# Patient Record
Sex: FEMALE | Race: BLACK OR AFRICAN AMERICAN | NOT HISPANIC OR LATINO | URBAN - METROPOLITAN AREA
[De-identification: names, ages, dates, MRNs, and addresses within clinical notes are randomized per-mention and may not be internally consistent; named-entity substitution may affect disease eponyms.]

---

## 2020-02-21 ENCOUNTER — INPATIENT (INPATIENT)
Facility: HOSPITAL | Age: 50
LOS: 10 days | Discharge: HOME | DRG: 682 | End: 2020-03-03
Attending: INTERNAL MEDICINE | Admitting: INTERNAL MEDICINE
Payer: MEDICAID

## 2020-02-21 VITALS
WEIGHT: 190.04 LBS | RESPIRATION RATE: 22 BRPM | DIASTOLIC BLOOD PRESSURE: 98 MMHG | TEMPERATURE: 99 F | SYSTOLIC BLOOD PRESSURE: 159 MMHG | HEIGHT: 64 IN | OXYGEN SATURATION: 100 % | HEART RATE: 119 BPM

## 2020-02-21 DIAGNOSIS — D64.9 ANEMIA, UNSPECIFIED: ICD-10-CM

## 2020-02-21 LAB
ALBUMIN SERPL ELPH-MCNC: 3.1 G/DL — LOW (ref 3.3–5)
ALP SERPL-CCNC: 104 U/L — SIGNIFICANT CHANGE UP (ref 40–120)
ALT FLD-CCNC: 16 U/L — SIGNIFICANT CHANGE UP (ref 10–45)
ANION GAP SERPL CALC-SCNC: 13 MMOL/L — SIGNIFICANT CHANGE UP (ref 5–17)
ANISOCYTOSIS BLD QL: SIGNIFICANT CHANGE UP
APPEARANCE UR: CLEAR — SIGNIFICANT CHANGE UP
APTT BLD: 32.1 SEC — SIGNIFICANT CHANGE UP (ref 27.5–36.3)
AST SERPL-CCNC: 25 U/L — SIGNIFICANT CHANGE UP (ref 10–40)
BACTERIA # UR AUTO: NEGATIVE — SIGNIFICANT CHANGE UP
BASOPHILS # BLD AUTO: 0 K/UL — SIGNIFICANT CHANGE UP (ref 0–0.2)
BASOPHILS NFR BLD AUTO: 0 % — SIGNIFICANT CHANGE UP (ref 0–2)
BILIRUB SERPL-MCNC: 0.3 MG/DL — SIGNIFICANT CHANGE UP (ref 0.2–1.2)
BILIRUB UR-MCNC: NEGATIVE — SIGNIFICANT CHANGE UP
BLD GP AB SCN SERPL QL: NEGATIVE — SIGNIFICANT CHANGE UP
BUN SERPL-MCNC: 24 MG/DL — HIGH (ref 7–23)
BURR CELLS BLD QL SMEAR: PRESENT — SIGNIFICANT CHANGE UP
CALCIUM SERPL-MCNC: 8.3 MG/DL — LOW (ref 8.4–10.5)
CHLORIDE SERPL-SCNC: 106 MMOL/L — SIGNIFICANT CHANGE UP (ref 96–108)
CO2 SERPL-SCNC: 18 MMOL/L — LOW (ref 22–31)
COLOR SPEC: SIGNIFICANT CHANGE UP
CREAT SERPL-MCNC: 2.04 MG/DL — HIGH (ref 0.5–1.3)
DACRYOCYTES BLD QL SMEAR: SLIGHT — SIGNIFICANT CHANGE UP
DIFF PNL FLD: NEGATIVE — SIGNIFICANT CHANGE UP
ELLIPTOCYTES BLD QL SMEAR: SLIGHT — SIGNIFICANT CHANGE UP
EOSINOPHIL # BLD AUTO: 0.39 K/UL — SIGNIFICANT CHANGE UP (ref 0–0.5)
EOSINOPHIL NFR BLD AUTO: 5 % — SIGNIFICANT CHANGE UP (ref 0–6)
EPI CELLS # UR: 2 /HPF — SIGNIFICANT CHANGE UP
GIANT PLATELETS BLD QL SMEAR: PRESENT — SIGNIFICANT CHANGE UP
GLUCOSE BLDC GLUCOMTR-MCNC: 149 MG/DL — HIGH (ref 70–99)
GLUCOSE SERPL-MCNC: 144 MG/DL — HIGH (ref 70–99)
GLUCOSE UR QL: NEGATIVE — SIGNIFICANT CHANGE UP
HCG UR QL: NEGATIVE — SIGNIFICANT CHANGE UP
HCT VFR BLD CALC: 17.7 % — CRITICAL LOW (ref 34.5–45)
HGB BLD-MCNC: 4 G/DL — CRITICAL LOW (ref 11.5–15.5)
HYALINE CASTS # UR AUTO: 6 /LPF — HIGH (ref 0–2)
HYPOCHROMIA BLD QL: SIGNIFICANT CHANGE UP
INR BLD: 1.29 RATIO — HIGH (ref 0.88–1.16)
KETONES UR-MCNC: NEGATIVE — SIGNIFICANT CHANGE UP
LEUKOCYTE ESTERASE UR-ACNC: NEGATIVE — SIGNIFICANT CHANGE UP
LG PLATELETS BLD QL AUTO: SLIGHT — SIGNIFICANT CHANGE UP
LYMPHOCYTES # BLD AUTO: 1.24 K/UL — SIGNIFICANT CHANGE UP (ref 1–3.3)
LYMPHOCYTES # BLD AUTO: 16 % — SIGNIFICANT CHANGE UP (ref 13–44)
MACROCYTES BLD QL: SLIGHT — SIGNIFICANT CHANGE UP
MANUAL SMEAR VERIFICATION: SIGNIFICANT CHANGE UP
MCHC RBC-ENTMCNC: 13.1 PG — LOW (ref 27–34)
MCHC RBC-ENTMCNC: 22.6 GM/DL — LOW (ref 32–36)
MCV RBC AUTO: 58 FL — LOW (ref 80–100)
MICROCYTES BLD QL: SIGNIFICANT CHANGE UP
MONOCYTES # BLD AUTO: 1.09 K/UL — HIGH (ref 0–0.9)
MONOCYTES NFR BLD AUTO: 14 % — SIGNIFICANT CHANGE UP (ref 2–14)
NEUTROPHILS # BLD AUTO: 5.04 K/UL — SIGNIFICANT CHANGE UP (ref 1.8–7.4)
NEUTROPHILS NFR BLD AUTO: 63 % — SIGNIFICANT CHANGE UP (ref 43–77)
NEUTS BAND # BLD: 2 % — SIGNIFICANT CHANGE UP (ref 0–8)
NITRITE UR-MCNC: NEGATIVE — SIGNIFICANT CHANGE UP
NRBC # BLD: 3 /100 — HIGH (ref 0–0)
NT-PROBNP SERPL-SCNC: HIGH PG/ML (ref 0–300)
PH UR: 6 — SIGNIFICANT CHANGE UP (ref 5–8)
PLAT MORPH BLD: ABNORMAL
PLATELET # BLD AUTO: 363 K/UL — SIGNIFICANT CHANGE UP (ref 150–400)
POLYCHROMASIA BLD QL SMEAR: SLIGHT — SIGNIFICANT CHANGE UP
POTASSIUM SERPL-MCNC: 4.1 MMOL/L — SIGNIFICANT CHANGE UP (ref 3.5–5.3)
POTASSIUM SERPL-SCNC: 4.1 MMOL/L — SIGNIFICANT CHANGE UP (ref 3.5–5.3)
PROT SERPL-MCNC: 7.8 G/DL — SIGNIFICANT CHANGE UP (ref 6–8.3)
PROT UR-MCNC: ABNORMAL
PROTHROM AB SERPL-ACNC: 14.8 SEC — HIGH (ref 10–12.9)
RBC # BLD: 3.05 M/UL — LOW (ref 3.8–5.2)
RBC # FLD: 27.2 % — HIGH (ref 10.3–14.5)
RBC BLD AUTO: ABNORMAL
RBC CASTS # UR COMP ASSIST: 4 /HPF — SIGNIFICANT CHANGE UP (ref 0–4)
RH IG SCN BLD-IMP: POSITIVE — SIGNIFICANT CHANGE UP
RH IG SCN BLD-IMP: POSITIVE — SIGNIFICANT CHANGE UP
SCHISTOCYTES BLD QL AUTO: SLIGHT — SIGNIFICANT CHANGE UP
SODIUM SERPL-SCNC: 137 MMOL/L — SIGNIFICANT CHANGE UP (ref 135–145)
SP GR SPEC: 1.01 — SIGNIFICANT CHANGE UP (ref 1.01–1.02)
TARGETS BLD QL SMEAR: SLIGHT — SIGNIFICANT CHANGE UP
TROPONIN T, HIGH SENSITIVITY RESULT: 22 NG/L — SIGNIFICANT CHANGE UP (ref 0–51)
UROBILINOGEN FLD QL: NEGATIVE — SIGNIFICANT CHANGE UP
WBC # BLD: 7.76 K/UL — SIGNIFICANT CHANGE UP (ref 3.8–10.5)
WBC # FLD AUTO: 7.76 K/UL — SIGNIFICANT CHANGE UP (ref 3.8–10.5)
WBC UR QL: 5 /HPF — SIGNIFICANT CHANGE UP (ref 0–5)

## 2020-02-21 PROCEDURE — 74176 CT ABD & PELVIS W/O CONTRAST: CPT | Mod: 26

## 2020-02-21 PROCEDURE — 99284 EMERGENCY DEPT VISIT MOD MDM: CPT

## 2020-02-21 PROCEDURE — 93010 ELECTROCARDIOGRAM REPORT: CPT

## 2020-02-21 PROCEDURE — 99222 1ST HOSP IP/OBS MODERATE 55: CPT

## 2020-02-21 PROCEDURE — 71046 X-RAY EXAM CHEST 2 VIEWS: CPT | Mod: 26

## 2020-02-21 RX ORDER — INFLUENZA VIRUS VACCINE 15; 15; 15; 15 UG/.5ML; UG/.5ML; UG/.5ML; UG/.5ML
0.5 SUSPENSION INTRAMUSCULAR ONCE
Refills: 0 | Status: COMPLETED | OUTPATIENT
Start: 2020-02-21 | End: 2020-02-21

## 2020-02-21 NOTE — ED PROVIDER NOTE - CLINICAL SUMMARY MEDICAL DECISION MAKING FREE TEXT BOX
49 y old f with history of endometriosis ,DM hypertension with one week of  dyspnea and pedal edema ,couth  ECG no acute changes ,,ro pneumonia vs CHF  ,will obtain chest xray blood work and on reevaluation: ZR

## 2020-02-21 NOTE — CONSULT NOTE ADULT - ASSESSMENT
Ms Robert has likely EVELYN, edema and UA showing proteinuria ( could be all chronic) but given neg CT scan for endometrial finding and mostly showing anasarca, and pe showing edema and UA with protein- this could be first presentation of nephrotic syndrome and sudden onset could suggest minimal change dx vs FSGS. In addition, this could be paraprotein process in setting of anemia as well. Her anemia and renal finding could not be related.  Alternatively. a GI loss of albumin, protein losing enteropathy and anemia could also be causing this and pre renal EVELYN.     For now, consider PRBC and fluids for tonight, no lasix  Await final read on CT Scan but appears neg for endometrial bleeding  Check urine lytes  Please send tests for ALVERTO, dsDNA, anti smith, RF, ANCAs, Anti GBM, Anti PLA2R level, anti GBM, Hep panel, HIV , c3,c4,RPR, immunoglobulin free light chains, Serum immunofixation, urine for protein/crt ratio  Needs renal dopplers for rule out Renal veins for rule out thrombosis      Arvin Sarmiento MD  Cell   Pager   Office  Ms Robert has likely EVELYN, edema and UA showing proteinuria ( could be all chronic) but given neg CT scan for endometrial finding and mostly showing anasarca, and pe showing edema and UA with protein- this could be first presentation of nephrotic syndrome and sudden onset could suggest minimal change dx vs FSGS. In addition, this could be paraprotein process in setting of anemia as well. Her anemia and renal finding could not be related.  Alternatively. a GI loss of albumin, protein losing enteropathy and anemia could also be causing this and pre renal EVELYN.     For now, consider PRBC and fluids for tonight, no lasix  Await final read on CT Scan but appears neg for endometrial bleeding  Check urine lytes  Please send tests for ALVERTO, dsDNA, anti smith, RF, ANCAs, Anti GBM, Anti PLA2R level, anti GBM, Hep panel, HIV , c3,c4,RPR, immunoglobulin free light chains, Serum immunofixation, urine for protein/crt ratio  Needs renal dopplers for rule out Renal veins for rule out thrombosis  [place] still get [place] in [place] as might need alternate imaging as CT neg for obvious finding- pt said this was missed last time as well on CT      Arvin Sarmiento MD  Cell   Pager   Office

## 2020-02-21 NOTE — ED ADULT NURSE REASSESSMENT NOTE - NS ED NURSE REASSESS COMMENT FT1
Hgb level is 4. Blood transfusion Type 'O' pos started after checked by 2RN's. Blood transfusion protocol followed. Will continue to reassess.

## 2020-02-21 NOTE — ED ADULT NURSE NOTE - OBJECTIVE STATEMENT
Female 49 years old with past medical history of DM and HTN came in for shortness of breathe for 2 days with swelling of both legs. Pt reports she vomited once last night. With occasional productive cough of whitish secretions. Denies fever, chills, chest pain or urinary symptoms. No sick contacts, no recent travel. Will continue to reassess. Female 49 years old with past medical history of DM and HTN came in for shortness of breathe for 2 days with swelling of both legs. Pt reports she vomited once last night. With occasional productive cough of whitish secretions. Denies fever, chills, chest pain or urinary symptoms. Abdomen distended but soft and non tender.  No sick contacts, no recent travel. Will continue to reassess.

## 2020-02-21 NOTE — ED PROVIDER NOTE - RAPID ASSESSMENT
49y f PMHx DM, HTN, endometriosis p/w shortness of breath. Pt reports SOB for approximately 1 week, associated with cough/vomiting. she reports swollen ankles/legs for 2 days with increased discomfort with walking. +cough with clear sputum. Denies chest pain or pain with deep inspiration. She also admits to nausea/vomiting this week after coughing fits, approx 1 episode per day this week, nonbloody and nonbilious. +nausea currently. Currently anemic on iron supplements, diagnosed 5 yrs ago. Did not get flu shot this year. Pt denies recent travel, OCPs, hemoptysis, fever, chills, sick contacts, heavy vaginal bleeding, abd pain, diarrhea, dysuria, hematuria, or joint pain. LMP september, pt perimenopausal.     **Pt seen in waiting room by Kt Davis (FRANNY) Documentation completed by myself. Pt to be sent to main ED for further evaluation - all orders placed to be followed by MD in the main ED** 49y f PMHx DM, HTN, endometriosis p/w shortness of breath. Pt reports SOB for approximately 1 week, associated with cough/vomiting. she reports swollen ankles/legs for 2 days with increased discomfort with walking. +cough with clear sputum. Denies chest pain or pain with deep inspiration. She also admits to nausea/vomiting this week after coughing fits, approx 1 episode per day this week, nonbloody and nonbilious. +nausea currently. Currently anemic on iron supplements, diagnosed 5 yrs ago. Did not get flu shot this year. Pt denies recent travel, OCPs, hemoptysis, fever, chills, sick contacts, heavy vaginal bleeding, abd pain, diarrhea, dysuria, hematuria, or joint pain. LMP september, pt perimenopausal.     **Pt seen in waiting room by Kt Davis (FRANNY) Documentation completed by myself. Pt to be sent to main ED for further evaluation - all orders placed to be followed by MD in the main ED**    Dr. Khoury: I attest to above documentation. I did not personally see or examine this pt, but was present in the department and available to the PA/ACP at the time.

## 2020-02-21 NOTE — ED PROVIDER NOTE - OBJECTIVE STATEMENT
49y female with PMH of DM, HTN, endometriosis presenting with SOB. Started with productive cough with white sputum x4 months, for the last week she has had SOB, and multiple episodes of post-tussive vomiting, no nausea or diarrhea. For the past 2 days she has had bilateral leg swelling, never had leg swelling before. No chest pain, but feels abdominal distention and fullness that feels like it is pressing up into her chest. No fevers, chills, abdominal pain.

## 2020-02-22 DIAGNOSIS — D64.9 ANEMIA, UNSPECIFIED: ICD-10-CM

## 2020-02-22 DIAGNOSIS — N17.9 ACUTE KIDNEY FAILURE, UNSPECIFIED: ICD-10-CM

## 2020-02-22 DIAGNOSIS — E11.9 TYPE 2 DIABETES MELLITUS WITHOUT COMPLICATIONS: ICD-10-CM

## 2020-02-22 DIAGNOSIS — I10 ESSENTIAL (PRIMARY) HYPERTENSION: ICD-10-CM

## 2020-02-22 LAB
ANION GAP SERPL CALC-SCNC: 11 MMOL/L — SIGNIFICANT CHANGE UP (ref 5–17)
BUN SERPL-MCNC: 23 MG/DL — SIGNIFICANT CHANGE UP (ref 7–23)
CALCIUM SERPL-MCNC: 8 MG/DL — LOW (ref 8.4–10.5)
CHLORIDE SERPL-SCNC: 108 MMOL/L — SIGNIFICANT CHANGE UP (ref 96–108)
CO2 SERPL-SCNC: 18 MMOL/L — LOW (ref 22–31)
CREAT ?TM UR-MCNC: 106 MG/DL — SIGNIFICANT CHANGE UP
CREAT SERPL-MCNC: 2.07 MG/DL — HIGH (ref 0.5–1.3)
ERYTHROCYTE [SEDIMENTATION RATE] IN BLOOD: 37 MM/HR — HIGH (ref 0–15)
GLUCOSE BLDC GLUCOMTR-MCNC: 108 MG/DL — HIGH (ref 70–99)
GLUCOSE BLDC GLUCOMTR-MCNC: 117 MG/DL — HIGH (ref 70–99)
GLUCOSE BLDC GLUCOMTR-MCNC: 142 MG/DL — HIGH (ref 70–99)
GLUCOSE BLDC GLUCOMTR-MCNC: 145 MG/DL — HIGH (ref 70–99)
GLUCOSE SERPL-MCNC: 114 MG/DL — HIGH (ref 70–99)
HBA1C BLD-MCNC: 5.4 % — SIGNIFICANT CHANGE UP (ref 4–5.6)
HCT VFR BLD CALC: 22.3 % — LOW (ref 34.5–45)
HCT VFR BLD CALC: 22.7 % — LOW (ref 34.5–45)
HCT VFR BLD CALC: 27.7 % — LOW (ref 34.5–45)
HGB BLD-MCNC: 5.7 G/DL — CRITICAL LOW (ref 11.5–15.5)
HGB BLD-MCNC: 5.8 G/DL — CRITICAL LOW (ref 11.5–15.5)
HGB BLD-MCNC: 7.6 G/DL — LOW (ref 11.5–15.5)
MCHC RBC-ENTMCNC: 16.2 PG — LOW (ref 27–34)
MCHC RBC-ENTMCNC: 16.3 PG — LOW (ref 27–34)
MCHC RBC-ENTMCNC: 18.7 PG — LOW (ref 27–34)
MCHC RBC-ENTMCNC: 25.6 GM/DL — LOW (ref 32–36)
MCHC RBC-ENTMCNC: 25.6 GM/DL — LOW (ref 32–36)
MCHC RBC-ENTMCNC: 27.4 GM/DL — LOW (ref 32–36)
MCV RBC AUTO: 63.6 FL — LOW (ref 80–100)
MCV RBC AUTO: 63.7 FL — LOW (ref 80–100)
MCV RBC AUTO: 68.1 FL — LOW (ref 80–100)
NRBC # BLD: 1 /100 WBCS — HIGH (ref 0–0)
PLATELET # BLD AUTO: 313 K/UL — SIGNIFICANT CHANGE UP (ref 150–400)
PLATELET # BLD AUTO: 328 K/UL — SIGNIFICANT CHANGE UP (ref 150–400)
PLATELET # BLD AUTO: 347 K/UL — SIGNIFICANT CHANGE UP (ref 150–400)
POTASSIUM SERPL-MCNC: 4.1 MMOL/L — SIGNIFICANT CHANGE UP (ref 3.5–5.3)
POTASSIUM SERPL-SCNC: 4.1 MMOL/L — SIGNIFICANT CHANGE UP (ref 3.5–5.3)
PROT ?TM UR-MCNC: 366 MG/DL — HIGH (ref 0–12)
PROT SERPL-MCNC: 6.6 G/DL — SIGNIFICANT CHANGE UP (ref 6–8.3)
PROT SERPL-MCNC: 6.6 G/DL — SIGNIFICANT CHANGE UP (ref 6–8.3)
PROT/CREAT UR-RTO: 3.5 RATIO — HIGH (ref 0–0.2)
RBC # BLD: 3.5 M/UL — LOW (ref 3.8–5.2)
RBC # BLD: 3.57 M/UL — LOW (ref 3.8–5.2)
RBC # BLD: 4.07 M/UL — SIGNIFICANT CHANGE UP (ref 3.8–5.2)
RBC # FLD: 32.9 % — HIGH (ref 10.3–14.5)
RBC # FLD: 33.2 % — HIGH (ref 10.3–14.5)
RBC # FLD: 33.9 % — HIGH (ref 10.3–14.5)
SODIUM SERPL-SCNC: 137 MMOL/L — SIGNIFICANT CHANGE UP (ref 135–145)
SODIUM UR-SCNC: 46 MMOL/L — SIGNIFICANT CHANGE UP
WBC # BLD: 9.16 K/UL — SIGNIFICANT CHANGE UP (ref 3.8–10.5)
WBC # BLD: 9.47 K/UL — SIGNIFICANT CHANGE UP (ref 3.8–10.5)
WBC # BLD: 9.94 K/UL — SIGNIFICANT CHANGE UP (ref 3.8–10.5)
WBC # FLD AUTO: 9.16 K/UL — SIGNIFICANT CHANGE UP (ref 3.8–10.5)
WBC # FLD AUTO: 9.47 K/UL — SIGNIFICANT CHANGE UP (ref 3.8–10.5)
WBC # FLD AUTO: 9.94 K/UL — SIGNIFICANT CHANGE UP (ref 3.8–10.5)

## 2020-02-22 PROCEDURE — 99232 SBSQ HOSP IP/OBS MODERATE 35: CPT

## 2020-02-22 RX ORDER — HEPARIN SODIUM 5000 [USP'U]/ML
5000 INJECTION INTRAVENOUS; SUBCUTANEOUS
Refills: 0 | Status: DISCONTINUED | OUTPATIENT
Start: 2020-02-22 | End: 2020-02-26

## 2020-02-22 RX ORDER — LOSARTAN POTASSIUM 100 MG/1
50 TABLET, FILM COATED ORAL DAILY
Refills: 0 | Status: DISCONTINUED | OUTPATIENT
Start: 2020-02-22 | End: 2020-03-03

## 2020-02-22 RX ORDER — INSULIN LISPRO 100/ML
VIAL (ML) SUBCUTANEOUS
Refills: 0 | Status: DISCONTINUED | OUTPATIENT
Start: 2020-02-22 | End: 2020-03-03

## 2020-02-22 RX ORDER — DEXTROSE 50 % IN WATER 50 %
25 SYRINGE (ML) INTRAVENOUS ONCE
Refills: 0 | Status: DISCONTINUED | OUTPATIENT
Start: 2020-02-22 | End: 2020-03-03

## 2020-02-22 RX ORDER — DEXTROSE 50 % IN WATER 50 %
15 SYRINGE (ML) INTRAVENOUS ONCE
Refills: 0 | Status: DISCONTINUED | OUTPATIENT
Start: 2020-02-22 | End: 2020-03-03

## 2020-02-22 RX ORDER — BUMETANIDE 0.25 MG/ML
2 INJECTION INTRAMUSCULAR; INTRAVENOUS
Refills: 0 | Status: DISCONTINUED | OUTPATIENT
Start: 2020-02-22 | End: 2020-03-03

## 2020-02-22 RX ORDER — SODIUM CHLORIDE 9 MG/ML
1000 INJECTION, SOLUTION INTRAVENOUS
Refills: 0 | Status: DISCONTINUED | OUTPATIENT
Start: 2020-02-22 | End: 2020-03-03

## 2020-02-22 RX ORDER — GLUCAGON INJECTION, SOLUTION 0.5 MG/.1ML
1 INJECTION, SOLUTION SUBCUTANEOUS ONCE
Refills: 0 | Status: DISCONTINUED | OUTPATIENT
Start: 2020-02-22 | End: 2020-03-03

## 2020-02-22 RX ORDER — DEXTROSE 50 % IN WATER 50 %
12.5 SYRINGE (ML) INTRAVENOUS ONCE
Refills: 0 | Status: DISCONTINUED | OUTPATIENT
Start: 2020-02-22 | End: 2020-03-03

## 2020-02-22 RX ADMIN — HEPARIN SODIUM 5000 UNIT(S): 5000 INJECTION INTRAVENOUS; SUBCUTANEOUS at 17:29

## 2020-02-22 RX ADMIN — Medication 100 MILLIGRAM(S): at 17:20

## 2020-02-22 RX ADMIN — Medication 100 MILLIGRAM(S): at 10:05

## 2020-02-22 RX ADMIN — HEPARIN SODIUM 5000 UNIT(S): 5000 INJECTION INTRAVENOUS; SUBCUTANEOUS at 05:16

## 2020-02-22 RX ADMIN — LOSARTAN POTASSIUM 50 MILLIGRAM(S): 100 TABLET, FILM COATED ORAL at 09:58

## 2020-02-22 RX ADMIN — BUMETANIDE 2 MILLIGRAM(S): 0.25 INJECTION INTRAMUSCULAR; INTRAVENOUS at 17:40

## 2020-02-22 NOTE — H&P ADULT - HISTORY OF PRESENT ILLNESS
49 y old AA female with hx of DMII, HTN, endometriosis here with SOB, ARIAS and abdominal girth last 1 week( sudden onset) and new LE edema as well. All started 1 week ago. Also cough for 1 month. Non sputum  She has had some n/v as well and some sputum production. She Feels that it is her endometriosis that is bleeding and enlarged. She has had something similar in 2012. In er, she had hgb of 4, crt of 2. Her prior crt was normal in 2015. No NSAID use, no recent new meds. No prior hx of renal disease. She has no other med hx.  PRBC transfusion ordered in the ED  She does have labs showing 300+ protein and albumin <3.5.  No jt pains, no rashes, no other specific complaints

## 2020-02-22 NOTE — PROGRESS NOTE ADULT - SUBJECTIVE AND OBJECTIVE BOX
[facility] DIVISION OF KIDNEY DISEASES AND HYPERTENSION -- FOLLOW UP NOTE  --------------------------------------------------------------------------------  Chief Complaint:    Feels no change    24 hour events/subjective:  urine studies noted      PAST HISTORY  --------------------------------------------------------------------------------  No significant changes to PMH, PSH, FHx, SHx, unless otherwise noted    ALLERGIES & MEDICATIONS  --------------------------------------------------------------------------------  Allergies    No Known Allergies    Intolerances      Standing Inpatient Medications  dextrose 5%. 1000 milliLiter(s) IV Continuous <Continuous>  dextrose 50% Injectable 12.5 Gram(s) IV Push once  dextrose 50% Injectable 25 Gram(s) IV Push once  dextrose 50% Injectable 25 Gram(s) IV Push once  heparin  Injectable 5000 Unit(s) SubCutaneous two times a day  influenza   Vaccine 0.5 milliLiter(s) IntraMuscular once  insulin lispro (HumaLOG) corrective regimen sliding scale   SubCutaneous three times a day before meals  losartan 50 milliGRAM(s) Oral daily    PRN Inpatient Medications  dextrose 40% Gel 15 Gram(s) Oral once PRN  glucagon  Injectable 1 milliGRAM(s) IntraMuscular once PRN      REVIEW OF SYSTEMS  --------------------------------------------------------------------------------  Gen: No weight changes, fatigue, fevers/chills, weakness  Skin: No rashes  Head/Eyes/Ears/Mouth: No headache; Normal hearing; Normal vision w/o blurriness; No sinus pain/discomfort, sore throat  Respiratory: No dyspnea, cough, wheezing, hemoptysis  CV: No chest pain, PND, orthopnea  GI: No abdominal pain, diarrhea, constipation, nausea, vomiting, melena, hematochezia  : No increased frequency, dysuria, hematuria, nocturia  MSK: No joint pain/swelling; no back pain; no edema  Neuro: No dizziness/lightheadedness, weakness, seizures, numbness, tingling  Heme: No easy bruising or bleeding  Endo: No heat/cold intolerance  Psych: No significant nervousness, anxiety, stress, depression    All other systems were reviewed and are negative, except as noted.    VITALS/PHYSICAL EXAM  --------------------------------------------------------------------------------  T(C): 36.6 (02-22-20 @ 11:48), Max: 37.4 (02-21-20 @ 18:07)  HR: 106 (02-22-20 @ 11:48) (91 - 119)  BP: 155/101 (02-22-20 @ 11:48) (135/90 - 159/98)  RR: 19 (02-22-20 @ 11:48) (18 - 22)  SpO2: 97% (02-22-20 @ 11:48) (96% - 100%)  Wt(kg): --  Height (cm): 162.56 (02-21-20 @ 13:29)  Weight (kg): 86.2 (02-21-20 @ 13:29)  BMI (kg/m2): 32.6 (02-21-20 @ 13:29)  BSA (m2): 1.91 (02-21-20 @ 13:29)      PHYSICAL EXAM: vital signs as above  in no apparent distress  Neck: Supple, no JVD,    Lungs: no rhonchi, no wheeze, no crackles  CVS: S1 S2 no M/R/G  Abdomen: no tenderness, no organomegaly, anasarca  Neuro: Grossly intact  Ext: no cyanosis or clubbing, 2+ edema    LABS/STUDIES  --------------------------------------------------------------------------------              5.7    9.47  >-----------<  328      [02-22-20 @ 05:36]              22.3     137  |  108  |  23  ----------------------------<  114      [02-22-20 @ 03:32]  4.1   |  18  |  2.07        Ca     8.0     [02-22-20 @ 03:32]    TPro  6.6  /  Alb  x   /  TBili  x   /  DBili  x   /  AST  x   /  ALT  x   /  AlkPhos  x   [02-22-20 @ 12:11]    PT/INR: PT 14.8 , INR 1.29       [02-21-20 @ 16:15]  PTT: 32.1       [02-21-20 @ 16:15]      Creatinine Trend:  SCr 2.07 [02-22 @ 03:32]  SCr 2.04 [02-21 @ 14:52]    Urinalysis - [02-21-20 @ 15:40]      Color Light Yellow / Appearance Clear / SG 1.015 / pH 6.0      Gluc Negative / Ketone Negative  / Bili Negative / Urobili Negative       Blood Negative / Protein 300 mg/dL / Leuk Est Negative / Nitrite Negative      RBC 4 / WBC 5 / Hyaline 6 / Gran  / Sq Epi  / Non Sq Epi 2 / Bacteria Negative    Urine Creatinine 106      [02-22-20 @ 07:06]  Urine Protein 366      [02-22-20 @ 07:06]  Urine Sodium 46      [02-22-20 @ 07:06]

## 2020-02-22 NOTE — H&P ADULT - PROBLEM SELECTOR PLAN 4
She denies any pmh of renal failure. Renal eval called. STEFANI as advised. Nephrotic syndrome noted on UA with substantial proteinuria

## 2020-02-22 NOTE — PROGRESS NOTE ADULT - PROBLEM SELECTOR PLAN 4
She denies any pmh of renal failure. Renal eval noted  KO as advised. Nephrotic syndrome noted on UA with substantial proteinuria  Anasarca related to proteinuria, started on IV Bumex

## 2020-02-22 NOTE — PROGRESS NOTE ADULT - ASSESSMENT
Ms Robert has likely EVELYN, edema and UA showing proteinuria ( could be all chronic) but given neg CT scan for endometrial finding and mostly showing anasarca, and pe showing edema and UA with protein- this could be first presentation of nephrotic syndrome and sudden onset could suggest minimal change dx vs FSGS. In addition, this could be paraprotein process in setting of anemia as well. Her anemia and renal finding could not be related.  Alternatively. a GI loss of albumin, protein losing enteropathy and anemia could also be causing this and pre renal EVELYN.     Spot ratio showing 3.5 gm of proteinuria- could be diabetes related but with her presentation could be nephrotic syndrome other causes- all workup sent as listed below.   ALVERTO, dsDNA, anti smith, RF, ANCAs, Anti GBM, Anti PLA2R level, anti GBM, Hep panel, HIV , c3,c4,RPR, immunoglobulin free light chains, Serum immunofixation  Needs renal dopplers for rule out Renal veins for rule out thrombosis  Would still get [place] in [place] as might need alternate imaging as CT neg for obvious finding- pt said this was missed last time as well on CT  on [facility] for now - which is great  start bumex 2mg IV BID for diuresis as most of this is anasarca      Arvin Sarmiento MD  Cell   Pager   Office

## 2020-02-22 NOTE — CONSULT NOTE ADULT - ASSESSMENT
49 y old AA female with hx of DMII, HTN, endometriosis here with SOB, ARIAS and abdominal girth and new LE edema, found to have renal dysfunction, proteinuria, and severe anemia. CT a/p found hepatomegaly and 1.1cm soft tissue density in the L breast    Anemia -- responded well to PRBC, with microcytosis, concerning for bleeding, though can also be due to thalassemia. Also may be ACD due to renal dysfunction  -- hgb adequate at this time  -- transfuse to keep hgb >7  -- check Fe panel, B12, folate, SPEP, ZOLTAN, hapto, FOBT, hemoglobin electrophoresis  -- if with iron def, will need GI eval  -- monitor CBC    Renal -- renal following    edema -- per renal    endometriosis -- pls consult gyn    hepatomegaly -- LFTs nml, cause unclear  -- monitor for now, ? GI eval    L breast soft tissue density -- partially noted on CT. Not able to obtain mammo in house, ? utility of CT chest to eval  -- will need mammo and breast u/s as outpt    Will follow, d/w primary team, [phone]

## 2020-02-22 NOTE — PROGRESS NOTE ADULT - SUBJECTIVE AND OBJECTIVE BOX
Patient is a 49y old  Female who presents with a chief complaint of Edema (2020 13:12)      SUBJECTIVE / OVERNIGHT EVENTS:  No dizziness. Anasarca +  Review of Systems:   CONSTITUTIONAL: No fever, weight loss, or fatigue  EYES: No eye pain, visual disturbances, or discharge  ENMT:  No difficulty hearing, tinnitus, vertigo; No sinus or throat pain  NECK: No pain or stiffness  BREASTS: No pain, masses, or nipple discharge  RESPIRATORY: No cough, wheezing, chills or hemoptysis; No shortness of breath  CARDIOVASCULAR: No chest pain, palpitations, dizziness, + leg swelling  GASTROINTESTINAL: No abdominal or epigastric pain. No nausea, vomiting, or hematemesis; No diarrhea or constipation. No melena or hematochezia.  GENITOURINARY: No dysuria, frequency, hematuria, or incontinence  NEUROLOGICAL: No headaches, memory loss, loss of strength, numbness, or tremors  SKIN: No itching, burning, rashes, or lesions   LYMPH NODES: No enlarged glands  ENDOCRINE: No heat or cold intolerance; No hair loss  MUSCULOSKELETAL: No joint pain or swelling; No muscle, back, or extremity pain  PSYCHIATRIC: No depression, anxiety, mood swings, or difficulty sleeping  HEME/LYMPH: No easy bruising, or bleeding gums  ALLERY AND IMMUNOLOGIC: No hives or eczema    MEDICATIONS  (STANDING):  buMETAnide Injectable 2 milliGRAM(s) IV Push two times a day  dextrose 5%. 1000 milliLiter(s) (50 mL/Hr) IV Continuous <Continuous>  dextrose 50% Injectable 12.5 Gram(s) IV Push once  dextrose 50% Injectable 25 Gram(s) IV Push once  dextrose 50% Injectable 25 Gram(s) IV Push once  heparin  Injectable 5000 Unit(s) SubCutaneous two times a day  influenza   Vaccine 0.5 milliLiter(s) IntraMuscular once  insulin lispro (HumaLOG) corrective regimen sliding scale   SubCutaneous three times a day before meals  losartan 50 milliGRAM(s) Oral daily    MEDICATIONS  (PRN):  dextrose 40% Gel 15 Gram(s) Oral once PRN Blood Glucose LESS THAN 70 milliGRAM(s)/deciliter  glucagon  Injectable 1 milliGRAM(s) IntraMuscular once PRN Glucose LESS THAN 70 milligrams/deciliter      PHYSICAL EXAM:  Vital Signs Last 24 Hrs  T(C): 36.6 (2020 15:31), Max: 36.9 (2020 09:53)  T(F): 97.9 (2020 15:31), Max: 98.4 (2020 09:53)  HR: 110 (2020 17:19) (91 - 112)  BP: 142/99 (2020 17:19) (135/90 - 155/101)  BP(mean): --  RR: 19 (2020 17:19) (18 - 19)  SpO2: 100% (2020 17:19) (96% - 100%)  I&O's Summary    2020 07:01  -  2020 21:24  --------------------------------------------------------  IN: 120 mL / OUT: 0 mL / NET: 120 mL      GENERAL: NAD, well-developed  HEAD:  Atraumatic, Normocephalic  EYES: EOMI, PERRLA, conjunctiva and sclera clear  NECK: Supple, No JVD  CHEST/LUNG: Clear to auscultation bilaterally; No wheeze  HEART: Regular rate and rhythm; No murmurs, rubs, or gallops  ABDOMEN: Soft, Nontender, Nondistended; Bowel sounds present  EXTREMITIES:  2+ Peripheral Pulses, No clubbing, cyanosis, 3+edema  PSYCH: AAOx3  NEUROLOGY: non-focal  SKIN: No rashes or lesions    LABS:  CAPILLARY BLOOD GLUCOSE      POCT Blood Glucose.: 145 mg/dL (2020 17:39)  POCT Blood Glucose.: 142 mg/dL (2020 12:39)  POCT Blood Glucose.: 108 mg/dL (2020 08:24)  POCT Blood Glucose.: 149 mg/dL (2020 21:54)                          7.6    9.94  )-----------( 313      ( 2020 16:36 )             27.7     02-    137  |  108  |  23  ----------------------------<  114<H>  4.1   |  18<L>  |  2.07<H>    Ca    8.0<L>      2020 03:32    TPro  6.6  /  Alb  x   /  TBili  x   /  DBili  x   /  AST  x   /  ALT  x   /  AlkPhos  x       PT/INR - ( 2020 16:15 )   PT: 14.8 sec;   INR: 1.29 ratio         PTT - ( 2020 16:15 )  PTT:32.1 sec      Urinalysis Basic - ( 2020 15:40 )    Color: Light Yellow / Appearance: Clear / S.015 / pH: x  Gluc: x / Ketone: Negative  / Bili: Negative / Urobili: Negative   Blood: x / Protein: 300 mg/dL / Nitrite: Negative   Leuk Esterase: Negative / RBC: 4 /hpf / WBC 5 /HPF   Sq Epi: x / Non Sq Epi: 2 /hpf / Bacteria: Negative        RADIOLOGY & ADDITIONAL TESTS:    Imaging Personally Reviewed:    Consultant(s) Notes Reviewed:      Care Discussed with Consultants/Other Providers:

## 2020-02-23 LAB
ANION GAP SERPL CALC-SCNC: 13 MMOL/L — SIGNIFICANT CHANGE UP (ref 5–17)
BUN SERPL-MCNC: 29 MG/DL — HIGH (ref 7–23)
CALCIUM SERPL-MCNC: 8.3 MG/DL — LOW (ref 8.4–10.5)
CHLORIDE SERPL-SCNC: 106 MMOL/L — SIGNIFICANT CHANGE UP (ref 96–108)
CO2 SERPL-SCNC: 20 MMOL/L — LOW (ref 22–31)
CREAT SERPL-MCNC: 2.14 MG/DL — HIGH (ref 0.5–1.3)
FERRITIN SERPL-MCNC: 14 NG/ML — LOW (ref 15–150)
FOLATE SERPL-MCNC: >20 NG/ML — SIGNIFICANT CHANGE UP
GLUCOSE BLDC GLUCOMTR-MCNC: 130 MG/DL — HIGH (ref 70–99)
GLUCOSE BLDC GLUCOMTR-MCNC: 135 MG/DL — HIGH (ref 70–99)
GLUCOSE BLDC GLUCOMTR-MCNC: 139 MG/DL — HIGH (ref 70–99)
GLUCOSE BLDC GLUCOMTR-MCNC: 143 MG/DL — HIGH (ref 70–99)
GLUCOSE SERPL-MCNC: 78 MG/DL — SIGNIFICANT CHANGE UP (ref 70–99)
HAPTOGLOB SERPL-MCNC: 176 MG/DL — SIGNIFICANT CHANGE UP (ref 34–200)
HCT VFR BLD CALC: SIGNIFICANT CHANGE UP % (ref 34.5–45)
HGB BLD-MCNC: 8.1 G/DL — LOW (ref 11.5–15.5)
IRON SATN MFR SERPL: 29 UG/DL — LOW (ref 30–160)
IRON SATN MFR SERPL: 8 % — LOW (ref 14–50)
MCHC RBC-ENTMCNC: SIGNIFICANT CHANGE UP GM/DL (ref 32–36)
MCHC RBC-ENTMCNC: SIGNIFICANT CHANGE UP PG (ref 27–34)
MCV RBC AUTO: SIGNIFICANT CHANGE UP FL (ref 80–100)
NRBC # BLD: 1 /100 WBCS — HIGH (ref 0–0)
PLATELET # BLD AUTO: 330 K/UL — SIGNIFICANT CHANGE UP (ref 150–400)
POTASSIUM SERPL-MCNC: 4 MMOL/L — SIGNIFICANT CHANGE UP (ref 3.5–5.3)
POTASSIUM SERPL-SCNC: 4 MMOL/L — SIGNIFICANT CHANGE UP (ref 3.5–5.3)
PROT SERPL-MCNC: 6.9 G/DL — SIGNIFICANT CHANGE UP (ref 6–8.3)
PROT SERPL-MCNC: 6.9 G/DL — SIGNIFICANT CHANGE UP (ref 6–8.3)
RBC # BLD: 4.44 M/UL — SIGNIFICANT CHANGE UP (ref 3.8–5.2)
RBC # BLD: 4.44 M/UL — SIGNIFICANT CHANGE UP (ref 3.8–5.2)
RBC # FLD: SIGNIFICANT CHANGE UP % (ref 10.3–14.5)
RETICS #: 11.5 K/UL — LOW (ref 25–125)
RETICS/RBC NFR: 0.3 % — LOW (ref 0.5–2.5)
SODIUM SERPL-SCNC: 139 MMOL/L — SIGNIFICANT CHANGE UP (ref 135–145)
T4 FREE SERPL-MCNC: 1.3 NG/DL — SIGNIFICANT CHANGE UP (ref 0.9–1.8)
TIBC SERPL-MCNC: 350 UG/DL — SIGNIFICANT CHANGE UP (ref 220–430)
TSH SERPL-MCNC: 1.06 UIU/ML — SIGNIFICANT CHANGE UP (ref 0.27–4.2)
UIBC SERPL-MCNC: 321 UG/DL — SIGNIFICANT CHANGE UP (ref 110–370)
VIT B12 SERPL-MCNC: 1119 PG/ML — SIGNIFICANT CHANGE UP (ref 232–1245)
WBC # BLD: 10.83 K/UL — HIGH (ref 3.8–10.5)
WBC # FLD AUTO: 10.83 K/UL — HIGH (ref 3.8–10.5)

## 2020-02-23 PROCEDURE — 83020 HEMOGLOBIN ELECTROPHORESIS: CPT | Mod: 26

## 2020-02-23 PROCEDURE — 99232 SBSQ HOSP IP/OBS MODERATE 35: CPT | Mod: GC

## 2020-02-23 RX ORDER — IRON SUCROSE 20 MG/ML
200 INJECTION, SOLUTION INTRAVENOUS EVERY 24 HOURS
Refills: 0 | Status: COMPLETED | OUTPATIENT
Start: 2020-02-23 | End: 2020-02-26

## 2020-02-23 RX ADMIN — BUMETANIDE 2 MILLIGRAM(S): 0.25 INJECTION INTRAMUSCULAR; INTRAVENOUS at 18:17

## 2020-02-23 RX ADMIN — LOSARTAN POTASSIUM 50 MILLIGRAM(S): 100 TABLET, FILM COATED ORAL at 05:15

## 2020-02-23 RX ADMIN — Medication 200 MILLIGRAM(S): at 13:48

## 2020-02-23 RX ADMIN — HEPARIN SODIUM 5000 UNIT(S): 5000 INJECTION INTRAVENOUS; SUBCUTANEOUS at 18:18

## 2020-02-23 RX ADMIN — HEPARIN SODIUM 5000 UNIT(S): 5000 INJECTION INTRAVENOUS; SUBCUTANEOUS at 05:17

## 2020-02-23 RX ADMIN — IRON SUCROSE 110 MILLIGRAM(S): 20 INJECTION, SOLUTION INTRAVENOUS at 18:17

## 2020-02-23 RX ADMIN — BUMETANIDE 2 MILLIGRAM(S): 0.25 INJECTION INTRAMUSCULAR; INTRAVENOUS at 05:17

## 2020-02-23 NOTE — PROGRESS NOTE ADULT - PROBLEM SELECTOR PLAN 4
She denies any pmh of renal failure. Renal eval noted  KO as advised. Nephrotic syndrome noted on UA with substantial proteinuria  Anasarca related to proteinuria, started on IV Bumex. Renal Biopsy recommended

## 2020-02-23 NOTE — PROGRESS NOTE ADULT - SUBJECTIVE AND OBJECTIVE BOX
[facility] DIVISION OF KIDNEY DISEASES AND HYPERTENSION -- FOLLOW UP NOTE  --------------------------------------------------------------------------------  Chief Complaint: Body swelling    24 hour events/subjective: Pt. seen and examined at bedside. Pt. states that her facial swelling is improved. States her LE edema is also improving. Denies CP, SOB, N/V/F/C.    PAST HISTORY  --------------------------------------------------------------------------------  No significant changes to PMH, PSH, FHx, SHx, unless otherwise noted    ALLERGIES & MEDICATIONS  --------------------------------------------------------------------------------  Allergies    No Known Allergies    Intolerances    Standing Inpatient Medications  buMETAnide Injectable 2 milliGRAM(s) IV Push two times a day  dextrose 5%. 1000 milliLiter(s) IV Continuous <Continuous>  dextrose 50% Injectable 12.5 Gram(s) IV Push once  dextrose 50% Injectable 25 Gram(s) IV Push once  dextrose 50% Injectable 25 Gram(s) IV Push once  heparin  Injectable 5000 Unit(s) SubCutaneous two times a day  influenza   Vaccine 0.5 milliLiter(s) IntraMuscular once  insulin lispro (HumaLOG) corrective regimen sliding scale   SubCutaneous three times a day before meals  losartan 50 milliGRAM(s) Oral daily    REVIEW OF SYSTEMS  --------------------------------------------------------------------------------  Gen: No lethargy  Respiratory: No dyspnea  CV: No chest pain  GI: No abdominal pain  MSK: + LE edema  Neuro: No dizziness  Heme: No bleeding    All other systems were reviewed and are negative, except as noted.    VITALS/PHYSICAL EXAM  --------------------------------------------------------------------------------  T(C): 36.7 (02-23-20 @ 05:04), Max: 37 (02-22-20 @ 21:33)  HR: 104 (02-23-20 @ 05:04) (104 - 112)  BP: 149/89 (02-23-20 @ 05:04) (133/92 - 155/101)  RR: 18 (02-23-20 @ 05:04) (18 - 19)  SpO2: 96% (02-23-20 @ 05:04) (96% - 100%)  Wt(kg): --  Height (cm): 162.56 (02-21-20 @ 13:29)  Weight (kg): 86.2 (02-21-20 @ 13:29)  BMI (kg/m2): 32.6 (02-21-20 @ 13:29)  BSA (m2): 1.91 (02-21-20 @ 13:29)    02-22-20 @ 07:01  -  02-23-20 @ 07:00  --------------------------------------------------------  IN: 600 mL / OUT: 0 mL / NET: 600 mL    Physical Exam:  	Gen: NAD  	HEENT: supple neck  	Pulm: CTA B/L  	CV: RRR, S1S2; no rub  	Abd: +BS, soft, nontender/nondistended  	: No suprapubic tenderness  	LE: 2+ edema b/l    LABS/STUDIES  --------------------------------------------------------------------------------              7.6    9.94  >-----------<  313      [02-22-20 @ 16:36]              27.7     139  |  106  |  29  ----------------------------<  78      [02-23-20 @ 07:30]  4.0   |  20  |  2.14        Ca     8.3     [02-23-20 @ 07:30]    Creatinine Trend:  SCr 2.14 [02-23 @ 07:30]  SCr 2.07 [02-22 @ 03:32]  SCr 2.04 [02-21 @ 14:52]    Urine Creatinine 106      [02-22-20 @ 07:06]  Urine Protein 366      [02-22-20 @ 07:06]  Urine Sodium 46      [02-22-20 @ 07:06]

## 2020-02-23 NOTE — PROGRESS NOTE ADULT - SUBJECTIVE AND OBJECTIVE BOX
Pt seen, feeling fine, unchanged SOB and edema    MEDICATIONS  (STANDING):  buMETAnide Injectable 2 milliGRAM(s) IV Push two times a day  dextrose 5%. 1000 milliLiter(s) (50 mL/Hr) IV Continuous <Continuous>  dextrose 50% Injectable 12.5 Gram(s) IV Push once  dextrose 50% Injectable 25 Gram(s) IV Push once  dextrose 50% Injectable 25 Gram(s) IV Push once  heparin  Injectable 5000 Unit(s) SubCutaneous two times a day  influenza   Vaccine 0.5 milliLiter(s) IntraMuscular once  insulin lispro (HumaLOG) corrective regimen sliding scale   SubCutaneous three times a day before meals  iron sucrose IVPB 200 milliGRAM(s) IV Intermittent every 24 hours  losartan 50 milliGRAM(s) Oral daily    MEDICATIONS  (PRN):  dextrose 40% Gel 15 Gram(s) Oral once PRN Blood Glucose LESS THAN 70 milliGRAM(s)/deciliter  glucagon  Injectable 1 milliGRAM(s) IntraMuscular once PRN Glucose LESS THAN 70 milligrams/deciliter  guaiFENesin   Syrup  (Sugar-Free) 200 milliGRAM(s) Oral every 6 hours PRN Cough      ROS  No fever, sweats, chills  No epistaxis, HA, sore throat  No CP, sputum  No n/v/d, abd pain, melena, hematochezia  + edema  No rash  No anxiety  No back pain, joint pain  No bleeding, bruising  No dysuria, hematuria    Vital Signs Last 24 Hrs  T(C): 36.7 (23 Feb 2020 05:04), Max: 37 (22 Feb 2020 21:33)  T(F): 98.1 (23 Feb 2020 05:04), Max: 98.6 (22 Feb 2020 21:33)  HR: 106 (23 Feb 2020 13:54) (104 - 110)  BP: 142/98 (23 Feb 2020 13:54) (133/92 - 149/89)  BP(mean): --  RR: 18 (23 Feb 2020 13:54) (18 - 19)  SpO2: 97% (23 Feb 2020 13:54) (96% - 100%)    PE  NAD  Awake, alert  Anicteric, MMM  RRR  CTAB diminished throughout  Abd soft, NT  2+ pitting edema b/l LE  No rash grossly  FROM                          8.1    10.83 )-----------( 330      ( 23 Feb 2020 07:30 )             SEE NOTE      02-23    139  |  106  |  29<H>  ----------------------------<  78  4.0   |  20<L>  |  2.14<H>    Ca    8.3<L>      23 Feb 2020 07:30    TPro  6.9  /  Alb  x   /  TBili  x   /  DBili  x   /  AST  x   /  ALT  x   /  AlkPhos  x   02-23

## 2020-02-23 NOTE — PROGRESS NOTE ADULT - ASSESSMENT
Ms. Robert has likely EVELYN, edema and UA showing proteinuria (could be all chronic) but given neg CT scan for endometrial finding and mostly showing anasarca, and pe showing edema and UA with protein- this could be first presentation of nephrotic syndrome and sudden onset could suggest minimal change dx vs FSGS. In addition, this could be paraprotein process in setting of anemia as well. Her anemia and renal finding could not be related.  Alternatively. a GI loss of albumin, protein losing enteropathy and anemia could also be causing this and pre renal EVELYN.     Spot ratio showing 3.5 gm of proteinuria- could be diabetes related but with her presentation could be nephrotic syndrome other causes- all workup sent as listed below.   ALVERTO, dsDNA, anti smith, RF, ANCAs, Anti GBM, Anti PLA2R level, anti GBM, Hep panel, HIV , C3, C4, RPR, immunoglobulin free light chains, serum immunofixation.  Needs renal dopplers for rule out renal veins for rule out thrombosis.  Continue on ARB.  Bumex 2mg IV BID for diuresis as most of this is anasarca.    Umair May  Nephrology Fellow  Cell: [phone] (from 8 am to 5 pm)  (After 5 pm or on weekends please page on-call fellow)

## 2020-02-23 NOTE — PROGRESS NOTE ADULT - ATTENDING COMMENTS
Ms. Robert has likely EVELYN, edema and UA showing proteinuria (could be all chronic) but given neg CT scan for endometrial finding and mostly showing anasarca, and pe showing edema and UA with protein- this could be first presentation of nephrotic syndrome and sudden onset could suggest minimal change dx vs FSGS. In addition, this could be paraprotein process in setting of anemia as well. Her anemia and renal finding could not be related.    Spot ratio showing 3.5 gm of proteinuria- could be diabetes related but with her presentation could be nephrotic syndrome other causes- all workup sent as listed below.   ALVERTO, dsDNA, anti smith, RF, ANCAs, Anti GBM, Anti PLA2R level, anti GBM, Hep panel, HIV , C3, C4, RPR, immunoglobulin free light chains, serum immunofixation was sent. Appreciate heme eval  Needs renal dopplers for rule out renal veins for rule out thrombosis.  Continue on ARB.  Bumex 2mg IV BID for diuresis as most of this is anasarca.  Please arrange for a kidney biopsy for this week with US guided with either Dr Liriano or Zac (pt has agreed)    Arvin Sarmiento MD  Cell   Pager   Office

## 2020-02-23 NOTE — PROGRESS NOTE ADULT - ASSESSMENT
49 y old AA female with hx of DMII, HTN, endometriosis here with SOB, ARIAS and abdominal girth and new LE edema, found to have renal dysfunction, proteinuria, and severe anemia. CT a/p found hepatomegaly and 1.1cm soft tissue density in the L breast    Anemia -- responded well to PRBC. Now with Ferritin of 14, with YUAN  -- start venofer 200mg daily x4  -- hgb adequate at this time  -- transfuse to keep hgb >7  -- neg hapto, no hemolysis  -- B12/folate adeuquate, TFT nml  -- f/u SPEP, ZOLTAN, FOBT, hemoglobin electrophoresis  -- with YUAN, need GI eval  -- monitor CBC    Renal -- renal following  -- for renal bx    edema -- per renal    endometriosis -- pls consult gyn    hepatomegaly -- LFTs nml, cause unclear  -- monitor for now, ? GI eval    L breast soft tissue density -- partially noted on CT. Not able to obtain mammo in house. Pt states that she had a lesion in the L breast that was bx years ago, last mammo was 2018, which was neg  -- outpt mammo and breast u/s    Will follow, d/w primary team and pt, [phone]

## 2020-02-23 NOTE — PROGRESS NOTE ADULT - PROBLEM SELECTOR PLAN 1
Severe anemia. cause TBD. Will get hem eval, FU CBC. She does have evidence of Fe deficiency with low ferritin. Hem FU noted. Will check stool OB as well

## 2020-02-24 ENCOUNTER — TRANSCRIPTION ENCOUNTER (OUTPATIENT)
Age: 50
End: 2020-02-24

## 2020-02-24 LAB
% ALBUMIN: 42.7 % — SIGNIFICANT CHANGE UP
% ALPHA 1: 6.8 % — SIGNIFICANT CHANGE UP
% ALPHA 2: 10.7 % — SIGNIFICANT CHANGE UP
% BETA: 12.8 % — SIGNIFICANT CHANGE UP
% GAMMA: 27 % — SIGNIFICANT CHANGE UP
ALBUMIN SERPL ELPH-MCNC: 2.9 G/DL — LOW (ref 3.6–5.5)
ALBUMIN/GLOB SERPL ELPH: 0.7 RATIO — SIGNIFICANT CHANGE UP
ALPHA1 GLOB SERPL ELPH-MCNC: 0.5 G/DL — HIGH (ref 0.1–0.4)
ALPHA2 GLOB SERPL ELPH-MCNC: 0.7 G/DL — SIGNIFICANT CHANGE UP (ref 0.5–1)
ANION GAP SERPL CALC-SCNC: 12 MMOL/L — SIGNIFICANT CHANGE UP (ref 5–17)
ANTI-RIBONUCLEAR PROTEIN: <0.2 AI — SIGNIFICANT CHANGE UP
B-GLOBULIN SERPL ELPH-MCNC: 0.9 G/DL — SIGNIFICANT CHANGE UP (ref 0.5–1)
BUN SERPL-MCNC: 32 MG/DL — HIGH (ref 7–23)
C3 SERPL-MCNC: 119 MG/DL — SIGNIFICANT CHANGE UP (ref 81–157)
C4 SERPL-MCNC: 20 MG/DL — SIGNIFICANT CHANGE UP (ref 13–39)
CALCIUM SERPL-MCNC: 8.4 MG/DL — SIGNIFICANT CHANGE UP (ref 8.4–10.5)
CHLORIDE SERPL-SCNC: 106 MMOL/L — SIGNIFICANT CHANGE UP (ref 96–108)
CO2 SERPL-SCNC: 20 MMOL/L — LOW (ref 22–31)
CREAT SERPL-MCNC: 2.44 MG/DL — HIGH (ref 0.5–1.3)
ENA SM AB FLD QL: <0.2 AI — SIGNIFICANT CHANGE UP
GAMMA GLOBULIN: 1.9 G/DL — HIGH (ref 0.6–1.6)
GLUCOSE BLDC GLUCOMTR-MCNC: 110 MG/DL — HIGH (ref 70–99)
GLUCOSE BLDC GLUCOMTR-MCNC: 124 MG/DL — HIGH (ref 70–99)
GLUCOSE BLDC GLUCOMTR-MCNC: 132 MG/DL — HIGH (ref 70–99)
GLUCOSE BLDC GLUCOMTR-MCNC: 82 MG/DL — SIGNIFICANT CHANGE UP (ref 70–99)
GLUCOSE SERPL-MCNC: 85 MG/DL — SIGNIFICANT CHANGE UP (ref 70–99)
HAV IGM SER-ACNC: SIGNIFICANT CHANGE UP
HBV CORE IGM SER-ACNC: SIGNIFICANT CHANGE UP
HBV SURFACE AG SER-ACNC: SIGNIFICANT CHANGE UP
HCT VFR BLD CALC: 29.1 % — LOW (ref 34.5–45)
HCV AB S/CO SERPL IA: 0.39 S/CO — SIGNIFICANT CHANGE UP (ref 0–0.99)
HCV AB SERPL-IMP: SIGNIFICANT CHANGE UP
HGB BLD-MCNC: 7.9 G/DL — LOW (ref 11.5–15.5)
HIV 1+2 AB+HIV1 P24 AG SERPL QL IA: SIGNIFICANT CHANGE UP
IGA FLD-MCNC: 314 MG/DL — SIGNIFICANT CHANGE UP (ref 84–499)
IGA FLD-MCNC: 321 MG/DL — SIGNIFICANT CHANGE UP (ref 84–499)
IGG FLD-MCNC: 2061 MG/DL — HIGH (ref 610–1660)
IGG FLD-MCNC: 2432 MG/DL — HIGH (ref 610–1660)
IGM SERPL-MCNC: 59 MG/DL — SIGNIFICANT CHANGE UP (ref 35–242)
IGM SERPL-MCNC: 60 MG/DL — SIGNIFICANT CHANGE UP (ref 35–242)
INTERPRETATION SERPL IFE-IMP: SIGNIFICANT CHANGE UP
KAPPA LC SER QL IFE: 10.82 MG/DL — HIGH (ref 0.33–1.94)
KAPPA LC SER QL IFE: 10.82 MG/DL — HIGH (ref 0.33–1.94)
KAPPA LC SER QL IFE: 11.07 MG/DL — HIGH (ref 0.33–1.94)
KAPPA LC SER QL IFE: 11.36 MG/DL — HIGH (ref 0.33–1.94)
KAPPA/LAMBDA FREE LIGHT CHAIN RATIO, SERUM: 1.15 RATIO — SIGNIFICANT CHANGE UP (ref 0.26–1.65)
KAPPA/LAMBDA FREE LIGHT CHAIN RATIO, SERUM: 1.15 RATIO — SIGNIFICANT CHANGE UP (ref 0.26–1.65)
KAPPA/LAMBDA FREE LIGHT CHAIN RATIO, SERUM: 1.21 RATIO — SIGNIFICANT CHANGE UP (ref 0.26–1.65)
KAPPA/LAMBDA FREE LIGHT CHAIN RATIO, SERUM: 1.31 RATIO — SIGNIFICANT CHANGE UP (ref 0.26–1.65)
LAMBDA LC SER QL IFE: 8.69 MG/DL — HIGH (ref 0.57–2.63)
LAMBDA LC SER QL IFE: 9.18 MG/DL — HIGH (ref 0.57–2.63)
LAMBDA LC SER QL IFE: 9.39 MG/DL — HIGH (ref 0.57–2.63)
LAMBDA LC SER QL IFE: 9.39 MG/DL — HIGH (ref 0.57–2.63)
MCHC RBC-ENTMCNC: 18.9 PG — LOW (ref 27–34)
MCHC RBC-ENTMCNC: 27.1 GM/DL — LOW (ref 32–36)
MCV RBC AUTO: 69.6 FL — LOW (ref 80–100)
NRBC # BLD: 1 /100 WBCS — HIGH (ref 0–0)
PLATELET # BLD AUTO: 299 K/UL — SIGNIFICANT CHANGE UP (ref 150–400)
POTASSIUM SERPL-MCNC: 3.7 MMOL/L — SIGNIFICANT CHANGE UP (ref 3.5–5.3)
POTASSIUM SERPL-SCNC: 3.7 MMOL/L — SIGNIFICANT CHANGE UP (ref 3.5–5.3)
PROT PATTERN SERPL ELPH-IMP: SIGNIFICANT CHANGE UP
RBC # BLD: 4.18 M/UL — SIGNIFICANT CHANGE UP (ref 3.8–5.2)
RBC # FLD: 35.7 % — HIGH (ref 10.3–14.5)
SODIUM SERPL-SCNC: 138 MMOL/L — SIGNIFICANT CHANGE UP (ref 135–145)
T PALLIDUM AB TITR SER: NEGATIVE — SIGNIFICANT CHANGE UP
WBC # BLD: 11.21 K/UL — HIGH (ref 3.8–10.5)
WBC # FLD AUTO: 11.21 K/UL — HIGH (ref 3.8–10.5)

## 2020-02-24 PROCEDURE — 93975 VASCULAR STUDY: CPT | Mod: 26

## 2020-02-24 PROCEDURE — 76830 TRANSVAGINAL US NON-OB: CPT | Mod: 26

## 2020-02-24 PROCEDURE — 99233 SBSQ HOSP IP/OBS HIGH 50: CPT | Mod: GC

## 2020-02-24 RX ADMIN — HEPARIN SODIUM 5000 UNIT(S): 5000 INJECTION INTRAVENOUS; SUBCUTANEOUS at 05:45

## 2020-02-24 RX ADMIN — IRON SUCROSE 110 MILLIGRAM(S): 20 INJECTION, SOLUTION INTRAVENOUS at 17:02

## 2020-02-24 RX ADMIN — BUMETANIDE 2 MILLIGRAM(S): 0.25 INJECTION INTRAMUSCULAR; INTRAVENOUS at 17:02

## 2020-02-24 RX ADMIN — BUMETANIDE 2 MILLIGRAM(S): 0.25 INJECTION INTRAMUSCULAR; INTRAVENOUS at 05:45

## 2020-02-24 RX ADMIN — HEPARIN SODIUM 5000 UNIT(S): 5000 INJECTION INTRAVENOUS; SUBCUTANEOUS at 17:02

## 2020-02-24 RX ADMIN — LOSARTAN POTASSIUM 50 MILLIGRAM(S): 100 TABLET, FILM COATED ORAL at 05:45

## 2020-02-24 NOTE — CONSULT NOTE ADULT - ASSESSMENT
anemia  iron deficiency   hepatomegaly    no overt gi bleed, but given degree of anemia would recommend inpatient egd/colonoscopy  cont regular diet today  clears Tuesday prep Tuesday pm   plan for egd/colon on Wednesday   check abdominal u/s

## 2020-02-24 NOTE — PROGRESS NOTE ADULT - ASSESSMENT
49y F with nephrotic syndrome      #Nephrotic syndrome  -Has likely EVELYN, edema and UA showing proteinuria (could be all chronic) but given neg CT scan for endometrial finding and mostly showing anasarca, and pe showing edema and UA with protein- this could be first presentation of nephrotic syndrome and sudden onset could suggest minimal change, could also be a be paraprotein process in setting of anemia as well.   -Spot ratio showing 3.5 gm of proteinuria- could be diabetes related but with her presentation could be nephrotic syndrome other causes- all workup sent as listed below.   - Please check ALVERTO, dsDNA, anti smith, RF, ANCAs, Anti GBM, Anti PLA2R level, anti GBM, Hep panel, HIV , C3, C4, RPR, immunoglobulin free light chains, serum immunofixation.  - Awaiting renal dopplers for rule out renal veins for rule out thrombosis.  - Bumex 2mg IV BID for diuresis as most of this is anasarca.  - Would hold ARB at this time  - Will decide on kidney biopsy pending US results, serological workup, and if patient is agreeble     Gustavo Snatana   Nephrology Fellow  Pager NS: [phone]/ [facility]: [phone]  (After 5 pm or on weekends please page the on-call fellow)

## 2020-02-24 NOTE — DISCHARGE NOTE NURSING/CASE MANAGEMENT/SOCIAL WORK - NSDCPEWEB_GEN_ALL_CORE
[facility] for Tobacco Control website --- [email or web address] website --- [email or web address]

## 2020-02-24 NOTE — DISCHARGE NOTE NURSING/CASE MANAGEMENT/SOCIAL WORK - PATIENT PORTAL LINK FT
You can access the FollowMyHealth Patient Portal offered by [facility] by registering at the following website: [email or web address]. By joining [facility]’s FollowMyHealth portal, you will also be able to view your health information using other applications (apps) compatible with our system.

## 2020-02-24 NOTE — PROGRESS NOTE ADULT - SUBJECTIVE AND OBJECTIVE BOX
[facility] DIVISION OF KIDNEY DISEASES AND HYPERTENSION -- FOLLOW UP NOTE  --------------------------------------------------------------------------------    24 hour events/subjective: Patient seen and examined at the bedside. States she still has LE swelling though improved, no abdominal pain or CP. SOB improved as well. Had a prolonged discussion regarding kidney biopsy, patient would like to see the kidney US result and serological workup prior to receiving kidney biopsy.        PAST HISTORY  --------------------------------------------------------------------------------  No significant changes to PMH, PSH, FHx, SHx, unless otherwise noted    ALLERGIES & MEDICATIONS  --------------------------------------------------------------------------------  Allergies    No Known Allergies    Intolerances      Standing Inpatient Medications  buMETAnide Injectable 2 milliGRAM(s) IV Push two times a day  dextrose 5%. 1000 milliLiter(s) IV Continuous <Continuous>  dextrose 50% Injectable 12.5 Gram(s) IV Push once  dextrose 50% Injectable 25 Gram(s) IV Push once  dextrose 50% Injectable 25 Gram(s) IV Push once  heparin  Injectable 5000 Unit(s) SubCutaneous two times a day  influenza   Vaccine 0.5 milliLiter(s) IntraMuscular once  insulin lispro (HumaLOG) corrective regimen sliding scale   SubCutaneous three times a day before meals  iron sucrose IVPB 200 milliGRAM(s) IV Intermittent every 24 hours  losartan 50 milliGRAM(s) Oral daily    PRN Inpatient Medications  dextrose 40% Gel 15 Gram(s) Oral once PRN  glucagon  Injectable 1 milliGRAM(s) IntraMuscular once PRN  guaiFENesin   Syrup  (Sugar-Free) 200 milliGRAM(s) Oral every 6 hours PRN      REVIEW OF SYSTEMS  --------------------------------------------------------------------------------  Gen: No lethargy  Respiratory: + dyspnea  CV: No chest pain  GI: No abdominal pain/ diarrhea   MSK: + LE edema  Neuro: No dizziness  Heme: No bleeding    All other systems were reviewed and are negative, except as noted.      >>> <<<    VITALS/PHYSICAL EXAM  --------------------------------------------------------------------------------  T(C): 36.8 (02-24-20 @ 11:27), Max: 36.9 (02-23-20 @ 20:37)  HR: 88 (02-24-20 @ 11:27) (88 - 100)  BP: 143/94 (02-24-20 @ 11:27) (138/89 - 147/90)  RR: 18 (02-24-20 @ 11:27) (18 - 18)  SpO2: 98% (02-24-20 @ 11:27) (97% - 99%)  Wt(kg): --        02-23-20 @ 07:01  -  02-24-20 @ 07:00  --------------------------------------------------------  IN: 580 mL / OUT: 0 mL / NET: 580 mL    02-24-20 @ 07:01  -  02-24-20 @ 15:30  --------------------------------------------------------  IN: 240 mL / OUT: 0 mL / NET: 240 mL      Physical Exam:    	Gen: NAD  	HEENT: supple neck  	Pulm: CTA B/L  	CV: RRR, S1S2; no rub  	Abd: +BS, soft, nontender/nondistended  	: No suprapubic tenderness  	LE: 2+ edema b/l    LABS/STUDIES  --------------------------------------------------------------------------------              7.9    11.21 >-----------<  299      [02-24-20 @ 07:16]              29.1     138  |  106  |  32  ----------------------------<  85      [02-24-20 @ 07:08]  3.7   |  20  |  2.44        Ca     8.4     [02-24-20 @ 07:08]    TPro  6.9  /  Alb  2.9  /  TBili  x   /  DBili  x   /  AST  x   /  ALT  x   /  AlkPhos  x   [02-23-20 @ 10:12]          Creatinine Trend:  SCr 2.44 [02-24 @ 07:08]  SCr 2.14 [02-23 @ 07:30]  SCr 2.07 [02-22 @ 03:32]  SCr 2.04 [02-21 @ 14:52]    Urinalysis - [02-21-20 @ 15:40]      Color Light Yellow / Appearance Clear / SG 1.015 / pH 6.0      Gluc Negative / Ketone Negative  / Bili Negative / Urobili Negative       Blood Negative / Protein 300 mg/dL / Leuk Est Negative / Nitrite Negative      RBC 4 / WBC 5 / Hyaline 6 / Gran  / Sq Epi  / Non Sq Epi 2 / Bacteria Negative    Urine Creatinine 106      [02-22-20 @ 07:06]  Urine Protein 366      [02-22-20 @ 07:06]  Urine Sodium 46      [02-22-20 @ 07:06]    Iron 29, TIBC 350, %sat 8      [02-23-20 @ 10:15]  Ferritin 14      [02-23-20 @ 10:12]  HbA1c 5.4      [02-22-20 @ 11:41]  TSH 1.06      [02-23-20 @ 10:12]      Free Light Chains: kappa 11.36, lambda 8.69, ratio = 1.31      [02-23 @ 10:12]  Immunofixation Serum:   No Monoclonal Band Identified    Reference Range: None Detected      [02-23-20 @ 10:12]  SPEP Interpretation: Normal Electrophoresis Pattern      [02-23-20 @ 10:12]

## 2020-02-24 NOTE — PROGRESS NOTE ADULT - ATTENDING COMMENTS
Less edema.  W/U in progress  1.  Nephrotic range proteinuria--w/u as outlined.  Reviewed indications of renal biopsy with patient who is considering.  Agree with NOT providing ACEI or ARB at present  2.  Volume overload--diuretic management  3.  Renal failure--non oliguric, no HD at present, avoid nephrotoxins, anti-ANG2 rx

## 2020-02-25 DIAGNOSIS — I10 ESSENTIAL (PRIMARY) HYPERTENSION: ICD-10-CM

## 2020-02-25 DIAGNOSIS — D64.9 ANEMIA, UNSPECIFIED: ICD-10-CM

## 2020-02-25 LAB
% ALBUMIN: 41.4 % — SIGNIFICANT CHANGE UP
% ALPHA 1: 6.9 % — SIGNIFICANT CHANGE UP
% ALPHA 2: 11 % — SIGNIFICANT CHANGE UP
% BETA: 13.8 % — SIGNIFICANT CHANGE UP
% GAMMA: 26.9 % — SIGNIFICANT CHANGE UP
ALBUMIN SERPL ELPH-MCNC: 2.7 G/DL — LOW (ref 3.6–5.5)
ALBUMIN/GLOB SERPL ELPH: 0.7 RATIO — SIGNIFICANT CHANGE UP
ALPHA1 GLOB SERPL ELPH-MCNC: 0.5 G/DL — HIGH (ref 0.1–0.4)
ALPHA2 GLOB SERPL ELPH-MCNC: 0.7 G/DL — SIGNIFICANT CHANGE UP (ref 0.5–1)
ANA TITR SER: NEGATIVE — SIGNIFICANT CHANGE UP
ANION GAP SERPL CALC-SCNC: 13 MMOL/L — SIGNIFICANT CHANGE UP (ref 5–17)
B-GLOBULIN SERPL ELPH-MCNC: 0.9 G/DL — SIGNIFICANT CHANGE UP (ref 0.5–1)
BUN SERPL-MCNC: 30 MG/DL — HIGH (ref 7–23)
CALCIUM SERPL-MCNC: 8.4 MG/DL — SIGNIFICANT CHANGE UP (ref 8.4–10.5)
CHLORIDE SERPL-SCNC: 104 MMOL/L — SIGNIFICANT CHANGE UP (ref 96–108)
CO2 SERPL-SCNC: 22 MMOL/L — SIGNIFICANT CHANGE UP (ref 22–31)
CREAT SERPL-MCNC: 2.38 MG/DL — HIGH (ref 0.5–1.3)
GAMMA GLOBULIN: 1.8 G/DL — HIGH (ref 0.6–1.6)
GLUCOSE BLDC GLUCOMTR-MCNC: 104 MG/DL — HIGH (ref 70–99)
GLUCOSE BLDC GLUCOMTR-MCNC: 109 MG/DL — HIGH (ref 70–99)
GLUCOSE BLDC GLUCOMTR-MCNC: 81 MG/DL — SIGNIFICANT CHANGE UP (ref 70–99)
GLUCOSE BLDC GLUCOMTR-MCNC: 86 MG/DL — SIGNIFICANT CHANGE UP (ref 70–99)
GLUCOSE SERPL-MCNC: 79 MG/DL — SIGNIFICANT CHANGE UP (ref 70–99)
HCT VFR BLD CALC: SIGNIFICANT CHANGE UP % (ref 34.5–45)
HEMOGLOBIN INTERPRETATION: SIGNIFICANT CHANGE UP
HGB A MFR BLD: 98.1 % — HIGH (ref 95.8–98)
HGB A2 MFR BLD: 1.9 % — LOW (ref 2–3.2)
HGB BLD-MCNC: 8.1 G/DL — LOW (ref 11.5–15.5)
INTERPRETATION SERPL IFE-IMP: SIGNIFICANT CHANGE UP
MCHC RBC-ENTMCNC: 26.8 GM/DL — LOW (ref 32–36)
MCHC RBC-ENTMCNC: SIGNIFICANT CHANGE UP PG (ref 27–34)
MCV RBC AUTO: SIGNIFICANT CHANGE UP FL (ref 80–100)
NRBC # BLD: 4 /100 WBCS — HIGH (ref 0–0)
PHOSPHOLIPASE A2 RECEPTOR ELISA: 2 RU/ML — SIGNIFICANT CHANGE UP
PHOSPHOLIPASE A2 RECEPTOR IFA: NEGATIVE — SIGNIFICANT CHANGE UP
PLATELET # BLD AUTO: 321 K/UL — SIGNIFICANT CHANGE UP (ref 150–400)
POTASSIUM SERPL-MCNC: 3.6 MMOL/L — SIGNIFICANT CHANGE UP (ref 3.5–5.3)
POTASSIUM SERPL-SCNC: 3.6 MMOL/L — SIGNIFICANT CHANGE UP (ref 3.5–5.3)
PROT PATTERN SERPL ELPH-IMP: SIGNIFICANT CHANGE UP
RBC # BLD: 4.29 M/UL — SIGNIFICANT CHANGE UP (ref 3.8–5.2)
RBC # FLD: 36.6 % — HIGH (ref 10.3–14.5)
RHEUMATOID FACT SERPL-ACNC: <10 IU/ML — SIGNIFICANT CHANGE UP (ref 0–13)
SODIUM SERPL-SCNC: 139 MMOL/L — SIGNIFICANT CHANGE UP (ref 135–145)
WBC # BLD: 9.75 K/UL — SIGNIFICANT CHANGE UP (ref 3.8–10.5)
WBC # FLD AUTO: 9.75 K/UL — SIGNIFICANT CHANGE UP (ref 3.8–10.5)

## 2020-02-25 PROCEDURE — 99232 SBSQ HOSP IP/OBS MODERATE 35: CPT | Mod: GC

## 2020-02-25 RX ORDER — SOD SULF/SODIUM/NAHCO3/KCL/PEG
1000 SOLUTION, RECONSTITUTED, ORAL ORAL EVERY 4 HOURS
Refills: 0 | Status: COMPLETED | OUTPATIENT
Start: 2020-02-25 | End: 2020-02-25

## 2020-02-25 RX ADMIN — BUMETANIDE 2 MILLIGRAM(S): 0.25 INJECTION INTRAMUSCULAR; INTRAVENOUS at 17:09

## 2020-02-25 RX ADMIN — LOSARTAN POTASSIUM 50 MILLIGRAM(S): 100 TABLET, FILM COATED ORAL at 05:58

## 2020-02-25 RX ADMIN — Medication 1000 MILLILITER(S): at 15:37

## 2020-02-25 RX ADMIN — HEPARIN SODIUM 5000 UNIT(S): 5000 INJECTION INTRAVENOUS; SUBCUTANEOUS at 05:58

## 2020-02-25 RX ADMIN — IRON SUCROSE 110 MILLIGRAM(S): 20 INJECTION, SOLUTION INTRAVENOUS at 17:09

## 2020-02-25 RX ADMIN — Medication 1000 MILLILITER(S): at 20:23

## 2020-02-25 RX ADMIN — HEPARIN SODIUM 5000 UNIT(S): 5000 INJECTION INTRAVENOUS; SUBCUTANEOUS at 17:32

## 2020-02-25 RX ADMIN — BUMETANIDE 2 MILLIGRAM(S): 0.25 INJECTION INTRAMUSCULAR; INTRAVENOUS at 05:58

## 2020-02-25 RX ADMIN — Medication 20 MILLIGRAM(S): at 22:34

## 2020-02-25 NOTE — PROGRESS NOTE ADULT - PROBLEM SELECTOR PLAN 1
Severe anemia. cause TBD.  FU CBC. She does have evidence of Fe deficiency with low ferritin. Hem FU noted. Will check GI work up

## 2020-02-25 NOTE — PROGRESS NOTE ADULT - ASSESSMENT
49 y old AA female with hx of DMII, HTN, endometriosis here with SOB, ARIAS and abdominal girth and new LE edema, found to have renal dysfunction, proteinuria, and severe anemia. CT a/p found hepatomegaly and 1.1cm soft tissue density in the L breast    Anemia -- responded well to PRBC. Now with Ferritin of 14, with YUAN  -- c/w venofer 200mg daily x4 total  -- hgb adequate at this time  -- transfuse to keep hgb >7  -- neg hapto, no hemolysis  -- B12/folate adeuquate, TFT nml  -- SPEP, ZOLTAN,  hemoglobin electrophoresis wnl  --f/u  FOBT   -- with YUAN, need GI eval;  plan for egd and colonoscopy tomorrow  -- monitor CBC- stable for now    Renal -- renal following  -- for renal bx    edema -- per renal    endometriosis -- pls consult gyn    hepatomegaly -- LFTs nml, cause unclear  -- monitor for now,     L breast soft tissue density -- partially noted on CT. Not able to obtain mammo in house. Pt states that she had a lesion in the L breast that was bx years ago, last mammo was 2018, which was neg  -- outpt mammo and breast u/s    Benito Rosa MD  Hematology/Oncology  Cell:  [phone]  Office Phone: [phone]  Office Fax:  [phone] [address] [place] [address]

## 2020-02-25 NOTE — PROGRESS NOTE ADULT - ASSESSMENT
49y F with nephrotic syndrome      #Nephrotic syndrome  -Has likely EVELYN, edema and UA showing proteinuria (could be all chronic) but given neg CT scan for endometrial finding and mostly showing anasarca, and pe showing edema and UA with protein- this could be first presentation of nephrotic syndrome and sudden onset could suggest minimal change, could also be a be paraprotein process in setting of anemia as well.   - Spot ratio showing 3.5 gm of proteinuria- could be diabetes related but with her presentation could be nephrotic syndrome other causes- all workup sent as listed below.   - Waiting ALVERTO, dsDNA, anti smith, RF, ANCAs, Anti GBM, Anti PLA2R level, anti GBM, Hep panel, HIV , C3, C4, RPR, immunoglobulin free light chains, serum immunofixation.  - Renal Sono: Patent renal veins bilaterally. No evidence of thrombosis  Markedly increased renal cortical echogenicity bilaterally, which may be secondary to medical renal disease versus other infiltrative processes.  - Bumex 2mg IV BID for diuresis as most of this is anasarca.  - Plan for renal biopsy on 2/27/20 - Hold IV heparin prior to renal biospy. May give DDAVP 20mcg IV prior to Biopsy to prevent bleeding. Keep SBP<160mmHg and Hgb>8gm/dl    #Hypertension  - Blood pressure currently stable and at an acceptable range  - If BP still uncontrolled can add ACE/ARBs  - Keep SBP<160mmHg prior to renal biopsy    #Anemia  - Patient with iron studies consistent with iron deficiency  - Agree with IV Iron 200mg very 48 hours x5 doses  - Transfuse as necessary      Manan Olmos  Nephrology Fellow  Pager: [phone]

## 2020-02-25 NOTE — PROGRESS NOTE ADULT - ASSESSMENT
anemia  iron deficiency   hepatomegaly    no overt gi bleed, but given degree of anemia would recommend inpatient egd/colonoscopy, plan for tomorrow  CLD today, prep ordered. NPO after midnight   check abdominal u/s   further recommendations pending above

## 2020-02-25 NOTE — PROGRESS NOTE ADULT - SUBJECTIVE AND OBJECTIVE BOX
Patient is a 49y old  Female who presents with a chief complaint of Edema (2020 13:12)      SUBJECTIVE / OVERNIGHT EVENTS:  No dizziness. Anasarca better  Review of Systems:   CONSTITUTIONAL: No fever, weight loss, or fatigue  EYES: No eye pain, visual disturbances, or discharge  ENMT:  No difficulty hearing, tinnitus, vertigo; No sinus or throat pain  NECK: No pain or stiffness  BREASTS: No pain, masses, or nipple discharge  RESPIRATORY: No cough, wheezing, chills or hemoptysis; No shortness of breath  CARDIOVASCULAR: No chest pain, palpitations, dizziness, + leg swelling  GASTROINTESTINAL: No abdominal or epigastric pain. No nausea, vomiting, or hematemesis; No diarrhea or constipation. No melena or hematochezia.  GENITOURINARY: No dysuria, frequency, hematuria, or incontinence  NEUROLOGICAL: No headaches, memory loss, loss of strength, numbness, or tremors  SKIN: No itching, burning, rashes, or lesions   LYMPH NODES: No enlarged glands  ENDOCRINE: No heat or cold intolerance; No hair loss  MUSCULOSKELETAL: No joint pain or swelling; No muscle, back, or extremity pain  PSYCHIATRIC: No depression, anxiety, mood swings, or difficulty sleeping  HEME/LYMPH: No easy bruising, or bleeding gums  ALLERY AND IMMUNOLOGIC: No hives or eczema    MEDICATIONS  (STANDING):  buMETAnide Injectable 2 milliGRAM(s) IV Push two times a day  dextrose 5%. 1000 milliLiter(s) (50 mL/Hr) IV Continuous <Continuous>  dextrose 50% Injectable 12.5 Gram(s) IV Push once  dextrose 50% Injectable 25 Gram(s) IV Push once  dextrose 50% Injectable 25 Gram(s) IV Push once  heparin  Injectable 5000 Unit(s) SubCutaneous two times a day  influenza   Vaccine 0.5 milliLiter(s) IntraMuscular once  insulin lispro (HumaLOG) corrective regimen sliding scale   SubCutaneous three times a day before meals  losartan 50 milliGRAM(s) Oral daily    MEDICATIONS  (PRN):  dextrose 40% Gel 15 Gram(s) Oral once PRN Blood Glucose LESS THAN 70 milliGRAM(s)/deciliter  glucagon  Injectable 1 milliGRAM(s) IntraMuscular once PRN Glucose LESS THAN 70 milligrams/deciliter      PHYSICAL EXAM:  Vital Signs Last 24 Hrs  T(C): 36.6 (2020 15:31), Max: 36.9 (2020 09:53)  T(F): 97.9 (2020 15:31), Max: 98.4 (2020 09:53)  HR: 110 (2020 17:19) (91 - 112)  BP: 142/99 (2020 17:19) (135/90 - 155/101)  BP(mean): --  RR: 19 (2020 17:19) (18 - 19)  SpO2: 100% (2020 17:19) (96% - 100%)  I&O's Summary    2020 07:01  -  2020 21:24  --------------------------------------------------------  IN: 120 mL / OUT: 0 mL / NET: 120 mL      GENERAL: NAD, well-developed  HEAD:  Atraumatic, Normocephalic  EYES: EOMI, PERRLA, conjunctiva and sclera clear  NECK: Supple, No JVD  CHEST/LUNG: Clear to auscultation bilaterally; No wheeze  HEART: Regular rate and rhythm; No murmurs, rubs, or gallops  ABDOMEN: Soft, Nontender, Nondistended; Bowel sounds present  EXTREMITIES:  2+ Peripheral Pulses, No clubbing, cyanosis, 3+edema  PSYCH: AAOx3  NEUROLOGY: non-focal  SKIN: No rashes or lesions    LABS:  CAPILLARY BLOOD GLUCOSE      POCT Blood Glucose.: 145 mg/dL (2020 17:39)  POCT Blood Glucose.: 142 mg/dL (2020 12:39)  POCT Blood Glucose.: 108 mg/dL (2020 08:24)  POCT Blood Glucose.: 149 mg/dL (2020 21:54)                          7.6    9.94  )-----------( 313      ( 2020 16:36 )             27.7     02-    137  |  108  |  23  ----------------------------<  114<H>  4.1   |  18<L>  |  2.07<H>    Ca    8.0<L>      2020 03:32    TPro  6.6  /  Alb  x   /  TBili  x   /  DBili  x   /  AST  x   /  ALT  x   /  AlkPhos  x       PT/INR - ( 2020 16:15 )   PT: 14.8 sec;   INR: 1.29 ratio         PTT - ( 2020 16:15 )  PTT:32.1 sec      Urinalysis Basic - ( 2020 15:40 )    Color: Light Yellow / Appearance: Clear / S.015 / pH: x  Gluc: x / Ketone: Negative  / Bili: Negative / Urobili: Negative   Blood: x / Protein: 300 mg/dL / Nitrite: Negative   Leuk Esterase: Negative / RBC: 4 /hpf / WBC 5 /HPF   Sq Epi: x / Non Sq Epi: 2 /hpf / Bacteria: Negative        RADIOLOGY & ADDITIONAL TESTS:    Imaging Personally Reviewed:    Consultant(s) Notes Reviewed:      Care Discussed with Consultants/Other Providers:

## 2020-02-25 NOTE — PROGRESS NOTE ADULT - SUBJECTIVE AND OBJECTIVE BOX
no new symptoms    bisacodyl 20 milliGRAM(s) Oral at bedtime  buMETAnide Injectable 2 milliGRAM(s) IV Push two times a day  dextrose 40% Gel 15 Gram(s) Oral once PRN  dextrose 5%. 1000 milliLiter(s) IV Continuous <Continuous>  dextrose 50% Injectable 12.5 Gram(s) IV Push once  dextrose 50% Injectable 25 Gram(s) IV Push once  dextrose 50% Injectable 25 Gram(s) IV Push once  glucagon  Injectable 1 milliGRAM(s) IntraMuscular once PRN  guaiFENesin   Syrup  (Sugar-Free) 200 milliGRAM(s) Oral every 6 hours PRN  heparin  Injectable 5000 Unit(s) SubCutaneous two times a day  influenza   Vaccine 0.5 milliLiter(s) IntraMuscular once  insulin lispro (HumaLOG) corrective regimen sliding scale   SubCutaneous three times a day before meals  iron sucrose IVPB 200 milliGRAM(s) IV Intermittent every 24 hours  losartan 50 milliGRAM(s) Oral daily  polyethylene glycol/electrolyte Solution 1000 milliLiter(s) Oral every 4 hours      No Known Allergies      ROS otherwise negative     T(C): 36.7 (02-25-20 @ 12:33), Max: 36.9 (02-24-20 @ 20:56)  HR: 98 (02-25-20 @ 12:33) (87 - 98)  BP: 125/88 (02-25-20 @ 12:33) (125/88 - 131/89)  RR: 18 (02-25-20 @ 12:33) (17 - 18)  SpO2: 99% (02-25-20 @ 12:33) (96% - 99%)  PHYSICAL EXAM  Gen:  laying in bed, nad  H:  anicteric, eomi  CV:  RRR, S1, S2  Lungs:  CTA b/l  Ab soft/nt/nd  Ext:  no edema                          8.1    9.75  )-----------( 321      ( 25 Feb 2020 07:24 )             #NM                          7.9    11.21 )-----------( 299      ( 24 Feb 2020 07:16 )             29.1                         8.1    10.83 )-----------( 330      ( 23 Feb 2020 07:30 )             SEE NOTE  02-25    139  |  104  |  30<H>  ----------------------------<  79  3.6   |  22  |  2.38<H>    Ca    8.4      25 Feb 2020 07:18    Protein Electrophoresis, Serum (02.23.20 @ 10:12)    Protein Total, Serum: 6.9 g/dL    Total Protein, Serum: 6.9 g/dL    Albumin, Serum: 2.9 g/dL    Alpha 1: 0.5 g/dL    Alpha 2: 0.7 g/dL    Beta Globulin: 0.9 g/dL    Gamma Globulin: 1.9 g/dL    % Albumin: 42.7 %    % Alpha 1: 6.8 %    % Alpha 2: 10.7 %    % Beta: 12.8 %    % Gamma: 27.0 %    Albumin/Globulin Ratio: 0.7 Ratio    Serum Protein Electrophoresis Interp: Normal Electrophoresis Pattern    Immunofixation, Serum:   No Monoclonal Band Identified    Reference Range: None Detected (02.23.20 @ 10:12)    Hemoglobin Electrophoresis (02.23.20 @ 09:54)    Hemoglobin A%: 98.1 %    Hemoglobin A2%: 1.9 %    Hemoglobin Interpretation: Normal capillary hemoglobin electrophoresis pattern with microcytosis and  decreased hemoglobin A2 which may be seen in iron deficiency and/or alpha  thalassemia trait, or anemia of chronic disease.  Suggest clinical and  laboratory correlation.  IAN Boateng M.D.

## 2020-02-25 NOTE — PROGRESS NOTE ADULT - ATTENDING COMMENTS
Feeling improved with diuresis  1.  Nephrotic range proteinuria--w/u in progress.  Renal biopsy for Thurs  2.  Volume overload--diuresis, loop  3.  Renal failure--unclear A vs C.  Bx should help resolve

## 2020-02-25 NOTE — PROGRESS NOTE ADULT - SUBJECTIVE AND OBJECTIVE BOX
INTERVAL HPI/OVERNIGHT EVENTS:    pt seen and examined  she denies abdominal pain, n/v  no bm today  tolerating clears     MEDICATIONS  (STANDING):  buMETAnide Injectable 2 milliGRAM(s) IV Push two times a day  dextrose 5%. 1000 milliLiter(s) (50 mL/Hr) IV Continuous <Continuous>  dextrose 50% Injectable 12.5 Gram(s) IV Push once  dextrose 50% Injectable 25 Gram(s) IV Push once  dextrose 50% Injectable 25 Gram(s) IV Push once  heparin  Injectable 5000 Unit(s) SubCutaneous two times a day  influenza   Vaccine 0.5 milliLiter(s) IntraMuscular once  insulin lispro (HumaLOG) corrective regimen sliding scale   SubCutaneous three times a day before meals  iron sucrose IVPB 200 milliGRAM(s) IV Intermittent every 24 hours  losartan 50 milliGRAM(s) Oral daily    MEDICATIONS  (PRN):  dextrose 40% Gel 15 Gram(s) Oral once PRN Blood Glucose LESS THAN 70 milliGRAM(s)/deciliter  glucagon  Injectable 1 milliGRAM(s) IntraMuscular once PRN Glucose LESS THAN 70 milligrams/deciliter  guaiFENesin   Syrup  (Sugar-Free) 200 milliGRAM(s) Oral every 6 hours PRN Cough      Allergies    No Known Allergies    Intolerances        Review of Systems:    General:  No wt loss, fevers, chills, night sweats,fatigue,   Eyes:  Good vision, no reported pain  ENT:  No sore throat, pain, runny nose, dysphagia  CV:  No pain, palpitations, hypo/hypertension  Resp:  No dyspnea, cough, tachypnea, wheezing  GI:  No pain, No nausea, No vomiting, No diarrhea, No constipation, No weight loss, No fever, No pruritis, No rectal bleeding, No melena, No dysphagia  :  No pain, bleeding, incontinence, nocturia  Muscle:  No pain, weakness  Neuro:  No weakness, tingling, memory problems  Psych:  No fatigue, insomnia, mood problems, depression  Endocrine:  No polyuria, polydypsia, cold/heat intolerance  Heme:  No petechiae, ecchymosis, easy bruisability  Skin:  No rash, tattoos, scars, edema      Vital Signs Last 24 Hrs  T(C): 36.9 (25 Feb 2020 05:20), Max: 36.9 (24 Feb 2020 20:56)  T(F): 98.5 (25 Feb 2020 05:20), Max: 98.5 (25 Feb 2020 05:20)  HR: 87 (25 Feb 2020 05:20) (87 - 94)  BP: 130/74 (25 Feb 2020 05:20) (130/74 - 143/94)  BP(mean): --  RR: 17 (25 Feb 2020 05:20) (17 - 18)  SpO2: 96% (25 Feb 2020 05:20) (96% - 99%)    PHYSICAL EXAM:    Constitutional: NAD, well-developed  HEENT: EOMI, throat clear  Neck: No LAD, supple  Respiratory: CTA and P  Cardiovascular: S1 and S2, RRR, no M  Gastrointestinal: BS+, soft, NT/ND, neg HSM,  Extremities: No peripheral edema, neg clubing, cyanosis  Vascular: 2+ peripheral pulses  Neurological: A/O x 3, no focal deficits  Psychiatric: Normal mood, normal affect  Skin: No rashes      LABS:                        8.1    9.75  )-----------( 321      ( 25 Feb 2020 07:24 )             #NM      02-25    139  |  104  |  30<H>  ----------------------------<  79  3.6   |  22  |  2.38<H>    Ca    8.4      25 Feb 2020 07:18    TPro  6.9  /  Alb  2.9<L>  /  TBili  x   /  DBili  x   /  AST  x   /  ALT  x   /  AlkPhos  x   02-23          RADIOLOGY & ADDITIONAL TESTS:

## 2020-02-25 NOTE — PROGRESS NOTE ADULT - SUBJECTIVE AND OBJECTIVE BOX
[facility] Division of Kidney Diseases & Hypertension  FOLLOW UP NOTE  [phone]--------------------------------------------------------------------------------  Chief Complaint:Anemia      24 hour events/subjective:  Patient seen today. Currently without subjective complaints  No acute events overnight  SCr: 2.38 today stable  Patient on Bumex for diuresis      PAST HISTORY  --------------------------------------------------------------------------------  No significant changes to PMH, PSH, FHx, SHx, unless otherwise noted    ALLERGIES & MEDICATIONS  --------------------------------------------------------------------------------  Allergies    No Known Allergies    Intolerances      Standing Inpatient Medications  bisacodyl 20 milliGRAM(s) Oral at bedtime  buMETAnide Injectable 2 milliGRAM(s) IV Push two times a day  dextrose 5%. 1000 milliLiter(s) IV Continuous <Continuous>  dextrose 50% Injectable 12.5 Gram(s) IV Push once  dextrose 50% Injectable 25 Gram(s) IV Push once  dextrose 50% Injectable 25 Gram(s) IV Push once  heparin  Injectable 5000 Unit(s) SubCutaneous two times a day  influenza   Vaccine 0.5 milliLiter(s) IntraMuscular once  insulin lispro (HumaLOG) corrective regimen sliding scale   SubCutaneous three times a day before meals  iron sucrose IVPB 200 milliGRAM(s) IV Intermittent every 24 hours  losartan 50 milliGRAM(s) Oral daily  polyethylene glycol/electrolyte Solution 1000 milliLiter(s) Oral every 4 hours    PRN Inpatient Medications  dextrose 40% Gel 15 Gram(s) Oral once PRN  glucagon  Injectable 1 milliGRAM(s) IntraMuscular once PRN  guaiFENesin   Syrup  (Sugar-Free) 200 milliGRAM(s) Oral every 6 hours PRN      REVIEW OF SYSTEMS  --------------------------------------------------------------------------------  Gen: No  fevers/chills  Skin: No rashes  Head/Eyes/Ears/Mouth: No headache; Normal hearing; Normal vision w/o blurriness  Respiratory: No dyspnea, cough, wheezing, hemoptysis  CV: No chest pain, PND, orthopnea  GI: No abdominal pain, diarrhea, constipation, nausea, vomiting  : No increased frequency, dysuria, hematuria, nocturia  MSK: No joint pain/swelling; no back pain; no edema  Neuro: No dizziness/lightheadedness, weakness, seizures, numbness, tingling      All other systems were reviewed and are negative, except as noted.    VITALS/PHYSICAL EXAM  --------------------------------------------------------------------------------  T(C): 36.7 (02-25-20 @ 12:33), Max: 36.9 (02-24-20 @ 20:56)  HR: 98 (02-25-20 @ 12:33) (87 - 98)  BP: 125/88 (02-25-20 @ 12:33) (125/88 - 131/89)  RR: 18 (02-25-20 @ 12:33) (17 - 18)  SpO2: 99% (02-25-20 @ 12:33) (96% - 99%)  Wt(kg): --        02-24-20 @ 07:01  -  02-25-20 @ 07:00  --------------------------------------------------------  IN: 240 mL / OUT: 0 mL / NET: 240 mL    02-25-20 @ 07:01  -  02-25-20 @ 15:03  --------------------------------------------------------  IN: 720 mL / OUT: 0 mL / NET: 720 mL      Physical Exam:    	Gen: NAD  	HEENT: supple neck  	Pulm: CTA B/L  	CV: RRR, S1S2; no rub  	Abd: +BS, soft, nontender/nondistended  	: No suprapubic tenderness  	LE: 2+ edema b/l      LABS/STUDIES  --------------------------------------------------------------------------------              8.1    9.75  >-----------<  321      [02-25-20 @ 07:24]              #NM      139  |  104  |  30  ----------------------------<  79      [02-25-20 @ 07:18]  3.6   |  22  |  2.38        Ca     8.4     [02-25-20 @ 07:18]            Creatinine Trend:  SCr 2.38 [02-25 @ 07:18]  SCr 2.44 [02-24 @ 07:08]  SCr 2.14 [02-23 @ 07:30]  SCr 2.07 [02-22 @ 03:32]  SCr 2.04 [02-21 @ 14:52]    Urinalysis - [02-21-20 @ 15:40]      Color Light Yellow / Appearance Clear / SG 1.015 / pH 6.0      Gluc Negative / Ketone Negative  / Bili Negative / Urobili Negative       Blood Negative / Protein 300 mg/dL / Leuk Est Negative / Nitrite Negative      RBC 4 / WBC 5 / Hyaline 6 / Gran  / Sq Epi  / Non Sq Epi 2 / Bacteria Negative    Urine Creatinine 106      [02-22-20 @ 07:06]  Urine Protein 366      [02-22-20 @ 07:06]  Urine Sodium 46      [02-22-20 @ 07:06]    Iron 29, TIBC 350, %sat 8      [02-23-20 @ 10:15]  Ferritin 14      [02-23-20 @ 10:12]  HbA1c 5.4      [02-22-20 @ 11:41]  TSH 1.06      [02-23-20 @ 10:12]    HBsAg Nonreact      [02-24-20 @ 14:31]  HCV 0.39, Nonreact      [02-24-20 @ 14:31]  HIV Nonreact      [02-24-20 @ 14:31]    C3 Complement 119      [02-24-20 @ 14:31]  C4 Complement 20      [02-24-20 @ 14:31]  Rheumatoid Factor <10      [02-25-20 @ 09:57]  Syphilis Screen (Treponema Pallidum Ab) Negative      [02-24-20 @ 14:31]  PLA2R: ABHIJIT 2, IFA Negative      [02-22-20 @ 14:17]  Free Light Chains: kappa 11.07, lambda 9.18, ratio = 1.21      [02-24 @ 14:31]  Immunofixation Serum:   No Monoclonal Band Identified    Reference Range: None Detected      [02-23-20 @ 10:12]  SPEP Interpretation: Normal Electrophoresis Pattern      [02-23-20 @ 10:12]

## 2020-02-26 ENCOUNTER — RESULT REVIEW (OUTPATIENT)
Age: 50
End: 2020-02-26

## 2020-02-26 LAB
ANA TITR SER: NEGATIVE — SIGNIFICANT CHANGE UP
ANION GAP SERPL CALC-SCNC: 16 MMOL/L — SIGNIFICANT CHANGE UP (ref 5–17)
AUTO DIFF PNL BLD: NEGATIVE — SIGNIFICANT CHANGE UP
AUTO DIFF PNL BLD: NEGATIVE — SIGNIFICANT CHANGE UP
BUN SERPL-MCNC: 26 MG/DL — HIGH (ref 7–23)
C-ANCA SER-ACNC: NEGATIVE — SIGNIFICANT CHANGE UP
C-ANCA SER-ACNC: NEGATIVE — SIGNIFICANT CHANGE UP
CALCIUM SERPL-MCNC: 9 MG/DL — SIGNIFICANT CHANGE UP (ref 8.4–10.5)
CHLORIDE SERPL-SCNC: 106 MMOL/L — SIGNIFICANT CHANGE UP (ref 96–108)
CO2 SERPL-SCNC: 20 MMOL/L — LOW (ref 22–31)
CREAT SERPL-MCNC: 2.23 MG/DL — HIGH (ref 0.5–1.3)
DSDNA AB SER-ACNC: <12 IU/ML — SIGNIFICANT CHANGE UP
GLUCOSE BLDC GLUCOMTR-MCNC: 137 MG/DL — HIGH (ref 70–99)
GLUCOSE BLDC GLUCOMTR-MCNC: 89 MG/DL — SIGNIFICANT CHANGE UP (ref 70–99)
GLUCOSE SERPL-MCNC: 67 MG/DL — LOW (ref 70–99)
HCT VFR BLD CALC: 32.4 % — LOW (ref 34.5–45)
HGB BLD-MCNC: 8.3 G/DL — LOW (ref 11.5–15.5)
INR BLD: 1.21 RATIO — HIGH (ref 0.88–1.16)
MCHC RBC-ENTMCNC: 18.5 PG — LOW (ref 27–34)
MCHC RBC-ENTMCNC: 25.6 GM/DL — LOW (ref 32–36)
MCV RBC AUTO: 72.2 FL — LOW (ref 80–100)
NRBC # BLD: 2 /100 WBCS — HIGH (ref 0–0)
P-ANCA SER-ACNC: NEGATIVE — SIGNIFICANT CHANGE UP
P-ANCA SER-ACNC: NEGATIVE — SIGNIFICANT CHANGE UP
PLATELET # BLD AUTO: 309 K/UL — SIGNIFICANT CHANGE UP (ref 150–400)
POTASSIUM SERPL-MCNC: 3.6 MMOL/L — SIGNIFICANT CHANGE UP (ref 3.5–5.3)
POTASSIUM SERPL-SCNC: 3.6 MMOL/L — SIGNIFICANT CHANGE UP (ref 3.5–5.3)
PROTHROM AB SERPL-ACNC: 14 SEC — HIGH (ref 10–13.1)
RBC # BLD: 4.49 M/UL — SIGNIFICANT CHANGE UP (ref 3.8–5.2)
RBC # FLD: SIGNIFICANT CHANGE UP (ref 10.3–14.5)
SODIUM SERPL-SCNC: 142 MMOL/L — SIGNIFICANT CHANGE UP (ref 135–145)
WBC # BLD: 10.52 K/UL — HIGH (ref 3.8–10.5)
WBC # FLD AUTO: 10.52 K/UL — HIGH (ref 3.8–10.5)

## 2020-02-26 PROCEDURE — 88313 SPECIAL STAINS GROUP 2: CPT | Mod: 26

## 2020-02-26 PROCEDURE — 76942 ECHO GUIDE FOR BIOPSY: CPT | Mod: 26

## 2020-02-26 PROCEDURE — 88346 IMFLUOR 1ST 1ANTB STAIN PX: CPT | Mod: 26

## 2020-02-26 PROCEDURE — 88305 TISSUE EXAM BY PATHOLOGIST: CPT | Mod: 26

## 2020-02-26 PROCEDURE — 88312 SPECIAL STAINS GROUP 1: CPT | Mod: 26

## 2020-02-26 PROCEDURE — 50200 RENAL BIOPSY PERQ: CPT | Mod: LT

## 2020-02-26 PROCEDURE — 88350 IMFLUOR EA ADDL 1ANTB STN PX: CPT | Mod: 26

## 2020-02-26 PROCEDURE — 88348 ELECTRON MICROSCOPY DX: CPT | Mod: 26

## 2020-02-26 RX ORDER — ACETAMINOPHEN 500 MG
650 TABLET ORAL ONCE
Refills: 0 | Status: COMPLETED | OUTPATIENT
Start: 2020-02-26 | End: 2020-02-26

## 2020-02-26 RX ADMIN — Medication 200 MILLIGRAM(S): at 17:21

## 2020-02-26 RX ADMIN — BUMETANIDE 2 MILLIGRAM(S): 0.25 INJECTION INTRAMUSCULAR; INTRAVENOUS at 17:14

## 2020-02-26 RX ADMIN — Medication 650 MILLIGRAM(S): at 17:40

## 2020-02-26 RX ADMIN — Medication 650 MILLIGRAM(S): at 17:11

## 2020-02-26 RX ADMIN — IRON SUCROSE 110 MILLIGRAM(S): 20 INJECTION, SOLUTION INTRAVENOUS at 17:21

## 2020-02-26 RX ADMIN — BUMETANIDE 2 MILLIGRAM(S): 0.25 INJECTION INTRAMUSCULAR; INTRAVENOUS at 05:29

## 2020-02-26 NOTE — PROGRESS NOTE ADULT - PROBLEM SELECTOR PLAN 4
She denies any pmh of renal failure. Renal eval noted  KO as advised. Nephrotic syndrome noted on UA with substantial proteinuria  Anasarca related to proteinuria, started on IV Bumex. Renal Biopsy done

## 2020-02-26 NOTE — PROGRESS NOTE ADULT - SUBJECTIVE AND OBJECTIVE BOX
Patient is a 49y old  Female who presents with a chief complaint of Edema (2020 13:12)      SUBJECTIVE / OVERNIGHT EVENTS:  No dizziness. Anasarca better  Renal biopsy done today    Review of Systems:   CONSTITUTIONAL: No fever, weight loss, or fatigue  EYES: No eye pain, visual disturbances, or discharge  ENMT:  No difficulty hearing, tinnitus, vertigo; No sinus or throat pain  NECK: No pain or stiffness  BREASTS: No pain, masses, or nipple discharge  RESPIRATORY: No cough, wheezing, chills or hemoptysis; No shortness of breath  CARDIOVASCULAR: No chest pain, palpitations, dizziness, + leg swelling  GASTROINTESTINAL: No abdominal or epigastric pain. No nausea, vomiting, or hematemesis; No diarrhea or constipation. No melena or hematochezia.  GENITOURINARY: No dysuria, frequency, hematuria, or incontinence  NEUROLOGICAL: No headaches, memory loss, loss of strength, numbness, or tremors  SKIN: No itching, burning, rashes, or lesions   LYMPH NODES: No enlarged glands  ENDOCRINE: No heat or cold intolerance; No hair loss  MUSCULOSKELETAL: No joint pain or swelling; No muscle, back, or extremity pain  PSYCHIATRIC: No depression, anxiety, mood swings, or difficulty sleeping  HEME/LYMPH: No easy bruising, or bleeding gums  ALLERY AND IMMUNOLOGIC: No hives or eczema    MEDICATIONS  (STANDING):  buMETAnide Injectable 2 milliGRAM(s) IV Push two times a day  dextrose 5%. 1000 milliLiter(s) (50 mL/Hr) IV Continuous <Continuous>  dextrose 50% Injectable 12.5 Gram(s) IV Push once  dextrose 50% Injectable 25 Gram(s) IV Push once  dextrose 50% Injectable 25 Gram(s) IV Push once  heparin  Injectable 5000 Unit(s) SubCutaneous two times a day  influenza   Vaccine 0.5 milliLiter(s) IntraMuscular once  insulin lispro (HumaLOG) corrective regimen sliding scale   SubCutaneous three times a day before meals  losartan 50 milliGRAM(s) Oral daily    MEDICATIONS  (PRN):  dextrose 40% Gel 15 Gram(s) Oral once PRN Blood Glucose LESS THAN 70 milliGRAM(s)/deciliter  glucagon  Injectable 1 milliGRAM(s) IntraMuscular once PRN Glucose LESS THAN 70 milligrams/deciliter      PHYSICAL EXAM:  Vital Signs Last 24 Hrs  T(C): 36.6 (2020 15:31), Max: 36.9 (2020 09:53)  T(F): 97.9 (2020 15:31), Max: 98.4 (2020 09:53)  HR: 110 (2020 17:19) (91 - 112)  BP: 142/99 (2020 17:19) (135/90 - 155/101)  BP(mean): --  RR: 19 (2020 17:19) (18 - 19)  SpO2: 100% (2020 17:19) (96% - 100%)  I&O's Summary    2020 07:01  -  2020 21:24  --------------------------------------------------------  IN: 120 mL / OUT: 0 mL / NET: 120 mL      GENERAL: NAD, well-developed  HEAD:  Atraumatic, Normocephalic  EYES: EOMI, PERRLA, conjunctiva and sclera clear  NECK: Supple, No JVD  CHEST/LUNG: Clear to auscultation bilaterally; No wheeze  HEART: Regular rate and rhythm; No murmurs, rubs, or gallops  ABDOMEN: Soft, Nontender, Nondistended; Bowel sounds present  EXTREMITIES:  2+ Peripheral Pulses, No clubbing, cyanosis, 3+edema  PSYCH: AAOx3  NEUROLOGY: non-focal  SKIN: No rashes or lesions    LABS:  CAPILLARY BLOOD GLUCOSE      POCT Blood Glucose.: 145 mg/dL (2020 17:39)  POCT Blood Glucose.: 142 mg/dL (2020 12:39)  POCT Blood Glucose.: 108 mg/dL (2020 08:24)  POCT Blood Glucose.: 149 mg/dL (2020 21:54)                          7.6    9.94  )-----------( 313      ( 2020 16:36 )             27.7     02-    137  |  108  |  23  ----------------------------<  114<H>  4.1   |  18<L>  |  2.07<H>    Ca    8.0<L>      2020 03:32    TPro  6.6  /  Alb  x   /  TBili  x   /  DBili  x   /  AST  x   /  ALT  x   /  AlkPhos  x       PT/INR - ( 2020 16:15 )   PT: 14.8 sec;   INR: 1.29 ratio         PTT - ( 2020 16:15 )  PTT:32.1 sec      Urinalysis Basic - ( 2020 15:40 )    Color: Light Yellow / Appearance: Clear / S.015 / pH: x  Gluc: x / Ketone: Negative  / Bili: Negative / Urobili: Negative   Blood: x / Protein: 300 mg/dL / Nitrite: Negative   Leuk Esterase: Negative / RBC: 4 /hpf / WBC 5 /HPF   Sq Epi: x / Non Sq Epi: 2 /hpf / Bacteria: Negative        RADIOLOGY & ADDITIONAL TESTS:    Imaging Personally Reviewed:    Consultant(s) Notes Reviewed:      Care Discussed with Consultants/Other Providers:

## 2020-02-26 NOTE — PROGRESS NOTE ADULT - SUBJECTIVE AND OBJECTIVE BOX
Pt seen, feeling fine    MEDICATIONS  (STANDING):  buMETAnide Injectable 2 milliGRAM(s) IV Push two times a day  dextrose 5%. 1000 milliLiter(s) (50 mL/Hr) IV Continuous <Continuous>  dextrose 50% Injectable 12.5 Gram(s) IV Push once  dextrose 50% Injectable 25 Gram(s) IV Push once  dextrose 50% Injectable 25 Gram(s) IV Push once  influenza   Vaccine 0.5 milliLiter(s) IntraMuscular once  insulin lispro (HumaLOG) corrective regimen sliding scale   SubCutaneous three times a day before meals  losartan 50 milliGRAM(s) Oral daily    MEDICATIONS  (PRN):  dextrose 40% Gel 15 Gram(s) Oral once PRN Blood Glucose LESS THAN 70 milliGRAM(s)/deciliter  glucagon  Injectable 1 milliGRAM(s) IntraMuscular once PRN Glucose LESS THAN 70 milligrams/deciliter  guaiFENesin   Syrup  (Sugar-Free) 200 milliGRAM(s) Oral every 6 hours PRN Cough      ROS  No fever, sweats, chills  No epistaxis, HA, sore throat  No CP, SOB, cough, sputum  No n/v/d, abd pain, melena, hematochezia  + edema  No rash  No anxiety  No back pain, joint pain  No bleeding, bruising  No dysuria, hematuria    Vital Signs Last 24 Hrs  T(C): 36.7 (26 Feb 2020 15:12), Max: 37.1 (25 Feb 2020 21:29)  T(F): 98 (26 Feb 2020 15:12), Max: 98.7 (25 Feb 2020 21:29)  HR: 97 (26 Feb 2020 17:10) (96 - 101)  BP: 120/80 (26 Feb 2020 17:10) (120/80 - 144/96)  BP(mean): --  RR: 18 (26 Feb 2020 15:12) (18 - 18)  SpO2: 95% (26 Feb 2020 15:12) (95% - 97%)    PE  NAD  Awake, alert  Anicteric, MMM  RRR  CTAB  Abd soft, NT, ND  1+ pitting edema b/l LE  No rash grossly  FROM                          8.3    10.52 )-----------( 309      ( 26 Feb 2020 07:03 )             32.4       02-26    142  |  106  |  26<H>  ----------------------------<  67<L>  3.6   |  20<L>  |  2.23<H>    Ca    9.0      26 Feb 2020 07:03

## 2020-02-27 LAB
ANION GAP SERPL CALC-SCNC: 13 MMOL/L — SIGNIFICANT CHANGE UP (ref 5–17)
BUN SERPL-MCNC: 23 MG/DL — SIGNIFICANT CHANGE UP (ref 7–23)
CALCIUM SERPL-MCNC: 8.4 MG/DL — SIGNIFICANT CHANGE UP (ref 8.4–10.5)
CHLORIDE SERPL-SCNC: 106 MMOL/L — SIGNIFICANT CHANGE UP (ref 96–108)
CO2 SERPL-SCNC: 22 MMOL/L — SIGNIFICANT CHANGE UP (ref 22–31)
CREAT SERPL-MCNC: 2.28 MG/DL — HIGH (ref 0.5–1.3)
GLUCOSE BLDC GLUCOMTR-MCNC: 118 MG/DL — HIGH (ref 70–99)
GLUCOSE BLDC GLUCOMTR-MCNC: 130 MG/DL — HIGH (ref 70–99)
GLUCOSE BLDC GLUCOMTR-MCNC: 145 MG/DL — HIGH (ref 70–99)
GLUCOSE BLDC GLUCOMTR-MCNC: 86 MG/DL — SIGNIFICANT CHANGE UP (ref 70–99)
GLUCOSE SERPL-MCNC: 77 MG/DL — SIGNIFICANT CHANGE UP (ref 70–99)
HCT VFR BLD CALC: 26.7 % — LOW (ref 34.5–45)
HCT VFR BLD CALC: SIGNIFICANT CHANGE UP (ref 34.5–45)
HGB BLD-MCNC: 7.1 G/DL — LOW (ref 11.5–15.5)
HGB BLD-MCNC: 7.2 G/DL — LOW (ref 11.5–15.5)
MCHC RBC-ENTMCNC: 19.2 PG — LOW (ref 27–34)
MCHC RBC-ENTMCNC: 26.6 GM/DL — LOW (ref 32–36)
MCHC RBC-ENTMCNC: SIGNIFICANT CHANGE UP (ref 27–34)
MCHC RBC-ENTMCNC: SIGNIFICANT CHANGE UP (ref 32–36)
MCV RBC AUTO: 72.4 FL — LOW (ref 80–100)
MCV RBC AUTO: SIGNIFICANT CHANGE UP (ref 80–100)
NRBC # BLD: 2 /100 WBCS — HIGH (ref 0–0)
NRBC # BLD: 2 /100 WBCS — HIGH (ref 0–0)
PLATELET # BLD AUTO: 284 K/UL — SIGNIFICANT CHANGE UP (ref 150–400)
PLATELET # BLD AUTO: 318 K/UL — SIGNIFICANT CHANGE UP (ref 150–400)
POTASSIUM SERPL-MCNC: 4.3 MMOL/L — SIGNIFICANT CHANGE UP (ref 3.5–5.3)
POTASSIUM SERPL-SCNC: 4.3 MMOL/L — SIGNIFICANT CHANGE UP (ref 3.5–5.3)
RBC # BLD: 3.69 M/UL — LOW (ref 3.8–5.2)
RBC # BLD: 3.83 M/UL — SIGNIFICANT CHANGE UP (ref 3.8–5.2)
RBC # FLD: SIGNIFICANT CHANGE UP (ref 10.3–14.5)
RBC # FLD: SIGNIFICANT CHANGE UP (ref 10.3–14.5)
SODIUM SERPL-SCNC: 141 MMOL/L — SIGNIFICANT CHANGE UP (ref 135–145)
WBC # BLD: 10.37 K/UL — SIGNIFICANT CHANGE UP (ref 3.8–10.5)
WBC # BLD: 12.38 K/UL — HIGH (ref 3.8–10.5)
WBC # FLD AUTO: 10.37 K/UL — SIGNIFICANT CHANGE UP (ref 3.8–10.5)
WBC # FLD AUTO: 12.38 K/UL — HIGH (ref 3.8–10.5)

## 2020-02-27 PROCEDURE — 99233 SBSQ HOSP IP/OBS HIGH 50: CPT | Mod: GC

## 2020-02-27 PROCEDURE — 76857 US EXAM PELVIC LIMITED: CPT | Mod: 26

## 2020-02-27 PROCEDURE — 76700 US EXAM ABDOM COMPLETE: CPT | Mod: 26

## 2020-02-27 RX ORDER — ACETAMINOPHEN 500 MG
650 TABLET ORAL ONCE
Refills: 0 | Status: COMPLETED | OUTPATIENT
Start: 2020-02-27 | End: 2020-02-27

## 2020-02-27 RX ORDER — DESMOPRESSIN ACETATE 0.1 MG/1
20 TABLET ORAL ONCE
Refills: 0 | Status: COMPLETED | OUTPATIENT
Start: 2020-02-27 | End: 2020-02-27

## 2020-02-27 RX ADMIN — BUMETANIDE 2 MILLIGRAM(S): 0.25 INJECTION INTRAMUSCULAR; INTRAVENOUS at 18:35

## 2020-02-27 RX ADMIN — Medication 650 MILLIGRAM(S): at 20:59

## 2020-02-27 RX ADMIN — Medication 650 MILLIGRAM(S): at 13:00

## 2020-02-27 RX ADMIN — DESMOPRESSIN ACETATE 220 MICROGRAM(S): 0.1 TABLET ORAL at 20:58

## 2020-02-27 RX ADMIN — BUMETANIDE 2 MILLIGRAM(S): 0.25 INJECTION INTRAMUSCULAR; INTRAVENOUS at 07:00

## 2020-02-27 RX ADMIN — Medication 650 MILLIGRAM(S): at 12:25

## 2020-02-27 RX ADMIN — LOSARTAN POTASSIUM 50 MILLIGRAM(S): 100 TABLET, FILM COATED ORAL at 07:00

## 2020-02-27 RX ADMIN — Medication 650 MILLIGRAM(S): at 21:45

## 2020-02-27 NOTE — PROGRESS NOTE ADULT - ASSESSMENT
· Assessment		  49 y old AA female with hx of DMII, HTN, endometriosis here with SOB, ARIAS and abdominal girth and new LE edema, found to have renal dysfunction, proteinuria, and severe anemia. CT a/p found hepatomegaly and 1.1cm soft tissue density in the L breast    Anemia -- responded well to PRBC. Now with Ferritin of 14, with YUAN  -- s/p venofer 200mg daily x4 total, although worsening anemia today would hold off on further iron currently  -- hgb adequate at this time  -- transfuse to keep hgb >7  -- neg hapto, no hemolysis  -- B12/folate adeuquate, TFT nml  -- SPEP, ZOLTAN,  hemoglobin electrophoresis wnl  -- f/u  FOBT   -- with YUAN, had EGD and colonoscopy. Found gastritis and diverticuli, hemorrhoids  -- monitor CBC- stable for now  -- f/u GI    Renal -- renal following  -- for renal bx, ? IA nephropathy    edema -- per renal    hepatomegaly -- LFTs nml, cause unclear  -- monitor for now,     L breast soft tissue density -- partially noted on CT. Not able to obtain mammo in house. Pt states that she had a lesion in the L breast that was bx years ago, last mammo was 2018, which was neg  -- outpt mammo and breast u/s      Wayne Hernandez MD  [facility]  Cell: [phone]

## 2020-02-27 NOTE — DIETITIAN INITIAL EVALUATION ADULT. - ADD RECOMMEND
1. Will continue to monitor PO intake, weight, labs, skin, GI status, diet. 2. Encourage PO intake and obtained food preferences (obtained at this time) - reviewed menu order procedures. 3. Provided education on T2DM and heart healthy nutrition therapy - made aware RD remains available.

## 2020-02-27 NOTE — DIETITIAN INITIAL EVALUATION ADULT. - ENERGY NEEDS
Pertinent information as per chart: Pt 50 y/o F with PMH: T2DM, HTN, endometriosis, admitted with SOB, ARIAS, abdominal girth, and new LE edema, found to have renal dysfunction, proteinuria, and severe anemia; CT a/p found hepatomegaly and 1.1cm soft tissue density in the L breast, S/P renal biopsy (02/26).

## 2020-02-27 NOTE — PROGRESS NOTE ADULT - ASSESSMENT
anemia  iron deficiency   hepatomegaly      s/p colonoscopy 2/26 with diverticulosis in the sigmoid colon  - Internal hemorrhoids  - The examination was otherwise normal  - No specimens collected    s/p upper gastrointestinal endoscopy 2/26  with normal esophagus.  - Gastritis. Biopsied.  - Normal examined duodenum. Biopsied.  - await pathology results     check abdominal u/s   further recommendations pending above

## 2020-02-27 NOTE — PROGRESS NOTE ADULT - PROBLEM SELECTOR PLAN 4
IgA Nephropathy seen on Biopsy. Renal will FU. Small perinephric hematoma at site of biopsy, will monitor clinically

## 2020-02-27 NOTE — PROGRESS NOTE ADULT - ASSESSMENT
49y F with nephrotic syndrome      #Nephrotic syndrome  -Has likely EVELYN, edema and UA showing proteinuria (could be all chronic) but given neg CT scan for endometrial finding and mostly showing anasarca, and pe showing edema and UA with protein- this could be first presentation of nephrotic syndrome and sudden onset could suggest minimal change, could also be a be paraprotein process in setting of anemia as well.   - Spot urine TP/CR  showing 3.5 gm of proteinuria  - Serologic work-up so far: Normal Electrophoresis, Complements WNL, Neg PLA2R, Negative ALVERTO, anti-DsDNA and ANCA serologies  - Renal Sono: Patent renal veins bilaterally. No evidence of thrombosis  Markedly increased renal cortical echogenicity bilaterally, which may be secondary to medical renal disease versus other infiltrative processes.  - Bumex 2mg IV BID for diuresis as most of this is anasarca.  - Renal biopsy reviewed with pathologist preliminary with IgA Nephropathy, 70% Global Glomerulosclerosis    #Hypertension  - Blood pressure currently stable and at an acceptable range  - Keep SBP<160mmHg prior to renal biopsy    #Anemia  - Patient with iron studies consistent with iron deficiency  -  IV Iron 200mg very 48 hours x5 doses  - Transfuse as necessary      Manan Olmos  Nephrology Fellow  Pager: [phone]

## 2020-02-27 NOTE — PROGRESS NOTE ADULT - SUBJECTIVE AND OBJECTIVE BOX
Patient is a 49y old  Female who presents with a chief complaint of Edema (2020 13:12)      SUBJECTIVE / OVERNIGHT EVENTS:  Renal biopsy done yesterday.  US shows perinephric hematoma    Review of Systems:   CONSTITUTIONAL: No fever, weight loss, or fatigue  EYES: No eye pain, visual disturbances, or discharge  ENMT:  No difficulty hearing, tinnitus, vertigo; No sinus or throat pain  NECK: No pain or stiffness  BREASTS: No pain, masses, or nipple discharge  RESPIRATORY: No cough, wheezing, chills or hemoptysis; No shortness of breath  CARDIOVASCULAR: No chest pain, palpitations, dizziness, + leg swelling  GASTROINTESTINAL: No abdominal or epigastric pain. No nausea, vomiting, or hematemesis; No diarrhea or constipation. No melena or hematochezia.  GENITOURINARY: No dysuria, frequency, hematuria, or incontinence  NEUROLOGICAL: No headaches, memory loss, loss of strength, numbness, or tremors  SKIN: No itching, burning, rashes, or lesions   LYMPH NODES: No enlarged glands  ENDOCRINE: No heat or cold intolerance; No hair loss  MUSCULOSKELETAL: No joint pain or swelling; No muscle, back, or extremity pain  PSYCHIATRIC: No depression, anxiety, mood swings, or difficulty sleeping  HEME/LYMPH: No easy bruising, or bleeding gums  ALLERY AND IMMUNOLOGIC: No hives or eczema    MEDICATIONS  (STANDING):  buMETAnide Injectable 2 milliGRAM(s) IV Push two times a day  dextrose 5%. 1000 milliLiter(s) (50 mL/Hr) IV Continuous <Continuous>  dextrose 50% Injectable 12.5 Gram(s) IV Push once  dextrose 50% Injectable 25 Gram(s) IV Push once  dextrose 50% Injectable 25 Gram(s) IV Push once  heparin  Injectable 5000 Unit(s) SubCutaneous two times a day  influenza   Vaccine 0.5 milliLiter(s) IntraMuscular once  insulin lispro (HumaLOG) corrective regimen sliding scale   SubCutaneous three times a day before meals  losartan 50 milliGRAM(s) Oral daily    MEDICATIONS  (PRN):  dextrose 40% Gel 15 Gram(s) Oral once PRN Blood Glucose LESS THAN 70 milliGRAM(s)/deciliter  glucagon  Injectable 1 milliGRAM(s) IntraMuscular once PRN Glucose LESS THAN 70 milligrams/deciliter      PHYSICAL EXAM:  Vital Signs Last 24 Hrs  T(C): 36.6 (2020 15:31), Max: 36.9 (2020 09:53)  T(F): 97.9 (2020 15:31), Max: 98.4 (2020 09:53)  HR: 110 (2020 17:19) (91 - 112)  BP: 142/99 (2020 17:19) (135/90 - 155/101)  BP(mean): --  RR: 19 (2020 17:19) (18 - 19)  SpO2: 100% (2020 17:19) (96% - 100%)  I&O's Summary    2020 07:01  -  2020 21:24  --------------------------------------------------------  IN: 120 mL / OUT: 0 mL / NET: 120 mL      GENERAL: NAD, well-developed  HEAD:  Atraumatic, Normocephalic  EYES: EOMI, PERRLA, conjunctiva and sclera clear  NECK: Supple, No JVD  CHEST/LUNG: Clear to auscultation bilaterally; No wheeze  HEART: Regular rate and rhythm; No murmurs, rubs, or gallops  ABDOMEN: Soft, Nontender, Nondistended; Bowel sounds present  EXTREMITIES:  2+ Peripheral Pulses, No clubbing, cyanosis, 3+edema  PSYCH: AAOx3  NEUROLOGY: non-focal  SKIN: No rashes or lesions    LABS:  CAPILLARY BLOOD GLUCOSE      POCT Blood Glucose.: 145 mg/dL (2020 17:39)  POCT Blood Glucose.: 142 mg/dL (2020 12:39)  POCT Blood Glucose.: 108 mg/dL (2020 08:24)  POCT Blood Glucose.: 149 mg/dL (2020 21:54)                          7.6    9.94  )-----------( 313      ( 2020 16:36 )             27.7     02-22    137  |  108  |  23  ----------------------------<  114<H>  4.1   |  18<L>  |  2.07<H>    Ca    8.0<L>      2020 03:32    TPro  6.6  /  Alb  x   /  TBili  x   /  DBili  x   /  AST  x   /  ALT  x   /  AlkPhos  x   -    PT/INR - ( 2020 16:15 )   PT: 14.8 sec;   INR: 1.29 ratio         PTT - ( 2020 16:15 )  PTT:32.1 sec      Urinalysis Basic - ( 2020 15:40 )    Color: Light Yellow / Appearance: Clear / S.015 / pH: x  Gluc: x / Ketone: Negative  / Bili: Negative / Urobili: Negative   Blood: x / Protein: 300 mg/dL / Nitrite: Negative   Leuk Esterase: Negative / RBC: 4 /hpf / WBC 5 /HPF   Sq Epi: x / Non Sq Epi: 2 /hpf / Bacteria: Negative        RADIOLOGY & ADDITIONAL TESTS:    Imaging Personally Reviewed:    Consultant(s) Notes Reviewed:      Care Discussed with Consultants/Other Providers:

## 2020-02-27 NOTE — PROGRESS NOTE ADULT - SUBJECTIVE AND OBJECTIVE BOX
[facility] Division of Kidney Diseases & Hypertension  FOLLOW UP NOTE  [phone]--------------------------------------------------------------------------------  Chief Complaint:Anemia      24 hour events/subjective:    Patient seen today without any significant signs of distress  s/p renal biopsy on 2/26/20  No acute events overnight    PAST HISTORY  --------------------------------------------------------------------------------  No significant changes to PMH, PSH, FHx, SHx, unless otherwise noted    ALLERGIES & MEDICATIONS  --------------------------------------------------------------------------------  Allergies    No Known Allergies    Intolerances      Standing Inpatient Medications  buMETAnide Injectable 2 milliGRAM(s) IV Push two times a day  dextrose 5%. 1000 milliLiter(s) IV Continuous <Continuous>  dextrose 50% Injectable 12.5 Gram(s) IV Push once  dextrose 50% Injectable 25 Gram(s) IV Push once  dextrose 50% Injectable 25 Gram(s) IV Push once  influenza   Vaccine 0.5 milliLiter(s) IntraMuscular once  insulin lispro (HumaLOG) corrective regimen sliding scale   SubCutaneous three times a day before meals  losartan 50 milliGRAM(s) Oral daily    PRN Inpatient Medications  dextrose 40% Gel 15 Gram(s) Oral once PRN  glucagon  Injectable 1 milliGRAM(s) IntraMuscular once PRN  guaiFENesin   Syrup  (Sugar-Free) 200 milliGRAM(s) Oral every 6 hours PRN      REVIEW OF SYSTEMS  --------------------------------------------------------------------------------  Gen: No  fevers/chills  Skin: No rashes  Head/Eyes/Ears/Mouth: No headache; Normal hearing; Normal vision w/o blurriness  Respiratory: No dyspnea, cough, wheezing, hemoptysis  CV: No chest pain, PND, orthopnea  GI: No abdominal pain, diarrhea, constipation, nausea, vomiting  : No increased frequency, dysuria, hematuria, nocturia  MSK: No joint pain/swelling; no back pain; no edema  Neuro: No dizziness/lightheadedness, weakness, seizures, numbness, tingling      All other systems were reviewed and are negative, except as noted.    VITALS/PHYSICAL EXAM  --------------------------------------------------------------------------------  T(C): 36.7 (02-27-20 @ 12:38), Max: 36.7 (02-27-20 @ 12:38)  HR: 89 (02-27-20 @ 12:38) (89 - 97)  BP: 133/86 (02-27-20 @ 12:38) (120/80 - 133/86)  RR: 18 (02-27-20 @ 12:38) (18 - 18)  SpO2: 99% (02-27-20 @ 12:38) (96% - 99%)  Wt(kg): --        02-27-20 @ 07:01  -  02-27-20 @ 15:51  --------------------------------------------------------  IN: 600 mL / OUT: 0 mL / NET: 600 mL      Physical Exam:    	Gen: NAD  	HEENT: supple neck  	Pulm: CTA B/L  	CV: RRR, S1S2; no rub  	Abd: +BS, soft, nontender/nondistended  	: No suprapubic tenderness  	LE: 2+ edema b/l improving    LABS/STUDIES  --------------------------------------------------------------------------------              7.1    10.37 >-----------<  318      [02-27-20 @ 11:12]              26.7     141  |  106  |  23  ----------------------------<  77      [02-27-20 @ 08:43]  4.3   |  22  |  2.28        Ca     8.4     [02-27-20 @ 08:43]      PT/INR: PT 14.0 , INR 1.21       [02-26-20 @ 09:17]      Creatinine Trend:  SCr 2.28 [02-27 @ 08:43]  SCr 2.23 [02-26 @ 07:03]  SCr 2.38 [02-25 @ 07:18]  SCr 2.44 [02-24 @ 07:08]  SCr 2.14 [02-23 @ 07:30]    Urinalysis - [02-21-20 @ 15:40]      Color Light Yellow / Appearance Clear / SG 1.015 / pH 6.0      Gluc Negative / Ketone Negative  / Bili Negative / Urobili Negative       Blood Negative / Protein 300 mg/dL / Leuk Est Negative / Nitrite Negative      RBC 4 / WBC 5 / Hyaline 6 / Gran  / Sq Epi  / Non Sq Epi 2 / Bacteria Negative    Urine Creatinine 106      [02-22-20 @ 07:06]  Urine Protein 366      [02-22-20 @ 07:06]  Urine Sodium 46      [02-22-20 @ 07:06]    Iron 29, TIBC 350, %sat 8      [02-23-20 @ 10:15]  Ferritin 14      [02-23-20 @ 10:12]  HbA1c 5.4      [02-22-20 @ 11:41]  TSH 1.06      [02-23-20 @ 10:12]    HBsAg Nonreact      [02-24-20 @ 14:31]  HCV 0.39, Nonreact      [02-24-20 @ 14:31]  HIV Nonreact      [02-24-20 @ 14:31]    ALVERTO: titer Negative, pattern --      [02-25-20 @ 09:34]  dsDNA <12      [02-24-20 @ 14:31]  C3 Complement 119      [02-24-20 @ 14:31]  C4 Complement 20      [02-24-20 @ 14:31]  Rheumatoid Factor <10      [02-25-20 @ 09:57]  ANCA: cANCA Negative, pANCA Negative, atypical ANCA Negative      [02-24-20 @ 14:31]  Syphilis Screen (Treponema Pallidum Ab) Negative      [02-24-20 @ 14:31]  PLA2R: ABHIJIT 2, IFA Negative      [02-22-20 @ 14:17]  Free Light Chains: kappa 11.07, lambda 9.18, ratio = 1.21      [02-24 @ 14:31]  Immunofixation Serum:   No Monoclonal Band Identified    Reference Range: None Detected      [02-23-20 @ 10:12]  SPEP Interpretation: Normal Electrophoresis Pattern      [02-23-20 @ 10:12]

## 2020-02-27 NOTE — DIETITIAN INITIAL EVALUATION ADULT. - NS FNS WEIGHT CHANGE REASON
Pt reports history of 15 pounds weight loss due to fair PO intake, unable to recall times frames, from 190 to 175 pounds, followed by weight gain to 190 pounds again. Weight as per flow sheets (02/21) 190 pounds - will continue to monitor (pt sitting at this time).

## 2020-02-27 NOTE — DIETITIAN INITIAL EVALUATION ADULT. - PHYSICAL APPEARANCE
Performed nutrition focused physical exam with pt's consent and noted no signs of muscle/fat loss in any area./overweight/other (specify) Ht: 64 inches Wt: 190 pounds BMI: 32.6 kg/m2 IBW: 120 (+/-10%) 158.3 %IBW  Noted +1 sierra. foot edema as per flow sheets.  Skin: no noted pressure injuries as per documentation.

## 2020-02-27 NOTE — PROGRESS NOTE ADULT - SUBJECTIVE AND OBJECTIVE BOX
Pt seen, still with some fatigue      MEDICATIONS  (STANDING):  buMETAnide Injectable 2 milliGRAM(s) IV Push two times a day  dextrose 5%. 1000 milliLiter(s) (50 mL/Hr) IV Continuous <Continuous>  dextrose 50% Injectable 12.5 Gram(s) IV Push once  dextrose 50% Injectable 25 Gram(s) IV Push once  dextrose 50% Injectable 25 Gram(s) IV Push once  influenza   Vaccine 0.5 milliLiter(s) IntraMuscular once  insulin lispro (HumaLOG) corrective regimen sliding scale   SubCutaneous three times a day before meals  losartan 50 milliGRAM(s) Oral daily    MEDICATIONS  (PRN):  dextrose 40% Gel 15 Gram(s) Oral once PRN Blood Glucose LESS THAN 70 milliGRAM(s)/deciliter  glucagon  Injectable 1 milliGRAM(s) IntraMuscular once PRN Glucose LESS THAN 70 milligrams/deciliter  guaiFENesin   Syrup  (Sugar-Free) 200 milliGRAM(s) Oral every 6 hours PRN Cough      ROS  No fever, sweats, chills  No epistaxis, HA, sore throat  No CP, SOB, cough, sputum  No n/v/d, abd pain, melena, hematochezia  No edema  No rash  No anxiety  No back pain, joint pain  No bleeding, bruising  No dysuria, hematuria    Vital Signs Last 24 Hrs  T(C): 36.7 (27 Feb 2020 12:38), Max: 36.7 (27 Feb 2020 12:38)  T(F): 98 (27 Feb 2020 12:38), Max: 98 (27 Feb 2020 12:38)  HR: 89 (27 Feb 2020 12:38) (89 - 97)  BP: 133/86 (27 Feb 2020 12:38) (120/80 - 133/86)  BP(mean): --  RR: 18 (27 Feb 2020 12:38) (18 - 18)  SpO2: 99% (27 Feb 2020 12:38) (96% - 99%)    PE  NAD  Awake, alert  Anicteric, MMM  RRR  CTAB  Abd soft, NT, ND  No c/c/e  No rash grossly  FROM                          7.1    10.37 )-----------( 318      ( 27 Feb 2020 11:12 )             26.7       02-27    141  |  106  |  23  ----------------------------<  77  4.3   |  22  |  2.28<H>    Ca    8.4      27 Feb 2020 08:43

## 2020-02-27 NOTE — PROGRESS NOTE ADULT - SUBJECTIVE AND OBJECTIVE BOX
INTERVAL HPI/OVERNIGHT EVENTS:    s/p colonoscopy with diverticulosis in the sigmoid colon  - Internal hemorrhoids  - The examination was otherwise normal  - No specimens collected    s/p  upper gastrointestinal endoscopy  with normal esophagus.  - Gastritis. Biopsied.  - Normal examined duodenum. Biopsied.    pt seen and examined  no new GI events/complaints   no bm since procedure    MEDICATIONS  (STANDING):  buMETAnide Injectable 2 milliGRAM(s) IV Push two times a day  dextrose 5%. 1000 milliLiter(s) (50 mL/Hr) IV Continuous <Continuous>  dextrose 50% Injectable 12.5 Gram(s) IV Push once  dextrose 50% Injectable 25 Gram(s) IV Push once  dextrose 50% Injectable 25 Gram(s) IV Push once  influenza   Vaccine 0.5 milliLiter(s) IntraMuscular once  insulin lispro (HumaLOG) corrective regimen sliding scale   SubCutaneous three times a day before meals  losartan 50 milliGRAM(s) Oral daily    MEDICATIONS  (PRN):  dextrose 40% Gel 15 Gram(s) Oral once PRN Blood Glucose LESS THAN 70 milliGRAM(s)/deciliter  glucagon  Injectable 1 milliGRAM(s) IntraMuscular once PRN Glucose LESS THAN 70 milligrams/deciliter  guaiFENesin   Syrup  (Sugar-Free) 200 milliGRAM(s) Oral every 6 hours PRN Cough      Allergies    No Known Allergies    Intolerances        Review of Systems:    General:  No wt loss, fevers, chills, night sweats,fatigue,   Eyes:  Good vision, no reported pain  ENT:  No sore throat, pain, runny nose, dysphagia  CV:  No pain, palpitations, hypo/hypertension  Resp:  No dyspnea, cough, tachypnea, wheezing  GI:  No pain, No nausea, No vomiting, No diarrhea, No constipation, No weight loss, No fever, No pruritis, No rectal bleeding, No melena, No dysphagia  :  No pain, bleeding, incontinence, nocturia  Muscle:  No pain, weakness  Neuro:  No weakness, tingling, memory problems  Psych:  No fatigue, insomnia, mood problems, depression  Endocrine:  No polyuria, polydypsia, cold/heat intolerance  Heme:  No petechiae, ecchymosis, easy bruisability  Skin:  No rash, tattoos, scars, edema      Vital Signs Last 24 Hrs  T(C): 36.7 (27 Feb 2020 12:38), Max: 36.7 (26 Feb 2020 15:12)  T(F): 98 (27 Feb 2020 12:38), Max: 98 (26 Feb 2020 15:12)  HR: 89 (27 Feb 2020 12:38) (89 - 100)  BP: 133/86 (27 Feb 2020 12:38) (120/80 - 133/86)  BP(mean): --  RR: 18 (27 Feb 2020 12:38) (18 - 18)  SpO2: 99% (27 Feb 2020 12:38) (95% - 99%)    PHYSICAL EXAM:    Constitutional: NAD, well-developed  HEENT: EOMI, throat clear  Neck: No LAD, supple  Respiratory: CTA and P  Cardiovascular: S1 and S2, RRR, no M  Gastrointestinal: BS+, soft, NT/ND, neg HSM,  Extremities: No peripheral edema, neg clubing, cyanosis  Vascular: 2+ peripheral pulses  Neurological: A/O x 3, no focal deficits  Psychiatric: Normal mood, normal affect  Skin: No rashes      LABS:                        7.1    10.37 )-----------( 318      ( 27 Feb 2020 11:12 )             26.7     02-27    141  |  106  |  23  ----------------------------<  77  4.3   |  22  |  2.28<H>    Ca    8.4      27 Feb 2020 08:43      PT/INR - ( 26 Feb 2020 09:17 )   PT: 14.0 sec;   INR: 1.21 ratio               RADIOLOGY & ADDITIONAL TESTS:

## 2020-02-27 NOTE — DIETITIAN INITIAL EVALUATION ADULT. - OTHER INFO
Pt reports fair appetite and PO intake "for a long time", states always being a small eater. Confirms NKFA. Reports taking Multivitamin, Vitamin C, and Ferrous Sulfate PTA; denies drinking any nutritional supplement. Pt reports not following any type of diet or restriction at home but states not consuming chicken and milk; admits to add sugar to beverages and drink juice (noted cranberry juice 2L bottle at bedside) - diet recall indicates high carbohydrate/sugar intake. Denies monitoring BG and taking any insulin/medication for BG at home; HbA1c (02/22) 5.4% - indicates good BG control.     Pt reports good appetite and PO intake in house. Noted 75% PO intake on (02/24) as per flow sheets and 100% as per breakfast tray at bedside. Denies difficulty chewing/swallowing. Pt denies nausea, vomiting, diarrhea, or constipation, reports last BM yesterday (02/26). Reviewed menu order procedures in hospital and obtained food preferences.     Provided education on T2DM and heart healthy nutrition therapy. Provided education on foods containing carbohydrates, foods containing proteins, and portion sizes. Stressed the importance of a balanced meal to maintain blood glucose. Encouraged vegetables consumption. Described HbA1c and stressed importance of its normal levels. Recommended water consumption with avoidance of soda and juice. Discussed nutrition label reading. Recommended limited salt intake. Reviewed foods high in salt and amount of salt recommended per day. Discussed nutrition label reading. Stressed the importance of limited fried foods and saturated fat consumption. Pt amenable for education - made aware RD remains available. Pt reports fair appetite and PO intake "for a long time" at home, states always being a small eater. Confirms NKFA. Reports taking Multivitamin, Vitamin C, and Ferrous Sulfate PTA; denies drinking any nutritional supplement. Pt reports not following any type of diet or restriction at home but states not consuming chicken and milk; admits to add sugar to beverages and drink juice (noted cranberry juice 2L bottle at bedside) - diet recall indicates high carbohydrate/sugar intake. Denies monitoring BG and taking any insulin/medication for BG at home.     Pt reports good appetite and PO intake in house. Noted 75% PO intake on (02/24) as per flow sheets and 100% as per breakfast tray at bedside. Denies difficulty chewing/swallowing. Pt denies nausea, vomiting, diarrhea, or constipation, reports last BM yesterday (02/26). Reviewed menu order procedures in hospital and obtained food preferences.     Provided education on T2DM and heart healthy nutrition therapy. Provided education on foods containing carbohydrates, foods containing proteins, and portion sizes. Stressed the importance of a balanced meal to maintain blood glucose. Encouraged vegetables consumption. Described HbA1c and stressed importance of its normal levels. Recommended water consumption with avoidance of soda and juice. Discussed nutrition label reading. Recommended limited salt intake. Reviewed foods high in salt and amount of salt recommended per day. Discussed nutrition label reading. Stressed the importance of limited fried foods and saturated fat consumption. Pt amenable for education - made aware RD remains available.

## 2020-02-27 NOTE — PROGRESS NOTE ADULT - ATTENDING COMMENTS
Preliminary results of renal biopsy reviewed  1.  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  BP control important.  IMMUNOLOGIC rx unlikely to be beneficial given extent of irreversible disease  2.  Volume overload--diuretic rx  3.  Hypertension--maintain under current acceptable limits

## 2020-02-28 LAB
ALBUMIN SERPL ELPH-MCNC: 3.2 G/DL — LOW (ref 3.3–5)
ALP SERPL-CCNC: 86 U/L — SIGNIFICANT CHANGE UP (ref 40–120)
ALT FLD-CCNC: 10 U/L — SIGNIFICANT CHANGE UP (ref 10–45)
ANION GAP SERPL CALC-SCNC: 7 MMOL/L — SIGNIFICANT CHANGE UP (ref 5–17)
AST SERPL-CCNC: 17 U/L — SIGNIFICANT CHANGE UP (ref 10–40)
BASOPHILS # BLD AUTO: 0.01 K/UL — SIGNIFICANT CHANGE UP (ref 0–0.2)
BASOPHILS NFR BLD AUTO: 0.1 % — SIGNIFICANT CHANGE UP (ref 0–2)
BILIRUB SERPL-MCNC: 0.4 MG/DL — SIGNIFICANT CHANGE UP (ref 0.2–1.2)
BLD GP AB SCN SERPL QL: NEGATIVE — SIGNIFICANT CHANGE UP
BUN SERPL-MCNC: 24 MG/DL — HIGH (ref 7–23)
CALCIUM SERPL-MCNC: 8.6 MG/DL — SIGNIFICANT CHANGE UP (ref 8.4–10.5)
CHLORIDE SERPL-SCNC: 107 MMOL/L — SIGNIFICANT CHANGE UP (ref 96–108)
CO2 SERPL-SCNC: 21 MMOL/L — LOW (ref 22–31)
CREAT SERPL-MCNC: 2.51 MG/DL — HIGH (ref 0.5–1.3)
EOSINOPHIL # BLD AUTO: 0.3 K/UL — SIGNIFICANT CHANGE UP (ref 0–0.5)
EOSINOPHIL NFR BLD AUTO: 2.8 % — SIGNIFICANT CHANGE UP (ref 0–6)
GLUCOSE BLDC GLUCOMTR-MCNC: 124 MG/DL — HIGH (ref 70–99)
GLUCOSE BLDC GLUCOMTR-MCNC: 137 MG/DL — HIGH (ref 70–99)
GLUCOSE BLDC GLUCOMTR-MCNC: 146 MG/DL — HIGH (ref 70–99)
GLUCOSE BLDC GLUCOMTR-MCNC: 94 MG/DL — SIGNIFICANT CHANGE UP (ref 70–99)
GLUCOSE SERPL-MCNC: 99 MG/DL — SIGNIFICANT CHANGE UP (ref 70–99)
HCT VFR BLD CALC: SIGNIFICANT CHANGE UP (ref 34.5–45)
HGB BLD-MCNC: 6.7 G/DL — CRITICAL LOW (ref 11.5–15.5)
HGB BLD-MCNC: 7.1 G/DL — LOW (ref 11.5–15.5)
HGB BLD-MCNC: 8.1 G/DL — LOW (ref 11.5–15.5)
IMM GRANULOCYTES NFR BLD AUTO: 0.6 % — SIGNIFICANT CHANGE UP (ref 0–1.5)
LYMPHOCYTES # BLD AUTO: 0.75 K/UL — LOW (ref 1–3.3)
LYMPHOCYTES # BLD AUTO: 7 % — LOW (ref 13–44)
MCHC RBC-ENTMCNC: SIGNIFICANT CHANGE UP (ref 27–34)
MCHC RBC-ENTMCNC: SIGNIFICANT CHANGE UP (ref 32–36)
MCV RBC AUTO: SIGNIFICANT CHANGE UP (ref 80–100)
MONOCYTES # BLD AUTO: 1.07 K/UL — HIGH (ref 0–0.9)
MONOCYTES NFR BLD AUTO: 10 % — SIGNIFICANT CHANGE UP (ref 2–14)
NEUTROPHILS # BLD AUTO: 8.53 K/UL — HIGH (ref 1.8–7.4)
NEUTROPHILS NFR BLD AUTO: 79.5 % — HIGH (ref 43–77)
NRBC # BLD: 0 /100 WBCS — SIGNIFICANT CHANGE UP (ref 0–0)
NRBC # BLD: 1 /100 WBCS — HIGH (ref 0–0)
NRBC # BLD: 2 /100 WBCS — HIGH (ref 0–0)
PLATELET # BLD AUTO: 274 K/UL — SIGNIFICANT CHANGE UP (ref 150–400)
PLATELET # BLD AUTO: 280 K/UL — SIGNIFICANT CHANGE UP (ref 150–400)
PLATELET # BLD AUTO: 286 K/UL — SIGNIFICANT CHANGE UP (ref 150–400)
POTASSIUM SERPL-MCNC: 3.6 MMOL/L — SIGNIFICANT CHANGE UP (ref 3.5–5.3)
POTASSIUM SERPL-SCNC: 3.6 MMOL/L — SIGNIFICANT CHANGE UP (ref 3.5–5.3)
PROT SERPL-MCNC: 6.8 G/DL — SIGNIFICANT CHANGE UP (ref 6–8.3)
RBC # BLD: 3.51 M/UL — LOW (ref 3.8–5.2)
RBC # BLD: 3.71 M/UL — LOW (ref 3.8–5.2)
RBC # BLD: 4.04 M/UL — SIGNIFICANT CHANGE UP (ref 3.8–5.2)
RBC # FLD: SIGNIFICANT CHANGE UP (ref 10.3–14.5)
RH IG SCN BLD-IMP: POSITIVE — SIGNIFICANT CHANGE UP
SODIUM SERPL-SCNC: 135 MMOL/L — SIGNIFICANT CHANGE UP (ref 135–145)
SURGICAL PATHOLOGY STUDY: SIGNIFICANT CHANGE UP
WBC # BLD: 10.72 K/UL — HIGH (ref 3.8–10.5)
WBC # BLD: 11.3 K/UL — HIGH (ref 3.8–10.5)
WBC # BLD: 11.96 K/UL — HIGH (ref 3.8–10.5)
WBC # FLD AUTO: 10.72 K/UL — HIGH (ref 3.8–10.5)
WBC # FLD AUTO: 11.3 K/UL — HIGH (ref 3.8–10.5)
WBC # FLD AUTO: 11.96 K/UL — HIGH (ref 3.8–10.5)

## 2020-02-28 PROCEDURE — 99233 SBSQ HOSP IP/OBS HIGH 50: CPT | Mod: GC

## 2020-02-28 RX ORDER — LIDOCAINE 4 G/100G
1 CREAM TOPICAL DAILY
Refills: 0 | Status: DISCONTINUED | OUTPATIENT
Start: 2020-02-28 | End: 2020-03-03

## 2020-02-28 RX ORDER — ACETAMINOPHEN 500 MG
650 TABLET ORAL ONCE
Refills: 0 | Status: COMPLETED | OUTPATIENT
Start: 2020-02-28 | End: 2020-02-28

## 2020-02-28 RX ORDER — DESMOPRESSIN ACETATE 0.1 MG/1
20 TABLET ORAL ONCE
Refills: 0 | Status: COMPLETED | OUTPATIENT
Start: 2020-02-28 | End: 2020-02-28

## 2020-02-28 RX ADMIN — BUMETANIDE 2 MILLIGRAM(S): 0.25 INJECTION INTRAMUSCULAR; INTRAVENOUS at 06:37

## 2020-02-28 RX ADMIN — Medication 650 MILLIGRAM(S): at 04:35

## 2020-02-28 RX ADMIN — LIDOCAINE 1 PATCH: 4 CREAM TOPICAL at 13:04

## 2020-02-28 RX ADMIN — LIDOCAINE 1 PATCH: 4 CREAM TOPICAL at 21:49

## 2020-02-28 RX ADMIN — Medication 650 MILLIGRAM(S): at 19:30

## 2020-02-28 RX ADMIN — Medication 650 MILLIGRAM(S): at 05:38

## 2020-02-28 RX ADMIN — Medication 650 MILLIGRAM(S): at 18:51

## 2020-02-28 RX ADMIN — BUMETANIDE 2 MILLIGRAM(S): 0.25 INJECTION INTRAMUSCULAR; INTRAVENOUS at 17:54

## 2020-02-28 RX ADMIN — DESMOPRESSIN ACETATE 220 MICROGRAM(S): 0.1 TABLET ORAL at 16:38

## 2020-02-28 RX ADMIN — LOSARTAN POTASSIUM 50 MILLIGRAM(S): 100 TABLET, FILM COATED ORAL at 06:36

## 2020-02-28 NOTE — PROGRESS NOTE ADULT - ATTENDING COMMENTS
L flank pain  1.  Anemia--transfuse and rescan L kidney for expansion of L renal hematoma.  DDAVP and close monitoring.  Keep hgb>8  2.  IgA nephropathy--BP control, low protein diet.  Keep HCo3>21.  Sufficiently advanced that immunotherapy risk>benefit    Tremaine Snow MD  [phone]

## 2020-02-28 NOTE — PROGRESS NOTE ADULT - SUBJECTIVE AND OBJECTIVE BOX
[facility] Division of Kidney Diseases & Hypertension  FOLLOW UP NOTE  [phone]--------------------------------------------------------------------------------  Chief Complaint:Anemia      24 hour events/subjective:        PAST HISTORY  --------------------------------------------------------------------------------  No significant changes to PMH, PSH, FHx, SHx, unless otherwise noted    ALLERGIES & MEDICATIONS  --------------------------------------------------------------------------------  Allergies    No Known Allergies    Intolerances      Standing Inpatient Medications  buMETAnide Injectable 2 milliGRAM(s) IV Push two times a day  dextrose 5%. 1000 milliLiter(s) IV Continuous <Continuous>  dextrose 50% Injectable 12.5 Gram(s) IV Push once  dextrose 50% Injectable 25 Gram(s) IV Push once  dextrose 50% Injectable 25 Gram(s) IV Push once  influenza   Vaccine 0.5 milliLiter(s) IntraMuscular once  insulin lispro (HumaLOG) corrective regimen sliding scale   SubCutaneous three times a day before meals  losartan 50 milliGRAM(s) Oral daily    PRN Inpatient Medications  dextrose 40% Gel 15 Gram(s) Oral once PRN  glucagon  Injectable 1 milliGRAM(s) IntraMuscular once PRN  guaiFENesin   Syrup  (Sugar-Free) 200 milliGRAM(s) Oral every 6 hours PRN      REVIEW OF SYSTEMS  --------------------------------------------------------------------------------  Gen: No  fevers/chills  Skin: No rashes  Head/Eyes/Ears/Mouth: No headache  Respiratory: No dyspnea  CV: No chest pain  GI: No abdominal pain  : No hmeaturia  MSK:  + leg swelling  Neuro: No dizziness/lightheadedness      All other systems were reviewed and are negative, except as noted.    VITALS/PHYSICAL EXAM  --------------------------------------------------------------------------------  T(C): 36.8 (02-28-20 @ 05:20), Max: 37.2 (02-27-20 @ 21:11)  HR: 93 (02-28-20 @ 05:20) (89 - 107)  BP: 136/93 (02-28-20 @ 05:20) (133/86 - 154/98)  RR: 18 (02-28-20 @ 05:20) (17 - 18)  SpO2: 96% (02-28-20 @ 05:20) (96% - 99%)  Wt(kg): --        02-27-20 @ 07:01  -  02-28-20 @ 07:00  --------------------------------------------------------  IN: 1050 mL / OUT: 0 mL / NET: 1050 mL      Physical Exam:  	Gen: NAD, well-appearing  	HEENT: PERRL, supple neck, clear oropharynx  	Pulm: CTA B/L  	CV: RRR, S1S2;  	Abd: +BS, soft, nontender/nondistended  	: No suprapubic tenderness               Extremities: + bilateral LE edema  	Skin: Warm, without rashes  	Vascular access: none    LABS/STUDIES  --------------------------------------------------------------------------------              6.7    10.72 >-----------<  274      [02-28-20 @ 06:47]              See note    135  |  107  |  24  ----------------------------<  99      [02-28-20 @ 06:47]  3.6   |  21  |  2.51        Ca     8.6     [02-28-20 @ 06:47]    TPro  6.8  /  Alb  3.2  /  TBili  0.4  /  DBili  x   /  AST  17  /  ALT  10  /  AlkPhos  86  [02-28-20 @ 06:47]          Creatinine Trend:  SCr 2.51 [02-28 @ 06:47]  SCr 2.28 [02-27 @ 08:43]  SCr 2.23 [02-26 @ 07:03]  SCr 2.38 [02-25 @ 07:18]  SCr 2.44 [02-24 @ 07:08]    Urinalysis - [02-21-20 @ 15:40]      Color Light Yellow / Appearance Clear / SG 1.015 / pH 6.0      Gluc Negative / Ketone Negative  / Bili Negative / Urobili Negative       Blood Negative / Protein 300 mg/dL / Leuk Est Negative / Nitrite Negative      RBC 4 / WBC 5 / Hyaline 6 / Gran  / Sq Epi  / Non Sq Epi 2 / Bacteria Negative    Urine Creatinine 106      [02-22-20 @ 07:06]  Urine Protein 366      [02-22-20 @ 07:06]  Urine Sodium 46      [02-22-20 @ 07:06]    Iron 29, TIBC 350, %sat 8      [02-23-20 @ 10:15]  Ferritin 14      [02-23-20 @ 10:12]  HbA1c 5.4      [02-22-20 @ 11:41]  TSH 1.06      [02-23-20 @ 10:12]    HBsAg Nonreact      [02-24-20 @ 14:31]  HCV 0.39, Nonreact      [02-24-20 @ 14:31]  HIV Nonreact      [02-24-20 @ 14:31]    ALVERTO: titer Negative, pattern --      [02-25-20 @ 09:34]  dsDNA <12      [02-24-20 @ 14:31]  C3 Complement 119      [02-24-20 @ 14:31]  C4 Complement 20      [02-24-20 @ 14:31]  Rheumatoid Factor <10      [02-25-20 @ 09:57]  ANCA: cANCA Negative, pANCA Negative, atypical ANCA Negative      [02-24-20 @ 14:31]  Syphilis Screen (Treponema Pallidum Ab) Negative      [02-24-20 @ 14:31]  PLA2R: ABHIJIT 2, IFA Negative      [02-22-20 @ 14:17]  Free Light Chains: kappa 11.07, lambda 9.18, ratio = 1.21      [02-24 @ 14:31]  Immunofixation Serum:   No Monoclonal Band Identified    Reference Range: None Detected      [02-23-20 @ 10:12]  SPEP Interpretation: Normal Electrophoresis Pattern      [02-23-20 @ 10:12] [facility] Division of Kidney Diseases & Hypertension  FOLLOW UP NOTE  [phone]--------------------------------------------------------------------------------  Chief Complaint:Anemia      24 hour events/subjective:  Patient seen today. Still complaints of having spasms on the left side/flank  Patient with dropping hemoglobin levels  Renal US with new left perinephric hematoma  Hgb: 6.7 today      PAST HISTORY  --------------------------------------------------------------------------------  No significant changes to PMH, PSH, FHx, SHx, unless otherwise noted    ALLERGIES & MEDICATIONS  --------------------------------------------------------------------------------  Allergies    No Known Allergies    Intolerances      Standing Inpatient Medications  buMETAnide Injectable 2 milliGRAM(s) IV Push two times a day  dextrose 5%. 1000 milliLiter(s) IV Continuous <Continuous>  dextrose 50% Injectable 12.5 Gram(s) IV Push once  dextrose 50% Injectable 25 Gram(s) IV Push once  dextrose 50% Injectable 25 Gram(s) IV Push once  influenza   Vaccine 0.5 milliLiter(s) IntraMuscular once  insulin lispro (HumaLOG) corrective regimen sliding scale   SubCutaneous three times a day before meals  losartan 50 milliGRAM(s) Oral daily    PRN Inpatient Medications  dextrose 40% Gel 15 Gram(s) Oral once PRN  glucagon  Injectable 1 milliGRAM(s) IntraMuscular once PRN  guaiFENesin   Syrup  (Sugar-Free) 200 milliGRAM(s) Oral every 6 hours PRN      REVIEW OF SYSTEMS  --------------------------------------------------------------------------------  Gen: No  fevers/chills  Skin: No rashes  Head/Eyes/Ears/Mouth: No headache  Respiratory: No dyspnea  CV: No chest pain  GI: No abdominal pain  : No hematuria  MSK:  + leg swelling  Neuro: No dizziness/lightheadedness      All other systems were reviewed and are negative, except as noted.    VITALS/PHYSICAL EXAM  --------------------------------------------------------------------------------  T(C): 36.8 (02-28-20 @ 05:20), Max: 37.2 (02-27-20 @ 21:11)  HR: 93 (02-28-20 @ 05:20) (89 - 107)  BP: 136/93 (02-28-20 @ 05:20) (133/86 - 154/98)  RR: 18 (02-28-20 @ 05:20) (17 - 18)  SpO2: 96% (02-28-20 @ 05:20) (96% - 99%)  Wt(kg): --        02-27-20 @ 07:01  -  02-28-20 @ 07:00  --------------------------------------------------------  IN: 1050 mL / OUT: 0 mL / NET: 1050 mL      Physical Exam:  	Gen: NAD, well-appearing  	HEENT: PERRL, supple neck, clear oropharynx  	Pulm: CTA B/L  	CV: RRR, S1S2;  	Abd: +BS, soft, nontender/nondistended  	: No suprapubic tenderness               Extremities: + bilateral LE edema  	Skin: Warm, without rashes  	Vascular access: none    LABS/STUDIES  --------------------------------------------------------------------------------              6.7    10.72 >-----------<  274      [02-28-20 @ 06:47]              See note    135  |  107  |  24  ----------------------------<  99      [02-28-20 @ 06:47]  3.6   |  21  |  2.51        Ca     8.6     [02-28-20 @ 06:47]    TPro  6.8  /  Alb  3.2  /  TBili  0.4  /  DBili  x   /  AST  17  /  ALT  10  /  AlkPhos  86  [02-28-20 @ 06:47]          Creatinine Trend:  SCr 2.51 [02-28 @ 06:47]  SCr 2.28 [02-27 @ 08:43]  SCr 2.23 [02-26 @ 07:03]  SCr 2.38 [02-25 @ 07:18]  SCr 2.44 [02-24 @ 07:08]    Urinalysis - [02-21-20 @ 15:40]      Color Light Yellow / Appearance Clear / SG 1.015 / pH 6.0      Gluc Negative / Ketone Negative  / Bili Negative / Urobili Negative       Blood Negative / Protein 300 mg/dL / Leuk Est Negative / Nitrite Negative      RBC 4 / WBC 5 / Hyaline 6 / Gran  / Sq Epi  / Non Sq Epi 2 / Bacteria Negative    Urine Creatinine 106      [02-22-20 @ 07:06]  Urine Protein 366      [02-22-20 @ 07:06]  Urine Sodium 46      [02-22-20 @ 07:06]    Iron 29, TIBC 350, %sat 8      [02-23-20 @ 10:15]  Ferritin 14      [02-23-20 @ 10:12]  HbA1c 5.4      [02-22-20 @ 11:41]  TSH 1.06      [02-23-20 @ 10:12]    HBsAg Nonreact      [02-24-20 @ 14:31]  HCV 0.39, Nonreact      [02-24-20 @ 14:31]  HIV Nonreact      [02-24-20 @ 14:31]    ALVERTO: titer Negative, pattern --      [02-25-20 @ 09:34]  dsDNA <12      [02-24-20 @ 14:31]  C3 Complement 119      [02-24-20 @ 14:31]  C4 Complement 20      [02-24-20 @ 14:31]  Rheumatoid Factor <10      [02-25-20 @ 09:57]  ANCA: cANCA Negative, pANCA Negative, atypical ANCA Negative      [02-24-20 @ 14:31]  Syphilis Screen (Treponema Pallidum Ab) Negative      [02-24-20 @ 14:31]  PLA2R: ABHIJIT 2, IFA Negative      [02-22-20 @ 14:17]  Free Light Chains: kappa 11.07, lambda 9.18, ratio = 1.21      [02-24 @ 14:31]  Immunofixation Serum:   No Monoclonal Band Identified    Reference Range: None Detected      [02-23-20 @ 10:12]  SPEP Interpretation: Normal Electrophoresis Pattern      [02-23-20 @ 10:12]

## 2020-02-28 NOTE — PROGRESS NOTE ADULT - SUBJECTIVE AND OBJECTIVE BOX
INTERVAL HPI/OVERNIGHT EVENTS:    Pt seen and examined  she admits to L flank pain near site of renal bx  she denies abdominal pain, n/v, brbpr/hematochezia     MEDICATIONS  (STANDING):  buMETAnide Injectable 2 milliGRAM(s) IV Push two times a day  dextrose 5%. 1000 milliLiter(s) (50 mL/Hr) IV Continuous <Continuous>  dextrose 50% Injectable 12.5 Gram(s) IV Push once  dextrose 50% Injectable 25 Gram(s) IV Push once  dextrose 50% Injectable 25 Gram(s) IV Push once  influenza   Vaccine 0.5 milliLiter(s) IntraMuscular once  insulin lispro (HumaLOG) corrective regimen sliding scale   SubCutaneous three times a day before meals  losartan 50 milliGRAM(s) Oral daily    MEDICATIONS  (PRN):  dextrose 40% Gel 15 Gram(s) Oral once PRN Blood Glucose LESS THAN 70 milliGRAM(s)/deciliter  glucagon  Injectable 1 milliGRAM(s) IntraMuscular once PRN Glucose LESS THAN 70 milligrams/deciliter  guaiFENesin   Syrup  (Sugar-Free) 200 milliGRAM(s) Oral every 6 hours PRN Cough      Allergies    No Known Allergies    Intolerances        Review of Systems:    General:  No wt loss, fevers, chills, night sweats,fatigue,   Eyes:  Good vision, no reported pain  ENT:  No sore throat, pain, runny nose, dysphagia  CV:  No pain, palpitations, hypo/hypertension  Resp:  No dyspnea, cough, tachypnea, wheezing  GI:  No pain, No nausea, No vomiting, No diarrhea, No constipation, No weight loss, No fever, No pruritis, No rectal bleeding, No melena, No dysphagia  :  No pain, bleeding, incontinence, nocturia  Muscle:  No pain, weakness  Neuro:  No weakness, tingling, memory problems  Psych:  No fatigue, insomnia, mood problems, depression  Endocrine:  No polyuria, polydypsia, cold/heat intolerance  Heme:  No petechiae, ecchymosis, easy bruisability  Skin:  No rash, tattoos, scars, edema      Vital Signs Last 24 Hrs  T(C): 36.8 (28 Feb 2020 05:20), Max: 37.2 (27 Feb 2020 21:11)  T(F): 98.2 (28 Feb 2020 05:20), Max: 98.9 (27 Feb 2020 21:11)  HR: 93 (28 Feb 2020 05:20) (89 - 107)  BP: 136/93 (28 Feb 2020 05:20) (133/86 - 154/98)  BP(mean): --  RR: 18 (28 Feb 2020 05:20) (17 - 18)  SpO2: 96% (28 Feb 2020 05:20) (96% - 99%)    PHYSICAL EXAM:    Constitutional: NAD, well-developed  HEENT: EOMI, throat clear  Neck: No LAD, supple  Respiratory: CTA and P  Cardiovascular: S1 and S2, RRR, no M  Gastrointestinal: BS+, soft, NT/ND, neg HSM,  Extremities: No peripheral edema, neg clubing, cyanosis  Vascular: 2+ peripheral pulses  Neurological: A/O x 3, no focal deficits  Psychiatric: Normal mood, normal affect  Skin: No rashes      LABS:                        6.7    10.72 )-----------( 274      ( 28 Feb 2020 06:47 )             See note    02-28    135  |  107  |  24<H>  ----------------------------<  99  3.6   |  21<L>  |  2.51<H>    Ca    8.6      28 Feb 2020 06:47    TPro  6.8  /  Alb  3.2<L>  /  TBili  0.4  /  DBili  x   /  AST  17  /  ALT  10  /  AlkPhos  86  02-28          RADIOLOGY & ADDITIONAL TESTS:

## 2020-02-28 NOTE — PROGRESS NOTE ADULT - SUBJECTIVE AND OBJECTIVE BOX
Patient is a 49y old  Female who presents with a chief complaint of Edema (22 Feb 2020 13:12)    MEDICATIONS  (STANDING):  buMETAnide Injectable 2 milliGRAM(s) IV Push two times a day  dextrose 5%. 1000 milliLiter(s) (50 mL/Hr) IV Continuous <Continuous>  dextrose 50% Injectable 12.5 Gram(s) IV Push once  dextrose 50% Injectable 25 Gram(s) IV Push once  dextrose 50% Injectable 25 Gram(s) IV Push once  influenza   Vaccine 0.5 milliLiter(s) IntraMuscular once  insulin lispro (HumaLOG) corrective regimen sliding scale   SubCutaneous three times a day before meals  lidocaine   Patch 1 Patch Transdermal daily  losartan 50 milliGRAM(s) Oral daily    MEDICATIONS  (PRN):  dextrose 40% Gel 15 Gram(s) Oral once PRN Blood Glucose LESS THAN 70 milliGRAM(s)/deciliter  glucagon  Injectable 1 milliGRAM(s) IntraMuscular once PRN Glucose LESS THAN 70 milligrams/deciliter  guaiFENesin   Syrup  (Sugar-Free) 200 milliGRAM(s) Oral every 6 hours PRN Cough    Vital Signs Last 24 Hrs  T(C): 37.2 (28 Feb 2020 16:20), Max: 37.2 (27 Feb 2020 21:11)  T(F): 98.9 (28 Feb 2020 16:20), Max: 98.9 (27 Feb 2020 21:11)  HR: 99 (28 Feb 2020 16:20) (86 - 107)  BP: 134/86 (28 Feb 2020 16:20) (134/86 - 154/98)  BP(mean): --  RR: 18 (28 Feb 2020 16:20) (17 - 18)  SpO2: 97% (28 Feb 2020 16:20) (96% - 99%)  Constitutional: No fever, fatigue  Skin: No rash.  Eyes: No recent vision problems or eye pain.  ENT: No congestion, ear pain, or sore throat.  Cardiovascular: No chest pain or palpation.  Respiratory: No cough, shortness of breath, congestion, or wheezing.  Gastrointestinal: No abdominal pain, nausea, vomiting, or diarrhea.  Genitourinary: No dysuria.  Musculoskeletal: No joint swelling.  Neurologic: No headache.    cvs- s1 , s2+  rs- dec breath sounds at bases  abd- soft , bs+  ext - trace edema    LABS:  02-28    135  |  107  |  24<H>  ----------------------------<  99  3.6   |  21<L>  |  2.51<H>    Ca    8.6      28 Feb 2020 06:47    TPro  6.8  /  Alb  3.2<L>  /  TBili  0.4  /  DBili      /  AST  17  /  ALT  10  /  AlkPhos  86  02-28    Creatinine Trend: 2.51 <--, 2.28 <--, 2.23 <--, 2.38 <--, 2.44 <--, 2.14 <--, 2.07 <--                        6.7    10.72 )-----------( 274      ( 28 Feb 2020 06:47 )             See note    Urine Studies:    Creatinine, Random Urine: 106 mg/dL (02-22 @ 07:06)  Protein/Creatinine Ratio Calculation: 3.5 Ratio (02-22 @ 07:06)  Sodium, Random Urine: 46 mmol/L (02-22 @ 07:06)          LIVER FUNCTIONS - ( 28 Feb 2020 06:47 )  Alb: 3.2 g/dL / Pro: 6.8 g/dL / ALK PHOS: 86 U/L / ALT: 10 U/L / AST: 17 U/L / GGT: x

## 2020-02-28 NOTE — PROGRESS NOTE ADULT - SUBJECTIVE AND OBJECTIVE BOX
Pt c/o left flank pain   left perinephric hematoma         MEDICATIONS  (STANDING):  buMETAnide Injectable 2 milliGRAM(s) IV Push two times a day  dextrose 5%. 1000 milliLiter(s) (50 mL/Hr) IV Continuous <Continuous>  dextrose 50% Injectable 12.5 Gram(s) IV Push once  dextrose 50% Injectable 25 Gram(s) IV Push once  dextrose 50% Injectable 25 Gram(s) IV Push once  influenza   Vaccine 0.5 milliLiter(s) IntraMuscular once  insulin lispro (HumaLOG) corrective regimen sliding scale   SubCutaneous three times a day before meals  losartan 50 milliGRAM(s) Oral daily    MEDICATIONS  (PRN):  dextrose 40% Gel 15 Gram(s) Oral once PRN Blood Glucose LESS THAN 70 milliGRAM(s)/deciliter  glucagon  Injectable 1 milliGRAM(s) IntraMuscular once PRN Glucose LESS THAN 70 milligrams/deciliter  guaiFENesin   Syrup  (Sugar-Free) 200 milliGRAM(s) Oral every 6 hours PRN Cough      ROS  No fever, sweats, chills  No epistaxis, HA, sore throat  No CP, SOB, cough, sputum  No n/v/d, abd pain, melena, hematochezia  No edema  No rash  No anxiety  No back pain, joint pain  No bleeding, bruising  No dysuria, hematuria    Vital Signs Last 24 Hrs  T(C): 37 (28 Feb 2020 12:45), Max: 37.2 (27 Feb 2020 21:11)  T(F): 98.6 (28 Feb 2020 12:45), Max: 98.9 (27 Feb 2020 21:11)  HR: 94 (28 Feb 2020 12:45) (86 - 107)  BP: 138/86 (28 Feb 2020 12:45) (136/93 - 154/98)  BP(mean): --  RR: 18 (28 Feb 2020 12:45) (17 - 18)  SpO2: 97% (28 Feb 2020 12:45) (96% - 99%)  PE  NAD  Awake, alert  Anicteric, MMM  RRR  CTAB  Abd soft, NT, ND  No c/c/e  No rash grossly  FROM                        6.7    10.72 )-----------( 274      ( 28 Feb 2020 06:47 )             See note                          7.1    10.37 )-----------( 318      ( 27 Feb 2020 11:12 )             26.7       02-27    141  |  106  |  23  ----------------------------<  77  4.3   |  22  |  2.28<H>    Ca    8.4      27 Feb 2020 08:43

## 2020-02-28 NOTE — PROGRESS NOTE ADULT - ASSESSMENT
anemia  iron deficiency   hepatomegaly  IgA Nephropathy     s/p colonoscopy 2/26 with diverticulosis in the sigmoid colon  - Internal hemorrhoids  - The examination was otherwise normal  - No specimens collected    s/p upper gastrointestinal endoscopy 2/26  with normal esophagus.  - Gastritis. Biopsied.  - Normal examined duodenum. Biopsied.  - await pathology results     abdominal u/s with L arjun- nephric hematoma  f/u IR evaluation  monitor h/h daily, transfuse prn   renal follow up

## 2020-02-28 NOTE — PROGRESS NOTE ADULT - ASSESSMENT
· Assessment		  49 y old AA female with hx of DMII, HTN, endometriosis here with SOB, ARIAS and abdominal girth and new LE edema, found to have renal dysfunction, proteinuria, and severe anemia. CT a/p found hepatomegaly and 1.1cm soft tissue density in the L breast    Anemia -- responded well to PRBC. Now with Ferritin of 14, with YUAN  -- s/p venofer 200mg daily x4 total,   -- B12/folate adeuquate, TFT nml  -- SPEP, ZOLTAN,  hemoglobin electrophoresis wnl  EGD and colonoscopy. Found gastritis and diverticuli, hemorrhoids  Hb 6.7 with perinephric hematoma on US   pt to be transfused PRBC   IR eval     Renal -- renal following  -- s/p renal bx with post op perinephric hematoma   -path c/w IgA nephropathy       hepatomegaly -- LFTs nml, cause unclear  -- monitor for now,     L breast soft tissue density -- partially noted on CT. Not able to obtain mammo in house. Pt states that she had a lesion in the L breast that was bx years ago, last mammo was 2018, which was neg  -- outpt mammo and breast u/s

## 2020-02-29 LAB
APPEARANCE UR: CLEAR — SIGNIFICANT CHANGE UP
BACTERIA # UR AUTO: NEGATIVE — SIGNIFICANT CHANGE UP
BILIRUB UR-MCNC: NEGATIVE — SIGNIFICANT CHANGE UP
COLOR SPEC: SIGNIFICANT CHANGE UP
DIFF PNL FLD: ABNORMAL
EPI CELLS # UR: 2 /HPF — SIGNIFICANT CHANGE UP (ref 0–5)
GLUCOSE BLDC GLUCOMTR-MCNC: 116 MG/DL — HIGH (ref 70–99)
GLUCOSE BLDC GLUCOMTR-MCNC: 131 MG/DL — HIGH (ref 70–99)
GLUCOSE BLDC GLUCOMTR-MCNC: 139 MG/DL — HIGH (ref 70–99)
GLUCOSE BLDC GLUCOMTR-MCNC: 99 MG/DL — SIGNIFICANT CHANGE UP (ref 70–99)
GLUCOSE UR QL: NEGATIVE — SIGNIFICANT CHANGE UP
HCG SERPL-ACNC: 4.5 MIU/ML — HIGH
HCT VFR BLD CALC: SIGNIFICANT CHANGE UP (ref 34.5–45)
HGB BLD-MCNC: 7.7 G/DL — LOW (ref 11.5–15.5)
HGB BLD-MCNC: 8.7 G/DL — LOW (ref 11.5–15.5)
HGB BLD-MCNC: 8.9 G/DL — LOW (ref 11.5–15.5)
HYALINE CASTS # UR AUTO: 0 /LPF — SIGNIFICANT CHANGE UP (ref 0–7)
KETONES UR-MCNC: NEGATIVE — SIGNIFICANT CHANGE UP
LEUKOCYTE ESTERASE UR-ACNC: NEGATIVE — SIGNIFICANT CHANGE UP
MCHC RBC-ENTMCNC: SIGNIFICANT CHANGE UP (ref 27–34)
MCHC RBC-ENTMCNC: SIGNIFICANT CHANGE UP (ref 32–36)
MCV RBC AUTO: SIGNIFICANT CHANGE UP (ref 80–100)
NITRITE UR-MCNC: NEGATIVE — SIGNIFICANT CHANGE UP
NRBC # BLD: 0 /100 WBCS — SIGNIFICANT CHANGE UP (ref 0–0)
PH UR: 6 — SIGNIFICANT CHANGE UP (ref 5–8)
PLATELET # BLD AUTO: 249 K/UL — SIGNIFICANT CHANGE UP (ref 150–400)
PLATELET # BLD AUTO: 266 K/UL — SIGNIFICANT CHANGE UP (ref 150–400)
PLATELET # BLD AUTO: 280 K/UL — SIGNIFICANT CHANGE UP (ref 150–400)
PROT UR-MCNC: ABNORMAL
RBC # BLD: 3.84 M/UL — SIGNIFICANT CHANGE UP (ref 3.8–5.2)
RBC # BLD: 4.26 M/UL — SIGNIFICANT CHANGE UP (ref 3.8–5.2)
RBC # BLD: 4.33 M/UL — SIGNIFICANT CHANGE UP (ref 3.8–5.2)
RBC # FLD: SIGNIFICANT CHANGE UP (ref 10.3–14.5)
RBC CASTS # UR COMP ASSIST: 22 /HPF — HIGH (ref 0–4)
SP GR SPEC: 1.01 — SIGNIFICANT CHANGE UP (ref 1.01–1.02)
UROBILINOGEN FLD QL: SIGNIFICANT CHANGE UP
WBC # BLD: 10.33 K/UL — SIGNIFICANT CHANGE UP (ref 3.8–10.5)
WBC # BLD: 10.94 K/UL — HIGH (ref 3.8–10.5)
WBC # BLD: 9.73 K/UL — SIGNIFICANT CHANGE UP (ref 3.8–10.5)
WBC # FLD AUTO: 10.33 K/UL — SIGNIFICANT CHANGE UP (ref 3.8–10.5)
WBC # FLD AUTO: 10.94 K/UL — HIGH (ref 3.8–10.5)
WBC # FLD AUTO: 9.73 K/UL — SIGNIFICANT CHANGE UP (ref 3.8–10.5)
WBC UR QL: 4 /HPF — SIGNIFICANT CHANGE UP (ref 0–5)

## 2020-02-29 PROCEDURE — 99232 SBSQ HOSP IP/OBS MODERATE 35: CPT | Mod: GC

## 2020-02-29 RX ORDER — ACETAMINOPHEN 500 MG
650 TABLET ORAL ONCE
Refills: 0 | Status: COMPLETED | OUTPATIENT
Start: 2020-02-29 | End: 2020-02-29

## 2020-02-29 RX ORDER — ACETAMINOPHEN 500 MG
650 TABLET ORAL EVERY 6 HOURS
Refills: 0 | Status: COMPLETED | OUTPATIENT
Start: 2020-02-29 | End: 2020-03-02

## 2020-02-29 RX ORDER — OXYCODONE AND ACETAMINOPHEN 5; 325 MG/1; MG/1
1 TABLET ORAL ONCE
Refills: 0 | Status: DISCONTINUED | OUTPATIENT
Start: 2020-02-29 | End: 2020-03-01

## 2020-02-29 RX ADMIN — Medication 650 MILLIGRAM(S): at 09:30

## 2020-02-29 RX ADMIN — Medication 650 MILLIGRAM(S): at 22:42

## 2020-02-29 RX ADMIN — Medication 650 MILLIGRAM(S): at 10:32

## 2020-02-29 RX ADMIN — LIDOCAINE 1 PATCH: 4 CREAM TOPICAL at 00:27

## 2020-02-29 RX ADMIN — LIDOCAINE 1 PATCH: 4 CREAM TOPICAL at 18:23

## 2020-02-29 RX ADMIN — LOSARTAN POTASSIUM 50 MILLIGRAM(S): 100 TABLET, FILM COATED ORAL at 05:30

## 2020-02-29 RX ADMIN — BUMETANIDE 2 MILLIGRAM(S): 0.25 INJECTION INTRAMUSCULAR; INTRAVENOUS at 17:14

## 2020-02-29 RX ADMIN — BUMETANIDE 2 MILLIGRAM(S): 0.25 INJECTION INTRAMUSCULAR; INTRAVENOUS at 05:29

## 2020-02-29 RX ADMIN — LIDOCAINE 1 PATCH: 4 CREAM TOPICAL at 12:22

## 2020-02-29 NOTE — PROGRESS NOTE ADULT - PROBLEM SELECTOR PLAN 1
FU CBC.  Hem FU noted. She will need Iron replacement therapy with Procrit as needed. DW Hematogy  CBC has been stable.  DC planning

## 2020-02-29 NOTE — PROGRESS NOTE ADULT - SUBJECTIVE AND OBJECTIVE BOX
[facility] DIVISION OF KIDNEY DISEASES AND HYPERTENSION -- FOLLOW UP NOTE  --------------------------------------------------------------------------------    24 hour events/subjective: Patient seen and examined at the bedside. Still endorsing pain in her L flank. Denies any hematuria. LE swelling improving. Denies CP/SOB.        PAST HISTORY  --------------------------------------------------------------------------------  No significant changes to PMH, PSH, FHx, SHx, unless otherwise noted    ALLERGIES & MEDICATIONS  --------------------------------------------------------------------------------  Allergies    No Known Allergies    Intolerances      Standing Inpatient Medications  buMETAnide Injectable 2 milliGRAM(s) IV Push two times a day  dextrose 5%. 1000 milliLiter(s) IV Continuous <Continuous>  dextrose 50% Injectable 12.5 Gram(s) IV Push once  dextrose 50% Injectable 25 Gram(s) IV Push once  dextrose 50% Injectable 25 Gram(s) IV Push once  influenza   Vaccine 0.5 milliLiter(s) IntraMuscular once  insulin lispro (HumaLOG) corrective regimen sliding scale   SubCutaneous three times a day before meals  lidocaine   Patch 1 Patch Transdermal daily  losartan 50 milliGRAM(s) Oral daily    PRN Inpatient Medications  dextrose 40% Gel 15 Gram(s) Oral once PRN  glucagon  Injectable 1 milliGRAM(s) IntraMuscular once PRN  guaiFENesin   Syrup  (Sugar-Free) 200 milliGRAM(s) Oral every 6 hours PRN      REVIEW OF SYSTEMS  --------------------------------------------------------------------------------  Gen: No lethargy  Respiratory: No dyspnea  CV: No chest pain  GI: + L flank pain  MSK: + LE edema  Neuro: No dizziness  Heme: No bleeding    All other systems were reviewed and are negative, except as noted.      >>> <<<    VITALS/PHYSICAL EXAM  --------------------------------------------------------------------------------  T(C): 36.6 (02-29-20 @ 05:08), Max: 37.4 (02-28-20 @ 21:37)  HR: 87 (02-29-20 @ 05:08) (86 - 99)  BP: 138/79 (02-29-20 @ 05:08) (128/82 - 138/86)  RR: 18 (02-29-20 @ 05:08) (17 - 18)  SpO2: 96% (02-29-20 @ 05:08) (96% - 99%)  Wt(kg): --        02-28-20 @ 07:01  -  02-29-20 @ 07:00  --------------------------------------------------------  IN: 1050 mL / OUT: 500 mL / NET: 550 mL      Physical Exam:    	Gen: NAD, well-appearing  	HEENT: PERRL, supple neck, clear oropharynx  	Pulm: CTA B/L  	CV: RRR, S1S2;  	Abd: +BS, soft, nontender/nondistended  	: No suprapubic tenderness               Extremities: + bilateral LE edema, improving  	Skin: Warm, without rashes  	Vascular access: none      LABS/STUDIES  --------------------------------------------------------------------------------              7.7    9.73  >-----------<  249      [02-29-20 @ 06:55]              See note    135  |  107  |  24  ----------------------------<  99      [02-28-20 @ 06:47]  3.6   |  21  |  2.51        Ca     8.6     [02-28-20 @ 06:47]    TPro  6.8  /  Alb  3.2  /  TBili  0.4  /  DBili  x   /  AST  17  /  ALT  10  /  AlkPhos  86  [02-28-20 @ 06:47]          Creatinine Trend:  SCr 2.51 [02-28 @ 06:47]  SCr 2.28 [02-27 @ 08:43]  SCr 2.23 [02-26 @ 07:03]  SCr 2.38 [02-25 @ 07:18]  SCr 2.44 [02-24 @ 07:08]    Urinalysis - [02-28-20 @ 22:49]      Color Light Yellow / Appearance Clear / SG 1.015 / pH 6.0      Gluc Negative / Ketone Negative  / Bili Negative / Urobili <2 mg/dL       Blood Small / Protein 100 mg/dL / Leuk Est Negative / Nitrite Negative      RBC 22 / WBC 4 / Hyaline 0 / Gran  / Sq Epi  / Non Sq Epi 2 / Bacteria Negative      Iron 29, TIBC 350, %sat 8      [02-23-20 @ 10:15]  Ferritin 14      [02-23-20 @ 10:12]  HbA1c 5.4      [02-22-20 @ 11:41]  TSH 1.06      [02-23-20 @ 10:12]    HBsAg Nonreact      [02-24-20 @ 14:31]  HCV 0.39, Nonreact      [02-24-20 @ 14:31]  HIV Nonreact      [02-24-20 @ 14:31]    ALVERTO: titer Negative, pattern --      [02-25-20 @ 09:34]  dsDNA <12      [02-24-20 @ 14:31]  C3 Complement 119      [02-24-20 @ 14:31]  C4 Complement 20      [02-24-20 @ 14:31]  Rheumatoid Factor <10      [02-25-20 @ 09:57]  ANCA: cANCA Negative, pANCA Negative, atypical ANCA Negative      [02-24-20 @ 14:31]  Syphilis Screen (Treponema Pallidum Ab) Negative      [02-24-20 @ 14:31]  PLA2R: ABHIJIT 2, IFA Negative      [02-22-20 @ 14:17]  Free Light Chains: kappa 11.07, lambda 9.18, ratio = 1.21      [02-24 @ 14:31]  Immunofixation Serum:   No Monoclonal Band Identified    Reference Range: None Detected      [02-23-20 @ 10:12]  SPEP Interpretation: Normal Electrophoresis Pattern      [02-23-20 @ 10:12]

## 2020-02-29 NOTE — PROGRESS NOTE ADULT - ASSESSMENT
49y F with nephrotic syndrome      #Nephrotic syndrome  -Has likely EVELYN, edema and UA showing proteinuria (could be all chronic) but given neg CT scan for endometrial finding and mostly showing anasarca, and pe showing edema and UA with protein- this could be first presentation of nephrotic syndrome and sudden onset could suggest minimal change, could also be a be paraprotein process in setting of anemia as well.   - Spot urine TP/CR  showing 3.5 gm of proteinuria  - Serologic work-up so far: Normal Electrophoresis, Complements WNL, Neg PLA2R, Negative ALVERTO, anti-DsDNA and ANCA serologies  - Renal Sono: Patent renal veins bilaterally. No evidence of thrombosis  Markedly increased renal cortical echogenicity bilaterally, which may be secondary to medical renal disease versus other infiltrative processes.  - Bumex 2mg IV BID for diuresis, LE swelling improving. Can transition to PO regimen likely tomorrow.  - Renal biopsy 2/26/20  reviewed with pathologist preliminary with IgA Nephropathy, 70% Global Glomerulosclerosis    #Hypertension  - Blood pressure currently stable and at an acceptable range    #Anemia  - Patient with iron studies consistent with iron deficiency  -  IV Iron 200mg very 48 hours x5 doses  - Transfuse as necessary  - Patient now with small left perinephric  hematoma post biopsy site ( 9 x 3 x 4.3 cm complex fluid collection.) on the left s/p DDAVP on 2/27/20 and 2/28  - H/H relatively stable at 8.1->  7.7  - Please give  PRBC and maintain goal Hgb>8  - UA with microscopic hematuria, 22 rbc  - Please reconsult IR regarding post-biopsy renal hematoma  - Monitor Hgb Every 12 hours      Gustavo Santana   Nephrology Fellow  Pager [facility]: [phone]/ [facility]: [phone]  (After 5 pm or on weekends please page the on-call fellow)

## 2020-02-29 NOTE — PROGRESS NOTE ADULT - ASSESSMENT
· Assessment		  49 y old AA female with hx of DMII, HTN, endometriosis here with SOB, ARIAS and abdominal girth and new LE edema, found to have renal dysfunction, proteinuria, and severe anemia. CT a/p found hepatomegaly and 1.1cm soft tissue density in the L breast    Anemia -- responded well to PRBC.   -- s/p venofer 200mg daily x4 total,   -- B12/folate adeuquate, TFT nml  -- SPEP, ZOLTAN,  hemoglobin electrophoresis wnl  --EGD and colonoscopy. Found gastritis and diverticuli, hemorrhoids  --also with perinephric hematoma, f/u IR    Renal -- renal following  -- s/p renal bx with post op perinephric hematoma   -path c/w IgA nephropathy       hepatomegaly -- LFTs nml, cause unclear  -- monitor for now,     L breast soft tissue density -- partially noted on CT. Not able to obtain mammo in house. Pt states that she had a lesion in the L breast that was bx years ago, last mammo was 2018, which was neg  -- outpt mammo and breast u/s    Wayne Hernandez MD  [facility]  Cell: [phone]

## 2020-02-29 NOTE — PROGRESS NOTE ADULT - PROBLEM SELECTOR PLAN 4
IgA Nephropathy seen on Biopsy. Renal will FU. Small perinephric hematoma at site of biopsy, will monitor clinically  CBC is stable

## 2020-02-29 NOTE — PROGRESS NOTE ADULT - SUBJECTIVE AND OBJECTIVE BOX
Pt seen, more energetic    MEDICATIONS  (STANDING):  buMETAnide Injectable 2 milliGRAM(s) IV Push two times a day  dextrose 5%. 1000 milliLiter(s) (50 mL/Hr) IV Continuous <Continuous>  dextrose 50% Injectable 12.5 Gram(s) IV Push once  dextrose 50% Injectable 25 Gram(s) IV Push once  dextrose 50% Injectable 25 Gram(s) IV Push once  influenza   Vaccine 0.5 milliLiter(s) IntraMuscular once  insulin lispro (HumaLOG) corrective regimen sliding scale   SubCutaneous three times a day before meals  lidocaine   Patch 1 Patch Transdermal daily  losartan 50 milliGRAM(s) Oral daily    MEDICATIONS  (PRN):  dextrose 40% Gel 15 Gram(s) Oral once PRN Blood Glucose LESS THAN 70 milliGRAM(s)/deciliter  glucagon  Injectable 1 milliGRAM(s) IntraMuscular once PRN Glucose LESS THAN 70 milligrams/deciliter  guaiFENesin   Syrup  (Sugar-Free) 200 milliGRAM(s) Oral every 6 hours PRN Cough      ROS  No fever, sweats, chills  No epistaxis, HA, sore throat  No CP, SOB, cough, sputum  No n/v/d, abd pain, melena, hematochezia  No edema  No rash  No anxiety  No back pain, joint pain  No bleeding, bruising  No dysuria, hematuria    Vital Signs Last 24 Hrs  T(C): 36.8 (29 Feb 2020 13:15), Max: 37.4 (28 Feb 2020 21:37)  T(F): 98.2 (29 Feb 2020 13:15), Max: 99.4 (28 Feb 2020 21:37)  HR: 91 (29 Feb 2020 13:15) (87 - 93)  BP: 136/91 (29 Feb 2020 13:15) (128/82 - 138/79)  BP(mean): --  RR: 18 (29 Feb 2020 13:15) (17 - 18)  SpO2: 97% (29 Feb 2020 13:15) (96% - 98%)    PE  NAD  Awake, alert  Anicteric, MMM  RRR  CTAB  Abd soft, NT, ND  No c/c/e  No rash grossly  FROM                          8.9    10.94 )-----------( 280      ( 29 Feb 2020 12:35 )             See note      02-28    135  |  107  |  24<H>  ----------------------------<  99  3.6   |  21<L>  |  2.51<H>    Ca    8.6      28 Feb 2020 06:47    TPro  6.8  /  Alb  3.2<L>  /  TBili  0.4  /  DBili  x   /  AST  17  /  ALT  10  /  AlkPhos  86  02-28

## 2020-02-29 NOTE — PROGRESS NOTE ADULT - ASSESSMENT
anemia  iron deficiency   hepatomegaly  IgA Nephropathy     s/p colonoscopy 2/26 with diverticulosis in the sigmoid colon  - Internal hemorrhoids  - The examination was otherwise normal  - No specimens collected    s/p upper gastrointestinal endoscopy 2/26  with normal esophagus.  - Gastritis. Biopsied.  - Normal examined duodenum. Biopsied.  - await pathology results   - diet as tolerated     abdominal u/s with L arjun- nephric hematoma  heme/onc eval noted  monitor h/h daily, transfuse prn   renal follow up noted. Care appreciated   f/u IR evaluation  f/u renal u/s

## 2020-02-29 NOTE — PROGRESS NOTE ADULT - SUBJECTIVE AND OBJECTIVE BOX
INTERVAL HPI/OVERNIGHT EVENTS:    Pt seen and examined  still with L flank pain near site of renal bx, improved from yesterday  tolerating PO  no bms   she denies abdominal pain, n/v, brbpr/hematochezia   labs noted     MEDICATIONS  (STANDING):  buMETAnide Injectable 2 milliGRAM(s) IV Push two times a day  dextrose 5%. 1000 milliLiter(s) (50 mL/Hr) IV Continuous <Continuous>  dextrose 50% Injectable 12.5 Gram(s) IV Push once  dextrose 50% Injectable 25 Gram(s) IV Push once  dextrose 50% Injectable 25 Gram(s) IV Push once  influenza   Vaccine 0.5 milliLiter(s) IntraMuscular once  insulin lispro (HumaLOG) corrective regimen sliding scale   SubCutaneous three times a day before meals  losartan 50 milliGRAM(s) Oral daily    MEDICATIONS  (PRN):  dextrose 40% Gel 15 Gram(s) Oral once PRN Blood Glucose LESS THAN 70 milliGRAM(s)/deciliter  glucagon  Injectable 1 milliGRAM(s) IntraMuscular once PRN Glucose LESS THAN 70 milligrams/deciliter  guaiFENesin   Syrup  (Sugar-Free) 200 milliGRAM(s) Oral every 6 hours PRN Cough      Allergies    No Known Allergies    Intolerances        Review of Systems:    General:  No wt loss, fevers, chills, night sweats,fatigue,   Eyes:  Good vision, no reported pain  ENT:  No sore throat, pain, runny nose, dysphagia  CV:  No pain, palpitations, hypo/hypertension  Resp:  No dyspnea, cough, tachypnea, wheezing  GI:  No pain, No nausea, No vomiting, No diarrhea, No constipation, No weight loss, No fever, No pruritis, No rectal bleeding, No melena, No dysphagia  :  No pain, bleeding, incontinence, nocturia  Muscle:  No pain, weakness  Neuro:  No weakness, tingling, memory problems  Psych:  No fatigue, insomnia, mood problems, depression  Endocrine:  No polyuria, polydypsia, cold/heat intolerance  Heme:  No petechiae, ecchymosis, easy bruisability  Skin:  No rash, tattoos, scars, edema      Vital Signs Last 24 Hrs  T(C): 36.8 (28 Feb 2020 05:20), Max: 37.2 (27 Feb 2020 21:11)  T(F): 98.2 (28 Feb 2020 05:20), Max: 98.9 (27 Feb 2020 21:11)  HR: 93 (28 Feb 2020 05:20) (89 - 107)  BP: 136/93 (28 Feb 2020 05:20) (133/86 - 154/98)  BP(mean): --  RR: 18 (28 Feb 2020 05:20) (17 - 18)  SpO2: 96% (28 Feb 2020 05:20) (96% - 99%)    PHYSICAL EXAM:    Constitutional: NAD  HEENT: EOMI, throat clear  Neck: No LAD, supple  Respiratory: CTA and P  Cardiovascular: S1 and S2, RRR  Gastrointestinal: BS+, soft, NT/ND  Extremities: b/l LE peripheral edema  Vascular: 2+ peripheral pulses  Neurological: A/O x 3, no focal deficits        LABS:                        6.7    10.72 )-----------( 274      ( 28 Feb 2020 06:47 )             See note    02-28    135  |  107  |  24<H>  ----------------------------<  99  3.6   |  21<L>  |  2.51<H>    Ca    8.6      28 Feb 2020 06:47    TPro  6.8  /  Alb  3.2<L>  /  TBili  0.4  /  DBili  x   /  AST  17  /  ALT  10  /  AlkPhos  86  02-28          RADIOLOGY & ADDITIONAL TESTS:

## 2020-03-01 LAB
ANION GAP SERPL CALC-SCNC: 17 MMOL/L — SIGNIFICANT CHANGE UP (ref 5–17)
BUN SERPL-MCNC: 28 MG/DL — HIGH (ref 7–23)
CALCIUM SERPL-MCNC: 8.7 MG/DL — SIGNIFICANT CHANGE UP (ref 8.4–10.5)
CHLORIDE SERPL-SCNC: 102 MMOL/L — SIGNIFICANT CHANGE UP (ref 96–108)
CO2 SERPL-SCNC: 21 MMOL/L — LOW (ref 22–31)
CREAT SERPL-MCNC: 2.18 MG/DL — HIGH (ref 0.5–1.3)
GLUCOSE BLDC GLUCOMTR-MCNC: 116 MG/DL — HIGH (ref 70–99)
GLUCOSE BLDC GLUCOMTR-MCNC: 154 MG/DL — HIGH (ref 70–99)
GLUCOSE BLDC GLUCOMTR-MCNC: 91 MG/DL — SIGNIFICANT CHANGE UP (ref 70–99)
GLUCOSE BLDC GLUCOMTR-MCNC: 94 MG/DL — SIGNIFICANT CHANGE UP (ref 70–99)
GLUCOSE SERPL-MCNC: 76 MG/DL — SIGNIFICANT CHANGE UP (ref 70–99)
HCT VFR BLD CALC: SIGNIFICANT CHANGE UP (ref 34.5–45)
HCT VFR BLD CALC: SIGNIFICANT CHANGE UP (ref 34.5–45)
HGB BLD-MCNC: 7.9 G/DL — LOW (ref 11.5–15.5)
HGB BLD-MCNC: 9.6 G/DL — LOW (ref 11.5–15.5)
MCHC RBC-ENTMCNC: SIGNIFICANT CHANGE UP (ref 27–34)
MCHC RBC-ENTMCNC: SIGNIFICANT CHANGE UP (ref 27–34)
MCHC RBC-ENTMCNC: SIGNIFICANT CHANGE UP (ref 32–36)
MCHC RBC-ENTMCNC: SIGNIFICANT CHANGE UP (ref 32–36)
MCV RBC AUTO: SIGNIFICANT CHANGE UP (ref 80–100)
MCV RBC AUTO: SIGNIFICANT CHANGE UP (ref 80–100)
NRBC # BLD: 0 /100 WBCS — SIGNIFICANT CHANGE UP (ref 0–0)
NRBC # BLD: 0 /100 WBCS — SIGNIFICANT CHANGE UP (ref 0–0)
PLATELET # BLD AUTO: 240 K/UL — SIGNIFICANT CHANGE UP (ref 150–400)
PLATELET # BLD AUTO: 301 K/UL — SIGNIFICANT CHANGE UP (ref 150–400)
POTASSIUM SERPL-MCNC: 3.7 MMOL/L — SIGNIFICANT CHANGE UP (ref 3.5–5.3)
POTASSIUM SERPL-SCNC: 3.7 MMOL/L — SIGNIFICANT CHANGE UP (ref 3.5–5.3)
RBC # BLD: 3.88 M/UL — SIGNIFICANT CHANGE UP (ref 3.8–5.2)
RBC # BLD: 4.7 M/UL — SIGNIFICANT CHANGE UP (ref 3.8–5.2)
RBC # FLD: SIGNIFICANT CHANGE UP (ref 10.3–14.5)
RBC # FLD: SIGNIFICANT CHANGE UP (ref 10.3–14.5)
SODIUM SERPL-SCNC: 140 MMOL/L — SIGNIFICANT CHANGE UP (ref 135–145)
WBC # BLD: 10.08 K/UL — SIGNIFICANT CHANGE UP (ref 3.8–10.5)
WBC # BLD: 10.5 K/UL — SIGNIFICANT CHANGE UP (ref 3.8–10.5)
WBC # FLD AUTO: 10.08 K/UL — SIGNIFICANT CHANGE UP (ref 3.8–10.5)
WBC # FLD AUTO: 10.5 K/UL — SIGNIFICANT CHANGE UP (ref 3.8–10.5)

## 2020-03-01 PROCEDURE — 73120 X-RAY EXAM OF HAND: CPT | Mod: 26,LT

## 2020-03-01 PROCEDURE — 76770 US EXAM ABDO BACK WALL COMP: CPT | Mod: 26

## 2020-03-01 PROCEDURE — 93971 EXTREMITY STUDY: CPT | Mod: 26

## 2020-03-01 RX ORDER — OXYCODONE AND ACETAMINOPHEN 5; 325 MG/1; MG/1
1 TABLET ORAL ONCE
Refills: 0 | Status: DISCONTINUED | OUTPATIENT
Start: 2020-03-01 | End: 2020-03-01

## 2020-03-01 RX ADMIN — OXYCODONE AND ACETAMINOPHEN 1 TABLET(S): 5; 325 TABLET ORAL at 22:40

## 2020-03-01 RX ADMIN — LIDOCAINE 1 PATCH: 4 CREAM TOPICAL at 01:23

## 2020-03-01 RX ADMIN — BUMETANIDE 2 MILLIGRAM(S): 0.25 INJECTION INTRAMUSCULAR; INTRAVENOUS at 17:15

## 2020-03-01 RX ADMIN — BUMETANIDE 2 MILLIGRAM(S): 0.25 INJECTION INTRAMUSCULAR; INTRAVENOUS at 04:07

## 2020-03-01 RX ADMIN — LIDOCAINE 1 PATCH: 4 CREAM TOPICAL at 20:02

## 2020-03-01 RX ADMIN — Medication 650 MILLIGRAM(S): at 15:43

## 2020-03-01 RX ADMIN — OXYCODONE AND ACETAMINOPHEN 1 TABLET(S): 5; 325 TABLET ORAL at 00:08

## 2020-03-01 RX ADMIN — LIDOCAINE 1 PATCH: 4 CREAM TOPICAL at 12:27

## 2020-03-01 RX ADMIN — LOSARTAN POTASSIUM 50 MILLIGRAM(S): 100 TABLET, FILM COATED ORAL at 04:08

## 2020-03-01 RX ADMIN — OXYCODONE AND ACETAMINOPHEN 1 TABLET(S): 5; 325 TABLET ORAL at 01:24

## 2020-03-01 RX ADMIN — Medication 650 MILLIGRAM(S): at 01:15

## 2020-03-01 NOTE — PROGRESS NOTE ADULT - PROBLEM SELECTOR PLAN 1
FU CBC.  Hem FU noted. She will need Iron replacement therapy with Procrit as needed. DW Hematology  CBC has been stable.  DC planning

## 2020-03-01 NOTE — CHART NOTE - NSCHARTNOTEFT_GEN_A_CORE
H/H from the morning noted to be 7.1/26.7.  Patient seen and examined.  She states that she feels "fine" but does note some intermittent spasms at the site of her renal biopsy.  She denies any headache, SOB, cough, chest pain, palpitations, N/V/D, and abdominal pain.  CBC was repeated and hgb noted to be 7.2, WBC 12.38, and Hct is not read because it says the MCHC is low so it may be invalid.  Discussed these findings with Dr. Cannon - Renal US ordered and called department to expedite.   No further interventions ordered at this time.  Signout given to night NP who will follow up.
Medicine PA Note     ROBERTO YOUNG  MRN-[id number]  Allergies    No Known Allergies    Intolerances       Pt admitted with H+H of 4.0/17.7 currently s/p 2 units of PRBCs. Post transfusion H+H 5.8/22.7, repeat CBC 5.7/22.3. Pt currently asymptomatic, denies headache, dizziness, shortness of breath, chest pain. No signs of overt bleeding. Pt HD stable.    Vital Signs Last 24 Hrs  T(C): 36.8 (02-22-20 @ 04:27), Max: 37.4 (02-21-20 @ 18:07)  T(F): 98.3 (02-22-20 @ 04:27), Max: 99.3 (02-21-20 @ 18:07)  HR: 91 (02-22-20 @ 04:27) (91 - 119)  BP: 135/90 (02-22-20 @ 04:27) (135/90 - 159/98)  RR: 18 (02-22-20 @ 04:27) (18 - 22)  SpO2: 96% (02-22-20 @ 04:27) (96% - 100%)  Daily Height in cm: 162.56 (21 Feb 2020 13:29)    Daily   I&O's Summary    CAPILLARY BLOOD GLUCOSE                          5.7    9.47  )-----------( 328      ( 22 Feb 2020 05:36 )             22.3     02-22    137  |  108  |  23  ----------------------------<  114<H>  4.1   |  18<L>  |  2.07<H>    Ca    8.0<L>      22 Feb 2020 03:32    TPro  7.8  /  Alb  3.1<L>  /  TBili  0.3  /  DBili  x   /  AST  25  /  ALT  16  /  AlkPhos  104  02-21    PT/INR - ( 21 Feb 2020 16:15 )   PT: 14.8 sec;   INR: 1.29 ratio         PTT - ( 21 Feb 2020 16:15 )  PTT:32.1 sec        PHYSICAL EXAM:  GENERAL: NAD, well-developed  CHEST/LUNG: Clear to auscultation bilaterally; No wheeze  HEART: Regular rate and rhythm;   ABDOMEN: Soft, Nontender, Nondistended; Bowel sounds present  EXTREMITIES:  2+ Peripheral Pulses, No clubbing, cyanosis      Assessment/Plan: HPI:  49y female with PMH of DM, HTN, endometriosis presenting with SOB. Started with productive cough with white sputum x4 months, for the last week she has had SOB, and multiple episodes of post-tussive vomiting, no nausea or diarrhea. For the past 2 days she has had bilateral leg swelling. Admitted with anemia. Pt HD stable with no signs of overt bleeding.      #Anemia  -Will order 2 units PRBCs stat.  -Will continue to closely follow and assess CBC post transfusion.  -Case discussed with Dr. Cannon.  -Will obtain heme consult in AM.  -Day team to follow.    -Vladimir Saleh PA-C, [id number], Dept of Medicine
ROBERTO YOUNG    Notified by RN patient with L hand swelling on previous IV insertion site. See at bedside in NAD. Lt hand red, swollen, pain, and tenderness.        Interventions taken     pain control  warm compress and elevated Lt hand  L arm Dop to r/o DVT  cont assess and monitor  f/u with am team.                    Elsa Cartagena ANP-BC  [facility] #[id number]
CC: Left renal hematoma    Notified by radiology of below results. Patient seen at bedside upon return from US, patient in NAD. Endorsing some persistent spasms at renal biopsy site, post-biopsy hematuria from yesterday has resolved. Renal fellow notified, and recommending DDAVP 20mcg x 1 dose. Will monitor CBC q6hr, and transfuse as needed. Dr. Cannon notified of findings and agrees to plan of care.    US Urinary Bladder (02.27.20 @ 20:03)  Left kidney: 10.3 cm.  No hydronephrosis. Adjacent to the left kidney, there is a 9 x 3 x 4.3 cm complex fluid collection.  IMPRESSION:   Small left perinephric hematoma.      Jessy De Paz PA-C  Department of Medicine  #[id number]

## 2020-03-01 NOTE — PROGRESS NOTE ADULT - ASSESSMENT
anemia  iron deficiency   hepatomegaly  IgA Nephropathy         s/p colonoscopy 2/26 with diverticulosis in the sigmoid colon  - Internal hemorrhoids  - The examination was otherwise normal  - No specimens collected    s/p upper gastrointestinal endoscopy 2/26  with normal esophagus.  - Gastritis. Biopsied.  - Normal examined duodenum. Biopsied.  - pathology reviewed; negative for H.pylori, negative for intestinal metaplasia   - diet as tolerated       abdominal u/s with L arjun- nephric hematoma  - heme/onc eval noted  - monitor h/h daily, transfuse prn   - renal follow up noted. Care appreciated   - f/u IR evaluation  - Renal ultrasound noted

## 2020-03-01 NOTE — PROGRESS NOTE ADULT - SUBJECTIVE AND OBJECTIVE BOX
Patient is a 49y old  Female who presents with a chief complaint of Edema (22 Feb 2020 13:12)  Edema improved    MEDICATIONS  (STANDING):  buMETAnide Injectable 2 milliGRAM(s) IV Push two times a day  dextrose 5%. 1000 milliLiter(s) (50 mL/Hr) IV Continuous <Continuous>  dextrose 50% Injectable 12.5 Gram(s) IV Push once  dextrose 50% Injectable 25 Gram(s) IV Push once  dextrose 50% Injectable 25 Gram(s) IV Push once  influenza   Vaccine 0.5 milliLiter(s) IntraMuscular once  insulin lispro (HumaLOG) corrective regimen sliding scale   SubCutaneous three times a day before meals  lidocaine   Patch 1 Patch Transdermal daily  losartan 50 milliGRAM(s) Oral daily    MEDICATIONS  (PRN):  dextrose 40% Gel 15 Gram(s) Oral once PRN Blood Glucose LESS THAN 70 milliGRAM(s)/deciliter  glucagon  Injectable 1 milliGRAM(s) IntraMuscular once PRN Glucose LESS THAN 70 milligrams/deciliter  guaiFENesin   Syrup  (Sugar-Free) 200 milliGRAM(s) Oral every 6 hours PRN Cough    Vital Signs Last 24 Hrs  T(C): 37.2 (28 Feb 2020 16:20), Max: 37.2 (27 Feb 2020 21:11)  T(F): 98.9 (28 Feb 2020 16:20), Max: 98.9 (27 Feb 2020 21:11)  HR: 99 (28 Feb 2020 16:20) (86 - 107)  BP: 134/86 (28 Feb 2020 16:20) (134/86 - 154/98)  BP(mean): --  RR: 18 (28 Feb 2020 16:20) (17 - 18)  SpO2: 97% (28 Feb 2020 16:20) (96% - 99%)  Constitutional: No fever, fatigue  Skin: No rash.  Eyes: No recent vision problems or eye pain.  ENT: No congestion, ear pain, or sore throat.  Cardiovascular: No chest pain or palpation.  Respiratory: No cough, shortness of breath, congestion, or wheezing.  Gastrointestinal: No abdominal pain, nausea, vomiting, or diarrhea.  Genitourinary: No dysuria.  Musculoskeletal: No joint swelling.  Neurologic: No headache.      abd- soft , bs+  ext - trace edema    LABS:  02-28    135  |  107  |  24<H>  ----------------------------<  99  3.6   |  21<L>  |  2.51<H>    Ca    8.6      28 Feb 2020 06:47    TPro  6.8  /  Alb  3.2<L>  /  TBili  0.4  /  DBili      /  AST  17  /  ALT  10  /  AlkPhos  86  02-28    Creatinine Trend: 2.51 <--, 2.28 <--, 2.23 <--, 2.38 <--, 2.44 <--, 2.14 <--, 2.07 <--                        6.7    10.72 )-----------( 274      ( 28 Feb 2020 06:47 )             See note    Urine Studies:    Creatinine, Random Urine: 106 mg/dL (02-22 @ 07:06)  Protein/Creatinine Ratio Calculation: 3.5 Ratio (02-22 @ 07:06)  Sodium, Random Urine: 46 mmol/L (02-22 @ 07:06)          LIVER FUNCTIONS - ( 28 Feb 2020 06:47 )  Alb: 3.2 g/dL / Pro: 6.8 g/dL / ALK PHOS: 86 U/L / ALT: 10 U/L / AST: 17 U/L / GGT: x

## 2020-03-01 NOTE — PROGRESS NOTE ADULT - SUBJECTIVE AND OBJECTIVE BOX
INTERVAL HPI/OVERNIGHT EVENTS:    Pt seen and examined  events noted   more energetic today   L flank pain improving  tolerating PO  +flatus, no bms   she denies SOB, CP, dizziness, n/v, abd pain brbpr/hematochezia   labs noted     MEDICATIONS  (STANDING):  buMETAnide Injectable 2 milliGRAM(s) IV Push two times a day  dextrose 5%. 1000 milliLiter(s) (50 mL/Hr) IV Continuous <Continuous>  dextrose 50% Injectable 12.5 Gram(s) IV Push once  dextrose 50% Injectable 25 Gram(s) IV Push once  dextrose 50% Injectable 25 Gram(s) IV Push once  influenza   Vaccine 0.5 milliLiter(s) IntraMuscular once  insulin lispro (HumaLOG) corrective regimen sliding scale   SubCutaneous three times a day before meals  losartan 50 milliGRAM(s) Oral daily    MEDICATIONS  (PRN):  dextrose 40% Gel 15 Gram(s) Oral once PRN Blood Glucose LESS THAN 70 milliGRAM(s)/deciliter  glucagon  Injectable 1 milliGRAM(s) IntraMuscular once PRN Glucose LESS THAN 70 milligrams/deciliter  guaiFENesin   Syrup  (Sugar-Free) 200 milliGRAM(s) Oral every 6 hours PRN Cough      Allergies    No Known Allergies    Intolerances        Review of Systems:    General:  No wt loss, fevers, chills, night sweats,fatigue,   Eyes:  Good vision, no reported pain  ENT:  No sore throat, pain, runny nose, dysphagia  CV:  No pain, palpitations, hypo/hypertension  Resp:  No dyspnea, cough, tachypnea, wheezing  GI:  No pain, No nausea, No vomiting, No diarrhea, No constipation, No weight loss, No fever, No pruritis, No rectal bleeding, No melena, No dysphagia  :  No pain, bleeding, incontinence, nocturia  Muscle:  No pain, weakness  Neuro:  No weakness, tingling, memory problems  Psych:  No fatigue, insomnia, mood problems, depression  Endocrine:  No polyuria, polydypsia, cold/heat intolerance  Heme:  No petechiae, ecchymosis, easy bruisability  Skin:  No rash, tattoos, scars, edema      Vital Signs Last 24 Hrs  T(C): 36.8 (28 Feb 2020 05:20), Max: 37.2 (27 Feb 2020 21:11)  T(F): 98.2 (28 Feb 2020 05:20), Max: 98.9 (27 Feb 2020 21:11)  HR: 93 (28 Feb 2020 05:20) (89 - 107)  BP: 136/93 (28 Feb 2020 05:20) (133/86 - 154/98)  BP(mean): --  RR: 18 (28 Feb 2020 05:20) (17 - 18)  SpO2: 96% (28 Feb 2020 05:20) (96% - 99%)    PHYSICAL EXAM:    Constitutional: NAD  HEENT: EOMI, throat clear  Neck: No LAD, supple  Respiratory: CTA and P  Cardiovascular: S1 and S2, RRR  Gastrointestinal: BS+, soft, NT/ND  Extremities: b/l LE peripheral edema  Vascular: 2+ peripheral pulses  Neurological: A/O x 3, no focal deficits        LABS:                        6.7    10.72 )-----------( 274      ( 28 Feb 2020 06:47 )             See note    02-28    135  |  107  |  24<H>  ----------------------------<  99  3.6   |  21<L>  |  2.51<H>    Ca    8.6      28 Feb 2020 06:47    TPro  6.8  /  Alb  3.2<L>  /  TBili  0.4  /  DBili  x   /  AST  17  /  ALT  10  /  AlkPhos  86  02-28          RADIOLOGY & ADDITIONAL TESTS:

## 2020-03-01 NOTE — PROGRESS NOTE ADULT - SUBJECTIVE AND OBJECTIVE BOX
Brief Hematology Note    Patient not in room today, down for US so unable to see. Continuing to follow, however, and review blood counts    · Assessment		  49 y old AA female with hx of DMII, HTN, endometriosis here with SOB, ARIAS and abdominal girth and new LE edema, found to have renal dysfunction, proteinuria, and severe anemia. CT a/p found hepatomegaly and 1.1cm soft tissue density in the L breast    Anemia -- responded well to PRBC.   -- s/p venofer 200mg daily x4 total,   -- B12/folate adeuquate, TFT nml  -- SPEP, ZOLTAN,  hemoglobin electrophoresis wnl  --EGD and colonoscopy. Found gastritis and diverticuli, hemorrhoids  --also with perinephric hematoma, f/u IR  --discussed CBC abnormality with lab today as many parameters "not reported", this is due to her low MCHC which is caused by her prolong anemia/iron def  --continue to monitor CBC daily and transfuse for hgb < 7.5    Renal -- renal following  -- s/p renal bx with post op perinephric hematoma   --path c/w IgA nephropathy       hepatomegaly -- LFTs nml, cause unclear  -- monitor for now,     L breast soft tissue density -- partially noted on CT. Not able to obtain mammo in house. Pt states that she had a lesion in the L breast that was bx years ago, last mammo was 2018, which was neg  -- outpt mammo and breast u/s    patient to follow up with us in our office after discharge. discussed with Dr. Davis Hernandez MD  [facility]  Cell: [phone]

## 2020-03-01 NOTE — PROGRESS NOTE ADULT - PROBLEM SELECTOR PLAN 4
IgA Nephropathy seen on Biopsy. Renal will FU. Small perinephric hematoma at site of biopsy, will monitor clinically  CBC is stable.  DC planning once cleared by renal

## 2020-03-02 LAB
ANION GAP SERPL CALC-SCNC: 15 MMOL/L — SIGNIFICANT CHANGE UP (ref 5–17)
BUN SERPL-MCNC: 29 MG/DL — HIGH (ref 7–23)
CALCIUM SERPL-MCNC: 8.9 MG/DL — SIGNIFICANT CHANGE UP (ref 8.4–10.5)
CHLORIDE SERPL-SCNC: 103 MMOL/L — SIGNIFICANT CHANGE UP (ref 96–108)
CO2 SERPL-SCNC: 23 MMOL/L — SIGNIFICANT CHANGE UP (ref 22–31)
CREAT SERPL-MCNC: 2.23 MG/DL — HIGH (ref 0.5–1.3)
GLUCOSE BLDC GLUCOMTR-MCNC: 105 MG/DL — HIGH (ref 70–99)
GLUCOSE BLDC GLUCOMTR-MCNC: 122 MG/DL — HIGH (ref 70–99)
GLUCOSE BLDC GLUCOMTR-MCNC: 128 MG/DL — HIGH (ref 70–99)
GLUCOSE BLDC GLUCOMTR-MCNC: 194 MG/DL — HIGH (ref 70–99)
GLUCOSE SERPL-MCNC: 76 MG/DL — SIGNIFICANT CHANGE UP (ref 70–99)
HCT VFR BLD CALC: SIGNIFICANT CHANGE UP (ref 34.5–45)
HGB BLD-MCNC: 8 G/DL — LOW (ref 11.5–15.5)
MCHC RBC-ENTMCNC: SIGNIFICANT CHANGE UP (ref 27–34)
MCHC RBC-ENTMCNC: SIGNIFICANT CHANGE UP (ref 32–36)
MCV RBC AUTO: SIGNIFICANT CHANGE UP (ref 80–100)
NRBC # BLD: 0 /100 WBCS — SIGNIFICANT CHANGE UP (ref 0–0)
PLATELET # BLD AUTO: 259 K/UL — SIGNIFICANT CHANGE UP (ref 150–400)
POTASSIUM SERPL-MCNC: 3.7 MMOL/L — SIGNIFICANT CHANGE UP (ref 3.5–5.3)
POTASSIUM SERPL-SCNC: 3.7 MMOL/L — SIGNIFICANT CHANGE UP (ref 3.5–5.3)
RBC # BLD: 4 M/UL — SIGNIFICANT CHANGE UP (ref 3.8–5.2)
RBC # FLD: SIGNIFICANT CHANGE UP (ref 10.3–14.5)
SODIUM SERPL-SCNC: 141 MMOL/L — SIGNIFICANT CHANGE UP (ref 135–145)
WBC # BLD: 8.35 K/UL — SIGNIFICANT CHANGE UP (ref 3.8–10.5)
WBC # FLD AUTO: 8.35 K/UL — SIGNIFICANT CHANGE UP (ref 3.8–10.5)

## 2020-03-02 PROCEDURE — 99233 SBSQ HOSP IP/OBS HIGH 50: CPT

## 2020-03-02 RX ADMIN — BUMETANIDE 2 MILLIGRAM(S): 0.25 INJECTION INTRAMUSCULAR; INTRAVENOUS at 17:07

## 2020-03-02 RX ADMIN — Medication 650 MILLIGRAM(S): at 07:18

## 2020-03-02 RX ADMIN — Medication 650 MILLIGRAM(S): at 07:03

## 2020-03-02 RX ADMIN — BUMETANIDE 2 MILLIGRAM(S): 0.25 INJECTION INTRAMUSCULAR; INTRAVENOUS at 05:36

## 2020-03-02 RX ADMIN — LIDOCAINE 1 PATCH: 4 CREAM TOPICAL at 00:10

## 2020-03-02 RX ADMIN — OXYCODONE AND ACETAMINOPHEN 1 TABLET(S): 5; 325 TABLET ORAL at 00:10

## 2020-03-02 RX ADMIN — LIDOCAINE 1 PATCH: 4 CREAM TOPICAL at 23:16

## 2020-03-02 RX ADMIN — LOSARTAN POTASSIUM 50 MILLIGRAM(S): 100 TABLET, FILM COATED ORAL at 05:36

## 2020-03-02 RX ADMIN — LIDOCAINE 1 PATCH: 4 CREAM TOPICAL at 11:24

## 2020-03-02 RX ADMIN — LIDOCAINE 1 PATCH: 4 CREAM TOPICAL at 19:09

## 2020-03-02 NOTE — PROGRESS NOTE ADULT - SUBJECTIVE AND OBJECTIVE BOX
INTERVAL HPI/OVERNIGHT EVENTS:    patient seen and examined  resting comfortably in bed  tolerating PO  no bms  denies SOB, CP, n/v abd pain     MEDICATIONS  (STANDING):  buMETAnide Injectable 2 milliGRAM(s) IV Push two times a day  dextrose 5%. 1000 milliLiter(s) (50 mL/Hr) IV Continuous <Continuous>  dextrose 50% Injectable 12.5 Gram(s) IV Push once  dextrose 50% Injectable 25 Gram(s) IV Push once  dextrose 50% Injectable 25 Gram(s) IV Push once  influenza   Vaccine 0.5 milliLiter(s) IntraMuscular once  insulin lispro (HumaLOG) corrective regimen sliding scale   SubCutaneous three times a day before meals  lidocaine   Patch 1 Patch Transdermal daily  losartan 50 milliGRAM(s) Oral daily    MEDICATIONS  (PRN):  acetaminophen   Tablet .. 650 milliGRAM(s) Oral every 6 hours PRN Temp greater or equal to 38C (100.4F), Mild Pain (1 - 3)  dextrose 40% Gel 15 Gram(s) Oral once PRN Blood Glucose LESS THAN 70 milliGRAM(s)/deciliter  glucagon  Injectable 1 milliGRAM(s) IntraMuscular once PRN Glucose LESS THAN 70 milligrams/deciliter  guaiFENesin   Syrup  (Sugar-Free) 200 milliGRAM(s) Oral every 6 hours PRN Cough      Allergies    No Known Allergies    Intolerances        Review of Systems:    General:  No wt loss, fevers, chills, night sweats,fatigue,   Eyes:  Good vision, no reported pain  ENT:  No sore throat, pain, runny nose, dysphagia  CV:  No pain, palpitatioins, hypo/hypertension  Resp:  No dyspnea, cough, tachypnea, wheezing  GI:  No pain, No nausea, No vomiting, No diarrhea, No constipatiion, No weight loss, No fever, No pruritis, No rectal bleeding, No tarry stools, No dysphagia,  :  No pain, bleeding, incontinence, nocturia  Muscle:  No pain, weakness  Neuro:  No weakness, tingling, memory problems  Psych:  No fatigue, insomnia, mood problems, depression  Endocrine:  No polyuria, polydypsia, cold/heat intolerance  Heme:  No petechiae, ecchymosis, easy bruisability  Skin:  No rash, tattoos, scars, edema      Vital Signs Last 24 Hrs  T(C): 36.8 (02 Mar 2020 04:24), Max: 37.2 (01 Mar 2020 12:50)  T(F): 98.3 (02 Mar 2020 04:24), Max: 98.9 (01 Mar 2020 12:50)  HR: 94 (02 Mar 2020 04:24) (94 - 99)  BP: 130/86 (02 Mar 2020 04:24) (130/86 - 151/102)  BP(mean): --  RR: 18 (02 Mar 2020 04:24) (18 - 18)  SpO2: 93% (02 Mar 2020 04:24) (93% - 98%)    PHYSICAL EXAM:    Constitutional: NAD  HEENT: EOMI, throat clear  Neck: No LAD, supple  Respiratory: CTA and P  Cardiovascular: S1 and S2, RRR, no M  Gastrointestinal: BS+, soft, NT/ND  Extremities: +b/l LE edema - improving   Vascular: 2+ peripheral pulses  Neurological: A/O x 3, no focal deficits        LABS:                        8.0    8.35  )-----------( 259      ( 02 Mar 2020 07:01 )             See note    03-02    141  |  103  |  29<H>  ----------------------------<  76  3.7   |  23  |  2.23<H>    Ca    8.9      02 Mar 2020 06:45            RADIOLOGY & ADDITIONAL TESTS:

## 2020-03-02 NOTE — PROGRESS NOTE ADULT - ASSESSMENT
anemia  iron deficiency   hepatomegaly  IgA Nephropathy         s/p colonoscopy 2/26 with diverticulosis in the sigmoid colon  - Internal hemorrhoids  - The examination was otherwise normal  - No specimens collected    s/p upper gastrointestinal endoscopy 2/26  with normal esophagus.  - Gastritis. Biopsied.  - Normal examined duodenum. Biopsied.  - pathology reviewed; negative for H.pylori, negative for intestinal metaplasia   - diet as tolerated       abdominal u/s with L arjun- nephric hematoma  - heme/onc eval noted; patient to follow up as outpatient for YUAN therapy   - monitor h/h daily, transfuse prn   - renal follow up noted. Care appreciated   - f/u IR evaluation  - Renal ultrasound noted   - No GI objection to d/c

## 2020-03-02 NOTE — PROGRESS NOTE ADULT - SUBJECTIVE AND OBJECTIVE BOX
[facility] DIVISION OF KIDNEY DISEASES AND HYPERTENSION -- FOLLOW UP NOTE  --------------------------------------------------------------------------------  Chief Complaint:/subjective: feeling better, wants to go home    24 hour events: as above        PAST HISTORY  --------------------------------------------------------------------------------  No significant changes to PMH, PSH, FHx, SHx, unless otherwise noted    ALLERGIES & MEDICATIONS  --------------------------------------------------------------------------------  Allergies    No Known Allergies    Intolerances      Standing Inpatient Medications  buMETAnide Injectable 2 milliGRAM(s) IV Push two times a day  dextrose 5%. 1000 milliLiter(s) IV Continuous <Continuous>  dextrose 50% Injectable 12.5 Gram(s) IV Push once  dextrose 50% Injectable 25 Gram(s) IV Push once  dextrose 50% Injectable 25 Gram(s) IV Push once  influenza   Vaccine 0.5 milliLiter(s) IntraMuscular once  insulin lispro (HumaLOG) corrective regimen sliding scale   SubCutaneous three times a day before meals  lidocaine   Patch 1 Patch Transdermal daily  losartan 50 milliGRAM(s) Oral daily    PRN Inpatient Medications  acetaminophen   Tablet .. 650 milliGRAM(s) Oral every 6 hours PRN  dextrose 40% Gel 15 Gram(s) Oral once PRN  glucagon  Injectable 1 milliGRAM(s) IntraMuscular once PRN  guaiFENesin   Syrup  (Sugar-Free) 200 milliGRAM(s) Oral every 6 hours PRN      REVIEW OF SYSTEMS  --------------------------------------------------------------------------------  Gen: No weight changes, fatigue, fevers/chills, weakness  Skin: No rashes  Head/Eyes/Ears/Mouth: No headache;   Respiratory: No dyspnea, cough  CV: No chest pain, PND, orthopnea  GI: No abdominal pain, diarrhea, constipation, nausea, vomiting  : No increased frequency, dysuria, hematuria, nocturia  MSK: No joint pain/swelling; no back pain; no edema  Neuro: No dizziness/lightheadedness, weakness  Heme: No easy bruising or bleeding  Psych: No significant nervousness, anxiety, stress, depression    All other systems were reviewed and are negative, except as noted.    VITALS/PHYSICAL EXAM  --------------------------------------------------------------------------------  T(C): 36.8 (03-02-20 @ 12:32), Max: 37.1 (03-01-20 @ 20:46)  HR: 90 (03-02-20 @ 12:32) (90 - 95)  BP: 141/96 (03-02-20 @ 12:32) (130/86 - 150/100)  RR: 18 (03-02-20 @ 12:32) (18 - 18)  SpO2: 97% (03-02-20 @ 12:32) (93% - 98%)  Wt(kg): --  Adult Advanced Hemodynamics Last 24 Hrs  ABP: --  ABP(mean): --  CVP(mm Hg): --  CO: --  CI: --  PA: --  PA(mean): --  PCWP: --  SVR: --  SVRI: --        03-01-20 @ 07:01  -  03-02-20 @ 07:00  --------------------------------------------------------  IN: 480 mL / OUT: 0 mL / NET: 480 mL    03-02-20 @ 07:01  -  03-02-20 @ 15:45  --------------------------------------------------------  IN: 480 mL / OUT: 0 mL / NET: 480 mL      Physical Exam:  	Gen: NAD,    	HEENT:  no jvp  	Pulm: CTA B/L  	CV: RRR, S1S2; no rub  	Back:  no sacral edema  	Abd: +BS, soft,    	: No suprapubic tenderness  	Ext: no edema  	Neuro: awake  	Psych: alert  	Skin: Warm   	Vascular access:    LABS/STUDIES  --------------------------------------------------------------------------------              8.0    8.35  >-----------<  259      [03-02-20 @ 07:01]              See note    Hemoglobin: 8.0 g/dL (03-02-20 @ 07:01)  Hemoglobin: 9.6 g/dL (03-01-20 @ 12:20)    Platelet Count - Automated: 259 K/uL (03-02-20 @ 07:01)  Platelet Count - Automated: 301 K/uL (03-01-20 @ 12:20)    141  |  103  |  29  ----------------------------<  76      [03-02-20 @ 06:45]  3.7   |  23  |  2.23        Ca     8.9     [03-02-20 @ 06:45]            Creatinine Trend:  SCr 2.23 [03-02 @ 06:45]  SCr 2.18 [03-01 @ 07:09]  SCr 2.51 [02-28 @ 06:47]  SCr 2.28 [02-27 @ 08:43]  SCr 2.23 [02-26 @ 07:03]    Urinalysis - [02-28-20 @ 22:49]      Color Light Yellow / Appearance Clear / SG 1.015 / pH 6.0      Gluc Negative / Ketone Negative  / Bili Negative / Urobili <2 mg/dL       Blood Small / Protein 100 mg/dL / Leuk Est Negative / Nitrite Negative      RBC 22 / WBC 4 / Hyaline 0 / Gran  / Sq Epi  / Non Sq Epi 2 / Bacteria Negative      Iron 29, TIBC 350, %sat 8      [02-23-20 @ 10:15]  Ferritin 14      [02-23-20 @ 10:12]  HbA1c 5.4      [02-22-20 @ 11:41]  TSH 1.06      [02-23-20 @ 10:12]    HBsAg Nonreact      [02-24-20 @ 14:31]  HCV 0.39, Nonreact      [02-24-20 @ 14:31]  HIV Nonreact      [02-24-20 @ 14:31]    ALVERTO: titer Negative, pattern --      [02-25-20 @ 09:34]  dsDNA <12      [02-24-20 @ 14:31]  C3 Complement 119      [02-24-20 @ 14:31]  C4 Complement 20      [02-24-20 @ 14:31]  Rheumatoid Factor <10      [02-25-20 @ 09:57]  ANCA: cANCA Negative, pANCA Negative, atypical ANCA Negative      [02-24-20 @ 14:31]  Syphilis Screen (Treponema Pallidum Ab) Negative      [02-24-20 @ 14:31]  PLA2R: ABHIJIT 2, IFA Negative      [02-22-20 @ 14:17]  Free Light Chains: kappa 11.07, lambda 9.18, ratio = 1.21      [02-24 @ 14:31]  Immunofixation Serum:   No Monoclonal Band Identified    Reference Range: None Detected      [02-23-20 @ 10:12]  SPEP Interpretation: Normal Electrophoresis Pattern      [02-23-20 @ 10:12]

## 2020-03-02 NOTE — PROGRESS NOTE ADULT - ASSESSMENT
49y F with nephrotic syndrome      #Nephrotic syndrome/ IGAN- CKD Stage 1v   - Renal biopsy 2/26/20  reviewed with pathologist advanced IgA Nephropathy  -no role for other therapy  -on losartan    #vol overload  -change bumex to 80mg po daily    #Hypertension  - Blood pressure currently stable and at an acceptable range    #Anemia  - left perinephric  hematoma post biopsy site ( 9 x 3 x 4.3 cm complex fluid collection.) on the left s/p DDAVP on 2/27/20 and 2/28  - H/H relatively stable   -monitor hb trend  - Please give  PRBC and maintain goal Hgb>8  - UA with microscopic hematuria, 22 rbc 49y F with nephrotic syndrome      #Nephrotic syndrome/ IGAN- CKD Stage 1v   - Renal biopsy 2/26/20  reviewed with pathologist advanced IgA Nephropathy  -no role for other therapy  -on losartan    #vol overload  -change bumex to  oral    #Hypertension  - Blood pressure currently stable and at an acceptable range    #Anemia  - left perinephric  hematoma post biopsy site ( 9 x 3 x 4.3 cm complex fluid collection.) on the left s/p DDAVP on 2/27/20 and 2/28  - H/H relatively stable   -monitor hb trend  - Please give  PRBC and maintain goal Hgb>8  - UA with microscopic hematuria, 22 rbc

## 2020-03-02 NOTE — PROGRESS NOTE ADULT - PROBLEM SELECTOR PLAN 1
FU CBC.  Hem FU noted. She will need Iron replacement therapy with Procrit as needed. DW Hematology    DC planning if CBC is stable.

## 2020-03-02 NOTE — PROGRESS NOTE ADULT - SUBJECTIVE AND OBJECTIVE BOX
Patient is a 49y old  Female who presents with a chief complaint of Edema (22 Feb 2020 13:12)  Edema improved  No edema in legs  No dizziness reported  MEDICATIONS  (STANDING):  buMETAnide Injectable 2 milliGRAM(s) IV Push two times a day  dextrose 5%. 1000 milliLiter(s) (50 mL/Hr) IV Continuous <Continuous>  dextrose 50% Injectable 12.5 Gram(s) IV Push once  dextrose 50% Injectable 25 Gram(s) IV Push once  dextrose 50% Injectable 25 Gram(s) IV Push once  influenza   Vaccine 0.5 milliLiter(s) IntraMuscular once  insulin lispro (HumaLOG) corrective regimen sliding scale   SubCutaneous three times a day before meals  lidocaine   Patch 1 Patch Transdermal daily  losartan 50 milliGRAM(s) Oral daily    MEDICATIONS  (PRN):  dextrose 40% Gel 15 Gram(s) Oral once PRN Blood Glucose LESS THAN 70 milliGRAM(s)/deciliter  glucagon  Injectable 1 milliGRAM(s) IntraMuscular once PRN Glucose LESS THAN 70 milligrams/deciliter  guaiFENesin   Syrup  (Sugar-Free) 200 milliGRAM(s) Oral every 6 hours PRN Cough    Vital Signs Last 24 Hrs  T(C): 37.2 (28 Feb 2020 16:20), Max: 37.2 (27 Feb 2020 21:11)  T(F): 98.9 (28 Feb 2020 16:20), Max: 98.9 (27 Feb 2020 21:11)  HR: 99 (28 Feb 2020 16:20) (86 - 107)  BP: 134/86 (28 Feb 2020 16:20) (134/86 - 154/98)  BP(mean): --  RR: 18 (28 Feb 2020 16:20) (17 - 18)  SpO2: 97% (28 Feb 2020 16:20) (96% - 99%)  Constitutional: No fever, fatigue  Skin: No rash.  Eyes: No recent vision problems or eye pain.  ENT: No congestion, ear pain, or sore throat.  Cardiovascular: No chest pain or palpation.  Respiratory: No cough, shortness of breath, congestion, or wheezing.  Gastrointestinal: No abdominal pain, nausea, vomiting, or diarrhea.  Genitourinary: No dysuria.  Musculoskeletal: No joint swelling.  Neurologic: No headache.      abd- soft , bs+  ext - trace edema    LABS:  02-28    135  |  107  |  24<H>  ----------------------------<  99  3.6   |  21<L>  |  2.51<H>    Ca    8.6      28 Feb 2020 06:47    TPro  6.8  /  Alb  3.2<L>  /  TBili  0.4  /  DBili      /  AST  17  /  ALT  10  /  AlkPhos  86  02-28    Creatinine Trend: 2.51 <--, 2.28 <--, 2.23 <--, 2.38 <--, 2.44 <--, 2.14 <--, 2.07 <--                        6.7    10.72 )-----------( 274      ( 28 Feb 2020 06:47 )             See note    Urine Studies:    Creatinine, Random Urine: 106 mg/dL (02-22 @ 07:06)  Protein/Creatinine Ratio Calculation: 3.5 Ratio (02-22 @ 07:06)  Sodium, Random Urine: 46 mmol/L (02-22 @ 07:06)          LIVER FUNCTIONS - ( 28 Feb 2020 06:47 )  Alb: 3.2 g/dL / Pro: 6.8 g/dL / ALK PHOS: 86 U/L / ALT: 10 U/L / AST: 17 U/L / GGT: x

## 2020-03-03 ENCOUNTER — TRANSCRIPTION ENCOUNTER (OUTPATIENT)
Age: 50
End: 2020-03-03

## 2020-03-03 VITALS
SYSTOLIC BLOOD PRESSURE: 139 MMHG | OXYGEN SATURATION: 98 % | RESPIRATION RATE: 18 BRPM | DIASTOLIC BLOOD PRESSURE: 91 MMHG | HEART RATE: 102 BPM | TEMPERATURE: 99 F

## 2020-03-03 LAB
ANION GAP SERPL CALC-SCNC: 16 MMOL/L — SIGNIFICANT CHANGE UP (ref 5–17)
BUN SERPL-MCNC: 31 MG/DL — HIGH (ref 7–23)
CALCIUM SERPL-MCNC: 9.4 MG/DL — SIGNIFICANT CHANGE UP (ref 8.4–10.5)
CHLORIDE SERPL-SCNC: 102 MMOL/L — SIGNIFICANT CHANGE UP (ref 96–108)
CO2 SERPL-SCNC: 22 MMOL/L — SIGNIFICANT CHANGE UP (ref 22–31)
CREAT SERPL-MCNC: 2.13 MG/DL — HIGH (ref 0.5–1.3)
GLUCOSE BLDC GLUCOMTR-MCNC: 193 MG/DL — HIGH (ref 70–99)
GLUCOSE BLDC GLUCOMTR-MCNC: 99 MG/DL — SIGNIFICANT CHANGE UP (ref 70–99)
GLUCOSE SERPL-MCNC: 96 MG/DL — SIGNIFICANT CHANGE UP (ref 70–99)
HCT VFR BLD CALC: SIGNIFICANT CHANGE UP (ref 34.5–45)
HGB BLD-MCNC: 8.6 G/DL — LOW (ref 11.5–15.5)
MCHC RBC-ENTMCNC: SIGNIFICANT CHANGE UP (ref 27–34)
MCHC RBC-ENTMCNC: SIGNIFICANT CHANGE UP (ref 32–36)
MCV RBC AUTO: SIGNIFICANT CHANGE UP (ref 80–100)
NRBC # BLD: 0 /100 WBCS — SIGNIFICANT CHANGE UP (ref 0–0)
PLATELET # BLD AUTO: 293 K/UL — SIGNIFICANT CHANGE UP (ref 150–400)
POTASSIUM SERPL-MCNC: 3.9 MMOL/L — SIGNIFICANT CHANGE UP (ref 3.5–5.3)
POTASSIUM SERPL-SCNC: 3.9 MMOL/L — SIGNIFICANT CHANGE UP (ref 3.5–5.3)
RBC # BLD: 4.24 M/UL — SIGNIFICANT CHANGE UP (ref 3.8–5.2)
RBC # FLD: SIGNIFICANT CHANGE UP (ref 10.3–14.5)
SODIUM SERPL-SCNC: 140 MMOL/L — SIGNIFICANT CHANGE UP (ref 135–145)
WBC # BLD: 10.1 K/UL — SIGNIFICANT CHANGE UP (ref 3.8–10.5)
WBC # FLD AUTO: 10.1 K/UL — SIGNIFICANT CHANGE UP (ref 3.8–10.5)

## 2020-03-03 PROCEDURE — 83010 ASSAY OF HAPTOGLOBIN QUANT: CPT

## 2020-03-03 PROCEDURE — 82607 VITAMIN B-12: CPT

## 2020-03-03 PROCEDURE — 99238 HOSP IP/OBS DSCHRG MGMT 30/<: CPT

## 2020-03-03 PROCEDURE — 82962 GLUCOSE BLOOD TEST: CPT

## 2020-03-03 PROCEDURE — 86850 RBC ANTIBODY SCREEN: CPT

## 2020-03-03 PROCEDURE — 85610 PROTHROMBIN TIME: CPT

## 2020-03-03 PROCEDURE — 88313 SPECIAL STAINS GROUP 2: CPT

## 2020-03-03 PROCEDURE — 84165 PROTEIN E-PHORESIS SERUM: CPT

## 2020-03-03 PROCEDURE — 84484 ASSAY OF TROPONIN QUANT: CPT

## 2020-03-03 PROCEDURE — 85730 THROMBOPLASTIN TIME PARTIAL: CPT

## 2020-03-03 PROCEDURE — 86225 DNA ANTIBODY NATIVE: CPT

## 2020-03-03 PROCEDURE — 86431 RHEUMATOID FACTOR QUANT: CPT

## 2020-03-03 PROCEDURE — 83020 HEMOGLOBIN ELECTROPHORESIS: CPT

## 2020-03-03 PROCEDURE — 80048 BASIC METABOLIC PNL TOTAL CA: CPT

## 2020-03-03 PROCEDURE — 83521 IG LIGHT CHAINS FREE EACH: CPT

## 2020-03-03 PROCEDURE — 86780 TREPONEMA PALLIDUM: CPT

## 2020-03-03 PROCEDURE — 86038 ANTINUCLEAR ANTIBODIES: CPT

## 2020-03-03 PROCEDURE — 74176 CT ABD & PELVIS W/O CONTRAST: CPT

## 2020-03-03 PROCEDURE — 76770 US EXAM ABDO BACK WALL COMP: CPT

## 2020-03-03 PROCEDURE — 73120 X-RAY EXAM OF HAND: CPT

## 2020-03-03 PROCEDURE — 83880 ASSAY OF NATRIURETIC PEPTIDE: CPT

## 2020-03-03 PROCEDURE — 82570 ASSAY OF URINE CREATININE: CPT

## 2020-03-03 PROCEDURE — 87389 HIV-1 AG W/HIV-1&-2 AB AG IA: CPT

## 2020-03-03 PROCEDURE — 86901 BLOOD TYPING SEROLOGIC RH(D): CPT

## 2020-03-03 PROCEDURE — 86235 NUCLEAR ANTIGEN ANTIBODY: CPT

## 2020-03-03 PROCEDURE — 93971 EXTREMITY STUDY: CPT

## 2020-03-03 PROCEDURE — 50200 RENAL BIOPSY PERQ: CPT

## 2020-03-03 PROCEDURE — 86334 IMMUNOFIX E-PHORESIS SERUM: CPT

## 2020-03-03 PROCEDURE — 82784 ASSAY IGA/IGD/IGG/IGM EACH: CPT

## 2020-03-03 PROCEDURE — 86900 BLOOD TYPING SEROLOGIC ABO: CPT

## 2020-03-03 PROCEDURE — 84155 ASSAY OF PROTEIN SERUM: CPT

## 2020-03-03 PROCEDURE — 88305 TISSUE EXAM BY PATHOLOGIST: CPT

## 2020-03-03 PROCEDURE — 88346 IMFLUOR 1ST 1ANTB STAIN PX: CPT

## 2020-03-03 PROCEDURE — P9016: CPT

## 2020-03-03 PROCEDURE — 76830 TRANSVAGINAL US NON-OB: CPT

## 2020-03-03 PROCEDURE — 85045 AUTOMATED RETICULOCYTE COUNT: CPT

## 2020-03-03 PROCEDURE — 99285 EMERGENCY DEPT VISIT HI MDM: CPT | Mod: 25

## 2020-03-03 PROCEDURE — 81001 URINALYSIS AUTO W/SCOPE: CPT

## 2020-03-03 PROCEDURE — 84439 ASSAY OF FREE THYROXINE: CPT

## 2020-03-03 PROCEDURE — 83036 HEMOGLOBIN GLYCOSYLATED A1C: CPT

## 2020-03-03 PROCEDURE — 81025 URINE PREGNANCY TEST: CPT

## 2020-03-03 PROCEDURE — 83550 IRON BINDING TEST: CPT

## 2020-03-03 PROCEDURE — 93005 ELECTROCARDIOGRAM TRACING: CPT

## 2020-03-03 PROCEDURE — 84300 ASSAY OF URINE SODIUM: CPT

## 2020-03-03 PROCEDURE — 82746 ASSAY OF FOLIC ACID SERUM: CPT

## 2020-03-03 PROCEDURE — 88348 ELECTRON MICROSCOPY DX: CPT

## 2020-03-03 PROCEDURE — 99232 SBSQ HOSP IP/OBS MODERATE 35: CPT

## 2020-03-03 PROCEDURE — 86923 COMPATIBILITY TEST ELECTRIC: CPT

## 2020-03-03 PROCEDURE — 80074 ACUTE HEPATITIS PANEL: CPT

## 2020-03-03 PROCEDURE — 80053 COMPREHEN METABOLIC PANEL: CPT

## 2020-03-03 PROCEDURE — 83540 ASSAY OF IRON: CPT

## 2020-03-03 PROCEDURE — 84443 ASSAY THYROID STIM HORMONE: CPT

## 2020-03-03 PROCEDURE — 88312 SPECIAL STAINS GROUP 1: CPT

## 2020-03-03 PROCEDURE — 76857 US EXAM PELVIC LIMITED: CPT

## 2020-03-03 PROCEDURE — 88350 IMFLUOR EA ADDL 1ANTB STN PX: CPT

## 2020-03-03 PROCEDURE — 86036 ANCA SCREEN EACH ANTIBODY: CPT

## 2020-03-03 PROCEDURE — 36430 TRANSFUSION BLD/BLD COMPNT: CPT

## 2020-03-03 PROCEDURE — 93975 VASCULAR STUDY: CPT

## 2020-03-03 PROCEDURE — 84702 CHORIONIC GONADOTROPIN TEST: CPT

## 2020-03-03 PROCEDURE — 83516 IMMUNOASSAY NONANTIBODY: CPT

## 2020-03-03 PROCEDURE — 71046 X-RAY EXAM CHEST 2 VIEWS: CPT

## 2020-03-03 PROCEDURE — 82728 ASSAY OF FERRITIN: CPT

## 2020-03-03 PROCEDURE — 84156 ASSAY OF PROTEIN URINE: CPT

## 2020-03-03 PROCEDURE — 85652 RBC SED RATE AUTOMATED: CPT

## 2020-03-03 PROCEDURE — 76700 US EXAM ABDOM COMPLETE: CPT

## 2020-03-03 PROCEDURE — 86255 FLUORESCENT ANTIBODY SCREEN: CPT

## 2020-03-03 PROCEDURE — 85027 COMPLETE CBC AUTOMATED: CPT

## 2020-03-03 PROCEDURE — 76942 ECHO GUIDE FOR BIOPSY: CPT

## 2020-03-03 PROCEDURE — 86160 COMPLEMENT ANTIGEN: CPT

## 2020-03-03 RX ORDER — LOSARTAN POTASSIUM 100 MG/1
1 TABLET, FILM COATED ORAL
Qty: 30 | Refills: 0
Start: 2020-03-03 | End: 2020-04-01

## 2020-03-03 RX ORDER — BUMETANIDE 0.25 MG/ML
1 INJECTION INTRAMUSCULAR; INTRAVENOUS
Qty: 30 | Refills: 0
Start: 2020-03-03 | End: 2020-04-01

## 2020-03-03 RX ORDER — BUMETANIDE 0.25 MG/ML
2 INJECTION INTRAMUSCULAR; INTRAVENOUS DAILY
Refills: 0 | Status: DISCONTINUED | OUTPATIENT
Start: 2020-03-03 | End: 2020-03-03

## 2020-03-03 RX ADMIN — Medication 650 MILLIGRAM(S): at 01:00

## 2020-03-03 RX ADMIN — LOSARTAN POTASSIUM 50 MILLIGRAM(S): 100 TABLET, FILM COATED ORAL at 05:46

## 2020-03-03 RX ADMIN — LIDOCAINE 1 PATCH: 4 CREAM TOPICAL at 12:58

## 2020-03-03 RX ADMIN — BUMETANIDE 2 MILLIGRAM(S): 0.25 INJECTION INTRAMUSCULAR; INTRAVENOUS at 05:46

## 2020-03-03 RX ADMIN — Medication 650 MILLIGRAM(S): at 00:34

## 2020-03-03 NOTE — PROGRESS NOTE ADULT - SUBJECTIVE AND OBJECTIVE BOX
[facility] DIVISION OF KIDNEY DISEASES AND HYPERTENSION -- FOLLOW UP NOTE  --------------------------------------------------------------------------------  Chief Complaint:/subjective: feels well, no back pain, good uo, same leg swelling    24 hour events:as above, no acute events        PAST HISTORY  --------------------------------------------------------------------------------  No significant changes to PMH, PSH, FHx, SHx, unless otherwise noted    ALLERGIES & MEDICATIONS  --------------------------------------------------------------------------------  Allergies    No Known Allergies    Intolerances      Standing Inpatient Medications  buMETAnide 2 milliGRAM(s) Oral daily  dextrose 5%. 1000 milliLiter(s) IV Continuous <Continuous>  dextrose 50% Injectable 12.5 Gram(s) IV Push once  dextrose 50% Injectable 25 Gram(s) IV Push once  dextrose 50% Injectable 25 Gram(s) IV Push once  influenza   Vaccine 0.5 milliLiter(s) IntraMuscular once  insulin lispro (HumaLOG) corrective regimen sliding scale   SubCutaneous three times a day before meals  lidocaine   Patch 1 Patch Transdermal daily  losartan 50 milliGRAM(s) Oral daily    PRN Inpatient Medications  dextrose 40% Gel 15 Gram(s) Oral once PRN  glucagon  Injectable 1 milliGRAM(s) IntraMuscular once PRN  guaiFENesin   Syrup  (Sugar-Free) 200 milliGRAM(s) Oral every 6 hours PRN      REVIEW OF SYSTEMS  --------------------------------------------------------------------------------  Gen: No weight changes, fatigue, fevers/chills, weakness  Skin: No rashes  Head/Eyes/Ears/Mouth: No headache;   Respiratory: No dyspnea, cough  CV: No chest pain, PND, orthopnea  GI: No abdominal pain, diarrhea, constipation, nausea, vomiting  : No increased frequency, dysuria, hematuria, nocturia  MSK: No joint pain/swelling; no back pain; + edema  Neuro: No dizziness/lightheadedness, weakness  Heme: No easy bruising or bleeding  Psych: No significant nervousness, anxiety, stress, depression    All other systems were reviewed and are negative, except as noted.    VITALS/PHYSICAL EXAM  --------------------------------------------------------------------------------  T(C): 37.2 (03-03-20 @ 12:34), Max: 37.7 (03-02-20 @ 21:12)  HR: 102 (03-03-20 @ 12:34) (97 - 103)  BP: 139/91 (03-03-20 @ 12:34) (139/91 - 153/98)  RR: 18 (03-03-20 @ 12:34) (18 - 18)  SpO2: 98% (03-03-20 @ 12:34) (96% - 98%)  Wt(kg): --  Adult Advanced Hemodynamics Last 24 Hrs  ABP: --  ABP(mean): --  CVP(mm Hg): --  CO: --  CI: --  PA: --  PA(mean): --  PCWP: --  SVR: --  SVRI: --        03-02-20 @ 07:01  -  03-03-20 @ 07:00  --------------------------------------------------------  IN: 1520 mL / OUT: 0 mL / NET: 1520 mL    03-03-20 @ 07:01  -  03-03-20 @ 16:13  --------------------------------------------------------  IN: 840 mL / OUT: 0 mL / NET: 840 mL      Physical Exam:  	Gen: NAD,    	HEENT:  no jvp  	Pulm: CTA B/L  	CV: RRR, S1S2; no rub  	Back:   no sacral edema  	Abd: +BS, soft, nontender/nondistended  	: No suprapubic tenderness  	Ext: 1+ edema  	Neuro:awake  	Psych: alert  	Skin: Warm,    	Vascular access:    LABS/STUDIES  --------------------------------------------------------------------------------              8.6    10.10 >-----------<  293      [03-03-20 @ 07:21]              See note    Hemoglobin: 8.6 g/dL (03-03-20 @ 07:21)  Hemoglobin: 8.0 g/dL (03-02-20 @ 07:01)    Platelet Count - Automated: 293 K/uL (03-03-20 @ 07:21)  Platelet Count - Automated: 259 K/uL (03-02-20 @ 07:01)    140  |  102  |  31  ----------------------------<  96      [03-03-20 @ 07:21]  3.9   |  22  |  2.13        Ca     9.4     [03-03-20 @ 07:21]            Creatinine Trend:  SCr 2.13 [03-03 @ 07:21]  SCr 2.23 [03-02 @ 06:45]  SCr 2.18 [03-01 @ 07:09]  SCr 2.51 [02-28 @ 06:47]  SCr 2.28 [02-27 @ 08:43]    Urinalysis - [02-28-20 @ 22:49]      Color Light Yellow / Appearance Clear / SG 1.015 / pH 6.0      Gluc Negative / Ketone Negative  / Bili Negative / Urobili <2 mg/dL       Blood Small / Protein 100 mg/dL / Leuk Est Negative / Nitrite Negative      RBC 22 / WBC 4 / Hyaline 0 / Gran  / Sq Epi  / Non Sq Epi 2 / Bacteria Negative      Iron 29, TIBC 350, %sat 8      [02-23-20 @ 10:15]  Ferritin 14      [02-23-20 @ 10:12]  HbA1c 5.4      [02-22-20 @ 11:41]  TSH 1.06      [02-23-20 @ 10:12]    HBsAg Nonreact      [02-24-20 @ 14:31]  HCV 0.39, Nonreact      [02-24-20 @ 14:31]  HIV Nonreact      [02-24-20 @ 14:31]    ALVERTO: titer Negative, pattern --      [02-25-20 @ 09:34]  dsDNA <12      [02-24-20 @ 14:31]  C3 Complement 119      [02-24-20 @ 14:31]  C4 Complement 20      [02-24-20 @ 14:31]  Rheumatoid Factor <10      [02-25-20 @ 09:57]  ANCA: cANCA Negative, pANCA Negative, atypical ANCA Negative      [02-24-20 @ 14:31]  Syphilis Screen (Treponema Pallidum Ab) Negative      [02-24-20 @ 14:31]  PLA2R: ABHIJIT 2, IFA Negative      [02-22-20 @ 14:17]  Free Light Chains: kappa 11.07, lambda 9.18, ratio = 1.21      [02-24 @ 14:31]  Immunofixation Serum:   No Monoclonal Band Identified    Reference Range: None Detected      [02-23-20 @ 10:12]  SPEP Interpretation: Normal Electrophoresis Pattern      [02-23-20 @ 10:12]

## 2020-03-03 NOTE — DISCHARGE NOTE PROVIDER - CARE PROVIDERS DIRECT ADDRESSES
,DirectAddress_Unknown,[email or web address],DirectAddress_Unknown,DirectAddress_Unknown ,DirectAddress_Unknown,[email or web address],DirectAddress_Unknown,[email or web address]

## 2020-03-03 NOTE — PROGRESS NOTE ADULT - ATTENDING COMMENTS
ok to dc home with outpt renal follow up  [address]  [address] [place] [address] [phone] ok to dc home with outpt renal follow up  will sign off, reconsult as needed    [address]  [address]nd floor  [place], [place] [id number] [phone]

## 2020-03-03 NOTE — PROGRESS NOTE ADULT - ASSESSMENT
49y F with nephrotic syndrome      #Nephrotic syndrome/ IGAN- CKD Stage 1v   - Renal biopsy 2/26/20  reviewed with pathologist advanced IgA Nephropathy  -no role for other therapy  -on losartan    #vol overload/edema  -changed to oral bumex    #Hypertension  - Blood pressure currently stable and at an acceptable range    #Anemia  - left perinephric  hematoma post biopsy site ( 9 x 3 x 4.3 cm complex fluid collection.) on the left s/p DDAVP on 2/27/20 and 2/28  - H/H relatively stable   -monitor hb trend

## 2020-03-03 NOTE — DISCHARGE NOTE PROVIDER - NSDCCPCAREPLAN_GEN_ALL_CORE_FT
PRINCIPAL DISCHARGE DIAGNOSIS  Diagnosis: Anemia  Assessment and Plan of Treatment: Presented with SOB, ARIAS and abdominal girth last 1 week ( sudden onset) and new LE edema as well. All started 1 week ago. Also cough for 1 month, non sputum. Labs showed 300+ protein and albumin <3.5.  Pt seen by nephrology: s/p Renal biopsy 2/26/20 found with advanced IgA Nephropathy, continue with diuretics. Pt also with anemia and left perinephric  hematoma post biopsy site ( 9 x 3 x 4.3 cm complex fluid collection). H/H relatively stable, patient to follow up as outpatient for YUAN therapy. Pt seen by GI and hematology for anemia: iron deficiency s/p colonoscopy 2/26 with diverticulosis in the sigmoid colon, internal hemorrhoids, the examination was otherwise normal, biopsy pathology reviewed; negative for H. pylori, negative for intestinal metaplasia.  Seen by hematology/oncology: L breast soft tissue density -- partially noted on CT. Not able to obtain mammo in house. Pt states that she had a lesion in the L breast that was bx years ago, last mammo was 2018, which was negative. Follow up as  outpatient for mammo and breast u/s.  Follow up with nephrology, Hem/Onc, GI and PCP as OP.      SECONDARY DISCHARGE DIAGNOSES  Diagnosis: Acute renal failure, unspecified acute renal failure type  Assessment and Plan of Treatment: Seen by nephrology: s/p Renal biopsy 2/26/20 found with advanced IgA Nephropathy, continue with diuretics.  Follow up as OP with nephrology.

## 2020-03-03 NOTE — DISCHARGE NOTE PROVIDER - HOSPITAL COURSE
50 y/o AA female with PMHx of DM2, HTN, endometriosis here with SOB, ARIAS and abdominal girth last 1 week ( sudden onset) and new LE edema as well. All started 1 week ago. Also cough for 1 month, non sputum. Labs showed 300+ protein and albumin <3.5.  Pt seen by nephrology: s/p Renal biopsy 2/26/20 found with advanced IgA Nephropathy, continue with diuretics. Pt also with anemia and left perinephric  hematoma post biopsy site ( 9 x 3 x 4.3 cm complex fluid collection). H/H relatively stable, patient to follow up as outpatient for YUAN therapy. Pt seen by GI and hematology for anemia: iron deficiency s/p colonoscopy 2/26 with diverticulosis in the sigmoid colon, internal hemorrhoids, the examination was otherwise normal, biopsy pathology reviewed; negative for H. pylori, negative for intestinal metaplasia.  Seen by hematology/oncology: L breast soft tissue density -- partially noted on CT. Not able to obtain mammo in house. Pt states that she had a lesion in the L breast that was bx years ago, last mammo was 2018, which was neg. Follow up as  outpatient for mammo and breast u/s.    Pt cleared for DC by Dr. Cannon with follow up with nephrology, Hem/Onc, GI and PCP.

## 2020-03-03 NOTE — DISCHARGE NOTE PROVIDER - NSDCQMAMI_CARD_ALL_CORE
NOTIFICATION RETURN TO WORK / SCHOOL 
 
10/15/2019 11:14 AM 
 
Ms. Jenna Kraft [address]  
[facility] [id number] To Whom It May Concern: 
 
Jenna Kraft is currently under the care of [address] [place] [address]. She will return to school on 10/16/19 due to an MD appointment on 10/15/19. If there are questions or concerns please have the patient contact our office. Sincerely, Wm Treviño MD 
 
                                
 

No

## 2020-03-03 NOTE — DISCHARGE NOTE PROVIDER - CARE PROVIDER_API CALL
Gerardo Jackson (DO)  Gastroenterology; Internal Medicine  [address] [place] [address]  Phone: [phone]  Fax: [phone]  Follow Up Time:     Joy Ramos)  Internal Medicine; Nephrology  [address] [facility] [address]nd [place] [address]  Phone: [phone]  Fax: [phone]  Follow Up Time:     [facility],   Phone: (   )    -  Fax: (   )    -  Follow Up Time:     Wayne Hernandez)  Hematology; Internal Medicine; Medical Oncology  [address] [facility]  [place] [address]  Phone: [phone]  Fax: [phone]  Follow Up Time: Gerardo Jackson (DO)  Gastroenterology; Internal Medicine  [address] [place] [address]  Phone: [phone]  Fax: [phone]  Follow Up Time:     Joy Ramos)  Internal Medicine; Nephrology  [address] [facility] [address] [place] [address]  Phone: [phone]  Fax: [phone]  Follow Up Time:     Wayne Hernandez)  Hematology; Internal Medicine; Medical Oncology  [address] [facility]  [place] [address]  Phone: [phone]  Fax: [phone]  Follow Up Time:     Seb Cannon)  Internal Medicine  [address] [place] [address]  Phone: [phone]  Fax: [phone]  Follow Up Time:

## 2020-03-03 NOTE — DISCHARGE NOTE PROVIDER - PROVIDER TOKENS
PROVIDER:[TOKEN:[[id number]]],PROVIDER:[TOKEN:[[id number]]],FREE:[LAST:[[facility]],PHONE:[(   )    -],FAX:[(   )    -]],PROVIDER:[TOKEN:[[id number]]] PROVIDER:[TOKEN:[[id number]]],PROVIDER:[TOKEN:[[id number]]],PROVIDER:[TOKEN:[[id number]]],PROVIDER:[TOKEN:[[id number]]]

## 2020-03-03 NOTE — DISCHARGE NOTE PROVIDER - NSDCMRMEDTOKEN_GEN_ALL_CORE_FT
guaiFENesin 100 mg/5 mL oral liquid: 10 milliliter(s) orally every 6 hours, As needed, Cough  metFORMIN 500 mg oral tablet: 1 tab(s) orally once a day bumetanide 2 mg oral tablet: 1 tab(s) orally once a day  guaiFENesin 100 mg/5 mL oral liquid: 10 milliliter(s) orally every 6 hours, As needed, Cough  losartan 50 mg oral tablet: 1 tab(s) orally once a day

## 2020-03-03 NOTE — PROGRESS NOTE ADULT - PROBLEM SELECTOR PROBLEM 3
Anemia
Benign essential hypertension

## 2020-03-03 NOTE — PROGRESS NOTE ADULT - PROBLEM SELECTOR PROBLEM 1
Anemia, unspecified type
Acute renal failure, unspecified acute renal failure type

## 2020-03-03 NOTE — PROGRESS NOTE ADULT - SUBJECTIVE AND OBJECTIVE BOX
INTERVAL HPI/OVERNIGHT EVENTS:    patient seen and examined at bedside  +2 bms, no melena, brbpr, hematochezia  tolerating PO  denies n/v abd pain  h/h improved    MEDICATIONS  (STANDING):  buMETAnide Injectable 2 milliGRAM(s) IV Push two times a day  dextrose 5%. 1000 milliLiter(s) (50 mL/Hr) IV Continuous <Continuous>  dextrose 50% Injectable 12.5 Gram(s) IV Push once  dextrose 50% Injectable 25 Gram(s) IV Push once  dextrose 50% Injectable 25 Gram(s) IV Push once  influenza   Vaccine 0.5 milliLiter(s) IntraMuscular once  insulin lispro (HumaLOG) corrective regimen sliding scale   SubCutaneous three times a day before meals  lidocaine   Patch 1 Patch Transdermal daily  losartan 50 milliGRAM(s) Oral daily    MEDICATIONS  (PRN):  dextrose 40% Gel 15 Gram(s) Oral once PRN Blood Glucose LESS THAN 70 milliGRAM(s)/deciliter  glucagon  Injectable 1 milliGRAM(s) IntraMuscular once PRN Glucose LESS THAN 70 milligrams/deciliter  guaiFENesin   Syrup  (Sugar-Free) 200 milliGRAM(s) Oral every 6 hours PRN Cough      Allergies    No Known Allergies    Intolerances        Review of Systems:    General:  No wt loss, fevers, chills, night sweats,fatigue,   Eyes:  Good vision, no reported pain  ENT:  No sore throat, pain, runny nose, dysphagia  CV:  No pain, palpitatioins, hypo/hypertension  Resp:  No dyspnea, cough, tachypnea, wheezing  GI:  No pain, No nausea, No vomiting, No diarrhea, No constipatiion, No weight loss, No fever, No pruritis, No rectal bleeding, No tarry stools, No dysphagia,  :  No pain, bleeding, incontinence, nocturia  Muscle:  No pain, weakness  Neuro:  No weakness, tingling, memory problems  Psych:  No fatigue, insomnia, mood problems, depression  Endocrine:  No polyuria, polydypsia, cold/heat intolerance  Heme:  No petechiae, ecchymosis, easy bruisability  Skin:  No rash, tattoos, scars, edema      Vital Signs Last 24 Hrs  T(C): 36.7 (03 Mar 2020 05:13), Max: 37.7 (02 Mar 2020 21:12)  T(F): 98.1 (03 Mar 2020 05:13), Max: 99.8 (02 Mar 2020 21:12)  HR: 97 (03 Mar 2020 05:13) (90 - 103)  BP: 153/98 (03 Mar 2020 05:13) (141/96 - 153/98)  BP(mean): --  RR: 18 (03 Mar 2020 05:13) (18 - 18)  SpO2: 96% (03 Mar 2020 05:13) (96% - 98%)    PHYSICAL EXAM:    Constitutional: NAD, well-developed  HEENT: EOMI, throat clear  Neck: No LAD, supple  Respiratory: CTA and P  Cardiovascular: S1 and S2, RRR, no M  Gastrointestinal: BS+, soft, NT/ND, neg HSM,  Extremities: No peripheral edema, neg clubing, cyanosis  Vascular: 2+ peripheral pulses  Neurological: A/O x 3, no focal deficits  Psychiatric: Normal mood, normal affect  Skin: No rashes      LABS:                        8.6    10.10 )-----------( 293      ( 03 Mar 2020 07:21 )             See note    03-03    140  |  102  |  31<H>  ----------------------------<  96  3.9   |  22  |  2.13<H>    Ca    9.4      03 Mar 2020 07:21            RADIOLOGY & ADDITIONAL TESTS:

## 2020-03-04 PROBLEM — Z00.00 ENCOUNTER FOR PREVENTIVE HEALTH EXAMINATION: Status: ACTIVE | Noted: 2020-03-04

## 2020-04-12 ENCOUNTER — EMERGENCY (EMERGENCY)
Facility: HOSPITAL | Age: 50
LOS: 0 days | Discharge: HOME | End: 2020-04-13
Attending: EMERGENCY MEDICINE
Payer: MEDICAID

## 2020-04-12 VITALS
TEMPERATURE: 98 F | OXYGEN SATURATION: 100 % | HEART RATE: 93 BPM | DIASTOLIC BLOOD PRESSURE: 89 MMHG | SYSTOLIC BLOOD PRESSURE: 130 MMHG | RESPIRATION RATE: 16 BRPM

## 2020-04-12 VITALS
DIASTOLIC BLOOD PRESSURE: 83 MMHG | HEART RATE: 96 BPM | SYSTOLIC BLOOD PRESSURE: 133 MMHG | WEIGHT: 169.98 LBS | HEIGHT: 64 IN | TEMPERATURE: 98 F | OXYGEN SATURATION: 97 % | RESPIRATION RATE: 20 BRPM

## 2020-04-12 DIAGNOSIS — E11.9 TYPE 2 DIABETES MELLITUS WITHOUT COMPLICATIONS: ICD-10-CM

## 2020-04-12 DIAGNOSIS — I10 ESSENTIAL (PRIMARY) HYPERTENSION: ICD-10-CM

## 2020-04-12 DIAGNOSIS — R53.83 OTHER FATIGUE: ICD-10-CM

## 2020-04-12 DIAGNOSIS — D64.9 ANEMIA, UNSPECIFIED: ICD-10-CM

## 2020-04-12 LAB
ABO RH CONFIRMATION: SIGNIFICANT CHANGE UP
ALBUMIN SERPL ELPH-MCNC: 3.1 G/DL — LOW (ref 3.3–5)
ALP SERPL-CCNC: 116 U/L — SIGNIFICANT CHANGE UP (ref 40–120)
ALT FLD-CCNC: 17 U/L — SIGNIFICANT CHANGE UP (ref 12–78)
ANION GAP SERPL CALC-SCNC: 9 MMOL/L — SIGNIFICANT CHANGE UP (ref 5–17)
ANISOCYTOSIS BLD QL: SLIGHT — SIGNIFICANT CHANGE UP
AST SERPL-CCNC: 16 U/L — SIGNIFICANT CHANGE UP (ref 15–37)
BASOPHILS # BLD AUTO: 0.02 K/UL — SIGNIFICANT CHANGE UP (ref 0–0.2)
BASOPHILS NFR BLD AUTO: 0.3 % — SIGNIFICANT CHANGE UP (ref 0–2)
BILIRUB SERPL-MCNC: 0.2 MG/DL — SIGNIFICANT CHANGE UP (ref 0.2–1.2)
BLD GP AB SCN SERPL QL: SIGNIFICANT CHANGE UP
BUN SERPL-MCNC: 30 MG/DL — HIGH (ref 7–23)
CALCIUM SERPL-MCNC: 8.7 MG/DL — SIGNIFICANT CHANGE UP (ref 8.5–10.1)
CHLORIDE SERPL-SCNC: 104 MMOL/L — SIGNIFICANT CHANGE UP (ref 96–108)
CO2 SERPL-SCNC: 26 MMOL/L — SIGNIFICANT CHANGE UP (ref 22–31)
CREAT SERPL-MCNC: 2.39 MG/DL — HIGH (ref 0.5–1.3)
EOSINOPHIL # BLD AUTO: 0.15 K/UL — SIGNIFICANT CHANGE UP (ref 0–0.5)
EOSINOPHIL NFR BLD AUTO: 2 % — SIGNIFICANT CHANGE UP (ref 0–6)
GLUCOSE SERPL-MCNC: 126 MG/DL — HIGH (ref 70–99)
HCG SERPL-ACNC: 1 MIU/ML — SIGNIFICANT CHANGE UP
HCT VFR BLD CALC: 24.4 % — LOW (ref 34.5–45)
HGB BLD-MCNC: 6.5 G/DL — CRITICAL LOW (ref 11.5–15.5)
HYPOCHROMIA BLD QL: SLIGHT — SIGNIFICANT CHANGE UP
IMM GRANULOCYTES NFR BLD AUTO: 0.4 % — SIGNIFICANT CHANGE UP (ref 0–1.5)
LYMPHOCYTES # BLD AUTO: 0.89 K/UL — LOW (ref 1–3.3)
LYMPHOCYTES # BLD AUTO: 11.6 % — LOW (ref 13–44)
MANUAL SMEAR VERIFICATION: YES — SIGNIFICANT CHANGE UP
MCHC RBC-ENTMCNC: 21.2 PG — LOW (ref 27–34)
MCHC RBC-ENTMCNC: 26.6 GM/DL — LOW (ref 32–36)
MCV RBC AUTO: 79.7 FL — LOW (ref 80–100)
MONOCYTES # BLD AUTO: 0.57 K/UL — SIGNIFICANT CHANGE UP (ref 0–0.9)
MONOCYTES NFR BLD AUTO: 7.4 % — SIGNIFICANT CHANGE UP (ref 2–14)
NEUTROPHILS # BLD AUTO: 6.01 K/UL — SIGNIFICANT CHANGE UP (ref 1.8–7.4)
NEUTROPHILS NFR BLD AUTO: 78.3 % — HIGH (ref 43–77)
NRBC # BLD: 0 /100 WBCS — SIGNIFICANT CHANGE UP (ref 0–0)
OVALOCYTES BLD QL SMEAR: SLIGHT — SIGNIFICANT CHANGE UP
PLAT MORPH BLD: NORMAL — SIGNIFICANT CHANGE UP
PLATELET # BLD AUTO: 370 K/UL — SIGNIFICANT CHANGE UP (ref 150–400)
POTASSIUM SERPL-MCNC: 4.8 MMOL/L — SIGNIFICANT CHANGE UP (ref 3.5–5.3)
POTASSIUM SERPL-SCNC: 4.8 MMOL/L — SIGNIFICANT CHANGE UP (ref 3.5–5.3)
PROT SERPL-MCNC: 8.2 GM/DL — SIGNIFICANT CHANGE UP (ref 6–8.3)
RBC # BLD: 3.06 M/UL — LOW (ref 3.8–5.2)
RBC # FLD: 19.9 % — HIGH (ref 10.3–14.5)
RBC BLD AUTO: ABNORMAL
SODIUM SERPL-SCNC: 139 MMOL/L — SIGNIFICANT CHANGE UP (ref 135–145)
WBC # BLD: 7.67 K/UL — SIGNIFICANT CHANGE UP (ref 3.8–10.5)
WBC # FLD AUTO: 7.67 K/UL — SIGNIFICANT CHANGE UP (ref 3.8–10.5)

## 2020-04-12 PROCEDURE — 99284 EMERGENCY DEPT VISIT MOD MDM: CPT

## 2020-04-12 NOTE — ED ADULT NURSE NOTE - OBJECTIVE STATEMENT
49 year old female presents with low hemoglobin and need a blood transfusion. She been feeling tired and achy.  She has a history of IGA Nephropathy and   kidneys are 75% damage.  She had a kidney biopsy in February and the area is tender now

## 2020-04-12 NOTE — ED PROVIDER NOTE - CARE PROVIDER_API CALL
Emery Cannon)  Internal Medicine  [address] [place] [address]  Phone: [phone]  Fax: [phone]  Follow Up Time: 1-3 Days

## 2020-04-12 NOTE — ED ADULT NURSE NOTE - ED STAT RN HANDOFF DETAILS
Pt pending d/c, No acute transfusion reaction noted after second unit of PRBCs. no complaints from pt. at this time. repeat CBC sent and Dr. stewart made aware and okay to d/c pt to home.

## 2020-04-12 NOTE — ED ADULT NURSE NOTE - NSIMPLEMENTINTERV_GEN_ALL_ED
Implemented All Universal Safety Interventions:  [place] to call system. Call bell, personal items and telephone within reach. Instruct patient to call for assistance. Room bathroom lighting operational. Non-slip footwear when patient is off stretcher. Physically safe environment: no spills, clutter or unnecessary equipment. Stretcher in lowest position, wheels locked, appropriate side rails in place.

## 2020-04-12 NOTE — ED PROVIDER NOTE - PROGRESS NOTE DETAILS
d/w ramya (pmd) he agress with xfusion 2 units prbc and then d/c to f/u in office with him Pt endorsed by Dr. Trujillo, present for blood transfusion.  pt in NAD, VSS, completing second unit.  Discussed results and outcome of testing with the patient, given copy as well.  Patient advised to please follow up with their primary care doctor within the next 24 hours and return to the Emergency Department for worsening symptoms or any other concerns.  Patient advised that their doctor may call  to follow up on the specific results of the tests performed today in the emergency department. during dc pt requested repeat CBC, will send for PMD follow up

## 2020-04-12 NOTE — ED ADULT NURSE NOTE - NS ED NURSE LEVEL OF CONSCIOUSNESS MENTAL STATUS
Alert/Cooperative/Awake [>50% of Time Spent on Counseling and Coordination of Care for  ___] : Greater than 50% of the encounter time was spent on counseling and coordination of care for [unfilled] [Time Spent: ___ minutes] : I have spent [unfilled] minutes of face to face time with the patient

## 2020-04-12 NOTE — ED ADULT TRIAGE NOTE - CHIEF COMPLAINT QUOTE
Pt walked  c/o low hemoglobin (6.5) s/p PRBC x5 in february [pt c/o generalized body ache , denies any SOB, or dizziness s/p kidney biopsy denies any rectal or vaginal bleed, hx IGA nephropathy and 75% kidney damage per pt

## 2020-04-12 NOTE — ED PROVIDER NOTE - OBJECTIVE STATEMENT
pt with hx htn, dm, iga nephropathy, anemia referred by pmd for anemia with hgb <7 on outpt labs done yest. pt c/o fatigue. no fever, ha, d/n/v, cp, sob, cough, abd pain, vaginal or rectal bleeding.  pmd - ramya

## 2020-04-12 NOTE — ED PROVIDER NOTE - PATIENT PORTAL LINK FT
You can access the FollowMyHealth Patient Portal offered by [facility] by registering at the following website: [email or web address]. By joining [employer]’s FollowMyHealth portal, you will also be able to view your health information using other applications (apps) compatible with our system.

## 2020-04-13 LAB
HCT VFR BLD CALC: 28.6 % — LOW (ref 34.5–45)
HGB BLD-MCNC: 8.3 G/DL — LOW (ref 11.5–15.5)
MCHC RBC-ENTMCNC: 22.9 PG — LOW (ref 27–34)
MCHC RBC-ENTMCNC: 29 GM/DL — LOW (ref 32–36)
MCV RBC AUTO: 79 FL — LOW (ref 80–100)
NRBC # BLD: 0 /100 WBCS — SIGNIFICANT CHANGE UP (ref 0–0)
PLATELET # BLD AUTO: 342 K/UL — SIGNIFICANT CHANGE UP (ref 150–400)
RBC # BLD: 3.62 M/UL — LOW (ref 3.8–5.2)
RBC # FLD: 19.2 % — HIGH (ref 10.3–14.5)
WBC # BLD: 9.38 K/UL — SIGNIFICANT CHANGE UP (ref 3.8–10.5)
WBC # FLD AUTO: 9.38 K/UL — SIGNIFICANT CHANGE UP (ref 3.8–10.5)

## 2020-06-28 ENCOUNTER — EMERGENCY (EMERGENCY)
Facility: HOSPITAL | Age: 50
LOS: 1 days | End: 2020-06-28
Attending: EMERGENCY MEDICINE
Payer: MEDICAID

## 2020-06-28 VITALS
WEIGHT: 175.05 LBS | RESPIRATION RATE: 18 BRPM | TEMPERATURE: 99 F | SYSTOLIC BLOOD PRESSURE: 137 MMHG | OXYGEN SATURATION: 100 % | HEART RATE: 105 BPM | DIASTOLIC BLOOD PRESSURE: 87 MMHG | HEIGHT: 64 IN

## 2020-06-28 LAB
ALBUMIN SERPL ELPH-MCNC: 3.8 G/DL — SIGNIFICANT CHANGE UP (ref 3.3–5)
ALP SERPL-CCNC: 90 U/L — SIGNIFICANT CHANGE UP (ref 40–120)
ALT FLD-CCNC: 8 U/L — LOW (ref 10–45)
ANION GAP SERPL CALC-SCNC: 11 MMOL/L — SIGNIFICANT CHANGE UP (ref 5–17)
ANISOCYTOSIS BLD QL: SLIGHT — SIGNIFICANT CHANGE UP
APTT BLD: 33.6 SEC — SIGNIFICANT CHANGE UP (ref 27.5–36.3)
AST SERPL-CCNC: 15 U/L — SIGNIFICANT CHANGE UP (ref 10–40)
BASOPHILS # BLD AUTO: 0.07 K/UL — SIGNIFICANT CHANGE UP (ref 0–0.2)
BASOPHILS NFR BLD AUTO: 0.9 % — SIGNIFICANT CHANGE UP (ref 0–2)
BILIRUB SERPL-MCNC: <0.1 MG/DL — LOW (ref 0.2–1.2)
BLD GP AB SCN SERPL QL: NEGATIVE — SIGNIFICANT CHANGE UP
BUN SERPL-MCNC: 34 MG/DL — HIGH (ref 7–23)
CALCIUM SERPL-MCNC: 9 MG/DL — SIGNIFICANT CHANGE UP (ref 8.4–10.5)
CHLORIDE SERPL-SCNC: 104 MMOL/L — SIGNIFICANT CHANGE UP (ref 96–108)
CO2 SERPL-SCNC: 26 MMOL/L — SIGNIFICANT CHANGE UP (ref 22–31)
CREAT SERPL-MCNC: 2.28 MG/DL — HIGH (ref 0.5–1.3)
DACRYOCYTES BLD QL SMEAR: SLIGHT — SIGNIFICANT CHANGE UP
ELLIPTOCYTES BLD QL SMEAR: SLIGHT — SIGNIFICANT CHANGE UP
EOSINOPHIL # BLD AUTO: 0 K/UL — SIGNIFICANT CHANGE UP (ref 0–0.5)
EOSINOPHIL NFR BLD AUTO: 0 % — SIGNIFICANT CHANGE UP (ref 0–6)
GLUCOSE SERPL-MCNC: 98 MG/DL — SIGNIFICANT CHANGE UP (ref 70–99)
HAPTOGLOB SERPL-MCNC: 175 MG/DL — SIGNIFICANT CHANGE UP (ref 34–200)
HCT VFR BLD CALC: 17.8 % — CRITICAL LOW (ref 34.5–45)
HGB BLD-MCNC: 4.6 G/DL — CRITICAL LOW (ref 11.5–15.5)
HYPOCHROMIA BLD QL: SIGNIFICANT CHANGE UP
INR BLD: 1.03 RATIO — SIGNIFICANT CHANGE UP (ref 0.88–1.16)
IRON SATN MFR SERPL: 16 % — SIGNIFICANT CHANGE UP (ref 14–50)
IRON SATN MFR SERPL: 58 UG/DL — SIGNIFICANT CHANGE UP (ref 30–160)
LDH SERPL L TO P-CCNC: 668 U/L — HIGH (ref 50–242)
LYMPHOCYTES # BLD AUTO: 1 K/UL — SIGNIFICANT CHANGE UP (ref 1–3.3)
LYMPHOCYTES # BLD AUTO: 12.3 % — LOW (ref 13–44)
MACROCYTES BLD QL: SLIGHT — SIGNIFICANT CHANGE UP
MANUAL SMEAR VERIFICATION: SIGNIFICANT CHANGE UP
MCHC RBC-ENTMCNC: 18.1 PG — LOW (ref 27–34)
MCHC RBC-ENTMCNC: 25.8 GM/DL — LOW (ref 32–36)
MCV RBC AUTO: 70.1 FL — LOW (ref 80–100)
MICROCYTES BLD QL: SLIGHT — SIGNIFICANT CHANGE UP
MONOCYTES # BLD AUTO: 0.36 K/UL — SIGNIFICANT CHANGE UP (ref 0–0.9)
MONOCYTES NFR BLD AUTO: 4.4 % — SIGNIFICANT CHANGE UP (ref 2–14)
NEUTROPHILS # BLD AUTO: 6.53 K/UL — SIGNIFICANT CHANGE UP (ref 1.8–7.4)
NEUTROPHILS NFR BLD AUTO: 79.8 % — HIGH (ref 43–77)
NEUTS BAND # BLD: 0.9 % — SIGNIFICANT CHANGE UP (ref 0–8)
OB PNL STL: NEGATIVE — SIGNIFICANT CHANGE UP
OVALOCYTES BLD QL SMEAR: SLIGHT — SIGNIFICANT CHANGE UP
PLAT MORPH BLD: NORMAL — SIGNIFICANT CHANGE UP
PLATELET # BLD AUTO: 348 K/UL — SIGNIFICANT CHANGE UP (ref 150–400)
POIKILOCYTOSIS BLD QL AUTO: SLIGHT — SIGNIFICANT CHANGE UP
POLYCHROMASIA BLD QL SMEAR: SIGNIFICANT CHANGE UP
POTASSIUM SERPL-MCNC: 4.2 MMOL/L — SIGNIFICANT CHANGE UP (ref 3.5–5.3)
POTASSIUM SERPL-SCNC: 4.2 MMOL/L — SIGNIFICANT CHANGE UP (ref 3.5–5.3)
PROT SERPL-MCNC: 7.2 G/DL — SIGNIFICANT CHANGE UP (ref 6–8.3)
PROTHROM AB SERPL-ACNC: 11.9 SEC — SIGNIFICANT CHANGE UP (ref 10–12.9)
RBC # BLD: 2.53 M/UL — LOW (ref 3.8–5.2)
RBC # BLD: 2.54 M/UL — LOW (ref 3.8–5.2)
RBC # FLD: 16.9 % — HIGH (ref 10.3–14.5)
RBC BLD AUTO: ABNORMAL
RETICS #: 117.9 K/UL — SIGNIFICANT CHANGE UP (ref 25–125)
RETICS/RBC NFR: 4.7 % — HIGH (ref 0.5–2.5)
RH IG SCN BLD-IMP: POSITIVE — SIGNIFICANT CHANGE UP
SARS-COV-2 RNA SPEC QL NAA+PROBE: SIGNIFICANT CHANGE UP
SCHISTOCYTES BLD QL AUTO: SLIGHT — SIGNIFICANT CHANGE UP
SODIUM SERPL-SCNC: 141 MMOL/L — SIGNIFICANT CHANGE UP (ref 135–145)
SPHEROCYTES BLD QL SMEAR: SLIGHT — SIGNIFICANT CHANGE UP
TARGETS BLD QL SMEAR: SLIGHT — SIGNIFICANT CHANGE UP
TIBC SERPL-MCNC: 362 UG/DL — SIGNIFICANT CHANGE UP (ref 220–430)
UIBC SERPL-MCNC: 304 UG/DL — SIGNIFICANT CHANGE UP (ref 110–370)
VARIANT LYMPHS # BLD: 1.7 % — SIGNIFICANT CHANGE UP (ref 0–6)
WBC # BLD: 8.09 K/UL — SIGNIFICANT CHANGE UP (ref 3.8–10.5)
WBC # FLD AUTO: 8.09 K/UL — SIGNIFICANT CHANGE UP (ref 3.8–10.5)

## 2020-06-28 PROCEDURE — 99220: CPT

## 2020-06-28 PROCEDURE — 93010 ELECTROCARDIOGRAM REPORT: CPT

## 2020-06-28 RX ORDER — LOSARTAN POTASSIUM 100 MG/1
50 TABLET, FILM COATED ORAL DAILY
Refills: 0 | Status: DISCONTINUED | OUTPATIENT
Start: 2020-06-28 | End: 2020-07-02

## 2020-06-28 RX ORDER — BUMETANIDE 0.25 MG/ML
2 INJECTION INTRAMUSCULAR; INTRAVENOUS DAILY
Refills: 0 | Status: DISCONTINUED | OUTPATIENT
Start: 2020-06-28 | End: 2020-07-02

## 2020-06-28 RX ADMIN — BUMETANIDE 2 MILLIGRAM(S): 0.25 INJECTION INTRAMUSCULAR; INTRAVENOUS at 20:56

## 2020-06-28 NOTE — ED CDU PROVIDER DISPOSITION NOTE - PATIENT PORTAL LINK FT
You can access the FollowMyHealth Patient Portal offered by [facility] by registering at the following website: [email or web address]. By joining [employer]’s FollowMyHealth portal, you will also be able to view your health information using other applications (apps) compatible with our system.

## 2020-06-28 NOTE — ED CDU PROVIDER DISPOSITION NOTE - CLINICAL COURSE
51 y/o F pmhx IgA nephropathy since Feb 2020 and had labs done yesterday and H/H = 5.1/20.1  PMD Dr. Cannon called pt today and told to go to ER.  Pt c/o fatigue and generalized weakness, but no GI bleeding, cp, lightheadedness, h/a.   pt also c/o itchy rash on arms and upper back.  Pt was admitted 2/21/20-->3/3 and was transfused PRBCs without reaction.  pt taking Fe++ daily.  In the ED patient was found to have hemoglobin of 4.6. Dr. Cannon was contacted and plan for patient to go to CDU for 3 units of PRBC with hematology evaluation was devised. Patient denies any acute symptoms aside from lethargy, and states that she has had no active bleeding. 51 y/o F pmhx IgA nephropathy since Feb 2020 and had labs done yesterday and H/H = 5.1/20.1  PMD Dr. Cannon called pt today and told to go to ER.  Pt c/o fatigue and generalized weakness, but no GI bleeding, cp, lightheadedness, h/a.   pt also c/o itchy rash on arms and upper back.  Pt was admitted 2/21/20-->3/3 and was transfused PRBCs without reaction.  pt taking Fe++ daily.  In the ED patient was found to have hemoglobin of 4.6. Dr. Cannon was contacted and plan for patient to go to CDU for 3 units of PRBC with hematology evaluation was devised. Patient denies any acute symptoms aside from lethargy, and states that she has had no active bleeding.   Patient given three units of PRBC. Repeat H/H 9.2/30.8. Patient denies bleeding. Spoke with Dr. Cannon who states that patient can follow up with himself and hematology outpatient. CHAPARRO Lew, patient to be DC home. - Sarah Lorenzana PA-C

## 2020-06-28 NOTE — ED ADULT NURSE REASSESSMENT NOTE - COMFORT CARE
ambulated to bathroom/meal provided/darkened lights/repositioned/warm blanket provided/plan of care explained/po fluids offered

## 2020-06-28 NOTE — ED ADULT NURSE REASSESSMENT NOTE - NS ED NURSE REASSESS COMMENT FT1
Pt resting in bed comfortably, called blood bank and informed type and screen will result in 15 min. MD aware. Safety maintained.

## 2020-06-28 NOTE — ED CDU PROVIDER INITIAL DAY NOTE - PHYSICAL EXAMINATION
GEN: Well Appearing, Nontoxic, NAD  HEENT: NC/AT, Symm Facies. PERRL, EOMI, MMM, posterior pharynx clear  CV: No JVD/Bruits or stridor;  +S1S2, RRR w/o m/g/r  RESP: CTAB w/o w/r/r  ABD: Soft, nt/nd, +BS. No guarding/rebound. No RUQ tender, no CVAT  DONTE performed by Dr. Jordan, no blood noted on exam.   EXT/MSK: No lower extremity edema or calf tenderness. WWP, palpable pulses. FROMx4  SKIN: No erythema, lesions or rash  Neuro: Grossly intact, AOX3 with normal speech, CN II-XII intact; Sensation intact, motor 5/5 throughout. Gait normal

## 2020-06-28 NOTE — ED CDU PROVIDER DISPOSITION NOTE - NSFOLLOWUPINSTRUCTIONS_ED_ALL_ED_FT
(1) You will need to follow-up with your PMD in 2-3 days for your anemia.   A copy of your results were given to you to bring to your appt.  (2) Read attached discharge paperwork.  (3) Drink plenty of fluids to stay hydrated.  (4) Return to ER for lightheaded/dizziness, chest pain, palpitations, shortness of breath, active bleeding, or any other concerns. (1) You will need to follow-up with your PMD in 1-2 days for your anemia, call today to make an appointment  A copy of your results were given to you to bring to your appt.  Please also follow up in the next 1-2 days with your Hematologist or [facility] Hematology, the phone number is listed below.   (2) Read attached discharge paperwork.  (3) Drink plenty of fluids to stay hydrated.  (4) Return to ER for lightheaded/dizziness, bleeding, back stools, chest pain, palpitations, shortness of breath, active bleeding, or any other concerns.      [facility]  Hematology/Oncology  [address] [place] [address]  Phone: [phone]

## 2020-06-28 NOTE — ED PROVIDER NOTE - PMH
Anemia    DM (diabetes mellitus)    HTN (hypertension)    IgA nephropathy determined by renal biopsy

## 2020-06-28 NOTE — ED ADULT NURSE REASSESSMENT NOTE - NS ED NURSE REASSESS COMMENT FT1
Pt AAOx4, NAD, resting comfortably in bed. 1 unit PRBCs started as ordered by MD. Consent in chart. Risks and benefits explained to patient. Patient verbalized understanding of risks and benefits. Patient aware of possible side effects. Vital signs stable. Second RN at bedside for confirmation. Safety maintained. 1710: Pt AAOx4, NAD, resting comfortably in bed. 1 unit PRBCs started as ordered by MD. Consent in chart. Risks and benefits explained to patient. Patient verbalized understanding of risks and benefits. Patient aware of possible side effects. Vital signs stable. Second RN at bedside for confirmation. Safety maintained.    1725: No changes observed. Pt tolerating transfusion well. Pt resting comfortably in bed. Pt denies headache, dizziness, chest pain, palpitations, cough, SOB, abdominal pain, n/v/d, fevers, chills, back pain, itchiness at this time.

## 2020-06-28 NOTE — ED PROVIDER NOTE - PHYSICAL EXAMINATION
Attn - alert, nad, pale, moist mm, lungs-, cor-, abdo-, extrem-, neuro-, skin - papular itchy rash upper back and upper arms bilat.  DONTE by resident. Physical Exam:  Gen: NAD, non-toxic appearing, awake alert   Head: NCAT  HEENT: PEERL, pale conjunctiva, oral mucosa moist  Lung: CTAB, no respiratory distress, no wheezes/rhonchi/rales B/L, speaking in full sentences  CV: RRR, no murmurs, rubs or gallops  Abd: soft, NT, ND, no guarding, no rigidity, no rebound tenderness, no CVA tenderness   MSK: no visible deformities, ROM normal in UE/LE  Neuro: No focal sensory or motor deficits  Skin: Warm, well perfused, no rash, no leg swelling  Psych: normal affect, calm  ~Jaelyn Jordan D.O. -Resident    Attn - alert, nad, pale, moist mm, lungs-, cor-, abdo-, extrem-, neuro-, skin - papular itchy rash upper back and upper arms bilat.  DONTE by resident.

## 2020-06-28 NOTE — ED CDU PROVIDER INITIAL DAY NOTE - OBJECTIVE STATEMENT
49 y/o F pmhx IgA nephropathy since Feb 2020 and had labs done yesterday and H/H = 5.1/20.1  PMD Dr. Cannon called pt today and told to go to ER.  Pt c/o fatigue and generalized weakness, but no GI bleeding, cp, lightheadedness, h/a.   pt also c/o itchy rash on arms and upper back.  Pt was admitted 2/21/20-->3/3 and was transfused PRBCs without reaction.  pt taking Fe++ daily.  In the ED patient was found to have hemoglobin of 4.6. Dr. Cannon was contacted and plan for patient to go to CDU for 3 units of PRBC with hematology evaluation was devised. Patient denies any acute symptoms aside from lethargy, and states that she has had no active bleeding.

## 2020-06-28 NOTE — ED ADULT NURSE REASSESSMENT NOTE - NS ED NURSE REASSESS COMMENT FT1
Report given to Mirlande RN in CDU.  2nd Unit of blood given with 2 RN bedside to verify.  Pt transported to CDU with RN.

## 2020-06-28 NOTE — ED PROVIDER NOTE - OBJECTIVE STATEMENT
Attn - pt seen in G8 - pt with hx of IgA nephropathy since Feb 2020 and had labs done yesterday and H/H = 5.1/20.1  PMD Dr. Cannon called pt today and told to go to ER.  Pt c/o fatigue and generalized weakness, but no GI bleeding, cp, lightheadedness, h/a.   pt also c/o itchy rash on arms and upper back.  Pt was admitted 2/21/20-->3/3 and was transfused PRBCs without reaction.  pt taking Fe++ daily. 50y F w/ PMHx IGA Nephropathy (dx Feb 2020) sent in low H/H on routine blood work done yesterday (5.1/20.1) by her PMD Dr. Clyde Cannon. Patient also endorsing generalized malaise x6 months and one episode of lightheadedness that self-resolved last week. Denies headache, current dizziness, cp, palpitations, sob, n/v, bloody stools, hematuria, dysuria, focal weakness or numbness. Also endorsing chronic pruritic rash over B/L UE and back x1 year. Patient states she did receive blood transfusion on previous admission in February, denies any allergic reaction. On iron tablets daily.     Attn - pt seen in G8 - pt with hx of IgA nephropathy since Feb 2020 and had labs done yesterday and H/H = 5.1/20.1  PMD Dr. Cannon called pt today and told to go to ER.  Pt c/o fatigue and generalized weakness, but no GI bleeding, cp, lightheadedness, h/a.   pt also c/o itchy rash on arms and upper back.  Pt was admitted 2/21/20-->3/3 and was transfused PRBCs without reaction.  pt taking Fe++ daily.

## 2020-06-28 NOTE — ED ADULT NURSE NOTE - NSIMPLEMENTINTERV_GEN_ALL_ED
Implemented All Universal Safety Interventions:  [place] to call system. Call bell, personal items and telephone within reach. Instruct patient to call for assistance. Room bathroom lighting operational. Non-slip footwear when patient is off stretcher. Physically safe environment: no spills, clutter or unnecessary equipment. Stretcher in lowest position, wheels locked, appropriate side rails in place.

## 2020-06-28 NOTE — ED PROVIDER NOTE - CLINICAL SUMMARY MEDICAL DECISION MAKING FREE TEXT BOX
Attn - pt with IGA nephropathy with weakness and fatigue and had labs done yesterday and H/H = 5.1/20.1   Will discuss with PMD - Dr. Cannon for dispo to CDU or admission.  Transfuse PRBCs. (no prior reaction) 50y F w/ PMHx IGA Nephropathy (dx Feb 2020) sent in low H/H on routine blood work done yesterday (5.1/20.1) by her PMD Dr. Clyde Cannon. Tachycardic on arrival, otherwise VS WNL. Conjunctival pallor, abdomen soft. Pt will require transfusion, likely CDU vs admission, will discuss dispo plan with Dr. Cannon.     Attn - pt with IGA nephropathy with weakness and fatigue and had labs done yesterday and H/H = 5.1/20.1   Will discuss with PMD - Dr. Cannon for dispo to CDU or admission.  Transfuse PRBCs. (no prior reaction)

## 2020-06-28 NOTE — ED CDU PROVIDER DISPOSITION NOTE - NSFOLLOWUPCLINICS_GEN_ALL_ED_FT
[facility]  Hematology/Oncology  [address] [id number]  Phone: [phone]  Fax:   Follow Up Time:

## 2020-06-28 NOTE — ED PROVIDER NOTE - PROGRESS NOTE DETAILS
Nikki ROBERTS (PGY-1): Hematology consulted, awaiting call back, spoke with Dr. Clyde Cannon who will follow-up with patient, agreeable for plan to place pt in CDU and hematology f/u in the morning. Attending MD Ramos: patient to go to CDU for pRBC transfusion. Heme consultation in the AM for acute on chronic anemia. Hemodynamically stable. Nikki ROBERTS (PGY-1): Pt seen by private Heme on previous admission, attempted to contact private, pending callback (Dr. Wayne Hernandez). Fecal occult sent.

## 2020-06-28 NOTE — ED CDU PROVIDER INITIAL DAY NOTE - PROGRESS NOTE DETAILS
CDU PROGRESS NOTE BERNARD FRANCO:  Pt arrived in CDU, Case/plan reviewed. Pt resting in stretcher in NAD, 2nd unit PRBC infusing tolerating well, pending 3rd unit and CBC. VSS. Pt aox3, ambulatory around unit with steady gait. S1 S2 noted, RRR, lungs CTA b/l, BS x4 with soft, nontender abdomen. Pt reports feeling fatigue and tired. Will continue to monitor overnight. CDU PROGRESS NOTE PA JOAN: Pt resting comfortably, currently receiving 3rd infusion PRBC. Pt feeling well without complaint. NAD, VSS. Will continue to monitor.

## 2020-06-28 NOTE — ED ADULT NURSE REASSESSMENT NOTE - NS ED NURSE REASSESS COMMENT FT1
Report received from Agustina MICHELLE. Pt A&OX3 calm and cooperative, 1st Unit of blood finished, 2nd Unit released.  denies reaction to 1st unit of blood, chest pain, sob, ha, n/v/d, abdominal pain, f/c, urinary symptoms, hematuria.

## 2020-06-28 NOTE — ED PROVIDER NOTE - NS ED ROS FT
CONST: no fevers, no chills  EYES: no pain, no vision changes  ENT: no sore throat, no ear pain, no change in hearing  CV: no chest pain, no palpitations, no leg swelling  RESP: no shortness of breath, no cough  ABD: no abdominal pain, no nausea, no vomiting, no diarrhea  : no dysuria, no flank pain, no hematuria  MSK: no back pain, no extremity pain  NEURO: no headache or additional neurologic complaints  HEME: no easy bleeding  SKIN:  +pruritic rash to UE

## 2020-06-28 NOTE — ED ADULT NURSE NOTE - OBJECTIVE STATEMENT
Pt is a 51 y/o F, PMH HTN (Losartan), DM, Anemia (requiring frequent transfusions since february), IGA nephropathy (on Bumex), presenting to the ED from home c/o low h&h (5). Pt states that she went to [facility] yesterday for lab work and was called today instructing her to come to the ER for a blood transfusion due to a hgb of 5. Pt states that she has felt fatigued, SOB and dyspnea on exertion. Pt denies headache, dizziness, chest pain, palpitations, cough, abdominal pain, n/v/d, fevers, chills, weakness at this time. Pt is resting comfortably in bed, call bell at bedside, safety maintained.

## 2020-06-28 NOTE — ED CDU PROVIDER INITIAL DAY NOTE - DETAILS
50 F pmhx IgA nephropathy p/w anemia, hemoglobin of 4.6  - continuous monitoring and frequent reevaluations   - 3 units of PRBC transfusion   - hematology evaluation   - d/w patient's PMD   - d/w ED attending

## 2020-06-28 NOTE — ED ADULT NURSE REASSESSMENT NOTE - NS ED NURSE REASSESS COMMENT FT1
Received pt from VIVIANA Dumont , received pt alert and responsive, oriented x4, denies any respiratory distress, SOB, or difficulty breathing. Pt transferred to CDU for blood transfusion and repeat CBC, pt is resting comfortably with no complaints other than feeling " fatigues" otherwise neuro intact no deficits noted. Received pt with 2nd unit PRBC infusing tolerating well, pending 3rd unit and CBC pt aware of plan.   IV in place, patent and free of signs of infiltration,  pt denies chest pain or palpitations, V/S stable, pt afebrile, pt denies pain at this time. Pt educated on unit and unit rules, instructed patient to notify RN of any needed assistance, Pt verbalizes understanding, Call bell placed within reach. Safety maintained. Will continue to monitor. Meal provided.

## 2020-06-28 NOTE — ED CDU PROVIDER DISPOSITION NOTE - ATTENDING CONTRIBUTION TO CARE
CDU DISCHARGE NOTE - Dr. Lew Attending : I have personally seen and examined this patient. I have discussed the case with the ACP. I have reviewed all pertinent clinical information, including history, physical exam, plan and the ACP’s note and agree except as noted.  Patient is stable for discharge to home.  Labs and tests reviewed with the patient.  The patient is to follow up with their doctor as discussed.

## 2020-06-29 VITALS
RESPIRATION RATE: 18 BRPM | SYSTOLIC BLOOD PRESSURE: 134 MMHG | OXYGEN SATURATION: 99 % | HEART RATE: 72 BPM | DIASTOLIC BLOOD PRESSURE: 88 MMHG | TEMPERATURE: 98 F

## 2020-06-29 LAB
HCT VFR BLD CALC: 30.8 % — LOW (ref 34.5–45)
HGB BLD-MCNC: 9.2 G/DL — LOW (ref 11.5–15.5)
MCHC RBC-ENTMCNC: 22.9 PG — LOW (ref 27–34)
MCHC RBC-ENTMCNC: 29.9 GM/DL — LOW (ref 32–36)
MCV RBC AUTO: 76.6 FL — LOW (ref 80–100)
NRBC # BLD: 0 /100 WBCS — SIGNIFICANT CHANGE UP (ref 0–0)
PLATELET # BLD AUTO: 291 K/UL — SIGNIFICANT CHANGE UP (ref 150–400)
RBC # BLD: 4.02 M/UL — SIGNIFICANT CHANGE UP (ref 3.8–5.2)
RBC # FLD: 20.6 % — HIGH (ref 10.3–14.5)
WBC # BLD: 9.24 K/UL — SIGNIFICANT CHANGE UP (ref 3.8–10.5)
WBC # FLD AUTO: 9.24 K/UL — SIGNIFICANT CHANGE UP (ref 3.8–10.5)

## 2020-06-29 PROCEDURE — 80053 COMPREHEN METABOLIC PANEL: CPT

## 2020-06-29 PROCEDURE — 86901 BLOOD TYPING SEROLOGIC RH(D): CPT

## 2020-06-29 PROCEDURE — 99217: CPT

## 2020-06-29 PROCEDURE — 83550 IRON BINDING TEST: CPT

## 2020-06-29 PROCEDURE — 85610 PROTHROMBIN TIME: CPT

## 2020-06-29 PROCEDURE — 82272 OCCULT BLD FECES 1-3 TESTS: CPT

## 2020-06-29 PROCEDURE — 83615 LACTATE (LD) (LDH) ENZYME: CPT

## 2020-06-29 PROCEDURE — 83540 ASSAY OF IRON: CPT

## 2020-06-29 PROCEDURE — 93005 ELECTROCARDIOGRAM TRACING: CPT

## 2020-06-29 PROCEDURE — 85027 COMPLETE CBC AUTOMATED: CPT

## 2020-06-29 PROCEDURE — 86900 BLOOD TYPING SEROLOGIC ABO: CPT

## 2020-06-29 PROCEDURE — P9016: CPT

## 2020-06-29 PROCEDURE — G0378: CPT

## 2020-06-29 PROCEDURE — 99285 EMERGENCY DEPT VISIT HI MDM: CPT | Mod: 25

## 2020-06-29 PROCEDURE — 36430 TRANSFUSION BLD/BLD COMPNT: CPT

## 2020-06-29 PROCEDURE — 83010 ASSAY OF HAPTOGLOBIN QUANT: CPT

## 2020-06-29 PROCEDURE — 86850 RBC ANTIBODY SCREEN: CPT

## 2020-06-29 PROCEDURE — 85730 THROMBOPLASTIN TIME PARTIAL: CPT

## 2020-06-29 PROCEDURE — 86923 COMPATIBILITY TEST ELECTRIC: CPT

## 2020-06-29 PROCEDURE — 85045 AUTOMATED RETICULOCYTE COUNT: CPT

## 2020-06-29 NOTE — ED CDU PROVIDER SUBSEQUENT DAY NOTE - ATTENDING CONTRIBUTION TO CARE
Carmen Lew MD - Attending Physician: I have personally seen and examined this patient. I have discussed the case with the ACP. I have reviewed all pertinent clinical information, including history, physical exam, plan and the ACP’s note and agree except as noted. See MDM

## 2020-06-29 NOTE — ED CDU PROVIDER SUBSEQUENT DAY NOTE - HISTORY
51 y/o F pmhx IgA nephropathy since Feb 2020 and had labs done yesterday and H/H = 5.1/20.1  PMD Dr. Cannon called pt today and told to go to ER.  In the ED patient was found to have hemoglobin of 4.6. Dr. Cannon was contacted and plan for patient to go to CDU for 3 units of PRBC with hematology evaluation consult.

## 2020-06-29 NOTE — ED CDU PROVIDER SUBSEQUENT DAY NOTE - MEDICAL DECISION MAKING DETAILS
Cramen Lew MD - Attending Physician: Pt h/o chronic anemia, here with low H/H. Now s/p transfusion of 3 units PRBCs. Feels much better this am. Will d/w PMD, Dr Callahan, for likely dc home and outpatient follow-up

## 2020-06-29 NOTE — ED CDU PROVIDER SUBSEQUENT DAY NOTE - PROGRESS NOTE DETAILS
CDU PROGRESS NOTE PA JOAN: Pt resting comfortably, feeling well without complaint. NAD, VSS. Patient completed 3 units PRBC, plan to report post transfusion CBC @6am. Patient signed out to myself. Pending repeat CBC results. Plan to call Dr. Cannon with results. Per sign out would like hematology to see patient in ED. Will page hematology on call. Patient with no complaints. Feels improved. Hungry. Denies bleeding. NAD. Vitals stable. - Sarah Lorenzana PA-C Repeat H/H 9.8/30.8. Spoke with Dr. Cannon who is aware. He states that patient can follow up with heme outpatient and that patient can call the office for follow up. CHAPARRO Lew, patient to be DC home. - Sarah Lorenzana PA-C

## 2020-07-17 PROBLEM — N02.8 RECURRENT AND PERSISTENT HEMATURIA WITH OTHER MORPHOLOGIC CHANGES: Chronic | Status: ACTIVE | Noted: 2020-06-28

## 2020-08-27 ENCOUNTER — APPOINTMENT (OUTPATIENT)
Dept: DERMATOLOGY | Facility: CLINIC | Age: 50
End: 2020-08-27
Payer: MEDICAID

## 2020-08-27 VITALS — TEMPERATURE: 98.4 F

## 2020-08-27 VITALS — BODY MASS INDEX: 30.56 KG/M2 | WEIGHT: 179 LBS | HEIGHT: 64 IN

## 2020-08-27 DIAGNOSIS — Z84.0 FAMILY HISTORY OF DISEASES OF THE SKIN AND SUBCUTANEOUS TISSUE: ICD-10-CM

## 2020-08-27 DIAGNOSIS — Z78.9 OTHER SPECIFIED HEALTH STATUS: ICD-10-CM

## 2020-08-27 PROCEDURE — 99203 OFFICE O/P NEW LOW 30 MIN: CPT | Mod: GC,25

## 2020-08-27 PROCEDURE — 11900 INJECT SKIN LESIONS </W 7: CPT | Mod: GC

## 2020-10-08 ENCOUNTER — APPOINTMENT (OUTPATIENT)
Dept: DERMATOLOGY | Facility: CLINIC | Age: 50
End: 2020-10-08
Payer: MEDICAID

## 2020-10-08 VITALS — TEMPERATURE: 97.5 F

## 2020-10-08 DIAGNOSIS — R22.9 LOCALIZED SWELLING, MASS AND LUMP, UNSPECIFIED: ICD-10-CM

## 2020-10-08 DIAGNOSIS — L28.1 PRURIGO NODULARIS: ICD-10-CM

## 2020-10-08 DIAGNOSIS — Z86.03 PERSONAL HISTORY OF NEOPLASM OF UNCERTAIN BEHAVIOR: ICD-10-CM

## 2020-10-08 DIAGNOSIS — L30.9 DERMATITIS, UNSPECIFIED: ICD-10-CM

## 2020-10-08 PROCEDURE — 99213 OFFICE O/P EST LOW 20 MIN: CPT | Mod: 25

## 2020-10-08 PROCEDURE — 11900 INJECT SKIN LESIONS </W 7: CPT

## 2020-11-04 ENCOUNTER — EMERGENCY (EMERGENCY)
Facility: HOSPITAL | Age: 50
LOS: 1 days | End: 2020-11-04
Attending: EMERGENCY MEDICINE
Payer: MEDICAID

## 2020-11-04 VITALS
HEIGHT: 64 IN | TEMPERATURE: 99 F | HEART RATE: 99 BPM | OXYGEN SATURATION: 100 % | SYSTOLIC BLOOD PRESSURE: 145 MMHG | RESPIRATION RATE: 20 BRPM | DIASTOLIC BLOOD PRESSURE: 84 MMHG | WEIGHT: 179.9 LBS

## 2020-11-04 LAB
ALBUMIN SERPL ELPH-MCNC: 4 G/DL — SIGNIFICANT CHANGE UP (ref 3.3–5)
ALP SERPL-CCNC: 120 U/L — SIGNIFICANT CHANGE UP (ref 40–120)
ALT FLD-CCNC: 12 U/L — SIGNIFICANT CHANGE UP (ref 10–45)
ANION GAP SERPL CALC-SCNC: 13 MMOL/L — SIGNIFICANT CHANGE UP (ref 5–17)
ANISOCYTOSIS BLD QL: SIGNIFICANT CHANGE UP
APTT BLD: 34.1 SEC — SIGNIFICANT CHANGE UP (ref 27.5–35.5)
AST SERPL-CCNC: 14 U/L — SIGNIFICANT CHANGE UP (ref 10–40)
BASOPHILS # BLD AUTO: 0 K/UL — SIGNIFICANT CHANGE UP (ref 0–0.2)
BASOPHILS NFR BLD AUTO: 0 % — SIGNIFICANT CHANGE UP (ref 0–2)
BILIRUB SERPL-MCNC: 0.1 MG/DL — LOW (ref 0.2–1.2)
BLD GP AB SCN SERPL QL: NEGATIVE — SIGNIFICANT CHANGE UP
BUN SERPL-MCNC: 35 MG/DL — HIGH (ref 7–23)
CALCIUM SERPL-MCNC: 8.9 MG/DL — SIGNIFICANT CHANGE UP (ref 8.4–10.5)
CHLORIDE SERPL-SCNC: 104 MMOL/L — SIGNIFICANT CHANGE UP (ref 96–108)
CO2 SERPL-SCNC: 22 MMOL/L — SIGNIFICANT CHANGE UP (ref 22–31)
CREAT SERPL-MCNC: 2.62 MG/DL — HIGH (ref 0.5–1.3)
DACRYOCYTES BLD QL SMEAR: SLIGHT — SIGNIFICANT CHANGE UP
ELLIPTOCYTES BLD QL SMEAR: SLIGHT — SIGNIFICANT CHANGE UP
EOSINOPHIL # BLD AUTO: 0.17 K/UL — SIGNIFICANT CHANGE UP (ref 0–0.5)
EOSINOPHIL NFR BLD AUTO: 1.8 % — SIGNIFICANT CHANGE UP (ref 0–6)
GLUCOSE SERPL-MCNC: 149 MG/DL — HIGH (ref 70–99)
HCG SERPL-ACNC: 2.6 MIU/ML — SIGNIFICANT CHANGE UP
HCT VFR BLD CALC: 23 % — LOW (ref 34.5–45)
HGB BLD-MCNC: 6.1 G/DL — CRITICAL LOW (ref 11.5–15.5)
HYPOCHROMIA BLD QL: SIGNIFICANT CHANGE UP
INR BLD: 1.02 RATIO — SIGNIFICANT CHANGE UP (ref 0.88–1.16)
LYMPHOCYTES # BLD AUTO: 0.92 K/UL — LOW (ref 1–3.3)
LYMPHOCYTES # BLD AUTO: 9.8 % — LOW (ref 13–44)
MACROCYTES BLD QL: SLIGHT — SIGNIFICANT CHANGE UP
MANUAL SMEAR VERIFICATION: SIGNIFICANT CHANGE UP
MCHC RBC-ENTMCNC: 19.2 PG — LOW (ref 27–34)
MCHC RBC-ENTMCNC: 26.5 GM/DL — LOW (ref 32–36)
MCV RBC AUTO: 72.3 FL — LOW (ref 80–100)
MICROCYTES BLD QL: SIGNIFICANT CHANGE UP
MONOCYTES # BLD AUTO: 0.17 K/UL — SIGNIFICANT CHANGE UP (ref 0–0.9)
MONOCYTES NFR BLD AUTO: 1.8 % — LOW (ref 2–14)
NEUTROPHILS # BLD AUTO: 7.97 K/UL — HIGH (ref 1.8–7.4)
NEUTROPHILS NFR BLD AUTO: 84.8 % — HIGH (ref 43–77)
OVALOCYTES BLD QL SMEAR: SLIGHT — SIGNIFICANT CHANGE UP
PLAT MORPH BLD: NORMAL — SIGNIFICANT CHANGE UP
PLATELET # BLD AUTO: 310 K/UL — SIGNIFICANT CHANGE UP (ref 150–400)
POIKILOCYTOSIS BLD QL AUTO: SLIGHT — SIGNIFICANT CHANGE UP
POLYCHROMASIA BLD QL SMEAR: SLIGHT — SIGNIFICANT CHANGE UP
POTASSIUM SERPL-MCNC: 4.1 MMOL/L — SIGNIFICANT CHANGE UP (ref 3.5–5.3)
POTASSIUM SERPL-SCNC: 4.1 MMOL/L — SIGNIFICANT CHANGE UP (ref 3.5–5.3)
PROT SERPL-MCNC: 7.5 G/DL — SIGNIFICANT CHANGE UP (ref 6–8.3)
PROTHROM AB SERPL-ACNC: 12.2 SEC — SIGNIFICANT CHANGE UP (ref 10.6–13.6)
RBC # BLD: 3.18 M/UL — LOW (ref 3.8–5.2)
RBC # FLD: 20.1 % — HIGH (ref 10.3–14.5)
RBC BLD AUTO: ABNORMAL
RH IG SCN BLD-IMP: POSITIVE — SIGNIFICANT CHANGE UP
SARS-COV-2 RNA SPEC QL NAA+PROBE: SIGNIFICANT CHANGE UP
SODIUM SERPL-SCNC: 139 MMOL/L — SIGNIFICANT CHANGE UP (ref 135–145)
STOMATOCYTES BLD QL SMEAR: SLIGHT — SIGNIFICANT CHANGE UP
VARIANT LYMPHS # BLD: 1.8 % — SIGNIFICANT CHANGE UP (ref 0–6)
WBC # BLD: 9.4 K/UL — SIGNIFICANT CHANGE UP (ref 3.8–10.5)
WBC # FLD AUTO: 9.4 K/UL — SIGNIFICANT CHANGE UP (ref 3.8–10.5)

## 2020-11-04 PROCEDURE — 99218: CPT

## 2020-11-04 RX ORDER — LOSARTAN POTASSIUM 100 MG/1
50 TABLET, FILM COATED ORAL DAILY
Refills: 0 | Status: DISCONTINUED | OUTPATIENT
Start: 2020-11-04 | End: 2020-11-08

## 2020-11-04 RX ORDER — BUMETANIDE 0.25 MG/ML
2 INJECTION INTRAMUSCULAR; INTRAVENOUS DAILY
Refills: 0 | Status: DISCONTINUED | OUTPATIENT
Start: 2020-11-04 | End: 2020-11-08

## 2020-11-04 NOTE — ED PROVIDER NOTE - ATTENDING CONTRIBUTION TO CARE
Attending MD Ramos:   I personally have seen and examined this patient.  Physician assistant note reviewed and agree on plan of care and except where noted.  See HPI, PE, and MDM for details.

## 2020-11-04 NOTE — ED ADULT TRIAGE NOTE - CHIEF COMPLAINT QUOTE
sent by PCP for blood transfusion, hbg "6.1." hx IGA nephropathy. + fatigue. Pt denies SOB, dizziness.

## 2020-11-04 NOTE — ED PROVIDER NOTE - OBJECTIVE STATEMENT
51 yo female with PMHx of IgA nephropathy, DM, HTN, anemia p/w low hgb.  Patient report that she has chronic anemia 2/2 IgA nephropathy requiring transfusions in the past.  Typically feels fatigued, but fatigue has worsened over the past month.  Went to her PMD, Dr. Cannon who sent labs.  Called her today and told her her hgb was 6.1.  Patient denies fevers, cough, cp, sob, abd pain, nvd, dysuria, vaginal bleeding, melena, BRBPR.

## 2020-11-04 NOTE — ED CDU PROVIDER INITIAL DAY NOTE - PMH
Anemia    DM (diabetes mellitus)    HTN (hypertension)    IgA nephropathy determined by renal biopsy     left normal/right normal

## 2020-11-04 NOTE — ED PROVIDER NOTE - PROGRESS NOTE DETAILS
Discussed case with Dr. greenwood.  Agreeable with plan for CDU for blood transfusion.  -Hans Zepeda PA-C

## 2020-11-04 NOTE — ED CDU PROVIDER INITIAL DAY NOTE - PROGRESS NOTE DETAILS
Patient seen at bedside. VSS. Patient resting comfortably, no complaints.  Will reevaluate. - Sarah Lorenzana PA-C

## 2020-11-04 NOTE — ED PROVIDER NOTE - CLINICAL SUMMARY MEDICAL DECISION MAKING FREE TEXT BOX
Attending MD Ramos: 50F with IgA nephropathy, anemia intermittently requiring transfusions presenting with report of fatigue and hb to 6 as outpatient. No s/s acute blood loss anemia, suspected to be related to KD. Will check hb, transfuse prn      *The above represents an initial assessment/impression. Please refer to progress notes for potential changes in patient clinical course*

## 2020-11-04 NOTE — ED PROVIDER NOTE - RAPID ASSESSMENT
50y F with PMHx of Anemia, IgA Nephropathy presents to the ED sent by PCP for Hgb 6.1 found in routine labs. PCP: Dr. Cannon. Reports having prior blood transfusions done here at [facility] - last done 6/2020. Currently feeling fine at time of teletriage evaluation with no acute complaints.     Berenice Steven MD note: The scribe (Yenny Jimenez)'s documentation has been prepared under my direction and personally reviewed by me.  Patient was seen as a tele QDOC patient, a complete physical exam was not performed as there is no physical exam room available the patient will be seen and further worked up in the main emergency department and their care will be completed by the main emergency department team. Receiving team will follow up on labs, analgesia, any clinical imaging, reassess and disposition as clinically indicated, all decisions regarding the progression of care will be made at their discretion. 50y F with PMHx of Anemia, IgA Nephropathy presents to the ED sent by PCP for Hgb 6.1 found in routine labs. PCP: Dr. Cannon. Reports having prior blood transfusions done here at [facility] - last done 6/2020. Currently feeling fine at time of teletriage. no active bleeding.  evaluation with no acute complaints.     Berenice Steevn DO note: The scribe (Yenny Jimenez)'s documentation has been prepared under my direction and personally reviewed by me.  Patient was seen as a tele QDOC patient, a complete physical exam was not performed as there is no physical exam room available the patient will be seen and further worked up in the main emergency department and their care will be completed by the main emergency department team. Receiving team will follow up on labs, analgesia, any clinical imaging, reassess and disposition as clinically indicated, all decisions regarding the progression of care will be made at their discretion.

## 2020-11-04 NOTE — ED ADULT NURSE NOTE - OBJECTIVE STATEMENT
51 y/o female PMHx Chronic Anemia, DM, HTN, IGA nephropathy, states her PMD told her to come in for a HGB 6.1. Pt denies any dizziness or blurry or double vision, denies any CP or SOB. breathing unlabored chest rise symmetrical. denies any abdominal tenderness, denies any urinary symptoms. FROM in all extremities.

## 2020-11-05 VITALS
TEMPERATURE: 98 F | RESPIRATION RATE: 18 BRPM | DIASTOLIC BLOOD PRESSURE: 84 MMHG | SYSTOLIC BLOOD PRESSURE: 130 MMHG | OXYGEN SATURATION: 100 % | HEART RATE: 69 BPM

## 2020-11-05 LAB
ANION GAP SERPL CALC-SCNC: 12 MMOL/L — SIGNIFICANT CHANGE UP (ref 5–17)
BUN SERPL-MCNC: 43 MG/DL — HIGH (ref 7–23)
CALCIUM SERPL-MCNC: 8.8 MG/DL — SIGNIFICANT CHANGE UP (ref 8.4–10.5)
CHLORIDE SERPL-SCNC: 109 MMOL/L — HIGH (ref 96–108)
CO2 SERPL-SCNC: 22 MMOL/L — SIGNIFICANT CHANGE UP (ref 22–31)
CREAT SERPL-MCNC: 2.91 MG/DL — HIGH (ref 0.5–1.3)
GLUCOSE SERPL-MCNC: 137 MG/DL — HIGH (ref 70–99)
HCT VFR BLD CALC: 28.9 % — LOW (ref 34.5–45)
HGB BLD-MCNC: 8 G/DL — LOW (ref 11.5–15.5)
MCHC RBC-ENTMCNC: 21.8 PG — LOW (ref 27–34)
MCHC RBC-ENTMCNC: 27.7 GM/DL — LOW (ref 32–36)
MCV RBC AUTO: 78.7 FL — LOW (ref 80–100)
NRBC # BLD: 0 /100 WBCS — SIGNIFICANT CHANGE UP (ref 0–0)
PLATELET # BLD AUTO: 270 K/UL — SIGNIFICANT CHANGE UP (ref 150–400)
POTASSIUM SERPL-MCNC: 4.4 MMOL/L — SIGNIFICANT CHANGE UP (ref 3.5–5.3)
POTASSIUM SERPL-SCNC: 4.4 MMOL/L — SIGNIFICANT CHANGE UP (ref 3.5–5.3)
RBC # BLD: 3.67 M/UL — LOW (ref 3.8–5.2)
RBC # FLD: 21.3 % — HIGH (ref 10.3–14.5)
SODIUM SERPL-SCNC: 143 MMOL/L — SIGNIFICANT CHANGE UP (ref 135–145)
WBC # BLD: 9.32 K/UL — SIGNIFICANT CHANGE UP (ref 3.8–10.5)
WBC # FLD AUTO: 9.32 K/UL — SIGNIFICANT CHANGE UP (ref 3.8–10.5)

## 2020-11-05 PROCEDURE — 85610 PROTHROMBIN TIME: CPT

## 2020-11-05 PROCEDURE — 84702 CHORIONIC GONADOTROPIN TEST: CPT

## 2020-11-05 PROCEDURE — 80048 BASIC METABOLIC PNL TOTAL CA: CPT

## 2020-11-05 PROCEDURE — 99285 EMERGENCY DEPT VISIT HI MDM: CPT | Mod: 25

## 2020-11-05 PROCEDURE — U0003: CPT

## 2020-11-05 PROCEDURE — 80053 COMPREHEN METABOLIC PANEL: CPT

## 2020-11-05 PROCEDURE — 86901 BLOOD TYPING SEROLOGIC RH(D): CPT

## 2020-11-05 PROCEDURE — 99217: CPT

## 2020-11-05 PROCEDURE — G0378: CPT

## 2020-11-05 PROCEDURE — 85730 THROMBOPLASTIN TIME PARTIAL: CPT

## 2020-11-05 PROCEDURE — 86850 RBC ANTIBODY SCREEN: CPT

## 2020-11-05 PROCEDURE — 36430 TRANSFUSION BLD/BLD COMPNT: CPT

## 2020-11-05 PROCEDURE — 86923 COMPATIBILITY TEST ELECTRIC: CPT

## 2020-11-05 PROCEDURE — P9016: CPT

## 2020-11-05 PROCEDURE — 85025 COMPLETE CBC W/AUTO DIFF WBC: CPT

## 2020-11-05 PROCEDURE — 86900 BLOOD TYPING SEROLOGIC ABO: CPT

## 2020-11-05 PROCEDURE — 85027 COMPLETE CBC AUTOMATED: CPT

## 2020-11-05 RX ADMIN — LOSARTAN POTASSIUM 50 MILLIGRAM(S): 100 TABLET, FILM COATED ORAL at 10:02

## 2020-11-05 RX ADMIN — BUMETANIDE 2 MILLIGRAM(S): 0.25 INJECTION INTRAMUSCULAR; INTRAVENOUS at 10:02

## 2020-11-05 NOTE — ED CDU PROVIDER SUBSEQUENT DAY NOTE - HISTORY
No interval changes since initial CDU provider note. Pt feels well without complaint. NAD VSS. Plan for 2 units PRBC in CDU in the setting of anemia. - BERNARD Reilly

## 2020-11-05 NOTE — ED CDU PROVIDER DISPOSITION NOTE - NSFOLLOWUPINSTRUCTIONS_ED_ALL_ED_FT
1. Rest. Stay hydrated.   2. Continue your current home medications.  3. Follow-up with your medical doctor in 2-3 days.  Bring your results with you for follow-up.  4. Return to the ER if you have any new or worsening symptoms, vaginal bleeding, worsening fatigue, lightheadedness, dizziness, chest pain, difficulty breathing, fevers, chills, weakness, or any other concerns. 1. Rest. Stay hydrated.   2. Continue your current home medications.  3. Follow-up with your medical doctor Dr. Cannon in 2-3 days. Follow up with your Nephrologist Dr. Martel in 2-3 days. Bring your results with you for follow-ups.  4. Return to the ER if you have any new or worsening symptoms, vaginal bleeding, worsening fatigue, lightheadedness, dizziness, chest pain, difficulty breathing, fevers, chills, weakness, or any other concerns.

## 2020-11-05 NOTE — ED CDU PROVIDER DISPOSITION NOTE - PATIENT PORTAL LINK FT
You can access the FollowMyHealth Patient Portal offered by [facility] by registering at the following website: [email or web address]. By joining [facility]’s FollowMyHealth portal, you will also be able to view your health information using other applications (apps) compatible with our system.

## 2020-11-05 NOTE — ED ADULT NURSE REASSESSMENT NOTE - COMFORT CARE
plan of care explained/ambulated to bathroom/meal provided/po fluids offered
meal provided/plan of care explained/po fluids offered

## 2020-11-05 NOTE — ED CDU PROVIDER DISPOSITION NOTE - CLINICAL COURSE
49 yo female with PMHx of IgA nephropathy, DM, HTN, anemia p/w low hgb.  Patient report that she has chronic anemia 2/2 IgA nephropathy requiring transfusions in the past.  Typically feels fatigued, but fatigue has worsened over the past month.  Went to her PMD, Dr. Cannon who sent labs.  Called her today and told her her hgb was 6.1.  Patient denies fevers, cough, cp, sob, abd pain, nvd, dysuria, vaginal bleeding, melena, BRBPR.  In ED, H/H 6.1/23. Plan for 2 units PRBC in CDU. Repeat H/H shows... 51 yo female with PMHx of IgA nephropathy, DM, HTN, anemia p/w low hgb.  Patient report that she has chronic anemia 2/2 IgA nephropathy requiring transfusions in the past.  Typically feels fatigued, but fatigue has worsened over the past month.  Went to her PMD, Dr. Cannon who sent labs.  Called her today and told her her hgb was 6.1.  Patient denies fevers, cough, cp, sob, abd pain, nvd, dysuria, vaginal bleeding, melena, BRBPR.  In ED, H/H 6.1/23. Plan for 2 units PRBC in CDU. Repeat H/H improved to 8/28.9  pt feels well. creatinine elevated- spoke with nephrologist- ok to d/c will f/up outpt.

## 2020-11-05 NOTE — ED ADULT NURSE REASSESSMENT NOTE - GENERAL PATIENT STATE
cooperative/improvement verbalized/comfortable appearance
smiling/interactive/comfortable appearance/cooperative

## 2020-11-05 NOTE — ED CDU PROVIDER SUBSEQUENT DAY NOTE - PHYSICAL EXAMINATION
Gen: AAO x 3, NAD  Psych: Normal affect.   HEENT: NC/AT, PERRLA, EOMI, MMM  Resp: unlabored CTAB  Cardiac: rrr s1s2, no murmurs, rubs or gallops  GI: ND, +BS, Soft, NT  Ext: no pedal edema, FROM in all extremities  Neuro: no focal deficits

## 2020-11-05 NOTE — ED ADULT NURSE REASSESSMENT NOTE - NS ED NURSE REASSESS COMMENT FT1
Pt report received from Dionicio MICHELLE. Pt transferred to CDU Bed 5 for 2 units of PRBCs. Pt a/ox3 denies respiratory distress, sob, dyspnea, C/P, palpitations, n/v/d at this time.  VSS, afebrile, IV clean/dry/intact. Pt aware of plan of care, call bell within reach ,safety maintained. Will monitor and assess.
blood transfusing, Second RN cosigned VIVIANA Prather pt has no rxn after 15 min, safety precautions in place.
Report taken from Nelly MICHELLE. states pt have a good night with no complaints. Will continue to monitor.
Pt received from VIVIANA Melendez. Pt oriented to CDU & plan of care was discussed. Pt received receiving 1st U of PRBC. Pt aware of S&S of transfusion reaction and will notify RN or PA of any symptoms. Pt denies any SOB, dizziness/lightheadedness, weakness or fatigue. Pt states she is feeling great. Safety & comfort measures maintained. Call bell in reach. Will continue to monitor.

## 2020-11-05 NOTE — ED CDU PROVIDER DISPOSITION NOTE - CARE PROVIDER_API CALL
Pari Martel)  Internal Medicine; Nephrology  [address], [address]  [place] [address]  Phone: [phone]  Fax: [phone]  Follow Up Time:

## 2020-11-05 NOTE — ED CDU PROVIDER SUBSEQUENT DAY NOTE - NS ED ROS FT
Constitutional: No fever or chills  Eyes: No visual changes  CV: No chest pain or lower extremity edema  Resp: No SOB no cough  GI: No abd pain. No nausea or vomiting. No diarrhea.   : No complaints   MSK: No musculoskeletal pain  Skin: No rash  Psych: No complaints   Neuro: No headache. No numbness or tingling. + weakness. +fatigue  No vaginal bleeding No dark stools

## 2020-11-05 NOTE — ED CDU PROVIDER SUBSEQUENT DAY NOTE - PROGRESS NOTE DETAILS
Patient seen at bedside. VSS.  Patient resting comfortably, no complaints.  Will reevaluate. - Sarah Lorenzana PA-C Rpt H/H 8.0/28.9.  Rpt Cr 2.91.   CHAPARRO Cannon increase in hemoglobin as well as increasing creatine. OK with discharge home with close outpatient follow up, will relay recommendation to morning team. - Sarah Lorenzaan PA-C CDU NOTE PA [place]: pt resting comfortably, feels well without complaint. NAD VSS. CDU NOTE BERNARD Noriega: spoke with pt's Nephrologist Dr. Martel, Sayed. informed him of elevated creatinine. ok to d/c home and f/up in his office. CDU NOTE BERNARD Noriega: as per Dr. Paniagua, pt stable for d/c home.

## 2020-11-05 NOTE — ED CDU PROVIDER DISPOSITION NOTE - ATTENDING CONTRIBUTION TO CARE
I have personally performed a face to face medical and diagnostic evaluation of the patient. I have discussed with and reviewed the ACP's note and agree with the History, ROS, Physical Exam and MDM unless otherwise indicated. A brief summary of my personal evaluation and impression can be found below.    Please see CDU Subsequent day note for further details.

## 2020-11-05 NOTE — ED CDU PROVIDER SUBSEQUENT DAY NOTE - ATTENDING CONTRIBUTION TO CARE
I have personally performed a face to face medical and diagnostic evaluation of the patient. I have discussed with and reviewed the ACP's note and agree with the History, ROS, Physical Exam and MDM unless otherwise indicated. A brief summary of my personal evaluation and impression can be found below.    From initial ED provider note: 51 yo female with PMHx of IgA nephropathy, DM, HTN, anemia p/w low hgb.  Patient report that she has chronic anemia 2/2 IgA nephropathy requiring transfusions in the past.  Typically feels fatigued, but fatigue has worsened over the past month.  Went to her PMD, Dr. Cannon who sent labs.  Called her today and told her her hgb was 6.1.  Patient denies fevers, cough, cp, sob, abd pain, nvd, dysuria, vaginal bleeding, melena, BRBPR.    PT sent to CDU for blood transfusions x2, no acute events overnight. h/h responded appropriately, on reassesment this a.m. pt feels well w/o complaints, asking to be dc'd, reports will be able to follow up closely w dr. cannon, strict return precautions discussed.     All other ROS negative, except as above and as per HPI and ROS section.    VITALS: Initial triage and subsequent vitals have been reviewed by me.  GEN APPEARANCE: WDWN, alert, non-toxic, NAD  HEAD: Atraumatic.  EYES: PERRLa, EOMI, vision grossly intact.   NECK: Supple  CV: RRR, S1S2, no c/r/m/g. Cap refill <2 seconds. No bruits.   LUNGS: CTAB. No abnormal breath sounds.  ABDOMEN: Soft, NTND. No guarding or rebound.   MSK/EXT: No spinal or extremity point tenderness. No CVA ttp. Pelvis stable. No peripheral edema.  NEURO: Alert, follows commands. Weight bearing normal. Speech normal. Sensation and motor normal x4 extremities.   SKIN: Warm, dry and intact. No rash.  PSYCH: Appropriate    Plan/MDM: 50F hx of IGA neph, HTN anemia presents w low h/h found to be anemic again ED sent to CDU for x2 transfusions, improved this a.m. w improvement in h/h, feels well, no sob zamora pain, asking to and is stable for dc at this time, discussed need for close pmd f/u strict return precautions.

## 2021-02-25 ENCOUNTER — INPATIENT (INPATIENT)
Facility: HOSPITAL | Age: 51
LOS: 1 days | Discharge: HOME | DRG: 812 | End: 2021-02-27
Attending: INTERNAL MEDICINE | Admitting: INTERNAL MEDICINE
Payer: MEDICAID

## 2021-02-25 VITALS
RESPIRATION RATE: 18 BRPM | DIASTOLIC BLOOD PRESSURE: 96 MMHG | TEMPERATURE: 100 F | SYSTOLIC BLOOD PRESSURE: 166 MMHG | HEIGHT: 64 IN | WEIGHT: 195.11 LBS | OXYGEN SATURATION: 100 % | HEART RATE: 110 BPM

## 2021-02-25 DIAGNOSIS — D64.9 ANEMIA, UNSPECIFIED: ICD-10-CM

## 2021-02-25 DIAGNOSIS — I10 ESSENTIAL (PRIMARY) HYPERTENSION: ICD-10-CM

## 2021-02-25 DIAGNOSIS — R50.9 FEVER, UNSPECIFIED: ICD-10-CM

## 2021-02-25 DIAGNOSIS — N02.8 RECURRENT AND PERSISTENT HEMATURIA WITH OTHER MORPHOLOGIC CHANGES: ICD-10-CM

## 2021-02-25 DIAGNOSIS — Z87.42 PERSONAL HISTORY OF OTHER DISEASES OF THE FEMALE GENITAL TRACT: Chronic | ICD-10-CM

## 2021-02-25 LAB
ALBUMIN SERPL ELPH-MCNC: 3.6 G/DL — SIGNIFICANT CHANGE UP (ref 3.3–5)
ALP SERPL-CCNC: 130 U/L — HIGH (ref 40–120)
ALT FLD-CCNC: 15 U/L — SIGNIFICANT CHANGE UP (ref 10–45)
ANION GAP SERPL CALC-SCNC: 13 MMOL/L — SIGNIFICANT CHANGE UP (ref 5–17)
ANISOCYTOSIS BLD QL: SIGNIFICANT CHANGE UP
APPEARANCE UR: CLEAR — SIGNIFICANT CHANGE UP
APTT BLD: 35.5 SEC — SIGNIFICANT CHANGE UP (ref 27.5–35.5)
AST SERPL-CCNC: 17 U/L — SIGNIFICANT CHANGE UP (ref 10–40)
BACTERIA # UR AUTO: NEGATIVE — SIGNIFICANT CHANGE UP
BASOPHILS # BLD AUTO: 0.12 K/UL — SIGNIFICANT CHANGE UP (ref 0–0.2)
BASOPHILS NFR BLD AUTO: 0.9 % — SIGNIFICANT CHANGE UP (ref 0–2)
BILIRUB SERPL-MCNC: <0.1 MG/DL — LOW (ref 0.2–1.2)
BILIRUB UR-MCNC: NEGATIVE — SIGNIFICANT CHANGE UP
BLD GP AB SCN SERPL QL: NEGATIVE — SIGNIFICANT CHANGE UP
BUN SERPL-MCNC: 34 MG/DL — HIGH (ref 7–23)
CALCIUM SERPL-MCNC: 8.7 MG/DL — SIGNIFICANT CHANGE UP (ref 8.4–10.5)
CHLORIDE SERPL-SCNC: 107 MMOL/L — SIGNIFICANT CHANGE UP (ref 96–108)
CO2 SERPL-SCNC: 21 MMOL/L — LOW (ref 22–31)
COLOR SPEC: SIGNIFICANT CHANGE UP
CREAT SERPL-MCNC: 2.62 MG/DL — HIGH (ref 0.5–1.3)
DACRYOCYTES BLD QL SMEAR: SLIGHT — SIGNIFICANT CHANGE UP
DIFF PNL FLD: ABNORMAL
ELLIPTOCYTES BLD QL SMEAR: SLIGHT — SIGNIFICANT CHANGE UP
EOSINOPHIL # BLD AUTO: 0.23 K/UL — SIGNIFICANT CHANGE UP (ref 0–0.5)
EOSINOPHIL NFR BLD AUTO: 1.7 % — SIGNIFICANT CHANGE UP (ref 0–6)
EPI CELLS # UR: 1 /HPF — SIGNIFICANT CHANGE UP
GIANT PLATELETS BLD QL SMEAR: PRESENT — SIGNIFICANT CHANGE UP
GLUCOSE SERPL-MCNC: 116 MG/DL — HIGH (ref 70–99)
GLUCOSE UR QL: NEGATIVE — SIGNIFICANT CHANGE UP
HCT VFR BLD CALC: 20.7 % — CRITICAL LOW (ref 34.5–45)
HGB BLD-MCNC: 5.6 G/DL — CRITICAL LOW (ref 11.5–15.5)
HYALINE CASTS # UR AUTO: 1 /LPF — SIGNIFICANT CHANGE UP (ref 0–2)
HYPOCHROMIA BLD QL: SIGNIFICANT CHANGE UP
INR BLD: 1.12 RATIO — SIGNIFICANT CHANGE UP (ref 0.88–1.16)
KETONES UR-MCNC: NEGATIVE — SIGNIFICANT CHANGE UP
LEUKOCYTE ESTERASE UR-ACNC: NEGATIVE — SIGNIFICANT CHANGE UP
LYMPHOCYTES # BLD AUTO: 1.55 K/UL — SIGNIFICANT CHANGE UP (ref 1–3.3)
LYMPHOCYTES # BLD AUTO: 11.3 % — LOW (ref 13–44)
MACROCYTES BLD QL: SLIGHT — SIGNIFICANT CHANGE UP
MANUAL SMEAR VERIFICATION: SIGNIFICANT CHANGE UP
MCHC RBC-ENTMCNC: 18.3 PG — LOW (ref 27–34)
MCHC RBC-ENTMCNC: 27.1 GM/DL — LOW (ref 32–36)
MCV RBC AUTO: 67.6 FL — LOW (ref 80–100)
MICROCYTES BLD QL: SIGNIFICANT CHANGE UP
MONOCYTES # BLD AUTO: 0.48 K/UL — SIGNIFICANT CHANGE UP (ref 0–0.9)
MONOCYTES NFR BLD AUTO: 3.5 % — SIGNIFICANT CHANGE UP (ref 2–14)
NEUTROPHILS # BLD AUTO: 11.13 K/UL — HIGH (ref 1.8–7.4)
NEUTROPHILS NFR BLD AUTO: 80.9 % — HIGH (ref 43–77)
NITRITE UR-MCNC: NEGATIVE — SIGNIFICANT CHANGE UP
OVALOCYTES BLD QL SMEAR: SLIGHT — SIGNIFICANT CHANGE UP
PH UR: 6.5 — SIGNIFICANT CHANGE UP (ref 5–8)
PLAT MORPH BLD: ABNORMAL
PLATELET # BLD AUTO: 339 K/UL — SIGNIFICANT CHANGE UP (ref 150–400)
POIKILOCYTOSIS BLD QL AUTO: SLIGHT — SIGNIFICANT CHANGE UP
POLYCHROMASIA BLD QL SMEAR: SLIGHT — SIGNIFICANT CHANGE UP
POTASSIUM SERPL-MCNC: 4.1 MMOL/L — SIGNIFICANT CHANGE UP (ref 3.5–5.3)
POTASSIUM SERPL-SCNC: 4.1 MMOL/L — SIGNIFICANT CHANGE UP (ref 3.5–5.3)
PROT SERPL-MCNC: 7.6 G/DL — SIGNIFICANT CHANGE UP (ref 6–8.3)
PROT UR-MCNC: ABNORMAL
PROTHROM AB SERPL-ACNC: 13.4 SEC — SIGNIFICANT CHANGE UP (ref 10.6–13.6)
RBC # BLD: 3.06 M/UL — LOW (ref 3.8–5.2)
RBC # FLD: 20.7 % — HIGH (ref 10.3–14.5)
RBC BLD AUTO: ABNORMAL
RBC CASTS # UR COMP ASSIST: 1 /HPF — SIGNIFICANT CHANGE UP (ref 0–4)
RH IG SCN BLD-IMP: POSITIVE — SIGNIFICANT CHANGE UP
SARS-COV-2 RNA SPEC QL NAA+PROBE: SIGNIFICANT CHANGE UP
SODIUM SERPL-SCNC: 141 MMOL/L — SIGNIFICANT CHANGE UP (ref 135–145)
SP GR SPEC: 1.01 — SIGNIFICANT CHANGE UP (ref 1.01–1.02)
TARGETS BLD QL SMEAR: SLIGHT — SIGNIFICANT CHANGE UP
UROBILINOGEN FLD QL: NEGATIVE — SIGNIFICANT CHANGE UP
VARIANT LYMPHS # BLD: 1.7 % — SIGNIFICANT CHANGE UP (ref 0–6)
WBC # BLD: 13.76 K/UL — HIGH (ref 3.8–10.5)
WBC # FLD AUTO: 13.76 K/UL — HIGH (ref 3.8–10.5)
WBC UR QL: 1 /HPF — SIGNIFICANT CHANGE UP (ref 0–5)

## 2021-02-25 PROCEDURE — 99285 EMERGENCY DEPT VISIT HI MDM: CPT

## 2021-02-25 PROCEDURE — 99223 1ST HOSP IP/OBS HIGH 75: CPT

## 2021-02-25 PROCEDURE — 93010 ELECTROCARDIOGRAM REPORT: CPT

## 2021-02-25 RX ORDER — LOSARTAN POTASSIUM 100 MG/1
50 TABLET, FILM COATED ORAL DAILY
Refills: 0 | Status: DISCONTINUED | OUTPATIENT
Start: 2021-02-25 | End: 2021-02-27

## 2021-02-25 RX ORDER — ACETAMINOPHEN 500 MG
1000 TABLET ORAL ONCE
Refills: 0 | Status: COMPLETED | OUTPATIENT
Start: 2021-02-25 | End: 2021-02-25

## 2021-02-25 RX ORDER — ACETAMINOPHEN 500 MG
650 TABLET ORAL EVERY 6 HOURS
Refills: 0 | Status: DISCONTINUED | OUTPATIENT
Start: 2021-02-25 | End: 2021-02-27

## 2021-02-25 RX ORDER — PSYLLIUM SEED (WITH DEXTROSE)
1 POWDER (GRAM) ORAL DAILY
Refills: 0 | Status: DISCONTINUED | OUTPATIENT
Start: 2021-02-25 | End: 2021-02-27

## 2021-02-25 RX ORDER — BUMETANIDE 0.25 MG/ML
2 INJECTION INTRAMUSCULAR; INTRAVENOUS DAILY
Refills: 0 | Status: DISCONTINUED | OUTPATIENT
Start: 2021-02-25 | End: 2021-02-26

## 2021-02-25 RX ADMIN — Medication 1000 MILLIGRAM(S): at 20:24

## 2021-02-25 NOTE — H&P ADULT - NSICDXPASTSURGICALHX_GEN_ALL_CORE_FT
PAST SURGICAL HISTORY:  No significant past surgical history      PAST SURGICAL HISTORY:  History of endometriosis

## 2021-02-25 NOTE — H&P ADULT - HISTORY OF PRESENT ILLNESS
50F with PMHx of IgA Nephropathy, HTN, and chronic microcytic anemia (believed to be due to iron deficiency and/or due to AOCD 2/2 to CKD) presents with chronic fatigue and anemia as outpatient. Patient has chronic fatigue and poor exercise tolerance which she states is unchanged from baseline. She had routine blood work with PMD which showed Hg of 5.9; thus, she came in. She denies menorrhagia, hematochezia, or hematuria. No fevers, chills, SOB, or cough. She follows actively with nephrology who periodically transfuse her with iron +/- EPOGEN. She no longer follows with hematologist. Patient last admitted February 2020 and last received pRBC in November 2020. In the ED patient had low grade fever, given Tylenol, and started on her first unit of PRBC. Patient has no complaints currently, she was sleeping.

## 2021-02-25 NOTE — H&P ADULT - NSHPPHYSICALEXAM_GEN_ALL_CORE
T(C): 37.9 (02-25-21 @ 15:19), Max: 37.9 (02-25-21 @ 15:19)  HR: 104 (02-25-21 @ 17:41) (99 - 110)  BP: 142/84 (02-25-21 @ 17:41) (136/83 - 166/96)  RR: 16 (02-25-21 @ 17:41) (16 - 18)  SpO2: 99% (02-25-21 @ 17:41) (99% - 100%)    Gen: (fe)male in NAD, appears comfortable, no diaphoresis  HEENT: NCAT, MMM, neck soft and supple  CV: +S1/S2, no m/r/g  Resp: CTAB, no w/r/r  GI: normoactive BS, soft, NTND, no rebounding/guarding  Ext: No LE edema, extremities appear warm and well perfused   Neuro: AOx3, no focal deficits, CNII-XII grossly intact  Psych: No SI/HI/AVH, appropriate affect  Skin: no petechiae, ecchymosis or maculopapular rash noted T(C): 37.9 (02-25-21 @ 15:19), Max: 37.9 (02-25-21 @ 15:19)  HR: 104 (02-25-21 @ 17:41) (99 - 110)  BP: 142/84 (02-25-21 @ 17:41) (136/83 - 166/96)  RR: 16 (02-25-21 @ 17:41) (16 - 18)  SpO2: 99% (02-25-21 @ 17:41) (99% - 100%)    Gen: female in NAD, appears comfortable, no diaphoresis  HEENT: NCAT, MMM, neck soft and supple  CV: +S1/S2, no m/r/g  Resp: CTAB, no w/r/r  GI: normoactive BS, soft, NTND, no rebounding/guarding  Ext: No LE edema, extremities appear warm and well perfused   Neuro: AOx3, no focal deficits, CNII-XII grossly intact  Psych: No SI/HI/AVH, appropriate affect  Skin: no petechiae, ecchymosis or maculopapular rash noted

## 2021-02-25 NOTE — ED PROVIDER NOTE - ATTENDING CONTRIBUTION TO CARE
Flynn Crocker MD, FACEP: In this physician's medical judgement based on clinical history and physical exam, patient with cc of low h/h requiring repeated transfusions in the past for similar presentations. Now here for low h/h and fatigue and weakness  will get iv, cbc, cmp, inr, t&s, ekg, and likey transfuse  Will follow up on labs, analgesia, imaging, reassess and disposition as clinically indicated. Flynn Crocker MD, FACEP: In this physician's medical judgement based on clinical history and physical exam, patient with cc of low h/h requiring repeated transfusions in the past for similar presentations. Now here for low h/h and fatigue and weakness  will get iv, cbc, cmp, inr, t&s, ekg, and likely transfuse  Will follow up on labs, analgesia, imaging, reassess and disposition as clinically indicated. Flynn Crocker MD, FACEP: In this physician's medical judgement based on clinical history and physical exam, patient with cc of low h/h requiring repeated transfusions in the past for similar presentations. Now here for low h/h and fatigue and weakness  will get iv, cbc, cmp, inr, t&s, ekg, and likely transfuse  Will follow up on labs, analgesia, imaging, reassess and disposition as clinically indicated.  Patient endorsed to the observation PA at the time of observation order placement.  Based on patient's history and physical exam, as well as the results of today's workup, I feel that patient warrants observation in the hospital for further workup/evaluation and continued management. I discussed the findings of today's workup with the patient and addressed the patient's questions and concerns. The patient was agreeable with observation. Our team spoke with the CDU receiving team who accepted the patient for observation and subsequently took over the patient's care at the time of order placement for observation. Flynn Crocker MD, FACEP: In this physician's medical judgement based on clinical history and physical exam, patient with cc of low h/h requiring repeated transfusions in the past for similar presentations. Now here for low h/h and fatigue and weakness  will get iv, cbc, cmp, inr, t&s, ekg, and likely transfuse  Will follow up on labs, analgesia, imaging, reassess and disposition as clinically indicated.  Patient with near fever to 100.2 and 99, declines rectal temperatures.  Will have to place patient in inpatient rather than cdu for concern of near fever.  Patient endorsed to the medical team at the time of admission. Based on patient's history and physical exam, as well as the results of today's workup, I feel that patient warrants admission to the hospital for further workup/evaluation and continued management. I discussed the findings of today's workup with the patient and addressed the patient's questions and concerns. The patient was agreeable with admission. Our team spoke with the inpatient receiving team who accepted the patient for admission and subsequently took over the patient's care at the time of admission. The receiving team will follow up on pending labs, analgesia, any clinical imaging results, ancillary findings, reassess, and disposition as clinically indicated. Details of patient and plan conveyed to receiving physician team and conveyed back for understanding. There were no questions at this time about the patient's status, disposition, and plan. Patient's care to be taken over by receiving physician team at this time, all decisions regarding the progression of care will be made at their discretion.

## 2021-02-25 NOTE — ED PROVIDER NOTE - OBJECTIVE STATEMENT
50y female PMHx IgA nephropathy, DM, HTN p/w anemia. Pt reports low hgb (5.9) outpatient drawn this week. Symptoms of fatigue x1 day. has routine transfusions q4-6months. ambulating without difficulty. Denies CP, SOB, dizziness, vaginal bleeding, melena, hematochezia, urinary symptoms, fever, chills. or LOC. no known covid exposures

## 2021-02-25 NOTE — H&P ADULT - NSICDXPASTMEDICALHX_GEN_ALL_CORE_FT
PAST MEDICAL HISTORY:  Anemia     DM (diabetes mellitus)     HTN (hypertension)     IgA nephropathy determined by renal biopsy

## 2021-02-25 NOTE — ED ADULT NURSE NOTE - OBJECTIVE STATEMENT
50 year old female patient presents to ED c/o low hemoglobin of 5.9 outpatient. Patient reports hx of anemia in the past with previous blood transfusions. Patient reports worsening fatigue over the last day. Denies current CP, palpitations, SOB, dizziness. Patient denies vaginal bleeding, blood in stool or urine. Patient well appearing with no acute distress noted. Patient aware of plan of care for CDU.

## 2021-02-25 NOTE — H&P ADULT - PROBLEM SELECTOR PLAN 2
Patient initially admitted to CDU, but had low grade fever of 100.2. She has no infectious symptoms. This unlikely represents infection. Can follow up BCx and UCx, hold anti-microbials, monitor fever curve, monitor WBC (current leukocytosis may be reactive)

## 2021-02-25 NOTE — H&P ADULT - ASSESSMENT
50F with PMHx of IgA Nephropathy, HTN, and chronic microcytic anemia (believed to be due to iron deficiency and/or due to AOCD 2/2 to CKD) presents with chronic fatigue and anemia as outpatient. Patient found to be anemic at 5.6 here with baseline of 8. Anemia is likely due to hypoproliferative state given prior need/admissions for same issue.

## 2021-02-25 NOTE — ED PROVIDER NOTE - PROGRESS NOTE DETAILS
consent to blood transfusion obtained, placed in chart. CDU PA aware, pending COVID swab. - Kt Davis PA-C

## 2021-02-25 NOTE — ED ADULT NURSE REASSESSMENT NOTE - NS ED NURSE REASSESS COMMENT FT1
PRBCs given. Consent in chart. Risks and benefits explained to patient. Patient verbalized understanding of risks and benefits. Patient aware of possible side effects. Vital signs stable. Second RN at bedside for confirmation.

## 2021-02-25 NOTE — ED PROVIDER NOTE - CLINICAL SUMMARY MEDICAL DECISION MAKING FREE TEXT BOX
50y female PMHx IgA nephropathy, DM, HTN p/w anemia. outpt hgb of 5.9 per PMD. symptoms of fatigue x1 day. pt otherwise well appearing, nonfocal exam. vitals stable. will check labs, ekg, transfuse as necessary, CDU vs admission - Kt Davis PA-C

## 2021-02-26 LAB
ANION GAP SERPL CALC-SCNC: 11 MMOL/L — SIGNIFICANT CHANGE UP (ref 5–17)
BUN SERPL-MCNC: 34 MG/DL — HIGH (ref 7–23)
CALCIUM SERPL-MCNC: 8.9 MG/DL — SIGNIFICANT CHANGE UP (ref 8.4–10.5)
CHLORIDE SERPL-SCNC: 107 MMOL/L — SIGNIFICANT CHANGE UP (ref 96–108)
CO2 SERPL-SCNC: 21 MMOL/L — LOW (ref 22–31)
CREAT SERPL-MCNC: 2.63 MG/DL — HIGH (ref 0.5–1.3)
CULTURE RESULTS: SIGNIFICANT CHANGE UP
FERRITIN SERPL-MCNC: 10 NG/ML — LOW (ref 15–150)
GLUCOSE SERPL-MCNC: 111 MG/DL — HIGH (ref 70–99)
HCT VFR BLD CALC: 25.8 % — LOW (ref 34.5–45)
HGB BLD-MCNC: 7.2 G/DL — LOW (ref 11.5–15.5)
IRON SATN MFR SERPL: 15 % — SIGNIFICANT CHANGE UP (ref 14–50)
IRON SATN MFR SERPL: 41 UG/DL — SIGNIFICANT CHANGE UP (ref 30–160)
MCHC RBC-ENTMCNC: 20.2 PG — LOW (ref 27–34)
MCHC RBC-ENTMCNC: 27.9 GM/DL — LOW (ref 32–36)
MCV RBC AUTO: 72.5 FL — LOW (ref 80–100)
NRBC # BLD: 0 /100 WBCS — SIGNIFICANT CHANGE UP (ref 0–0)
PLATELET # BLD AUTO: 312 K/UL — SIGNIFICANT CHANGE UP (ref 150–400)
POTASSIUM SERPL-MCNC: 3.7 MMOL/L — SIGNIFICANT CHANGE UP (ref 3.5–5.3)
POTASSIUM SERPL-SCNC: 3.7 MMOL/L — SIGNIFICANT CHANGE UP (ref 3.5–5.3)
RBC # BLD: 3.56 M/UL — LOW (ref 3.8–5.2)
RBC # FLD: 23.2 % — HIGH (ref 10.3–14.5)
SARS-COV-2 IGM SERPL IA-ACNC: 0.06 INDEX — SIGNIFICANT CHANGE UP
SODIUM SERPL-SCNC: 139 MMOL/L — SIGNIFICANT CHANGE UP (ref 135–145)
SPECIMEN SOURCE: SIGNIFICANT CHANGE UP
TIBC SERPL-MCNC: 266 UG/DL — SIGNIFICANT CHANGE UP (ref 220–430)
UIBC SERPL-MCNC: 225 UG/DL — SIGNIFICANT CHANGE UP (ref 110–370)
WBC # BLD: 10.06 K/UL — SIGNIFICANT CHANGE UP (ref 3.8–10.5)
WBC # FLD AUTO: 10.06 K/UL — SIGNIFICANT CHANGE UP (ref 3.8–10.5)

## 2021-02-26 RX ORDER — BUMETANIDE 0.25 MG/ML
1 INJECTION INTRAMUSCULAR; INTRAVENOUS DAILY
Refills: 0 | Status: CANCELLED | OUTPATIENT
Start: 2021-03-01 | End: 2021-02-27

## 2021-02-26 RX ORDER — IRON SUCROSE 20 MG/ML
200 INJECTION, SOLUTION INTRAVENOUS EVERY 24 HOURS
Refills: 0 | Status: DISCONTINUED | OUTPATIENT
Start: 2021-02-26 | End: 2021-02-27

## 2021-02-26 RX ORDER — HYDRALAZINE HCL 50 MG
10 TABLET ORAL ONCE
Refills: 0 | Status: COMPLETED | OUTPATIENT
Start: 2021-02-26 | End: 2021-02-26

## 2021-02-26 RX ORDER — LOSARTAN POTASSIUM 100 MG/1
50 TABLET, FILM COATED ORAL ONCE
Refills: 0 | Status: COMPLETED | OUTPATIENT
Start: 2021-02-26 | End: 2021-02-26

## 2021-02-26 RX ADMIN — IRON SUCROSE 110 MILLIGRAM(S): 20 INJECTION, SOLUTION INTRAVENOUS at 18:35

## 2021-02-26 RX ADMIN — BUMETANIDE 2 MILLIGRAM(S): 0.25 INJECTION INTRAMUSCULAR; INTRAVENOUS at 13:14

## 2021-02-26 RX ADMIN — Medication 10 MILLIGRAM(S): at 22:06

## 2021-02-26 RX ADMIN — LOSARTAN POTASSIUM 50 MILLIGRAM(S): 100 TABLET, FILM COATED ORAL at 17:52

## 2021-02-26 RX ADMIN — LOSARTAN POTASSIUM 50 MILLIGRAM(S): 100 TABLET, FILM COATED ORAL at 05:43

## 2021-02-26 NOTE — CONSULT NOTE ADULT - SUBJECTIVE AND OBJECTIVE BOX
Patient is a 50y old  Female who presents with a chief complaint of Fatigue/Anemia (2021 16:59)      HPI:    50F with PMHx of IgA Nephropathy, HTN, and chronic microcytic anemia (believed to be due to iron deficiency and/or due to AOCD 2/2 to CKD) presents with chronic fatigue and anemia as outpatient. Patient has chronic fatigue and poor exercise tolerance which she states is unchanged from baseline. She had routine blood work with PMD which showed Hg of 5.9; thus, she came in. She denies menorrhagia, hematochezia, or hematuria. No fevers, chills, SOB, or cough. She follows actively with nephrology who periodically transfuse her with iron +/- EPOGEN. She no longer follows with hematologist. Patient last admitted 2020 and last received pRBC in 2020. In the ED patient had low grade fever, given Tylenol, and started on her first unit of PRBC. Patient has no complaints currently, she was sleeping. (2021 22:33)    ER vitals:  Tmax 100.2, P 110, /83.  WBC 13.7 --> 10.0.  UA (-) nit/LE.  Pt c/o fatigue, denies fevers/chills/rigors/cough/sob/congestion/sore throat/abd pain/diarrhea/dysuria/constipation/joint pain.  Has a chronic hyperpigmented macular type rash over chest, back, upper arms for which she has seen dermatologist for and being treated, no new lesions.        REVIEW OF SYSTEMS:    CONSTITUTIONAL: No fever, weight loss, or fatigue  EYES: No eye pain, visual disturbances, or discharge  ENMT:  No sore throat  NECK: No pain or stiffness  RESPIRATORY: No cough, wheezing, chills or hemoptysis; No shortness of breath  CARDIOVASCULAR: No chest pain, palpitations, dizziness, or leg swelling  GASTROINTESTINAL: No abdominal or epigastric pain. No nausea, vomiting, or hematemesis; No diarrhea or constipation. No melena or hematochezia.  GENITOURINARY: No dysuria, frequency, hematuria, or incontinence  NEUROLOGICAL: No headaches, memory loss, loss of strength, numbness, or tremors  SKIN: No itching, burning, rashes, or lesions   LYMPH NODES: No enlarged glands  MUSCULOSKELETAL: No joint pain or swelling; No muscle, back, or extremity pain      PAST MEDICAL & SURGICAL HISTORY:  IgA nephropathy determined by renal biopsy    HTN (hypertension)    Anemia    DM (diabetes mellitus)    History of endometriosis        Allergies    No Known Allergies    Intolerances        FAMILY HISTORY:  FH: diabetes mellitus        SOCIAL HISTORY:    Denies smoking, ivdu, etoh    MEDICATIONS  (STANDING):  iron sucrose IVPB 200 milliGRAM(s) IV Intermittent every 24 hours  losartan 50 milliGRAM(s) Oral once  losartan 50 milliGRAM(s) Oral daily  psyllium Powder 1 Packet(s) Oral daily    MEDICATIONS  (PRN):  acetaminophen   Tablet .. 650 milliGRAM(s) Oral every 6 hours PRN Temp greater or equal to 38C (100.4F), Mild Pain (1 - 3), Moderate Pain (4 - 6), Severe Pain (7 - 10)      Vital Signs Last 24 Hrs  T(C): 37.1 (2021 16:03), Max: 37.1 (2021 02:50)  T(F): 98.8 (2021 16:03), Max: 98.8 (2021 16:03)  HR: 80 (2021 17:20) (70 - 85)  BP: 168/98 (2021 17:20) (121/79 - 168/98)  BP(mean): --  RR: 18 (2021 16:03) (18 - 20)  SpO2: 100% (2021 16:03) (98% - 100%)    PHYSICAL EXAM:    GENERAL: NAD, well-groomed  HEAD:  Atraumatic, Normocephalic  EYES: EOMI, PERRLA, conjunctiva and sclera clear  ENMT: No tonsillar erythema, exudates, or enlargement; Moist mucous membranes  NECK: Supple, No JVD  CHEST/LUNG: Clear to percussion bilaterally; No rales, rhonchi, wheezing, or rubs  HEART: Regular rate and rhythm; No murmurs, rubs, or gallops  ABDOMEN: Soft, Nontender, Nondistended; Bowel sounds present  EXTREMITIES:  2+ Peripheral Pulses, No clubbing, cyanosis, or edema  LYMPH: No lymphadenopathy noted  SKIN: scattered hyperpigmented macular type lesions on upper chest, upper back and arms.      LABS:  CBC Full  -  ( 2021 07:17 )  WBC Count : 10.06 K/uL  RBC Count : 3.56 M/uL  Hemoglobin : 7.2 g/dL  Hematocrit : 25.8 %  Platelet Count - Automated : 312 K/uL  Mean Cell Volume : 72.5 fl  Mean Cell Hemoglobin : 20.2 pg  Mean Cell Hemoglobin Concentration : 27.9 gm/dL  Auto Neutrophil # : x  Auto Lymphocyte # : x  Auto Monocyte # : x  Auto Eosinophil # : x  Auto Basophil # : x  Auto Neutrophil % : x  Auto Lymphocyte % : x  Auto Monocyte % : x  Auto Eosinophil % : x  Auto Basophil % : x          139  |  107  |  34<H>  ----------------------------<  111<H>  3.7   |  21<L>  |  2.63<H>    Ca    8.9      2021 07:17    TPro  7.6  /  Alb  3.6  /  TBili  <0.1<L>  /  DBili  x   /  AST  17  /  ALT  15  /  AlkPhos  130<H>        LIVER FUNCTIONS - ( 2021 17:48 )  Alb: 3.6 g/dL / Pro: 7.6 g/dL / ALK PHOS: 130 U/L / ALT: 15 U/L / AST: 17 U/L / GGT: x                               MICROBIOLOGY:        Urinalysis Basic - ( 2021 20:35 )    Color: Light Yellow / Appearance: Clear / S.014 / pH: x  Gluc: x / Ketone: Negative  / Bili: Negative / Urobili: Negative   Blood: x / Protein: 300 mg/dL / Nitrite: Negative   Leuk Esterase: Negative / RBC: 1 /hpf / WBC 1 /HPF   Sq Epi: x / Non Sq Epi: 1 /hpf / Bacteria: Negative        COVID-19 Antibody - for prior infection screening (21 @ 14:43)   COVID-19 IgG Antibody Index: 0.06: Roche ECLIA Total AB (KARMA)   NOTE: This result index represents a total antibody measurement, which   includes IgG, IgA, and IgM   Measures Nucleocapsid   Negative <= 0.99 Index   Positive >= 1.00 Index Index     COVID-19 PCR . (21 @ 18:46)   COVID-19 PCR: NotDetec: You can help in the fight against COVID-19. [facility] may contact   you to see if you are interested in voluntarily participating in one of   our clinical trials.     RADIOLOGY:

## 2021-02-26 NOTE — CONSULT NOTE ADULT - ASSESSMENT
50F with PMHx of IgA Nephropathy, HTN, and chronic microcytic anemia pw symptomatic anemia again.        1 Anemia-She got 1 gram of IV iron in the office and her iron stores still remained low(despite getting 2 units of blood).  2 [facility]-I suspect can restart the Bumex in 48 hours  and reduce the dose to 1mg po qd  3 GI-Plan per Dr Gold,.  Capsular test done 1/2021 and some slight duodenitis noted (SP EGD and Colon as well)  4 Renal-No need for renal sono at present     Sayed [facility]   [phone]

## 2021-02-26 NOTE — CONSULT NOTE ADULT - ASSESSMENT
50F with PMHx of IgA Nephropathy, HTN, and chronic microcytic anemia (believed to be due to iron deficiency and/or due to AOCD 2/2 to CKD) presents with chronic fatigue and anemia as outpatient. Patient has chronic fatigue and poor exercise tolerance which she states is unchanged from baseline. She had routine blood work with PMD which showed Hg of 5.9; thus, she came in. She denies menorrhagia, hematochezia, or hematuria. No fevers, chills, SOB, or cough. She follows actively with nephrology who periodically transfuse her with iron +/- EPOGEN. She no longer follows with hematologist. Patient last admitted February 2020 and last received pRBC in November 2020. In the ED patient had low grade fever, given Tylenol, and started on her first unit of PRBC. Patient has no complaints currently, she was sleeping. (25 Feb 2021 22:33)    ER vitals:  Tmax 100.2, P 110, /83.  WBC 13.7 --> 10.0.  UA (-) nit/LE.  Pt c/o fatigue, denies fevers/chills/rigors/cough/sob/congestion/sore throat/abd pain/diarrhea/dysuria/constipation/joint pain.  Has a chronic hyperpigmented macular type rash over chest, back, upper arms for which she has seen dermatologist for and being treated, no new lesions.      SIRS:    - low grade temp, mild tachycardia, leukocytosis.  No localizing symptoms on exam.  UA (-).  Covid pcr (-).   Pt denies URI symptoms.  Pt being observed off abx, WBC normalized, no fevers.    - f/u blood cultures x 2.  If pt with continued fever, check CXR and reassess for further need of infectious w/u.    - Pt states she has chronic type rash - hyperpigmented macular type lesions on arms, upper chest and back.  Sees a Dermatologist, pt states she was given topical cream (does not know name), and being monitored.  Will send RPR.    * If bcx negative at 48 hrs, and no new fevers, okay from ID standpoint to d/c home.    deficiency and/or due to AOCD 2/2 to CKD) presents with chronic fatigue and anemia as outpatient. Patient found to be anemic at 5.6 here with baseline of 8. Anemia is likely due to hypoproliferative state given prior need/admissions for same issue.    Symptomatic anemia:    - Pt anemic at 5.6 here, has baseline of 8.  s/p transfusion of 2 units pRBCs, monitor H/H    - Send iron studies      Dr. Elif Eagle covering 2/27 and 2/28.  [phone]

## 2021-02-26 NOTE — CONSULT NOTE ADULT - SUBJECTIVE AND OBJECTIVE BOX
NEPHROLOGY - NSN    Patient seen and examined.    HPI:  50F with PMHx of IgA Nephropathy, HTN, and chronic microcytic anemia (believed to be due to iron deficiency and/or due to AOCD 2/2 to CKD) presents with chronic fatigue and anemia as outpatient. Patient has chronic fatigue and poor exercise tolerance which she states is unchanged from baseline. She had routine blood work with PMD which showed Hg of 5.9; thus, she came in. She denies menorrhagia, hematochezia, or hematuria. No fevers, chills, SOB, or cough. She follows actively with nephrology who periodically transfuse her with iron +/- EPOGEN. She no longer follows with hematologist. Patient last admitted 2020 and last received pRBC in 2020. In the ED patient had low grade fever, given Tylenol, and started on her first unit of PRBC. Patient has no complaints currently, she was sleeping. (2021 22:33)  50F with PMHx of IgA Nephropathy, HTN, and chronic microcytic anemia (believed to be due to iron deficiency and/or due to AOCD 2/2 to CKD) presents with chronic fatigue and anemia as outpatient. Patient has chronic fatigue and poor exercise tolerance which she states is unchanged from baseline. She had routine blood work with PMD which showed Hg of 5.9; thus, she came in. She denies menorrhagia, hematochezia, or hematuria. No fevers, chills, SOB, or cough.           PAST MEDICAL & SURGICAL HISTORY:  IgA nephropathy determined by renal biopsy    HTN (hypertension)    Anemia    DM (diabetes mellitus)    History of endometriosis        MEDICATIONS  (STANDING):  buMETAnide 2 milliGRAM(s) Oral daily  losartan 50 milliGRAM(s) Oral daily  psyllium Powder 1 Packet(s) Oral daily      Allergies    No Known Allergies    Intolerances        SOCIAL HISTORY:  Denies alcohol abuse, drug abuse or tobacco usage.     FAMILY HISTORY:  FH: diabetes mellitus        VITALS:  T(C): 37.1 (21 @ 16:03), Max: 37.1 (21 @ 02:50)  HR: 80 (21 @ 16:03) (70 - 104)  BP: 167/104 (21 @ 16:03) (121/79 - 167/104)  RR: 18 (21 @ 16:03) (16 - 20)  SpO2: 100% (21 @ 16:03) (98% - 100%)    REVIEW OF SYSTEMS:  Denies any nausea, vomiting, diarrhea, fever or chills. All other pertinent systems are reviewed and are negative.    PHYSICAL EXAM:  Constitutional: NAD  HEENT: EOMI  Neck:  No JVD, supple   Respiratory: CTA B/L  Cardiovascular: S1 and S2, RRR  Gastrointestinal: + BS, soft, NT, ND  Extremities: No peripheral edema, + peripheral pulses  Neurological: A/O x 3, CN2-12 intact  Psychiatric: Normal mood, normal affect  : No Hernández  Skin: No rashes, C/D/I  Access: Not applicable    I and O's:        LABS:                        7.2    10.06 )-----------( 312      ( 2021 07:17 )             25.8         139  |  107  |  34<H>  ----------------------------<  111<H>  3.7   |  21<L>  |  2.63<H>    Ca    8.9      2021 07:17    TPro  7.6  /  Alb  3.6  /  TBili  <0.1<L>  /  DBili  x   /  AST  17  /  ALT  15  /  AlkPhos  130<H>        URINE:  Urinalysis Basic - ( 2021 20:35 )    Color: Light Yellow / Appearance: Clear / S.014 / pH: x  Gluc: x / Ketone: Negative  / Bili: Negative / Urobili: Negative   Blood: x / Protein: 300 mg/dL / Nitrite: Negative   Leuk Esterase: Negative / RBC: 1 /hpf / WBC 1 /HPF   Sq Epi: x / Non Sq Epi: 1 /hpf / Bacteria: Negative        RADIOLOGY & ADDITIONAL STUDIES:     < from: [facility] Duplex Upper Ext Vein Scan, Left (20 @ 11:22) >    EXAM:  DUPLEX EXT VEINS UPPER LT                            PROCEDURE DATE:  2020            INTERPRETATION:  CLINICAL INFORMATION: Left upper extremity swelling.    COMPARISON: None available.    TECHNIQUE: Duplex sonography of the LEFT UPPER extremity veins with color and spectral Doppler, with and without compression.      FINDINGS:    The left internal jugular, subclavian, axillary and brachial veins are patent and compressible where applicable.  The basilic vein (superficial veins) is patent and without thrombus.     There is thrombus within a branch of the cephalic vein in the antecubital fossae and forearm consistent with superficial thrombophlebitis.    Doppler examination shows normal spontaneous and phasic flow.    IMPRESSION:     Left upper extremity superficial thrombophlebitis.                            ISELA GUTIÉRREZ M.D., ATTENDING RADIOLOGIST  This document has been electronically signed. Mar  1 2020  5:24PM                < end of copied text >

## 2021-02-27 ENCOUNTER — TRANSCRIPTION ENCOUNTER (OUTPATIENT)
Age: 51
End: 2021-02-27

## 2021-02-27 VITALS
SYSTOLIC BLOOD PRESSURE: 117 MMHG | HEART RATE: 77 BPM | TEMPERATURE: 98 F | DIASTOLIC BLOOD PRESSURE: 71 MMHG | RESPIRATION RATE: 18 BRPM | OXYGEN SATURATION: 99 %

## 2021-02-27 LAB
ANION GAP SERPL CALC-SCNC: 13 MMOL/L — SIGNIFICANT CHANGE UP (ref 5–17)
BUN SERPL-MCNC: 34 MG/DL — HIGH (ref 7–23)
CALCIUM SERPL-MCNC: 8.9 MG/DL — SIGNIFICANT CHANGE UP (ref 8.4–10.5)
CHLORIDE SERPL-SCNC: 108 MMOL/L — SIGNIFICANT CHANGE UP (ref 96–108)
CO2 SERPL-SCNC: 20 MMOL/L — LOW (ref 22–31)
CREAT SERPL-MCNC: 2.62 MG/DL — HIGH (ref 0.5–1.3)
GLUCOSE SERPL-MCNC: 113 MG/DL — HIGH (ref 70–99)
HCT VFR BLD CALC: 29.9 % — LOW (ref 34.5–45)
HGB BLD-MCNC: 8.3 G/DL — LOW (ref 11.5–15.5)
MCHC RBC-ENTMCNC: 20.9 PG — LOW (ref 27–34)
MCHC RBC-ENTMCNC: 27.8 GM/DL — LOW (ref 32–36)
MCV RBC AUTO: 75.3 FL — LOW (ref 80–100)
NRBC # BLD: 0 /100 WBCS — SIGNIFICANT CHANGE UP (ref 0–0)
PLATELET # BLD AUTO: 292 K/UL — SIGNIFICANT CHANGE UP (ref 150–400)
POTASSIUM SERPL-MCNC: 4.1 MMOL/L — SIGNIFICANT CHANGE UP (ref 3.5–5.3)
POTASSIUM SERPL-SCNC: 4.1 MMOL/L — SIGNIFICANT CHANGE UP (ref 3.5–5.3)
RBC # BLD: 3.97 M/UL — SIGNIFICANT CHANGE UP (ref 3.8–5.2)
RBC # FLD: 23.9 % — HIGH (ref 10.3–14.5)
SODIUM SERPL-SCNC: 141 MMOL/L — SIGNIFICANT CHANGE UP (ref 135–145)
WBC # BLD: 10.74 K/UL — HIGH (ref 3.8–10.5)
WBC # FLD AUTO: 10.74 K/UL — HIGH (ref 3.8–10.5)

## 2021-02-27 PROCEDURE — U0003: CPT

## 2021-02-27 PROCEDURE — 85730 THROMBOPLASTIN TIME PARTIAL: CPT

## 2021-02-27 PROCEDURE — 86923 COMPATIBILITY TEST ELECTRIC: CPT

## 2021-02-27 PROCEDURE — 80048 BASIC METABOLIC PNL TOTAL CA: CPT

## 2021-02-27 PROCEDURE — P9016: CPT

## 2021-02-27 PROCEDURE — 86901 BLOOD TYPING SEROLOGIC RH(D): CPT

## 2021-02-27 PROCEDURE — 86850 RBC ANTIBODY SCREEN: CPT

## 2021-02-27 PROCEDURE — 85610 PROTHROMBIN TIME: CPT

## 2021-02-27 PROCEDURE — 86769 SARS-COV-2 COVID-19 ANTIBODY: CPT

## 2021-02-27 PROCEDURE — 87040 BLOOD CULTURE FOR BACTERIA: CPT

## 2021-02-27 PROCEDURE — 83550 IRON BINDING TEST: CPT

## 2021-02-27 PROCEDURE — 81001 URINALYSIS AUTO W/SCOPE: CPT

## 2021-02-27 PROCEDURE — 99285 EMERGENCY DEPT VISIT HI MDM: CPT

## 2021-02-27 PROCEDURE — 36430 TRANSFUSION BLD/BLD COMPNT: CPT

## 2021-02-27 PROCEDURE — 83540 ASSAY OF IRON: CPT

## 2021-02-27 PROCEDURE — 87086 URINE CULTURE/COLONY COUNT: CPT

## 2021-02-27 PROCEDURE — 85025 COMPLETE CBC W/AUTO DIFF WBC: CPT

## 2021-02-27 PROCEDURE — 80053 COMPREHEN METABOLIC PANEL: CPT

## 2021-02-27 PROCEDURE — 86780 TREPONEMA PALLIDUM: CPT

## 2021-02-27 PROCEDURE — 82728 ASSAY OF FERRITIN: CPT

## 2021-02-27 PROCEDURE — 86900 BLOOD TYPING SEROLOGIC ABO: CPT

## 2021-02-27 PROCEDURE — 86592 SYPHILIS TEST NON-TREP QUAL: CPT

## 2021-02-27 PROCEDURE — U0005: CPT

## 2021-02-27 PROCEDURE — 85027 COMPLETE CBC AUTOMATED: CPT

## 2021-02-27 RX ORDER — BUMETANIDE 0.25 MG/ML
0.5 INJECTION INTRAMUSCULAR; INTRAVENOUS
Qty: 15 | Refills: 0
Start: 2021-02-27 | End: 2021-03-28

## 2021-02-27 RX ADMIN — Medication 650 MILLIGRAM(S): at 08:33

## 2021-02-27 RX ADMIN — LOSARTAN POTASSIUM 50 MILLIGRAM(S): 100 TABLET, FILM COATED ORAL at 05:11

## 2021-02-27 NOTE — DISCHARGE NOTE PROVIDER - CARE PROVIDERS DIRECT ADDRESSES
[email or web address]

## 2021-02-27 NOTE — DISCHARGE NOTE PROVIDER - NSDCCPCAREPLAN_GEN_ALL_CORE_FT
PRINCIPAL DISCHARGE DIAGNOSIS  Diagnosis: Symptomatic anemia  Assessment and Plan of Treatment: transfused a total of 3 units PRBC with adequate response. follow up with nephrology after discharge.      SECONDARY DISCHARGE DIAGNOSES  Diagnosis: HTN (hypertension)  Assessment and Plan of Treatment: HTN (hypertension)  Follow up with your medical doctor to establish long term blood pressure treatment goals.

## 2021-02-27 NOTE — DISCHARGE NOTE PROVIDER - NSDCQMSTAIRS_GEN_ALL_CORE
[FreeTextEntry1] : 66-year-old male presents for followup of a right wrist injury. He had a syncopal episode approximately one month ago at which time he sustained a closed head injury as well as a nondisplaced right distal radius fracture. At the time of his hospitalization he was placed in a sugar tong splint that he has kept clean dry and intact.He no longer complains of pain in the wrist and has been able to move his fingers freely. No

## 2021-02-27 NOTE — DISCHARGE NOTE PROVIDER - HOSPITAL COURSE
50F with PMHx of IgA Nephropathy, HTN, and chronic microcytic anemia (believed to be due to iron deficiency and/or due to AOCD 2/2 to CKD) presents with chronic fatigue and anemia as outpatient. Patient has chronic fatigue and poor exercise tolerance which she states is unchanged from baseline. She had routine blood work with PMD which showed Hg of 5.9; thus, she came in. She denies menorrhagia, hematochezia, or hematuria. No fevers, chills, SOB, or cough. She follows actively with nephrology who periodically transfuse her with iron +/- EPOGEN.  Renal evaluation   Anemia-She got 1 gram of IV iron in the office and her iron stores still remained low(despite getting 2 units of blood).  2 [facility]-I suspect can restart the Bumex in 48 hours  and reduce the dose to 1mg po qd  3 GI-Plan per Dr Gold,.  Capsular test done 1/2021 and some slight duodenitis noted (SP EGD and Colon as well)  4 Renal-No need for renal sono at present   HGB improved to 8.3, blood and urine cultures no growth to date.

## 2021-02-27 NOTE — DISCHARGE NOTE PROVIDER - NSDCMRMEDTOKEN_GEN_ALL_CORE_FT
bumetanide 2 mg oral tablet: 1 tab(s) orally once a day  losartan 50 mg oral tablet: 1 tab(s) orally once a day   losartan 50 mg oral tablet: 1 tab(s) orally once a day

## 2021-02-27 NOTE — DISCHARGE NOTE NURSING/CASE MANAGEMENT/SOCIAL WORK - PATIENT PORTAL LINK FT
You can access the FollowMyHealth Patient Portal offered by [facility] by registering at the following website: [email or web address]. By joining [employer]’s FollowMyHealth portal, you will also be able to view your health information using other applications (apps) compatible with our system.

## 2021-02-27 NOTE — DISCHARGE NOTE PROVIDER - CARE PROVIDER_API CALL
Seb Cannon)  Internal Medicine  [address] [place] [address]  Phone: [phone]  Fax: [phone]  Follow Up Time:     Pari Martel)  Internal Medicine; Nephrology  [address], [address]  [place] [address]  Phone: [phone]  Fax: [phone]  Follow Up Time:     Anil Gold  GASTROENTEROLOGY  [address] [place] [address]  Phone: [phone]  Fax: [phone]  Follow Up Time:

## 2021-02-27 NOTE — DISCHARGE NOTE PROVIDER - PROVIDER TOKENS
PROVIDER:[TOKEN:[[id number]]],PROVIDER:[TOKEN:[[id number]]],PROVIDER:[TOKEN:[[id number]]]

## 2021-03-01 LAB — SARS-COV-2 IGG SERPL QL IA: NEGATIVE — SIGNIFICANT CHANGE UP

## 2021-03-03 LAB
CULTURE RESULTS: SIGNIFICANT CHANGE UP
CULTURE RESULTS: SIGNIFICANT CHANGE UP
SPECIMEN SOURCE: SIGNIFICANT CHANGE UP
SPECIMEN SOURCE: SIGNIFICANT CHANGE UP
T PALLIDUM AB TITR SER: NEGATIVE — SIGNIFICANT CHANGE UP

## 2021-04-01 ENCOUNTER — OUTPATIENT (OUTPATIENT)
Dept: OUTPATIENT SERVICES | Facility: HOSPITAL | Age: 51
LOS: 1 days | End: 2021-04-01
Payer: MEDICAID

## 2021-04-01 DIAGNOSIS — Z87.42 PERSONAL HISTORY OF OTHER DISEASES OF THE FEMALE GENITAL TRACT: Chronic | ICD-10-CM

## 2021-04-15 ENCOUNTER — INPATIENT (INPATIENT)
Facility: HOSPITAL | Age: 51
LOS: 0 days | Discharge: LEFT BEFORE DISCHARGE | DRG: 811 | End: 2021-04-15
Attending: INTERNAL MEDICINE | Admitting: INTERNAL MEDICINE
Payer: MEDICAID

## 2021-04-15 ENCOUNTER — TRANSCRIPTION ENCOUNTER (OUTPATIENT)
Age: 51
End: 2021-04-15

## 2021-04-15 VITALS
OXYGEN SATURATION: 100 % | SYSTOLIC BLOOD PRESSURE: 127 MMHG | DIASTOLIC BLOOD PRESSURE: 74 MMHG | TEMPERATURE: 99 F | RESPIRATION RATE: 18 BRPM | HEART RATE: 93 BPM | HEIGHT: 64 IN | WEIGHT: 199.96 LBS

## 2021-04-15 VITALS
DIASTOLIC BLOOD PRESSURE: 93 MMHG | TEMPERATURE: 99 F | RESPIRATION RATE: 18 BRPM | OXYGEN SATURATION: 100 % | HEART RATE: 76 BPM | SYSTOLIC BLOOD PRESSURE: 145 MMHG

## 2021-04-15 DIAGNOSIS — N18.9 CHRONIC KIDNEY DISEASE, UNSPECIFIED: ICD-10-CM

## 2021-04-15 DIAGNOSIS — Z87.42 PERSONAL HISTORY OF OTHER DISEASES OF THE FEMALE GENITAL TRACT: Chronic | ICD-10-CM

## 2021-04-15 DIAGNOSIS — Z79.899 OTHER LONG TERM (CURRENT) DRUG THERAPY: ICD-10-CM

## 2021-04-15 DIAGNOSIS — Z29.9 ENCOUNTER FOR PROPHYLACTIC MEASURES, UNSPECIFIED: ICD-10-CM

## 2021-04-15 DIAGNOSIS — I10 ESSENTIAL (PRIMARY) HYPERTENSION: ICD-10-CM

## 2021-04-15 DIAGNOSIS — D64.9 ANEMIA, UNSPECIFIED: ICD-10-CM

## 2021-04-15 LAB
ALBUMIN SERPL ELPH-MCNC: 3.8 G/DL — SIGNIFICANT CHANGE UP (ref 3.3–5)
ALP SERPL-CCNC: 119 U/L — SIGNIFICANT CHANGE UP (ref 40–120)
ALT FLD-CCNC: 15 U/L — SIGNIFICANT CHANGE UP (ref 10–45)
ANION GAP SERPL CALC-SCNC: 14 MMOL/L — SIGNIFICANT CHANGE UP (ref 5–17)
ANISOCYTOSIS BLD QL: SIGNIFICANT CHANGE UP
APPEARANCE UR: CLEAR — SIGNIFICANT CHANGE UP
AST SERPL-CCNC: 42 U/L — HIGH (ref 10–40)
BACTERIA # UR AUTO: NEGATIVE — SIGNIFICANT CHANGE UP
BASOPHILS # BLD AUTO: 0 K/UL — SIGNIFICANT CHANGE UP (ref 0–0.2)
BASOPHILS NFR BLD AUTO: 0 % — SIGNIFICANT CHANGE UP (ref 0–2)
BILIRUB SERPL-MCNC: 0.1 MG/DL — LOW (ref 0.2–1.2)
BILIRUB UR-MCNC: NEGATIVE — SIGNIFICANT CHANGE UP
BLD GP AB SCN SERPL QL: NEGATIVE — SIGNIFICANT CHANGE UP
BUN SERPL-MCNC: 37 MG/DL — HIGH (ref 7–23)
CALCIUM SERPL-MCNC: 8.6 MG/DL — SIGNIFICANT CHANGE UP (ref 8.4–10.5)
CHLORIDE SERPL-SCNC: 106 MMOL/L — SIGNIFICANT CHANGE UP (ref 96–108)
CO2 SERPL-SCNC: 20 MMOL/L — LOW (ref 22–31)
COLOR SPEC: SIGNIFICANT CHANGE UP
CREAT SERPL-MCNC: 3.26 MG/DL — HIGH (ref 0.5–1.3)
DACRYOCYTES BLD QL SMEAR: SLIGHT — SIGNIFICANT CHANGE UP
DIFF PNL FLD: NEGATIVE — SIGNIFICANT CHANGE UP
ELLIPTOCYTES BLD QL SMEAR: SLIGHT — SIGNIFICANT CHANGE UP
EOSINOPHIL # BLD AUTO: 0.24 K/UL — SIGNIFICANT CHANGE UP (ref 0–0.5)
EOSINOPHIL NFR BLD AUTO: 1.8 % — SIGNIFICANT CHANGE UP (ref 0–6)
EPI CELLS # UR: 1 /HPF — SIGNIFICANT CHANGE UP
GIANT PLATELETS BLD QL SMEAR: PRESENT — SIGNIFICANT CHANGE UP
GLUCOSE BLDC GLUCOMTR-MCNC: 139 MG/DL — HIGH (ref 70–99)
GLUCOSE SERPL-MCNC: 129 MG/DL — HIGH (ref 70–99)
GLUCOSE UR QL: NEGATIVE — SIGNIFICANT CHANGE UP
HCG SERPL-ACNC: 2.6 MIU/ML — SIGNIFICANT CHANGE UP
HCT VFR BLD CALC: 22.7 % — LOW (ref 34.5–45)
HCT VFR BLD CALC: 26.6 % — LOW (ref 34.5–45)
HGB BLD-MCNC: 5.9 G/DL — CRITICAL LOW (ref 11.5–15.5)
HGB BLD-MCNC: 7.5 G/DL — LOW (ref 11.5–15.5)
HYALINE CASTS # UR AUTO: 1 /LPF — SIGNIFICANT CHANGE UP (ref 0–2)
KETONES UR-MCNC: NEGATIVE — SIGNIFICANT CHANGE UP
LEUKOCYTE ESTERASE UR-ACNC: NEGATIVE — SIGNIFICANT CHANGE UP
LIDOCAIN IGE QN: 95 U/L — HIGH (ref 7–60)
LYMPHOCYTES # BLD AUTO: 1.77 K/UL — SIGNIFICANT CHANGE UP (ref 1–3.3)
LYMPHOCYTES # BLD AUTO: 13.3 % — SIGNIFICANT CHANGE UP (ref 13–44)
MACROCYTES BLD QL: SLIGHT — SIGNIFICANT CHANGE UP
MAGNESIUM SERPL-MCNC: 2.4 MG/DL — SIGNIFICANT CHANGE UP (ref 1.6–2.6)
MANUAL SMEAR VERIFICATION: SIGNIFICANT CHANGE UP
MCHC RBC-ENTMCNC: 23 PG — LOW (ref 27–34)
MCHC RBC-ENTMCNC: 24.8 PG — LOW (ref 27–34)
MCHC RBC-ENTMCNC: 26 GM/DL — LOW (ref 32–36)
MCHC RBC-ENTMCNC: 28.2 GM/DL — LOW (ref 32–36)
MCV RBC AUTO: 87.8 FL — SIGNIFICANT CHANGE UP (ref 80–100)
MCV RBC AUTO: 88.7 FL — SIGNIFICANT CHANGE UP (ref 80–100)
MICROCYTES BLD QL: SLIGHT — SIGNIFICANT CHANGE UP
MONOCYTES # BLD AUTO: 0.46 K/UL — SIGNIFICANT CHANGE UP (ref 0–0.9)
MONOCYTES NFR BLD AUTO: 3.5 % — SIGNIFICANT CHANGE UP (ref 2–14)
NEUTROPHILS # BLD AUTO: 10.57 K/UL — HIGH (ref 1.8–7.4)
NEUTROPHILS NFR BLD AUTO: 79.6 % — HIGH (ref 43–77)
NITRITE UR-MCNC: NEGATIVE — SIGNIFICANT CHANGE UP
NRBC # BLD: 0 /100 WBCS — SIGNIFICANT CHANGE UP (ref 0–0)
NRBC # BLD: 1 /100 — HIGH (ref 0–0)
OVALOCYTES BLD QL SMEAR: SIGNIFICANT CHANGE UP
PH UR: 6 — SIGNIFICANT CHANGE UP (ref 5–8)
PLAT MORPH BLD: ABNORMAL
PLATELET # BLD AUTO: 377 K/UL — SIGNIFICANT CHANGE UP (ref 150–400)
PLATELET # BLD AUTO: 456 K/UL — HIGH (ref 150–400)
POIKILOCYTOSIS BLD QL AUTO: SLIGHT — SIGNIFICANT CHANGE UP
POLYCHROMASIA BLD QL SMEAR: SLIGHT — SIGNIFICANT CHANGE UP
POTASSIUM SERPL-MCNC: 5.3 MMOL/L — SIGNIFICANT CHANGE UP (ref 3.5–5.3)
POTASSIUM SERPL-SCNC: 5.3 MMOL/L — SIGNIFICANT CHANGE UP (ref 3.5–5.3)
PROT SERPL-MCNC: 7.8 G/DL — SIGNIFICANT CHANGE UP (ref 6–8.3)
PROT UR-MCNC: ABNORMAL
RBC # BLD: 2.56 M/UL — LOW (ref 3.8–5.2)
RBC # BLD: 3.03 M/UL — LOW (ref 3.8–5.2)
RBC # FLD: 21.4 % — HIGH (ref 10.3–14.5)
RBC # FLD: 26.4 % — HIGH (ref 10.3–14.5)
RBC BLD AUTO: ABNORMAL
RBC CASTS # UR COMP ASSIST: 0 /HPF — SIGNIFICANT CHANGE UP (ref 0–4)
RH IG SCN BLD-IMP: POSITIVE — SIGNIFICANT CHANGE UP
SARS-COV-2 RNA SPEC QL NAA+PROBE: DETECTED
SCHISTOCYTES BLD QL AUTO: SLIGHT — SIGNIFICANT CHANGE UP
SODIUM SERPL-SCNC: 140 MMOL/L — SIGNIFICANT CHANGE UP (ref 135–145)
SP GR SPEC: 1.01 — SIGNIFICANT CHANGE UP (ref 1.01–1.02)
TARGETS BLD QL SMEAR: SLIGHT — SIGNIFICANT CHANGE UP
UROBILINOGEN FLD QL: NEGATIVE — SIGNIFICANT CHANGE UP
VARIANT LYMPHS # BLD: 1.8 % — SIGNIFICANT CHANGE UP (ref 0–6)
WBC # BLD: 10.62 K/UL — HIGH (ref 3.8–10.5)
WBC # BLD: 13.28 K/UL — HIGH (ref 3.8–10.5)
WBC # FLD AUTO: 10.62 K/UL — HIGH (ref 3.8–10.5)
WBC # FLD AUTO: 13.28 K/UL — HIGH (ref 3.8–10.5)
WBC UR QL: 2 /HPF — SIGNIFICANT CHANGE UP (ref 0–5)

## 2021-04-15 PROCEDURE — U0005: CPT

## 2021-04-15 PROCEDURE — 99285 EMERGENCY DEPT VISIT HI MDM: CPT

## 2021-04-15 PROCEDURE — 83550 IRON BINDING TEST: CPT

## 2021-04-15 PROCEDURE — 83690 ASSAY OF LIPASE: CPT

## 2021-04-15 PROCEDURE — 80053 COMPREHEN METABOLIC PANEL: CPT

## 2021-04-15 PROCEDURE — 81001 URINALYSIS AUTO W/SCOPE: CPT

## 2021-04-15 PROCEDURE — 86923 COMPATIBILITY TEST ELECTRIC: CPT

## 2021-04-15 PROCEDURE — P9016: CPT

## 2021-04-15 PROCEDURE — 85025 COMPLETE CBC W/AUTO DIFF WBC: CPT

## 2021-04-15 PROCEDURE — 84155 ASSAY OF PROTEIN SERUM: CPT

## 2021-04-15 PROCEDURE — 84165 PROTEIN E-PHORESIS SERUM: CPT

## 2021-04-15 PROCEDURE — 85027 COMPLETE CBC AUTOMATED: CPT

## 2021-04-15 PROCEDURE — 86901 BLOOD TYPING SEROLOGIC RH(D): CPT

## 2021-04-15 PROCEDURE — U0003: CPT

## 2021-04-15 PROCEDURE — 99223 1ST HOSP IP/OBS HIGH 75: CPT

## 2021-04-15 PROCEDURE — 86850 RBC ANTIBODY SCREEN: CPT

## 2021-04-15 PROCEDURE — 36430 TRANSFUSION BLD/BLD COMPNT: CPT

## 2021-04-15 PROCEDURE — 87086 URINE CULTURE/COLONY COUNT: CPT

## 2021-04-15 PROCEDURE — 86334 IMMUNOFIX E-PHORESIS SERUM: CPT

## 2021-04-15 PROCEDURE — 83540 ASSAY OF IRON: CPT

## 2021-04-15 PROCEDURE — 83010 ASSAY OF HAPTOGLOBIN QUANT: CPT

## 2021-04-15 PROCEDURE — 86900 BLOOD TYPING SEROLOGIC ABO: CPT

## 2021-04-15 PROCEDURE — 86769 SARS-COV-2 COVID-19 ANTIBODY: CPT

## 2021-04-15 PROCEDURE — 82962 GLUCOSE BLOOD TEST: CPT

## 2021-04-15 PROCEDURE — 82728 ASSAY OF FERRITIN: CPT

## 2021-04-15 PROCEDURE — 84702 CHORIONIC GONADOTROPIN TEST: CPT

## 2021-04-15 PROCEDURE — 83735 ASSAY OF MAGNESIUM: CPT

## 2021-04-15 RX ORDER — SODIUM ZIRCONIUM CYCLOSILICATE 10 G/10G
10 POWDER, FOR SUSPENSION ORAL ONCE
Refills: 0 | Status: COMPLETED | OUTPATIENT
Start: 2021-04-15 | End: 2021-04-15

## 2021-04-15 RX ORDER — BUMETANIDE 0.25 MG/ML
0 INJECTION INTRAMUSCULAR; INTRAVENOUS
Qty: 0 | Refills: 0 | DISCHARGE

## 2021-04-15 RX ORDER — ACETAMINOPHEN 500 MG
650 TABLET ORAL EVERY 6 HOURS
Refills: 0 | Status: DISCONTINUED | OUTPATIENT
Start: 2021-04-15 | End: 2021-04-15

## 2021-04-15 RX ADMIN — SODIUM ZIRCONIUM CYCLOSILICATE 10 GRAM(S): 10 POWDER, FOR SUSPENSION ORAL at 19:02

## 2021-04-15 NOTE — CONSULT NOTE ADULT - ASSESSMENT
50 year old female PMH anemia, CKD, HTN, presents for general fatigue and back pain  IGA nephropathy   CKD stage 3 b    50 year old female PMH anemia, CKD, HTN, presents for general fatigue and back pain  IGA nephropathy   CKD stage 3 b with acute on chronic renal failure likely due to hemodynamics   Hyperkalemia     1 GI- Dr Stuart Gold has done in the past.  SP Full workup   GI evaluation here at [facility]  2 Renal-Once HGB is increased I suspect the creatinine will improve  No need for renal sono  Lokelma 10 gm po x 1   3 Heme-Check Iron stores and haptoglobulin as well    DW PCP    [facility]   [phone]

## 2021-04-15 NOTE — ED ADULT NURSE REASSESSMENT NOTE - NS ED NURSE REASSESS COMMENT FT1
Patient aware she is moving to [facility] room 1 and VIVIANA talavera will be taking over her care as she is  admitted at this time.  Patient aware that per plan discussed with management she will remain in the room during her transfusions.  Patient agreeable to plan and aware she will have a roommate and said she is fine with that.

## 2021-04-15 NOTE — H&P ADULT - NSHPLABSRESULTS_GEN_ALL_CORE
5.9    13.28 )-----------( 456      ( 15 Apr 2021 03:54 )             22.7     -15    140  |  106  |  37<H>  ----------------------------<  129<H>  5.3   |  20<L>  |  3.26<H>    Ca    8.6      15 Apr 2021 03:54  Mg     2.4     04-15    TPro  7.8  /  Alb  3.8  /  TBili  0.1<L>  /  DBili  x   /  AST  42<H>  /  ALT  15  /  AlkPhos  119  04-15      Urinalysis Basic - ( 15 Apr 2021 03:54 )    Color: Light Yellow / Appearance: Clear / S.014 / pH: x  Gluc: x / Ketone: Negative  / Bili: Negative / Urobili: Negative   Blood: x / Protein: 300 mg/dL / Nitrite: Negative   Leuk Esterase: Negative / RBC: 0 /hpf / WBC 2 /HPF   Sq Epi: x / Non Sq Epi: 1 /hpf / Bacteria: Negative

## 2021-04-15 NOTE — ED ADULT NURSE REASSESSMENT NOTE - NS ED NURSE REASSESS COMMENT FT1
Report received from VIVIANA Leach.  Patient resting in stretcher with eyes closed but arouses easily.  She denies pain and is aware that blood is infusing via IV and will finish around 0900.  She is admitted and pending bed assignment.

## 2021-04-15 NOTE — DISCHARGE NOTE PROVIDER - NSDCCPCAREPLAN_GEN_ALL_CORE_FT
PRINCIPAL DISCHARGE DIAGNOSIS  Diagnosis: Left against medical advice  Assessment and Plan of Treatment:

## 2021-04-15 NOTE — H&P ADULT - PROBLEM SELECTOR PLAN 1
#YUAN, AOCID, requiring recurrent PRBC and IV iron transfusions   Symptomatic anemia  Workup in 2020: EGD, colonoscopy, pill endoscopy, SPEP, ZOLTAN, Hg Electrophoresis: reportedly unremarkable.   No S/S of bleed    Finish transfusing 2 unit PRBC  Recheck CBC  Consider hematology consult #YUAN, AOCD, requiring recurrent PRBC and IV iron transfusions   Symptomatic anemia  Workup in 2020: EGD, colonoscopy, pill endoscopy, SPEP, ZOLTAN, Hg Electrophoresis: reportedly unremarkable.   No S/S of bleed    Finish transfusing 2 unit PRBC  Monitor closely for fever, transfusion rxn  Recheck CBC  Check iron studies, ferritin   Consider hematology consult #YUAN, AOCD, requiring recurrent PRBC and IV iron transfusions   Symptomatic anemia  Workup in 2020: EGD, colonoscopy, pill endoscopy, SPEP, ZOLTAN, Hg Electrophoresis: reportedly unremarkable.   No S/S of bleed    Finish transfusing 2 unit PRBC  Monitor closely for fever, transfusion rxn  Recheck CBC  Check iron studies, ferritin, hapto  Consider hematology consult #YUAN, AOCD, requiring recurrent PRBC and IV iron transfusions   Symptomatic anemia  Workup in 2020: EGD, colonoscopy, pill endoscopy, SPEP, ZOLTAN, Hg Electrophoresis: reportedly unremarkable.   No S/S of bleed    Finish transfusing 2 unit PRBC  Monitor closely for fever, transfusion rxn  Recheck CBC post transfusion  Check iron studies, ferritin, hapto  Consider hematology consult

## 2021-04-15 NOTE — ED PROVIDER NOTE - ATTENDING CONTRIBUTION TO CARE
pt w chronic anemia here due to fatigue, low back pain, concern for recurrent anemia. denies cp, sob. on exam, pt is well appearing, asymptomatic at rest, unremarkable vital signs. labs do show acute anemia of 5.9 and an increase in creatinine from her baseline (which is elevated). unclear etiology of low back pain but low suspicion for retroperitoneal bleed, ua neg for infection, no reported trauma. pt admitted for transfusion, further w/u of acute on chronic ckd, back pain.

## 2021-04-15 NOTE — DISCHARGE NOTE PROVIDER - HOSPITAL COURSE
PATIENT LEAVING AMA     PATIENT LEAVING AMA    50 year old female with PMH of anemia (likely YUAN, AOCD), IgA nephropathy, hypertension presented to the ED with chief complaint of back and right lower abdominal pain, generalized malaise, found to have Hgb 5.9, Cr 3.26. Patient was admitted for further evaluation and management of acute symptomatic anemia, EVELYN on CKD.   In ED patient received 2u PRBC for h/h     Problem/Plan - 1:  ·  Problem: Anemia.  Plan: #YUAN, AOCD, requiring recurrent PRBC and IV iron transfusions   Symptomatic anemia  Workup in 2020: EGD, colonoscopy, pill endoscopy, SPEP, ZOLTAN, Hg Electrophoresis: reportedly unremarkable.   No S/S of bleed    Finish transfusing 2 unit PRBC  Monitor closely for fever, transfusion rxn  Recheck CBC post transfusion  Check iron studies, ferritin, hapto  Consider hematology consult.      Problem/Plan - 2:  ·  Problem: Chronic kidney disease.  Plan: #IgA Nephropathy, CKD stage 4  Cr appears to be above baseline (was 2.62 in Feb, now 3.26)  UA + protein  Hold Losartan, Bumex  Monitor BMP, strict Is/Os  Avoid nephrotoxins, renally dose meds  Nephrology consult  Consider renal US.       Problem/Plan - 3:  ·  Problem: HTN (hypertension).  Plan: Hold Losartan  Monitor.      Problem/Plan - 4:  ·  Problem: Need for prophylactic measure.  Plan: DVT PPX: SCDs  Ambulate as tolerated         PATIENT LEAVING AMA    50 year old female with PMH of anemia (likely YUAN, AOCD), IgA nephropathy, hypertension presented to the ED with chief complaint of back and right lower abdominal pain, generalized malaise, found to have Hgb 5.9, Cr 3.26. Patient was admitted for further evaluation and management of acute symptomatic anemia, EVELYN on CKD.   In ED patient received 2u PRBC for h/h 5.9/22. Post transfusion h/h 7.5/26.6. Hematology consult was called.  Cr. 3.26. Potassium 5.3 s/p lokelma 10mg po x1   Patient tested positive for COVID-19. 02 sat 100% ra    Patient now adamant abut leaving AMA. Patient verbalized full understanding of the risk of leaving AMA including death. Pt refused to sign AMA form. Nursing administrator Neris and Attending aware.  Discussed with Dr. Barboza and aware of above. Per Attending, pt to continue home medications of losartan and bumex.                 PATIENT LEAVING AMA    50 year old female with PMH of anemia (likely YUAN, AOCD), IgA nephropathy, hypertension presented to the ED with chief complaint of back and right lower abdominal pain, generalized malaise, found to have Hgb 5.9, Cr 3.26. Patient was admitted for further evaluation and management of acute symptomatic anemia, EVELYN on CKD.   In ED patient received 2u PRBC for h/h 5.9/22. Post transfusion h/h 7.5/26.6. Hematology consult was called.  Cr. 3.26. Potassium 5.3 s/p lokelma 10mg po x1   Patient tested positive for COVID-19. 02 sat 100% ra    Patient now adamant abut leaving AMA. Patient verbalized full understanding of the risk of leaving AMA including death. Pt refused to sign AMA form. Nursing administrator Neris and Attending aware.  Discussed with Dr. Barboza and aware of above. Per Attending, pt to continue home medications of losartan and bumex. Patient advised to follow up with her PMD Dr. Cannon and Renal Dr. Delonte Vigil as soon as possible.

## 2021-04-15 NOTE — H&P ADULT - NSHPPHYSICALEXAM_GEN_ALL_CORE
Vital Signs Last 24 Hrs  T(C): 36.8 (15 Apr 2021 06:20), Max: 37.1 (15 Apr 2021 00:18)  T(F): 98.2 (15 Apr 2021 06:20), Max: 98.8 (15 Apr 2021 06:00)  HR: 74 (15 Apr 2021 09:18) (74 - 94)  BP: 144/87 (15 Apr 2021 09:18) (126/82 - 146/89)  BP(mean): --  RR: 16 (15 Apr 2021 09:18) (16 - 18)  SpO2: 99% (15 Apr 2021 09:18) (97% - 100%)

## 2021-04-15 NOTE — CHART NOTE - NSCHARTNOTEFT_GEN_A_CORE
Called by RN due to patient wanting to sign out AMA. Asked patient why she wanted to leave and stated that she has "a lot going on right now" and that her sister is in the hospital that needs her. Patient stated that she feels much improved and can follow up with her doctors outpatient.     Patient is AAO x 3. NAD, VSS.  I explained at length to the patient the risks of signing out AMA including but not limited to harm, injury or death. I explained the risks, benefits and alternatives to treatment as well as the attendant risks of refusing treatment at this time. I offered to answer any questions and fully answered any such questions. Patient accepts responsibility for any and all results of this decision. We believe that the patient fully understands what has been explained and answered.   Patient refused to sign AMA form. Dr. Barboza and [facility] aware of above.    Patricia Marc NP  Medicine  [id number]

## 2021-04-15 NOTE — ED PROVIDER NOTE - PHYSICAL EXAMINATION
GENERAL: A&Ox3, non-toxic appearing, no acute distress  HEENT: NCAT, EOMI, oral mucosa moist, normal conjunctiva  RESP: CTAB, no respiratory distress, no wheezes/rhonchi/rales, speaking in full sentences  CV: RRR, no murmurs/rubs/gallops  ABDOMEN: soft, non-tender, non-distended, no guarding  MSK: no visible deformities  NEURO: no focal sensory or motor deficits, CN 2-12 grossly intact  SKIN: warm, normal color, well perfused, no rash  PSYCH: normal affect

## 2021-04-15 NOTE — H&P ADULT - HISTORY OF PRESENT ILLNESS
Patient is a 50 year old female with PMH of anemia (likely YUAN, AOCD), IgA nephropathy, hypertension presented to the ED with chief complaint of back and RL abdominal pain, generalized malaise. Patient reports having malaise at baseline. For the past two days she has also been experiencing right low back pain , feels like pressure and sharp at times, radiating towards right side of her abdomen. She also noticed R sided abdominal swelling. She was nervous that her kidney was enlarged. She was unable to sleep due to the pain and decided to come to the hospital for evaluation. She has not had a pRBC transfusion since her discharge in Feb 2020. Reports having had IV iron transfusion at nephrology clinic x2 a week after her discharge. Has a mild headache and felt feverish at home. Denies dizziness, vision changes, chest pain, cough, sore throat, hemoptysis, sob, palpitations, n/v, abdominal pain, dysuria, change in urinary frequency hematuria, diarrhea, blood in stool, pain/numbness/weakness in extremities, any rash or enlarged lymph nodes.

## 2021-04-15 NOTE — CONSULT NOTE ADULT - SUBJECTIVE AND OBJECTIVE BOX
NEPHROLOGY - NSN    Patient seen and examined.    HPI:  50 year old female PMH anemia, CKD, HTN, presents for general fatigue and back pain. States for past few days, has been feeling more tired. Today, starting having lower back discomfort and abd pain. Abd pain is crampy, diffuse, non-radiating. No associated nausea, vomiting, or dysuria. She was recently admitted in February for anemia and was nervous, so she came to the ER to have her "kidneys and blood checked". Endorses low grade fever the other day. Denies chills, cp, sob, diarrhea, constipation, or weakness  50F with PMHx of IgA Nephropathy, HTN, and chronic microcytic anemia (believed to be due to iron deficiency and/or due to AOCD 2/2 to CKD) presents with chronic fatigue and anemia as outpatient. Patient has chronic fatigue and poor exercise tolerance which she states is unchanged from baseline.  She denies menorrhagia, hematochezia, or hematuria. No fevers, chills, SOB, or cough.  She has seen Dr Gold as an outpt (GI)  She has had classic iron deficiency and sp IV venofer        PAST MEDICAL & SURGICAL HISTORY:  DM (diabetes mellitus)    Anemia    HTN (hypertension)    IgA nephropathy determined by renal biopsy    History of endometriosis        MEDICATIONS  (STANDING):      Allergies    No Known Allergies    Intolerances        SOCIAL HISTORY:  Denies alcohol abuse, drug abuse or tobacco usage.     FAMILY HISTORY:  FH: diabetes mellitus        VITALS:  T(C): 36.8 (04-15-21 @ 06:20), Max: 37.1 (04-15-21 @ 00:18)  HR: 74 (04-15-21 @ 09:18) (74 - 94)  BP: 144/87 (04-15-21 @ 09:18) (126/82 - 146/89)  RR: 16 (04-15-21 @ 09:18) (16 - 18)  SpO2: 99% (04-15-21 @ 09:18) (97% - 100%)    REVIEW OF SYSTEMS:  Denies any nausea, vomiting, diarrhea, fever or chills.  Good oral intake and denies fatigue or weakness. All other pertinent systems are reviewed and are negative.    PHYSICAL EXAM:  Constitutional: NAD  HEENT: EOMI  Neck:  No JVD, supple   Respiratory: CTA B/L  Cardiovascular: S1 and S2, RRR  Gastrointestinal: + BS, soft, NT, ND  Extremities: No peripheral edema, + peripheral pulses  Neurological: A/O x 3, CN2-12 intact  Psychiatric: Normal mood, normal affect  : No Hernández  Skin: No rashes, C/D/I  Access: Not applicable    I and O's:    Height (cm): 162.6 (04-15 @ 00:18)  Weight (kg): 90.7 (04-15 @ 00:18)  BMI (kg/m2): 34.3 (04-15 @ 00:18)  BSA (m2): 1.96 (04-15 @ 00:18)    LABS:                        5.9    13.28 )-----------( 456      ( 15 Apr 2021 03:54 )             22.7     04-15    140  |  106  |  37<H>  ----------------------------<  129<H>  5.3   |  20<L>  |  3.26<H>    Ca    8.6      15 Apr 2021 03:54  Mg     2.4     04-15    TPro  7.8  /  Alb  3.8  /  TBili  0.1<L>  /  DBili  x   /  AST  42<H>  /  ALT  15  /  AlkPhos  119  -15      URINE:  Urinalysis Basic - ( 15 Apr 2021 03:54 )    Color: Light Yellow / Appearance: Clear / S.014 / pH: x  Gluc: x / Ketone: Negative  / Bili: Negative / Urobili: Negative   Blood: x / Protein: 300 mg/dL / Nitrite: Negative   Leuk Esterase: Negative / RBC: 0 /hpf / WBC 2 /HPF   Sq Epi: x / Non Sq Epi: 1 /hpf / Bacteria: Negative        RADIOLOGY & ADDITIONAL STUDIES:    < from: [facility] Duplex Upper Ext Vein Scan, Left (20 @ 11:22) >    EXAM:  DUPLEX EXT VEINS UPPER LT                            PROCEDURE DATE:  2020            INTERPRETATION:  CLINICAL INFORMATION: Left upper extremity swelling.    COMPARISON: None available.    TECHNIQUE: Duplex sonography of the LEFT UPPER extremity veins with color and spectral Doppler, with and without compression.      FINDINGS:    The left internal jugular, subclavian, axillary and brachial veins are patent and compressible where applicable.  The basilic vein (superficial veins) is patent and without thrombus.     There is thrombus within a branch of the cephalic vein in the antecubital fossae and forearm consistent with superficial thrombophlebitis.    Doppler examination shows normal spontaneous and phasic flow.    IMPRESSION:     Left upper extremity superficial thrombophlebitis.                            ISELA GUTIÉRREZ M.D., ATTENDING RADIOLOGIST  This document has been electronically signed. Mar  1 2020  5:24PM              < end of copied text >   NEPHROLOGY - NSN    Patient seen and examined.    HPI:  50 year old female PMH anemia, CKD, HTN, presents for general fatigue and back pain. States for past few days, has been feeling more tired. Today, starting having lower back discomfort and abd pain. Abd pain is crampy, diffuse, non-radiating. No associated nausea, vomiting, or dysuria. She was recently admitted in February for anemia and was nervous, so she came to the ER to have her "kidneys and blood checked". Endorses low grade fever the other day. Denies chills, cp, sob, diarrhea, constipation, or weakness  50F with PMHx of IgA Nephropathy, HTN, and chronic microcytic anemia (believed to be due to iron deficiency and/or due to AOCD 2/2 to CKD) presents with chronic fatigue and anemia as outpatient. Patient has chronic fatigue and poor exercise tolerance which she states is unchanged from baseline.  She denies menorrhagia, hematochezia, or hematuria. No fevers, chills, SOB, or cough.  She has seen Dr Gold as an outpt (GI)  She has had classic iron deficiency and sp IV venofer    She had EGD/Colonoscopy and small bowel series     PAST MEDICAL & SURGICAL HISTORY:  DM (diabetes mellitus)    Anemia    HTN (hypertension)    IgA nephropathy determined by renal biopsy    History of endometriosis        MEDICATIONS  (STANDING):      Allergies    No Known Allergies    Intolerances        SOCIAL HISTORY:  Denies alcohol abuse, drug abuse or tobacco usage.     FAMILY HISTORY:  FH: diabetes mellitus        VITALS:  T(C): 36.8 (04-15-21 @ 06:20), Max: 37.1 (04-15-21 @ 00:18)  HR: 74 (04-15-21 @ 09:18) (74 - 94)  BP: 144/87 (04-15-21 @ 09:18) (126/82 - 146/89)  RR: 16 (04-15-21 @ 09:18) (16 - 18)  SpO2: 99% (04-15-21 @ 09:18) (97% - 100%)    REVIEW OF SYSTEMS:  Denies any nausea, vomiting, diarrhea, fever or chills.  Good oral intake and denies fatigue or weakness. All other pertinent systems are reviewed and are negative.    PHYSICAL EXAM:  Constitutional: NAD  HEENT: EOMI  Neck:  No JVD, supple   Respiratory: CTA B/L  Cardiovascular: S1 and S2, RRR  Gastrointestinal: + BS, soft, NT, ND  Extremities: No peripheral edema, + peripheral pulses  Neurological: A/O x 3, CN2-12 intact  Psychiatric: Normal mood, normal affect  : No Hernández  Skin: No rashes, C/D/I  Access: Not applicable    I and O's:    Height (cm): 162.6 (04-15 @ 00:18)  Weight (kg): 90.7 (04-15 @ 00:18)  BMI (kg/m2): 34.3 (04-15 @ 00:18)  BSA (m2): 1.96 (04-15 @ 00:18)    LABS:                        5.9    13.28 )-----------( 456      ( 15 Apr 2021 03:54 )             22.7     04-15    140  |  106  |  37<H>  ----------------------------<  129<H>  5.3   |  20<L>  |  3.26<H>    Ca    8.6      15 Apr 2021 03:54  Mg     2.4     04-15    TPro  7.8  /  Alb  3.8  /  TBili  0.1<L>  /  DBili  x   /  AST  42<H>  /  ALT  15  /  AlkPhos  119  04-15      URINE:  Urinalysis Basic - ( 15 Apr 2021 03:54 )    Color: Light Yellow / Appearance: Clear / S.014 / pH: x  Gluc: x / Ketone: Negative  / Bili: Negative / Urobili: Negative   Blood: x / Protein: 300 mg/dL / Nitrite: Negative   Leuk Esterase: Negative / RBC: 0 /hpf / WBC 2 /HPF   Sq Epi: x / Non Sq Epi: 1 /hpf / Bacteria: Negative        RADIOLOGY & ADDITIONAL STUDIES:    < from: [facility] Duplex Upper Ext Vein Scan, Left (20 @ 11:22) >    EXAM:  DUPLEX EXT VEINS UPPER LT                            PROCEDURE DATE:  2020            INTERPRETATION:  CLINICAL INFORMATION: Left upper extremity swelling.    COMPARISON: None available.    TECHNIQUE: Duplex sonography of the LEFT UPPER extremity veins with color and spectral Doppler, with and without compression.      FINDINGS:    The left internal jugular, subclavian, axillary and brachial veins are patent and compressible where applicable.  The basilic vein (superficial veins) is patent and without thrombus.     There is thrombus within a branch of the cephalic vein in the antecubital fossae and forearm consistent with superficial thrombophlebitis.    Doppler examination shows normal spontaneous and phasic flow.    IMPRESSION:     Left upper extremity superficial thrombophlebitis.                            ISELA GUTIÉRREZ M.D., ATTENDING RADIOLOGIST  This document has been electronically signed. Mar  1 2020  5:24PM              < end of copied text >

## 2021-04-15 NOTE — ED PROVIDER NOTE - OBJECTIVE STATEMENT
50 year old female PMH anemia, CKD, HTN, presents for general fatigue and back pain. States for past few days, has been feeling more tired. Today, starting having lower back discomfort and abd pain. Abd pain is crampy, diffuse, non-radiating. No associated nausea, vomiting, or dysuria. She was recently admitted in February for anemia and was nervous, so she came to the ER to have her "kidneys and blood checked". Endorses low grade fever the other day. Denies chills, cp, sob, diarrhea, constipation, or weakness.

## 2021-04-15 NOTE — H&P ADULT - NEGATIVE GENERAL GENITOURINARY SYMPTOMS
no hematuria/no renal colic/no urine discoloration/no incontinence/no dysuria/normal urinary frequency

## 2021-04-15 NOTE — H&P ADULT - ASSESSMENT
Patient is a 50 year old female with PMH of anemia (likely YUAN, AOCD), IgA nephropathy, hypertension presented to the ED with chief complaint of back and RL abdominal pain, generalized malaise, found to have Hgb 5.9, Cr 3.26. Patient is admitted admitted for further evaluation and management of acute symptomatic anemia, EVELYN on CKD.

## 2021-04-15 NOTE — DISCHARGE NOTE PROVIDER - CARE PROVIDER_API CALL
Seb Cannon)  Internal Medicine  [address] [place] [address]  Phone: [phone]  Fax: [phone]  Follow Up Time:     JASEN HERRERA  Internal Medicine  Phone: [phone]  Follow Up Time:

## 2021-04-15 NOTE — ED ADULT NURSE NOTE - CHIEF COMPLAINT QUOTE
low back pain; abdominal distention; general malaise; symptoms started yesterday; hx anemia requiring blood transfusions; no blood thinners; LMP year ago;

## 2021-04-15 NOTE — ED ADULT NURSE REASSESSMENT NOTE - NS ED NURSE REASSESS COMMENT FT1
Patient said she does not want to be pulled into the [facility] while receiving blood transfusion and said she will not come into the [facility].   She said she will leave if she is pulled into the hallway and made aware of the process for admitted patient in the ED.  Sent management to patient's bedside to discuss further with patient.

## 2021-04-15 NOTE — H&P ADULT - PROBLEM SELECTOR PLAN 2
#IgA Nephropathy  Cr appears to be above baseline (was 2.62 in Feb, now 3.26)  UA + protein  Hold Losartan, Bumex  Monitor BMP, strict Is/Os  Avoid nephrotoxins, renally dose meds  Nephrology consult  Consider renal US #IgA Nephropathy, CKD stage 4  Cr appears to be above baseline (was 2.62 in Feb, now 3.26)  UA + protein  Hold Losartan, Bumex  Monitor BMP, strict Is/Os  Avoid nephrotoxins, renally dose meds  Nephrology consult  Consider renal US

## 2021-04-15 NOTE — ED ADULT NURSE REASSESSMENT NOTE - NS ED NURSE REASSESS COMMENT FT1
PRBC initiated infusing over 3 hours. Consent in chart. Risks and benefits of administering product explained to patient. Patient verbalized understanding of risks and benefits. Patient educated on side effects to look out for.  Second RN at beside for confirmation of product and pt identification. On bedside cardiac monitor. VS as documented. Pt resting comfortably in bed. Bed locked and lowered. Comfort and safety measures maintained.

## 2021-04-15 NOTE — ED PROVIDER NOTE - NS ED ROS FT
GENERAL: no fever, no chills, +fatigue  EYES: no change in vision, no irritation, no discharge, no redness, no pain  HEENT: no trouble swallowing or speaking, no throat pain, no ear pain  CARDIAC: no chest pain, no palpitations   PULMONARY: no cough, no shortness of breath, no wheezing  GI: no abdominal pain, no nausea, no vomiting, no diarrhea, no constipation, no melena, no hematochezia, no hematemesis  : no changes in urination, no dysuria, no frequency, no hematuria, no discharge  SKIN: no rashes  NEURO: no headache, no numbness, no weakness  MSK: no joint pain, no muscle pain, +back pain, no calf pain

## 2021-04-15 NOTE — DISCHARGE NOTE PROVIDER - NSDCMRMEDTOKEN_GEN_ALL_CORE_FT
BUMETANIDE 2 MG TABLET: TAKE 1 TABLET (2MG) BY ORAL ROUTE ONCE DAILY  LOSARTAN POTASSIUM 50 MG TAB: TAKE 1 TABLET BY MOUTH EVERY DAY   LOSARTAN POTASSIUM 50 MG TAB: TAKE 1 TABLET BY MOUTH EVERY DAY   Bumex 1 mg oral tablet: 1 tab(s) orally once a day  LOSARTAN POTASSIUM 50 MG TAB: TAKE 1 TABLET BY MOUTH EVERY DAY

## 2021-04-15 NOTE — ED PROVIDER NOTE - CARE PLAN
Principal Discharge DX:	Anemia   Principal Discharge DX:	Symptomatic anemia  Secondary Diagnosis:	Acute renal failure superimposed on chronic kidney disease, unspecified CKD stage, unspecified acute renal failure type

## 2021-04-15 NOTE — H&P ADULT - NSHPSOCIALHISTORY_GEN_ALL_CORE
Lives at home  Self employed  Independent  Drinks alcohol occasionally. Denies smoking/tobacco/thc/any other illicit drug use

## 2021-04-15 NOTE — ED ADULT NURSE NOTE - OBJECTIVE STATEMENT
50 year old female presents to ED via walk-in with multiple medical complaints. Pt endorsing back pain, right flank pain. States she has a history of low hemoglobin and is having symptoms so wanted to make sure her blood counts were okay. Endorsing weakness as well. Upon assessment A&O x4, ambulatory and well appearing. Breathing spontaneously on room air. Denies n/v/d, fever, chills, urinary symptoms. Bed locked and lowered. Comfort and safety measures maintained.

## 2021-04-15 NOTE — ED PROVIDER NOTE - PROGRESS NOTE DETAILS
Addison Martell, PGY2: Hgb 5.9, type and screen ordered, consent placed in chart. 2 units PRBC ordered. Will admit for transfusion and monitoring. Patient aware of plan. TBA to Dr. Cannon. Addison Martell, PGY2: Dr. Barboza covering Dr. Cannon. Discussed admission and all questions answered. Will admit to medicine.

## 2021-04-16 LAB
% ALBUMIN: 48.3 % — SIGNIFICANT CHANGE UP
% ALPHA 1: 6.4 % — SIGNIFICANT CHANGE UP
% ALPHA 2: 11.3 % — SIGNIFICANT CHANGE UP
% BETA: 11.4 % — SIGNIFICANT CHANGE UP
% GAMMA: 22.6 % — SIGNIFICANT CHANGE UP
ALBUMIN SERPL ELPH-MCNC: 3.3 G/DL — LOW (ref 3.6–5.5)
ALBUMIN/GLOB SERPL ELPH: 0.9 RATIO — SIGNIFICANT CHANGE UP
ALPHA1 GLOB SERPL ELPH-MCNC: 0.4 G/DL — SIGNIFICANT CHANGE UP (ref 0.1–0.4)
ALPHA2 GLOB SERPL ELPH-MCNC: 0.8 G/DL — SIGNIFICANT CHANGE UP (ref 0.5–1)
B-GLOBULIN SERPL ELPH-MCNC: 0.8 G/DL — SIGNIFICANT CHANGE UP (ref 0.5–1)
COVID-19 SPIKE DOMAIN AB INTERP: POSITIVE
COVID-19 SPIKE DOMAIN ANTIBODY RESULT: 15.1 U/ML — HIGH
CULTURE RESULTS: SIGNIFICANT CHANGE UP
FERRITIN SERPL-MCNC: 151 NG/ML — HIGH (ref 15–150)
GAMMA GLOBULIN: 1.6 G/DL — SIGNIFICANT CHANGE UP (ref 0.6–1.6)
HAPTOGLOB SERPL-MCNC: 159 MG/DL — SIGNIFICANT CHANGE UP (ref 34–200)
INTERPRETATION SERPL IFE-IMP: SIGNIFICANT CHANGE UP
IRON SATN MFR SERPL: 37 % — SIGNIFICANT CHANGE UP (ref 14–50)
IRON SATN MFR SERPL: 88 UG/DL — SIGNIFICANT CHANGE UP (ref 30–160)
PROT PATTERN SERPL ELPH-IMP: SIGNIFICANT CHANGE UP
PROT SERPL-MCNC: 6.9 G/DL — SIGNIFICANT CHANGE UP (ref 6–8.3)
PROT SERPL-MCNC: 6.9 G/DL — SIGNIFICANT CHANGE UP (ref 6–8.3)
SARS-COV-2 IGG+IGM SERPL QL IA: 15.1 U/ML — HIGH
SARS-COV-2 IGG+IGM SERPL QL IA: POSITIVE
SPECIMEN SOURCE: SIGNIFICANT CHANGE UP
TIBC SERPL-MCNC: 239 UG/DL — SIGNIFICANT CHANGE UP (ref 220–430)
UIBC SERPL-MCNC: 152 UG/DL — SIGNIFICANT CHANGE UP (ref 110–370)

## 2021-04-21 DIAGNOSIS — Z71.89 OTHER SPECIFIED COUNSELING: ICD-10-CM

## 2021-05-06 NOTE — CONSULT NOTE ADULT - SUBJECTIVE AND OBJECTIVE BOX
49 y old AA female with hx of DMII, HTN, endometriosis here with SOB, ARIAS and abdominal girth last 1 week( sudden onset) and new LE edema as well. All started 1 week ago. Also cough for 1 month. She Feels that it is her endometriosis that is bleeding and enlarged. She has had something similar in 2012.  She was found to have hgb of 4 on admission, s/p total of 4U PRBC with hgb up to 7.6 today. MCV low. Denies hematuria or GIB.     PAST MEDICAL & SURGICAL HISTORY:  HTN (hypertension)  Anemia  DM (diabetes mellitus)      FAMILY HISTORY:      Alochol: Denied  Smoking: Nonsmoker  Drug Use: Denied  Marital Status:         Allergies    No Known Allergies    Intolerances        MEDICATIONS  (STANDING):  buMETAnide Injectable 2 milliGRAM(s) IV Push two times a day  dextrose 5%. 1000 milliLiter(s) (50 mL/Hr) IV Continuous <Continuous>  dextrose 50% Injectable 12.5 Gram(s) IV Push once  dextrose 50% Injectable 25 Gram(s) IV Push once  dextrose 50% Injectable 25 Gram(s) IV Push once  heparin  Injectable 5000 Unit(s) SubCutaneous two times a day  influenza   Vaccine 0.5 milliLiter(s) IntraMuscular once  insulin lispro (HumaLOG) corrective regimen sliding scale   SubCutaneous three times a day before meals  losartan 50 milliGRAM(s) Oral daily    MEDICATIONS  (PRN):  dextrose 40% Gel 15 Gram(s) Oral once PRN Blood Glucose LESS THAN 70 milliGRAM(s)/deciliter  glucagon  Injectable 1 milliGRAM(s) IntraMuscular once PRN Glucose LESS THAN 70 milligrams/deciliter      ROS  No fever, sweats, chills, no dizziness  No epistaxis, HA, sore throat  No CP, sputum + SOB, no palpitations  No n/v/d,  melena, hematochezia + epigastric abd discomfort  + edema  No rash  No anxiety  No back pain, joint pain  No bleeding, bruising  No dysuria, hematuria    T(C): 37 (02-22-20 @ 21:33), Max: 37 (02-22-20 @ 21:33)  HR: 109 (02-22-20 @ 21:33) (91 - 112)  BP: 133/92 (02-22-20 @ 21:33) (133/92 - 155/101)  RR: 18 (02-22-20 @ 21:33) (18 - 19)  SpO2: 97% (02-22-20 @ 21:33) (96% - 100%)  Wt(kg): --    PE  NAD  Awake, alert  Anicteric, MMM  RRR  CTAB  Abd soft, NT, distended  2+ pitting edema b/l LE  No rash grossly  FROM                          7.6    9.94  )-----------( 313      ( 22 Feb 2020 16:36 )             27.7       02-22    137  |  108  |  23  ----------------------------<  114<H>  4.1   |  18<L>  |  2.07<H>    Ca    8.0<L>      22 Feb 2020 03:32    TPro  6.6  /  Alb  x   /  TBili  x   /  DBili  x   /  AST  x   /  ALT  x   /  AlkPhos  x   02-22
[facility] DIVISION OF KIDNEY DISEASES AND HYPERTENSION -- INITIAL CONSULT NOTE  --------------------------------------------------------------------------------  HPI: 49 y old AA female with hx of DMII, HTN, endometriosis here with SOB, ARIAS and abdominal girth last 1 week( sudden onset) and new LE edema as well. All started 1 week ago. Also cough for 1 month. Non sputum  She has had some n/v as well and some sputum production. She Feels that it is her endometriosis that is bleeding and enlarged. She has had something similar in 2012. In er, she had hgb of 4, crt of 2. Her prior crt was normal in 2015. No NSAID use, no recent new meds. No prior hx of renal disease. She has no other med hx.  She does have labs showing 300+ protein and albumin <3.5.  No jt pains, no rashes, no other specific complaints  She works as a  and was in [employer] as one of the best  for [employer] home [employer].      PAST HISTORY  --------------------------------------------------------------------------------  PAST MEDICAL & SURGICAL HISTORY:  HTN (hypertension)  Anemia  DM (diabetes mellitus)    FAMILY HISTORY:    PAST SOCIAL HISTORY:    ALLERGIES & MEDICATIONS  --------------------------------------------------------------------------------  Allergies    No Known Allergies    Intolerances      Standing Inpatient Medications    PRN Inpatient Medications      REVIEW OF SYSTEMS  --------------------------------------------------------------------------------  Gen: No weight changes, fatigue, fevers/chills, weakness  Skin: No rashes  Head/Eyes/Ears/Mouth: No headache; Normal hearing; Normal vision w/o blurriness; No sinus pain/discomfort, sore throat  Respiratory: No dyspnea, cough, wheezing, hemoptysis  CV: No chest pain, PND, orthopnea  GI: No abdominal pain, diarrhea, constipation, nausea, vomiting, melena, hematochezia  : No increased frequency, dysuria, hematuria, nocturia  MSK: No joint pain/swelling; no back pain; no edema  Neuro: No dizziness/lightheadedness, weakness, seizures, numbness, tingling  Heme: No easy bruising or bleeding  Endo: No heat/cold intolerance  Psych: No significant nervousness, anxiety, stress, depression    All other systems were reviewed and are negative, except as noted.    VITALS/PHYSICAL EXAM  --------------------------------------------------------------------------------  T(C): 37.2 (02-21-20 @ 21:18), Max: 37.4 (02-21-20 @ 18:07)  HR: 106 (02-21-20 @ 21:18) (100 - 119)  BP: 148/90 (02-21-20 @ 21:18) (135/94 - 159/98)  RR: 19 (02-21-20 @ 21:18) (19 - 22)  SpO2: 100% (02-21-20 @ 21:18) (99% - 100%)  Wt(kg): --  Height (cm): 162.56 (02-21-20 @ 13:29)  Weight (kg): 86.2 (02-21-20 @ 13:29)  BMI (kg/m2): 32.6 (02-21-20 @ 13:29)  BSA (m2): 1.91 (02-21-20 @ 13:29)      Physical Exam:  	Gen: NAD, well-appearing  	HEENT: PERRL, supple neck, clear oropharynx  	Pulm: CTA B/L  	CV: RRR, S1S2; no rub  	Back: No spinal or CVA tenderness; no sacral edema  	Abd: distended, anasarca  	: No suprapubic tenderness  	UE: Warm, FROM, no clubbing, intact strength; no edema; no asterixis  	LE: 2+edema noted  	    LABS/STUDIES  --------------------------------------------------------------------------------              4.0    7.76  >-----------<  363      [02-21-20 @ 14:52]              17.7     137  |  106  |  24  ----------------------------<  144      [02-21-20 @ 14:52]  4.1   |  18  |  2.04        Ca     8.3     [02-21-20 @ 14:52]    TPro  7.8  /  Alb  3.1  /  TBili  0.3  /  DBili  x   /  AST  25  /  ALT  16  /  AlkPhos  104  [02-21-20 @ 14:52]    PT/INR: PT 14.8 , INR 1.29       [02-21-20 @ 16:15]  PTT: 32.1       [02-21-20 @ 16:15]      Creatinine Trend:  SCr 2.04 [02-21 @ 14:52]    Urinalysis - [02-21-20 @ 15:40]      Color Light Yellow / Appearance Clear / SG 1.015 / pH 6.0      Gluc Negative / Ketone Negative  / Bili Negative / Urobili Negative       Blood Negative / Protein 300 mg/dL / Leuk Est Negative / Nitrite Negative      RBC 4 / WBC 5 / Hyaline 6 / Gran  / Sq Epi  / Non Sq Epi 2 / Bacteria Negative
[facility]  Lopez Robles PA-C  [address] [place] [address]  [phone]      Chief Complaint:  Patient is a 49y old  Female who presents with a chief complaint of Edema (22 Feb 2020 13:12)      HPI: 49 y old AA female with hx of DMII, HTN, endometriosis here with SOB, ARIAS and abdominal girth last 1 week( sudden onset) and new LE edema as well. All started 1 week ago. Also cough for 1 month. Non sputum  She has had some n/v as well and some sputum production. She Feels that it is her endometriosis that is bleeding and enlarged. She has had something similar in 2012. In er, she had hgb of 4, crt of 2. Her prior crt was normal in 2015. No NSAID use, no recent new meds. No prior hx of renal disease. She has no other med hx.  PRBC transfusion ordered in the ED  She does have labs showing 300+ protein and albumin <3.5.  No jt pains, no rashes, no other specific complaints    Allergies:  No Known Allergies      Medications:  buMETAnide Injectable 2 milliGRAM(s) IV Push two times a day  dextrose 40% Gel 15 Gram(s) Oral once PRN  dextrose 5%. 1000 milliLiter(s) IV Continuous <Continuous>  dextrose 50% Injectable 12.5 Gram(s) IV Push once  dextrose 50% Injectable 25 Gram(s) IV Push once  dextrose 50% Injectable 25 Gram(s) IV Push once  glucagon  Injectable 1 milliGRAM(s) IntraMuscular once PRN  guaiFENesin   Syrup  (Sugar-Free) 200 milliGRAM(s) Oral every 6 hours PRN  heparin  Injectable 5000 Unit(s) SubCutaneous two times a day  influenza   Vaccine 0.5 milliLiter(s) IntraMuscular once  insulin lispro (HumaLOG) corrective regimen sliding scale   SubCutaneous three times a day before meals  iron sucrose IVPB 200 milliGRAM(s) IV Intermittent every 24 hours  losartan 50 milliGRAM(s) Oral daily      PMHX/PSHX:  HTN (hypertension)  Anemia  DM (diabetes mellitus)      Family history:      Social History:     ROS:     General:  No wt loss, fevers, chills, night sweats, fatigue,   Eyes:  Good vision, no reported pain  ENT:  No sore throat, pain, runny nose, dysphagia  CV:  No pain, palpitations, hypo/hypertension  Resp:  No dyspnea, cough, tachypnea, wheezing  GI:  No pain, No nausea, No vomiting, No diarrhea, No constipation, No weight loss, No fever, No pruritis, No rectal bleeding, No tarry stools, No dysphagia,  :  No pain, bleeding, incontinence, nocturia  Muscle:  No pain, weakness  Neuro:  No weakness, tingling, memory problems  Psych:  No fatigue, insomnia, mood problems, depression  Endocrine:  No polyuria, polydipsia, cold/heat intolerance  Heme:  No petechiae, ecchymosis, easy bruisability  Skin:  No rash, tattoos, scars, edema      PHYSICAL EXAM:   Vital Signs:  Vital Signs Last 24 Hrs  T(C): 36.8 (24 Feb 2020 11:27), Max: 36.9 (23 Feb 2020 20:37)  T(F): 98.3 (24 Feb 2020 11:27), Max: 98.4 (23 Feb 2020 20:37)  HR: 88 (24 Feb 2020 11:27) (88 - 106)  BP: 143/94 (24 Feb 2020 11:27) (138/89 - 147/90)  BP(mean): --  RR: 18 (24 Feb 2020 11:27) (18 - 18)  SpO2: 98% (24 Feb 2020 11:27) (97% - 99%)  Daily     Daily     GENERAL:  Appears stated age, well-groomed, well-nourished, no distress  HEENT:  NC/AT,  conjunctivae clear and pink, no thyromegaly, nodules, adenopathy, no JVD, sclera -anicteric  CHEST:  Full & symmetric excursion, no increased effort, breath sounds clear  HEART:  Regular rhythm, S1, S2, no murmur/rub/S3/S4, no abdominal bruit, no edema  ABDOMEN:  Soft, non-tender, non-distended, normoactive bowel sounds,  no masses ,no hepato-splenomegaly, no signs of chronic liver disease  EXTEREMITIES:  no cyanosis,clubbing or edema  SKIN:  No rash/erythema/ecchymoses/petechiae/wounds/abscess/warm/dry  NEURO:  Alert, oriented, no asterixis, no tremor, no encephalopathy    LABS:                        7.9    11.21 )-----------( 299      ( 24 Feb 2020 07:16 )             29.1     02-24    138  |  106  |  32<H>  ----------------------------<  85  3.7   |  20<L>  |  2.44<H>    Ca    8.4      24 Feb 2020 07:08    TPro  6.9  /  Alb  x   /  TBili  x   /  DBili  x   /  AST  x   /  ALT  x   /  AlkPhos  x   02-23    LIVER FUNCTIONS - ( 23 Feb 2020 10:12 )  Alb: x     / Pro: 6.9 g/dL / ALK PHOS: x     / ALT: x     / AST: x     / GGT: x                   Imaging:
History of bilateral breast biopsy    History of bilateral tubal ligation    S/P partial hysterectomy

## 2021-05-29 ENCOUNTER — APPOINTMENT (OUTPATIENT)
Dept: CT IMAGING | Facility: IMAGING CENTER | Age: 51
End: 2021-05-29
Payer: MEDICAID

## 2021-05-29 ENCOUNTER — OUTPATIENT (OUTPATIENT)
Dept: OUTPATIENT SERVICES | Facility: HOSPITAL | Age: 51
LOS: 1 days | End: 2021-05-29
Payer: MEDICAID

## 2021-05-29 DIAGNOSIS — Z87.42 PERSONAL HISTORY OF OTHER DISEASES OF THE FEMALE GENITAL TRACT: Chronic | ICD-10-CM

## 2021-05-29 DIAGNOSIS — Z00.8 ENCOUNTER FOR OTHER GENERAL EXAMINATION: ICD-10-CM

## 2021-05-29 PROCEDURE — 74176 CT ABD & PELVIS W/O CONTRAST: CPT | Mod: 26

## 2021-05-29 PROCEDURE — 74176 CT ABD & PELVIS W/O CONTRAST: CPT

## 2021-06-05 ENCOUNTER — EMERGENCY (EMERGENCY)
Facility: HOSPITAL | Age: 51
LOS: 1 days | Discharge: HOME | End: 2021-06-05
Attending: EMERGENCY MEDICINE
Payer: MEDICAID

## 2021-06-05 VITALS
TEMPERATURE: 98 F | RESPIRATION RATE: 18 BRPM | SYSTOLIC BLOOD PRESSURE: 136 MMHG | HEIGHT: 64 IN | WEIGHT: 195.11 LBS | OXYGEN SATURATION: 99 % | HEART RATE: 112 BPM | DIASTOLIC BLOOD PRESSURE: 88 MMHG

## 2021-06-05 DIAGNOSIS — Z87.42 PERSONAL HISTORY OF OTHER DISEASES OF THE FEMALE GENITAL TRACT: Chronic | ICD-10-CM

## 2021-06-05 LAB
ALBUMIN SERPL ELPH-MCNC: 3.9 G/DL — SIGNIFICANT CHANGE UP (ref 3.3–5)
ALP SERPL-CCNC: 121 U/L — HIGH (ref 40–120)
ALT FLD-CCNC: 9 U/L — LOW (ref 10–45)
ANION GAP SERPL CALC-SCNC: 14 MMOL/L — SIGNIFICANT CHANGE UP (ref 5–17)
AST SERPL-CCNC: 13 U/L — SIGNIFICANT CHANGE UP (ref 10–40)
BILIRUB SERPL-MCNC: <0.1 MG/DL — LOW (ref 0.2–1.2)
BLD GP AB SCN SERPL QL: NEGATIVE — SIGNIFICANT CHANGE UP
BUN SERPL-MCNC: 41 MG/DL — HIGH (ref 7–23)
CA-I BLD-SCNC: 1.05 MMOL/L — LOW (ref 1.12–1.3)
CALCIUM SERPL-MCNC: 8.7 MG/DL — SIGNIFICANT CHANGE UP (ref 8.4–10.5)
CHLORIDE SERPL-SCNC: 106 MMOL/L — SIGNIFICANT CHANGE UP (ref 96–108)
CO2 SERPL-SCNC: 21 MMOL/L — LOW (ref 22–31)
CREAT SERPL-MCNC: 3.39 MG/DL — HIGH (ref 0.5–1.3)
GLUCOSE SERPL-MCNC: 157 MG/DL — HIGH (ref 70–99)
HCT VFR BLD CALC: 18.5 % — CRITICAL LOW (ref 34.5–45)
HGB BLD-MCNC: 4.8 G/DL — CRITICAL LOW (ref 11.5–15.5)
MAGNESIUM SERPL-MCNC: 2.3 MG/DL — SIGNIFICANT CHANGE UP (ref 1.6–2.6)
MCHC RBC-ENTMCNC: 19.8 PG — LOW (ref 27–34)
MCHC RBC-ENTMCNC: 25.9 GM/DL — LOW (ref 32–36)
MCV RBC AUTO: 76.1 FL — LOW (ref 80–100)
PHOSPHATE SERPL-MCNC: 4.5 MG/DL — SIGNIFICANT CHANGE UP (ref 2.5–4.5)
PLATELET # BLD AUTO: 351 K/UL — SIGNIFICANT CHANGE UP (ref 150–400)
POTASSIUM SERPL-MCNC: 4.4 MMOL/L — SIGNIFICANT CHANGE UP (ref 3.5–5.3)
POTASSIUM SERPL-SCNC: 4.4 MMOL/L — SIGNIFICANT CHANGE UP (ref 3.5–5.3)
PROT SERPL-MCNC: 7.8 G/DL — SIGNIFICANT CHANGE UP (ref 6–8.3)
RBC # BLD: 2.43 M/UL — LOW (ref 3.8–5.2)
RBC # FLD: 18.6 % — HIGH (ref 10.3–14.5)
RH IG SCN BLD-IMP: POSITIVE — SIGNIFICANT CHANGE UP
SODIUM SERPL-SCNC: 141 MMOL/L — SIGNIFICANT CHANGE UP (ref 135–145)
WBC # BLD: 11.91 K/UL — HIGH (ref 3.8–10.5)
WBC # FLD AUTO: 11.91 K/UL — HIGH (ref 3.8–10.5)

## 2021-06-05 PROCEDURE — 99218: CPT

## 2021-06-05 NOTE — ED PROVIDER NOTE - OBJECTIVE STATEMENT
50F PMH anemia, IgA nephropathy, HTN p/w concern for low Hb and for LUE/LLE cramps. Pt reports feeling tired and notes that her hands look pale, believes she may need a transfusion. Pt reports that she requires prbc vs iron transfusions every ~6 weeks. Has had extensive anemia w/u, including endoscopy/colonoscopy/pill cam study/CT scan, upcoming heme appt & they are unable to identify cause of her anemia. Pt denies bloody stools, no longer menstruating.    Pt also reporting intermittent pain/spasm to L hand and L medial leg beginning this evening, lasts for 2min at a time. Denies trauma to area. No h/o DVT/PE. Currently without pain.

## 2021-06-05 NOTE — ED PROVIDER NOTE - CLINICAL SUMMARY MEDICAL DECISION MAKING FREE TEXT BOX
Bj, PGY2 - 50F PMH as above including anemia p/w 2 complaints:  1. Concern for anemia with fatigue. Will eval for anemia and transfuse PRN  2. Intermittent L arm/leg spasm/pain, currently asymptomatic. Extremities appear unremarkable and are nontender. DDx includes electrolyte abnormality, MSK spasm, unlikely DVT

## 2021-06-05 NOTE — ED PROVIDER NOTE - IV ALTEPLASE DOOR HIDDEN
Type of surgery: RIGHT AND LEFT LEG DIVISION PLANTARIS MUSCLE AND TENDON AND SOLEUS RELEASE   Location of surgery: [facility]  Date and time of surgery: 7/31/20 @ 12:00 PM  Surgeon: DR. PAYNE  Pre-Op Appt Date: PT TO SCHEDULE  Post-Op Appt Date: PT TO SCHEDULE   Packet sent out: Yes  Pre-cert/Authorization completed:  Yes  Date: 7/23/20    
show

## 2021-06-05 NOTE — ED PROVIDER NOTE - PHYSICAL EXAMINATION
I have reviewed the triage vital signs.  Const: AAOx3, in NAD  Eyes: no conjunctival injection  HENT: NCAT, Neck supple, oral mucosa moist  CV: RRR, +S1, S2  Resp: CTAB, no respiratory distress  GI: Abdomen soft, NTND, no guarding, no CVA tenderness  Extremities: No peripheral edema,  2+ radial and DP pulses  Skin: Warm, well perfused, no rash  MSK: No gross deformities appreciated. BL LE appear symmetric. No LLE swelling/ecchymosis/erythema. L calf nontender.   Neuro: No focal sensory or motor deficits  Psych: Appropriate mood and affect

## 2021-06-05 NOTE — ED PROVIDER NOTE - ATTENDING CONTRIBUTION TO CARE
50F hx anemia requiring freq transfusion, IgA nephropathy, htn presented to ER w c/o intermittent  cramps and spasms to lue and lle x1 day- not present currently, also endorses feeling weak, pallor. No active bleeding, has undergone extensive OP KO for anemia. On exam slight palllor. No HD instability noted. Palp distal pulses in ext x4. No calf swelling or TTP. No rashes or discolorations to skin. FROM in ext x4. Motor 5/5 throughout. Sens intact. Sx likley due to anemia, elctrolyte abn, dehydration. Very unlikley DVT. Check labs. Likely Dispo to CDU for prbc transfusion if indicated.

## 2021-06-05 NOTE — ED ADULT NURSE NOTE - OBJECTIVE STATEMENT
Pt is a 50 yr old female with pmh of HTN and anemia with transfusions every 6-7 weeks (iron and blood) coming from home for left leg and arm cramping. Pt states around two days ago she noticed she had some cramping in her left leg around the inside of her calf. Pt states today her hand started to cramp- so much so that she had to use heat to loosen the cramp. Pt states she also feels weak and noticed her gums and palms of her hands are paler than usual- thinking she might need a transfusion. Pt otherwise has no complaints- denies dizziness or lightheadedness- no bloody stool or urine. Pt is a/ox 3- vitals showng tachycardia but otherwise normal.

## 2021-06-05 NOTE — ED PROVIDER NOTE - NS ED ROS FT
General: +Fatigue. Denies fevers  Eyes: Denies vision changes  ENMT: Denies sore throat  CV: Denies chest pain  Resp: Denies SOB  GI: Denies abdominal pain, nausea, vomiting, diarrhea, blood in stool  : Denies painful urination  Skin: Denies new rashes  Neuro: Denies headache, focal weakness  MSK: +L hand and L arm pain/spasm. Denies recent trauma

## 2021-06-06 VITALS
HEART RATE: 89 BPM | DIASTOLIC BLOOD PRESSURE: 85 MMHG | SYSTOLIC BLOOD PRESSURE: 125 MMHG | RESPIRATION RATE: 16 BRPM | TEMPERATURE: 98 F | OXYGEN SATURATION: 100 %

## 2021-06-06 LAB
ANISOCYTOSIS BLD QL: SIGNIFICANT CHANGE UP
BASOPHILS # BLD AUTO: 0 K/UL — SIGNIFICANT CHANGE UP (ref 0–0.2)
BASOPHILS # BLD AUTO: 0.02 K/UL — SIGNIFICANT CHANGE UP (ref 0–0.2)
BASOPHILS NFR BLD AUTO: 0 % — SIGNIFICANT CHANGE UP (ref 0–2)
BASOPHILS NFR BLD AUTO: 0.2 % — SIGNIFICANT CHANGE UP (ref 0–2)
DACRYOCYTES BLD QL SMEAR: SLIGHT — SIGNIFICANT CHANGE UP
ELLIPTOCYTES BLD QL SMEAR: SLIGHT — SIGNIFICANT CHANGE UP
EOSINOPHIL # BLD AUTO: 0.2 K/UL — SIGNIFICANT CHANGE UP (ref 0–0.5)
EOSINOPHIL # BLD AUTO: 0.42 K/UL — SIGNIFICANT CHANGE UP (ref 0–0.5)
EOSINOPHIL NFR BLD AUTO: 1.7 % — SIGNIFICANT CHANGE UP (ref 0–6)
EOSINOPHIL NFR BLD AUTO: 3.5 % — SIGNIFICANT CHANGE UP (ref 0–6)
GIANT PLATELETS BLD QL SMEAR: PRESENT — SIGNIFICANT CHANGE UP
HCT VFR BLD CALC: 28.9 % — LOW (ref 34.5–45)
HGB BLD-MCNC: 8.3 G/DL — LOW (ref 11.5–15.5)
HYPOCHROMIA BLD QL: SIGNIFICANT CHANGE UP
IMM GRANULOCYTES NFR BLD AUTO: 0.3 % — SIGNIFICANT CHANGE UP (ref 0–1.5)
LYMPHOCYTES # BLD AUTO: 0.82 K/UL — LOW (ref 1–3.3)
LYMPHOCYTES # BLD AUTO: 0.94 K/UL — LOW (ref 1–3.3)
LYMPHOCYTES # BLD AUTO: 6.9 % — LOW (ref 13–44)
LYMPHOCYTES # BLD AUTO: 7.9 % — LOW (ref 13–44)
MACROCYTES BLD QL: SLIGHT — SIGNIFICANT CHANGE UP
MANUAL SMEAR VERIFICATION: SIGNIFICANT CHANGE UP
MCHC RBC-ENTMCNC: 23.1 PG — LOW (ref 27–34)
MCHC RBC-ENTMCNC: 28.7 GM/DL — LOW (ref 32–36)
MCV RBC AUTO: 80.5 FL — SIGNIFICANT CHANGE UP (ref 80–100)
MICROCYTES BLD QL: SLIGHT — SIGNIFICANT CHANGE UP
MONOCYTES # BLD AUTO: 0.42 K/UL — SIGNIFICANT CHANGE UP (ref 0–0.9)
MONOCYTES # BLD AUTO: 1.02 K/UL — HIGH (ref 0–0.9)
MONOCYTES NFR BLD AUTO: 3.5 % — SIGNIFICANT CHANGE UP (ref 2–14)
MONOCYTES NFR BLD AUTO: 8.6 % — SIGNIFICANT CHANGE UP (ref 2–14)
NEUTROPHILS # BLD AUTO: 10.25 K/UL — HIGH (ref 1.8–7.4)
NEUTROPHILS # BLD AUTO: 9.63 K/UL — HIGH (ref 1.8–7.4)
NEUTROPHILS NFR BLD AUTO: 81.3 % — HIGH (ref 43–77)
NEUTROPHILS NFR BLD AUTO: 86.1 % — HIGH (ref 43–77)
NRBC # BLD: 0 /100 WBCS — SIGNIFICANT CHANGE UP (ref 0–0)
OVALOCYTES BLD QL SMEAR: SLIGHT — SIGNIFICANT CHANGE UP
PLAT MORPH BLD: ABNORMAL
PLATELET # BLD AUTO: 334 K/UL — SIGNIFICANT CHANGE UP (ref 150–400)
POLYCHROMASIA BLD QL SMEAR: SLIGHT — SIGNIFICANT CHANGE UP
RBC # BLD: 3.59 M/UL — LOW (ref 3.8–5.2)
RBC # FLD: 17.9 % — HIGH (ref 10.3–14.5)
RBC BLD AUTO: ABNORMAL
SARS-COV-2 RNA SPEC QL NAA+PROBE: SIGNIFICANT CHANGE UP
TARGETS BLD QL SMEAR: SLIGHT — SIGNIFICANT CHANGE UP
WBC # BLD: 11.85 K/UL — HIGH (ref 3.8–10.5)
WBC # FLD AUTO: 11.85 K/UL — HIGH (ref 3.8–10.5)

## 2021-06-06 PROCEDURE — U0003: CPT

## 2021-06-06 PROCEDURE — U0005: CPT

## 2021-06-06 PROCEDURE — 86850 RBC ANTIBODY SCREEN: CPT

## 2021-06-06 PROCEDURE — 84100 ASSAY OF PHOSPHORUS: CPT

## 2021-06-06 PROCEDURE — 80053 COMPREHEN METABOLIC PANEL: CPT

## 2021-06-06 PROCEDURE — 93005 ELECTROCARDIOGRAM TRACING: CPT

## 2021-06-06 PROCEDURE — 86923 COMPATIBILITY TEST ELECTRIC: CPT

## 2021-06-06 PROCEDURE — 86901 BLOOD TYPING SEROLOGIC RH(D): CPT

## 2021-06-06 PROCEDURE — 86900 BLOOD TYPING SEROLOGIC ABO: CPT

## 2021-06-06 PROCEDURE — 85025 COMPLETE CBC W/AUTO DIFF WBC: CPT

## 2021-06-06 PROCEDURE — 82330 ASSAY OF CALCIUM: CPT

## 2021-06-06 PROCEDURE — 83735 ASSAY OF MAGNESIUM: CPT

## 2021-06-06 PROCEDURE — 36430 TRANSFUSION BLD/BLD COMPNT: CPT

## 2021-06-06 PROCEDURE — G0378: CPT

## 2021-06-06 PROCEDURE — P9016: CPT

## 2021-06-06 PROCEDURE — 99217: CPT

## 2021-06-06 PROCEDURE — 99283 EMERGENCY DEPT VISIT LOW MDM: CPT | Mod: 25

## 2021-06-06 NOTE — ED CDU PROVIDER DISPOSITION NOTE - CLINICAL COURSE
50F PMH anemia, IgA nephropathy, HTN p/w concern for low Hb and for LUE/LLE cramps. Pt reports feeling tired and notes that her hands look pale, believes she may need a transfusion. Pt reports that she requires prbc vs iron transfusions every ~6 weeks. Has had extensive anemia w/u, including endoscopy/colonoscopy/pill cam study/CT scan, upcoming heme appt & they are unable to identify cause of her anemia. Pt denies bloody stools, no longer menstruating.     In ED hemoglobin 4.5, Cr 3.39 otherwise unremarkable. pt sent to CDU for blood tranfusions.  In CDU__________. 50F PMH anemia, IgA nephropathy, HTN p/w concern for low Hb and for LUE/LLE cramps. Pt reports feeling tired and notes that her hands look pale, believes she may need a transfusion. Pt reports that she requires prbc vs iron transfusions every ~6 weeks. Has had extensive anemia w/u, including endoscopy/colonoscopy/pill cam study/CT scan, upcoming heme appt & they are unable to identify cause of her anemia. Pt denies bloody stools, no longer menstruating.     In ED hemoglobin 4.5, Cr 3.39 otherwise unremarkable. pt sent to CDU for blood tranfusions.  In CDU patient 3 units prbcs with improved H/H. Discussed with Dr. Ernandez and Dr. Hays cleared for discharge home

## 2021-06-06 NOTE — ED CDU PROVIDER DISPOSITION NOTE - NSFOLLOWUPINSTRUCTIONS_ED_ALL_ED_FT
- stay hydrated.   - take all home medications as prescribed  - follow up with your pcp Dr. Hays in the next 1-2 days  -follow up with your hematologist as scheduled.   - return if symptoms worsen, fever, weakness, shortness of breath, difficulty breathing, and all other concerns.

## 2021-06-06 NOTE — ED CDU PROVIDER INITIAL DAY NOTE - PROGRESS NOTE DETAILS
Spoke with Dr. Hays, discussed pt being observed in cdu overnight, agreeable with plan. -Jannie Torres PA-C Pt resting comfortably. NAD. No complaints. VSS. no dizziness, lightheadedness, pt receiving blood transfusion currently will continue to monitor. Spoke with Dr. Cannon, discussed pt being observed in cdu overnight, agreeable with plan. -Jannie Torres PA-C BERNARD Morris: Patient seen at bedside in NAD.  VSS.  Patient resting comfortably without complaints. on 3rd unit PRBCs and f/u CBC to be performed at 12pm BERNARD Morris: Patient seen at bedside in NAD.  VSS.  Patient resting comfortably without complaints. pending CBC results BERNARD Morris: H/H stable cleared for discharge home per ED attending Dr. Figueroa

## 2021-06-06 NOTE — ED CDU PROVIDER DISPOSITION NOTE - PLAN OF CARE
Attending MD Ernandez: · HPI Objective Statement: 50F PMH anemia, IgA nephropathy, HTN p/w concern for low Hb and for LUE/LLE cramps. Pt reports feeling tired and notes that her hands look pale, believes she may need a transfusion. Pt reports that she requires prbc vs iron transfusions every ~6 weeks. Has had extensive anemia w/u, including endoscopy/colonoscopy/pill cam study/CT scan, upcoming heme appt & they are unable to identify cause of her anemia. Pt denies bloody stools, no longer menstruating.     In ED hemoglobin 4.5, Cr 3.39 otherwise unremarkable. pt sent to CDU for blood tranfusions.  Patient received 3 units PRBC with improvement of symptoms per PA.  Non-face to face encounter with patient, PA discussed Plan with me and agreed upon.  I was immediately available at all times.

## 2021-06-06 NOTE — ED CDU PROVIDER INITIAL DAY NOTE - OBJECTIVE STATEMENT
50F PMH anemia, IgA nephropathy, HTN p/w concern for low Hb and for LUE/LLE cramps. Pt reports feeling tired and notes that her hands look pale, believes she may need a transfusion. Pt reports that she requires prbc vs iron transfusions every ~6 weeks. Has had extensive anemia w/u, including endoscopy/colonoscopy/pill cam study/CT scan, upcoming heme appt & they are unable to identify cause of her anemia. Pt denies bloody stools, no longer menstruating.     In ED hemoglobin 4.5, Cr 3.39 otherwise unremarkable. pt sent to CDU for blood tranfusions.

## 2021-06-06 NOTE — ED CDU PROVIDER INITIAL DAY NOTE - ATTENDING CONTRIBUTION TO CARE
50F hx anemia requiring freq transfusion, IgA nephropathy, htn presented to ER w c/o cramps to lue and lle, feeling weak, pallor. Hgb 4.5. No electrolyte disturbance. Sx likely due to severe anemia.  No active bleeding, has undergone extensive OP KO for anemia. Dispo to CDU for prbc transfusion.

## 2021-06-06 NOTE — ED CDU PROVIDER INITIAL DAY NOTE - MEDICAL DECISION MAKING DETAILS
Thank you so much for visiting us today. Please make sure to call your doctor today to be rechecked in 1-3 days. Bring your results from here with you when you do. Return immediately if any fevers, headaches, chest pain, difficulty breathing, abdominal pain, worsening or changing symptoms, or any other concerns. Patient Education        Influenza (Flu): Care Instructions  Your Care Instructions    Influenza (flu) is an infection in the lungs and breathing passages. It is caused by the influenza virus. There are different strains, or types, of the flu virus from year to year. Unlike the common cold, the flu comes on suddenly and the symptoms, such as a cough, congestion, fever, chills, fatigue, aches, and pains, are more severe. These symptoms may last up to 10 days. Although the flu can make you feel very sick, it usually doesn't cause serious health problems. Home treatment is usually all you need for flu symptoms. But your doctor may prescribe antiviral medicine to prevent other health problems, such as pneumonia, from developing. Older people and those who have a long-term health condition, such as lung disease, are most at risk for having pneumonia or other health problems. Follow-up care is a key part of your treatment and safety. Be sure to make and go to all appointments, and call your doctor if you are having problems. It's also a good idea to know your test results and keep a list of the medicines you take. How can you care for yourself at home? · Get plenty of rest.  · Drink plenty of fluids, enough so that your urine is light yellow or clear like water. If you have kidney, heart, or liver disease and have to limit fluids, talk with your doctor before you increase the amount of fluids you drink. · Take an over-the-counter pain medicine if needed, such as acetaminophen (Tylenol), ibuprofen (Advil, Motrin), or naproxen (Aleve), to relieve fever, headache, and muscle aches.  Read and follow all instructions on the label. No one younger than 20 should take aspirin. It has been linked to Reye syndrome, a serious illness. · Do not smoke. Smoking can make the flu worse. If you need help quitting, talk to your doctor about stop-smoking programs and medicines. These can increase your chances of quitting for good. · Breathe moist air from a hot shower or from a sink filled with hot water to help clear a stuffy nose. · Before you use cough and cold medicines, check the label. These medicines may not be safe for young children or for people with certain health problems. · If the skin around your nose and lips becomes sore, put some petroleum jelly on the area. · To ease coughing:  ? Drink fluids to soothe a scratchy throat. ? Suck on cough drops or plain hard candy. ? Take an over-the-counter cough medicine that contains dextromethorphan to help you get some sleep. Read and follow all instructions on the label. ? Raise your head at night with an extra pillow. This may help you rest if coughing keeps you awake. · Take any prescribed medicine exactly as directed. Call your doctor if you think you are having a problem with your medicine. To avoid spreading the flu  · Wash your hands regularly, and keep your hands away from your face. · Stay home from school, work, and other public places until you are feeling better and your fever has been gone for at least 24 hours. The fever needs to have gone away on its own without the help of medicine. · Ask people living with you to talk to their doctors about preventing the flu. They may get antiviral medicine to keep from getting the flu from you. · To prevent the flu in the future, get a flu vaccine every fall. Encourage people living with you to get the vaccine. · Cover your mouth when you cough or sneeze. When should you call for help? Call 911 anytime you think you may need emergency care. For example, call if:    · You have severe trouble breathing.  Call your doctor now or seek immediate medical care if:    · You have new or worse trouble breathing.     · You seem to be getting much sicker.     · You feel very sleepy or confused.     · You have a new or higher fever.     · You get a new rash.    Watch closely for changes in your health, and be sure to contact your doctor if:    · You begin to get better and then get worse.     · You are not getting better after 1 week. Where can you learn more? Go to http://daniel-mark.info/  Enter U938 in the search box to learn more about \"Influenza (Flu): Care Instructions. \"  Current as of: June 9, 2019Content Version: 12.4  © 2070-4407 Healthwise, Incorporated. Care instructions adapted under license by [employer] (which disclaims liability or warranty for this information). If you have questions about a medical condition or this instruction, always ask your healthcare professional. [address] any warranty or liability for your use of this information. Iraida: See attending statement below

## 2021-06-06 NOTE — ED CDU PROVIDER DISPOSITION NOTE - PATIENT PORTAL LINK FT
You can access the FollowMyHealth Patient Portal offered by [facility] by registering at the following website: [email or web address]. By joining [employer]’s FollowMyHealth portal, you will also be able to view your health information using other applications (apps) compatible with our system.

## 2021-06-27 ENCOUNTER — INPATIENT (INPATIENT)
Facility: HOSPITAL | Age: 51
LOS: 0 days | Discharge: HOME | DRG: 812 | End: 2021-06-28
Attending: INTERNAL MEDICINE | Admitting: INTERNAL MEDICINE
Payer: MEDICAID

## 2021-06-27 VITALS
RESPIRATION RATE: 18 BRPM | WEIGHT: 190.04 LBS | HEIGHT: 64 IN | OXYGEN SATURATION: 100 % | TEMPERATURE: 99 F | SYSTOLIC BLOOD PRESSURE: 168 MMHG | HEART RATE: 96 BPM | DIASTOLIC BLOOD PRESSURE: 95 MMHG

## 2021-06-27 DIAGNOSIS — Z87.42 PERSONAL HISTORY OF OTHER DISEASES OF THE FEMALE GENITAL TRACT: Chronic | ICD-10-CM

## 2021-06-27 DIAGNOSIS — D64.9 ANEMIA, UNSPECIFIED: ICD-10-CM

## 2021-06-27 LAB
ALBUMIN SERPL ELPH-MCNC: 4 G/DL — SIGNIFICANT CHANGE UP (ref 3.3–5)
ALP SERPL-CCNC: 139 U/L — HIGH (ref 40–120)
ALT FLD-CCNC: 11 U/L — SIGNIFICANT CHANGE UP (ref 10–45)
ANION GAP SERPL CALC-SCNC: 15 MMOL/L — SIGNIFICANT CHANGE UP (ref 5–17)
ANISOCYTOSIS BLD QL: SIGNIFICANT CHANGE UP
APPEARANCE UR: CLEAR — SIGNIFICANT CHANGE UP
APTT BLD: 36.2 SEC — HIGH (ref 27.5–35.5)
AST SERPL-CCNC: 17 U/L — SIGNIFICANT CHANGE UP (ref 10–40)
BACTERIA # UR AUTO: NEGATIVE — SIGNIFICANT CHANGE UP
BASOPHILS # BLD AUTO: 0 K/UL — SIGNIFICANT CHANGE UP (ref 0–0.2)
BASOPHILS NFR BLD AUTO: 0 % — SIGNIFICANT CHANGE UP (ref 0–2)
BILIRUB SERPL-MCNC: <0.1 MG/DL — LOW (ref 0.2–1.2)
BILIRUB UR-MCNC: NEGATIVE — SIGNIFICANT CHANGE UP
BLD GP AB SCN SERPL QL: NEGATIVE — SIGNIFICANT CHANGE UP
BUN SERPL-MCNC: 46 MG/DL — HIGH (ref 7–23)
CALCIUM SERPL-MCNC: 8.6 MG/DL — SIGNIFICANT CHANGE UP (ref 8.4–10.5)
CHLORIDE SERPL-SCNC: 107 MMOL/L — SIGNIFICANT CHANGE UP (ref 96–108)
CO2 SERPL-SCNC: 19 MMOL/L — LOW (ref 22–31)
COLOR SPEC: SIGNIFICANT CHANGE UP
CREAT SERPL-MCNC: 3.32 MG/DL — HIGH (ref 0.5–1.3)
DACRYOCYTES BLD QL SMEAR: SLIGHT — SIGNIFICANT CHANGE UP
DIFF PNL FLD: NEGATIVE — SIGNIFICANT CHANGE UP
ELLIPTOCYTES BLD QL SMEAR: SLIGHT — SIGNIFICANT CHANGE UP
EOSINOPHIL # BLD AUTO: 0.26 K/UL — SIGNIFICANT CHANGE UP (ref 0–0.5)
EOSINOPHIL NFR BLD AUTO: 2.6 % — SIGNIFICANT CHANGE UP (ref 0–6)
EPI CELLS # UR: 3 /HPF — SIGNIFICANT CHANGE UP
GLUCOSE SERPL-MCNC: 108 MG/DL — HIGH (ref 70–99)
GLUCOSE UR QL: NEGATIVE — SIGNIFICANT CHANGE UP
HCG SERPL-ACNC: 2.2 MIU/ML — SIGNIFICANT CHANGE UP
HCT VFR BLD CALC: 21.5 % — LOW (ref 34.5–45)
HGB BLD-MCNC: 5.8 G/DL — CRITICAL LOW (ref 11.5–15.5)
HYALINE CASTS # UR AUTO: 1 /LPF — SIGNIFICANT CHANGE UP (ref 0–2)
HYPOCHROMIA BLD QL: SIGNIFICANT CHANGE UP
INR BLD: 1.04 RATIO — SIGNIFICANT CHANGE UP (ref 0.88–1.16)
KETONES UR-MCNC: NEGATIVE — SIGNIFICANT CHANGE UP
LDH SERPL L TO P-CCNC: 225 U/L — SIGNIFICANT CHANGE UP (ref 50–242)
LEUKOCYTE ESTERASE UR-ACNC: NEGATIVE — SIGNIFICANT CHANGE UP
LYMPHOCYTES # BLD AUTO: 1.76 K/UL — SIGNIFICANT CHANGE UP (ref 1–3.3)
LYMPHOCYTES # BLD AUTO: 17.4 % — SIGNIFICANT CHANGE UP (ref 13–44)
MACROCYTES BLD QL: SLIGHT — SIGNIFICANT CHANGE UP
MANUAL SMEAR VERIFICATION: SIGNIFICANT CHANGE UP
MCHC RBC-ENTMCNC: 21 PG — LOW (ref 27–34)
MCHC RBC-ENTMCNC: 27 GM/DL — LOW (ref 32–36)
MCV RBC AUTO: 77.9 FL — LOW (ref 80–100)
MICROCYTES BLD QL: SLIGHT — SIGNIFICANT CHANGE UP
MONOCYTES # BLD AUTO: 0.35 K/UL — SIGNIFICANT CHANGE UP (ref 0–0.9)
MONOCYTES NFR BLD AUTO: 3.5 % — SIGNIFICANT CHANGE UP (ref 2–14)
NEUTROPHILS # BLD AUTO: 7.58 K/UL — HIGH (ref 1.8–7.4)
NEUTROPHILS NFR BLD AUTO: 74.8 % — SIGNIFICANT CHANGE UP (ref 43–77)
NITRITE UR-MCNC: NEGATIVE — SIGNIFICANT CHANGE UP
OVALOCYTES BLD QL SMEAR: SLIGHT — SIGNIFICANT CHANGE UP
PH UR: 6.5 — SIGNIFICANT CHANGE UP (ref 5–8)
PLAT MORPH BLD: NORMAL — SIGNIFICANT CHANGE UP
PLATELET # BLD AUTO: 325 K/UL — SIGNIFICANT CHANGE UP (ref 150–400)
POLYCHROMASIA BLD QL SMEAR: SLIGHT — SIGNIFICANT CHANGE UP
POTASSIUM SERPL-MCNC: 4.6 MMOL/L — SIGNIFICANT CHANGE UP (ref 3.5–5.3)
POTASSIUM SERPL-SCNC: 4.6 MMOL/L — SIGNIFICANT CHANGE UP (ref 3.5–5.3)
PROT SERPL-MCNC: 7.7 G/DL — SIGNIFICANT CHANGE UP (ref 6–8.3)
PROT UR-MCNC: ABNORMAL
PROTHROM AB SERPL-ACNC: 12.4 SEC — SIGNIFICANT CHANGE UP (ref 10.6–13.6)
RBC # BLD: 2.76 M/UL — LOW (ref 3.8–5.2)
RBC # FLD: 19.9 % — HIGH (ref 10.3–14.5)
RBC BLD AUTO: ABNORMAL
RBC CASTS # UR COMP ASSIST: 11 /HPF — HIGH (ref 0–4)
RH IG SCN BLD-IMP: POSITIVE — SIGNIFICANT CHANGE UP
SARS-COV-2 RNA SPEC QL NAA+PROBE: SIGNIFICANT CHANGE UP
SODIUM SERPL-SCNC: 141 MMOL/L — SIGNIFICANT CHANGE UP (ref 135–145)
SP GR SPEC: 1.01 — SIGNIFICANT CHANGE UP (ref 1.01–1.02)
UROBILINOGEN FLD QL: NEGATIVE — SIGNIFICANT CHANGE UP
VARIANT LYMPHS # BLD: 1.7 % — SIGNIFICANT CHANGE UP (ref 0–6)
WBC # BLD: 10.13 K/UL — SIGNIFICANT CHANGE UP (ref 3.8–10.5)
WBC # FLD AUTO: 10.13 K/UL — SIGNIFICANT CHANGE UP (ref 3.8–10.5)
WBC UR QL: 5 /HPF — SIGNIFICANT CHANGE UP (ref 0–5)

## 2021-06-27 PROCEDURE — 99285 EMERGENCY DEPT VISIT HI MDM: CPT

## 2021-06-27 NOTE — ED PROVIDER NOTE - CLINICAL SUMMARY MEDICAL DECISION MAKING FREE TEXT BOX
Unclear source of anemia, pts had full workup, denies any sxs of acute bleed states she feeling her baseline fatigue. Will do labs, hemolytic labs, consider occult, transfuse to target of 7.

## 2021-06-27 NOTE — ED PROVIDER NOTE - OBJECTIVE STATEMENT
51F PMH IgA nephropathy, HTN presents to the ED with abnormal Hb of 6.5 drawn yesterday, ordered by Nephrologist. Pt states she's fatigued but not more so than usual. Denies any chest pain, lightheadedness, sob, palpitations. Denies any dark/bloody stools, yellowing of eyes, darkening of urine. Pt states she has no more menstrual periods for approx 1 year. States that she's been worked up by GI (endo/colonoscopy/pill swallow), Gyn, Heme, Nephro, all without any cause for pts anemia. Pt takes iron pills currently.

## 2021-06-27 NOTE — ED ADULT NURSE NOTE - OBJECTIVE STATEMENT
51 year old female presents to ED via walk-in complaining of low hemoglobin from blood work performed yesterday. States hemoglobin was 6.5. Pt asymptomatic at this time, states baseline she is "always tired". Has had blood transfusions in the past. Upon assessment A&O x4, ambulatory, and well appearing. Denies chest pain, SOB, n/v/d, fever, chills. Bed locked and lowered. Comfort and safety measures maintained.

## 2021-06-27 NOTE — ED PROVIDER NOTE - CROS ED RESP ALL NEG
Atherosclerosis of coronary artery  CAD (coronary artery disease)  Blood clot due to device, implant, or graft  was on blood thinners  CHF (congestive heart failure)  EF ~ 25%  Diabetes  on insulin pump  Diabetic neuropathy    Diverticulitis    History of celiac disease    HLD (hyperlipidemia)    HTN (hypertension)    MRSA bacteremia    Osteoarthritis    Status post percutaneous transluminal coronary angioplasty  in 2012  STEMI (ST elevation myocardial infarction) - - -

## 2021-06-27 NOTE — ED PROVIDER NOTE - PHYSICAL EXAMINATION
GENERAL: no acute distress, non-toxic appearing  HEENT: oral mucosa moist, no scleral icterus  CARDIAC: regular rate and regular rhythm  PULM: clear to ascultation bilaterally  GI: abdomen nondistended, soft, nontender  : no suprapubic tenderness  NEURO: alert and oriented x 3, normal speech, moving all extremities without lateralization  MSK: no visible deformities, no peripheral edema  SKIN: no visible rashes  PSYCH: appropriate mood and affect

## 2021-06-27 NOTE — ED PROVIDER NOTE - ATTENDING CONTRIBUTION TO CARE
Private Physician OPAL Cannon PCP, Musa Martel Nephro  51y female pmh Anemia ethiology unclear sp multiple transfusions past 2y, DM diet controlled, HTN,     IgA nephropathy, Pt comes to c/o anemia, fatigued/tired. Had labs drawn and referred to ed for transfusion for hgb of 6.5 no known gi bleeding, cp, shortness of breath, palps,nvdc, PE WDWN female awake alert normocephalic atraumatic neck supple chest clear anterior & posterior cv no rubs, gallops or murmurs abd soft +bs no mass guarding  Flynn Marshall MD, Facep

## 2021-06-28 ENCOUNTER — TRANSCRIPTION ENCOUNTER (OUTPATIENT)
Age: 51
End: 2021-06-28

## 2021-06-28 VITALS
RESPIRATION RATE: 18 BRPM | OXYGEN SATURATION: 100 % | TEMPERATURE: 99 F | HEART RATE: 88 BPM | DIASTOLIC BLOOD PRESSURE: 86 MMHG | SYSTOLIC BLOOD PRESSURE: 143 MMHG

## 2021-06-28 DIAGNOSIS — D64.9 ANEMIA, UNSPECIFIED: ICD-10-CM

## 2021-06-28 DIAGNOSIS — I10 ESSENTIAL (PRIMARY) HYPERTENSION: ICD-10-CM

## 2021-06-28 DIAGNOSIS — Z29.9 ENCOUNTER FOR PROPHYLACTIC MEASURES, UNSPECIFIED: ICD-10-CM

## 2021-06-28 DIAGNOSIS — N02.8 RECURRENT AND PERSISTENT HEMATURIA WITH OTHER MORPHOLOGIC CHANGES: ICD-10-CM

## 2021-06-28 DIAGNOSIS — E11.9 TYPE 2 DIABETES MELLITUS WITHOUT COMPLICATIONS: ICD-10-CM

## 2021-06-28 LAB
FERRITIN SERPL-MCNC: 11 NG/ML — LOW (ref 15–150)
GLUCOSE BLDC GLUCOMTR-MCNC: 121 MG/DL — HIGH (ref 70–99)
GLUCOSE BLDC GLUCOMTR-MCNC: 128 MG/DL — HIGH (ref 70–99)
HAPTOGLOB SERPL-MCNC: 115 MG/DL — SIGNIFICANT CHANGE UP (ref 34–200)
HCT VFR BLD CALC: 29 % — LOW (ref 34.5–45)
HGB BLD-MCNC: 8 G/DL — LOW (ref 11.5–15.5)
IRON SATN MFR SERPL: 19 UG/DL — LOW (ref 30–160)
IRON SATN MFR SERPL: 6 % — LOW (ref 14–50)
MCHC RBC-ENTMCNC: 22.4 PG — LOW (ref 27–34)
MCHC RBC-ENTMCNC: 27.6 GM/DL — LOW (ref 32–36)
MCV RBC AUTO: 81.2 FL — SIGNIFICANT CHANGE UP (ref 80–100)
NRBC # BLD: 0 /100 WBCS — SIGNIFICANT CHANGE UP (ref 0–0)
PLATELET # BLD AUTO: 311 K/UL — SIGNIFICANT CHANGE UP (ref 150–400)
RBC # BLD: 3.57 M/UL — LOW (ref 3.8–5.2)
RBC # FLD: 18.6 % — HIGH (ref 10.3–14.5)
TIBC SERPL-MCNC: 336 UG/DL — SIGNIFICANT CHANGE UP (ref 220–430)
UIBC SERPL-MCNC: 316 UG/DL — SIGNIFICANT CHANGE UP (ref 110–370)
WBC # BLD: 10.17 K/UL — SIGNIFICANT CHANGE UP (ref 3.8–10.5)
WBC # FLD AUTO: 10.17 K/UL — SIGNIFICANT CHANGE UP (ref 3.8–10.5)

## 2021-06-28 PROCEDURE — 80053 COMPREHEN METABOLIC PANEL: CPT

## 2021-06-28 PROCEDURE — 99223 1ST HOSP IP/OBS HIGH 75: CPT

## 2021-06-28 PROCEDURE — 85730 THROMBOPLASTIN TIME PARTIAL: CPT

## 2021-06-28 PROCEDURE — 86923 COMPATIBILITY TEST ELECTRIC: CPT

## 2021-06-28 PROCEDURE — 82728 ASSAY OF FERRITIN: CPT

## 2021-06-28 PROCEDURE — 83540 ASSAY OF IRON: CPT

## 2021-06-28 PROCEDURE — 83550 IRON BINDING TEST: CPT

## 2021-06-28 PROCEDURE — 85027 COMPLETE CBC AUTOMATED: CPT

## 2021-06-28 PROCEDURE — 36430 TRANSFUSION BLD/BLD COMPNT: CPT

## 2021-06-28 PROCEDURE — 87635 SARS-COV-2 COVID-19 AMP PRB: CPT

## 2021-06-28 PROCEDURE — P9016: CPT

## 2021-06-28 PROCEDURE — 84702 CHORIONIC GONADOTROPIN TEST: CPT

## 2021-06-28 PROCEDURE — 81001 URINALYSIS AUTO W/SCOPE: CPT

## 2021-06-28 PROCEDURE — 82962 GLUCOSE BLOOD TEST: CPT

## 2021-06-28 PROCEDURE — 99285 EMERGENCY DEPT VISIT HI MDM: CPT

## 2021-06-28 PROCEDURE — 86901 BLOOD TYPING SEROLOGIC RH(D): CPT

## 2021-06-28 PROCEDURE — 83010 ASSAY OF HAPTOGLOBIN QUANT: CPT

## 2021-06-28 PROCEDURE — 86900 BLOOD TYPING SEROLOGIC ABO: CPT

## 2021-06-28 PROCEDURE — 86850 RBC ANTIBODY SCREEN: CPT

## 2021-06-28 PROCEDURE — 85610 PROTHROMBIN TIME: CPT

## 2021-06-28 PROCEDURE — 85025 COMPLETE CBC W/AUTO DIFF WBC: CPT

## 2021-06-28 PROCEDURE — 83615 LACTATE (LD) (LDH) ENZYME: CPT

## 2021-06-28 RX ORDER — LOSARTAN POTASSIUM 100 MG/1
50 TABLET, FILM COATED ORAL DAILY
Refills: 0 | Status: DISCONTINUED | OUTPATIENT
Start: 2021-06-28 | End: 2021-06-28

## 2021-06-28 RX ORDER — IRON SUCROSE 20 MG/ML
200 INJECTION, SOLUTION INTRAVENOUS EVERY 24 HOURS
Refills: 0 | Status: DISCONTINUED | OUTPATIENT
Start: 2021-06-28 | End: 2021-06-28

## 2021-06-28 RX ADMIN — IRON SUCROSE 110 MILLIGRAM(S): 20 INJECTION, SOLUTION INTRAVENOUS at 10:47

## 2021-06-28 RX ADMIN — LOSARTAN POTASSIUM 50 MILLIGRAM(S): 100 TABLET, FILM COATED ORAL at 05:31

## 2021-06-28 NOTE — DISCHARGE NOTE NURSING/CASE MANAGEMENT/SOCIAL WORK - PATIENT PORTAL LINK FT
You can access the FollowMyHealth Patient Portal offered by [facility] by registering at the following website: [email or web address]. By joining [employer]’s FollowMyHealth portal, you will also be able to view your health information using other applications (apps) compatible with our system.

## 2021-06-28 NOTE — DISCHARGE NOTE PROVIDER - CARE PROVIDERS DIRECT ADDRESSES
[email or web address] ,[email or web address],DirectAddress_Unknown,[email or web address], [email or web address],DirectAddress_Unknown

## 2021-06-28 NOTE — PROGRESS NOTE ADULT - SUBJECTIVE AND OBJECTIVE BOX
Patient is a 51y old  Female who presents with a chief complaint of anemia (2021 09:03)      HPI:  51 F w/ PMH of IgA nephropathy and HTN presents with abnormal Hb of 6.5 drawn yesterday, ordered by Nephrologist.   Pt states she's fatigued but not more so than usual. Denies lightheadedness, shortness of breath or chest pain and palpitations. Denies any dark/bloody stools, yellowing of eyes, darkening of urine.   Pt notes she has been worked up by GI (endo/colonoscopy/pill swallow), Gynecology, Hematology, Nephro in the past with still unknown etiology. No known prior bone biopsy. Pt takes iron supplements currently. Pt notes she was advised by her hematologist (Dr. Rose Canales) that she should receive procrit while she's here.  (2021 01:30)      S/p PRBC, Hb improved 8.0    PAST MEDICAL & SURGICAL HISTORY:  DM (diabetes mellitus)    Anemia    HTN (hypertension)    IgA nephropathy determined by renal biopsy    History of endometriosis        Review of Systems:   CONSTITUTIONAL: No fever, weight loss, or fatigue  EYES: No eye pain, visual disturbances, or discharge  ENMT:  No difficulty hearing, tinnitus, vertigo; No sinus or throat pain  NECK: No pain or stiffness  BREASTS: No pain, masses, or nipple discharge  RESPIRATORY: No cough, wheezing, chills or hemoptysis; No shortness of breath  CARDIOVASCULAR: No chest pain, palpitations, dizziness, or leg swelling  GASTROINTESTINAL: No abdominal or epigastric pain. No nausea, vomiting, or hematemesis; No diarrhea or constipation. No melena or hematochezia.  GENITOURINARY: No dysuria, frequency, hematuria, or incontinence  NEUROLOGICAL: No headaches, memory loss, loss of strength, numbness, or tremors  SKIN: No itching, burning, rashes, or lesions   LYMPH NODES: No enlarged glands  ENDOCRINE: No heat or cold intolerance; No hair loss  MUSCULOSKELETAL: No joint pain or swelling; No muscle, back, or extremity pain  PSYCHIATRIC: No depression, anxiety, mood swings, or difficulty sleeping  HEME/LYMPH: No easy bruising, or bleeding gums  ALLERY AND IMMUNOLOGIC: No hives or eczema    Allergies    No Known Allergies    Intolerances        Social History:     FAMILY HISTORY:  FH: diabetes mellitus        MEDICATIONS  (STANDING):  iron sucrose IVPB 200 milliGRAM(s) IV Intermittent every 24 hours  losartan 50 milliGRAM(s) Oral daily    MEDICATIONS  (PRN):        CAPILLARY BLOOD GLUCOSE      POCT Blood Glucose.: 128 mg/dL (2021 09:12)    I&O's Summary      PHYSICAL EXAM:  Vital Signs Last 24 Hrs  T(C): 37.1 (2021 11:49), Max: 37.4 (2021 02:38)  T(F): 98.8 (2021 11:49), Max: 99.4 (2021 02:38)  HR: 89 (2021 11:49) (80 - 98)  BP: 156/88 (2021 11:49) (133/83 - 169/93)  BP(mean): --  RR: 18 (2021 11:49) (18 - 20)  SpO2: 100% (2021 11:49) (98% - 100%)    GENERAL: NAD, well-developed  HEAD:  Atraumatic, Normocephalic  EYES: EOMI, PERRLA, conjunctiva and sclera clear  NECK: Supple, No JVD  CHEST/LUNG: Clear to auscultation bilaterally; No wheeze  HEART: Regular rate and rhythm; No murmurs, rubs, or gallops  ABDOMEN: Soft, Nontender, Nondistended; Bowel sounds present  EXTREMITIES:  2+ Peripheral Pulses, No clubbing, cyanosis, or edema  PSYCH: AAOx3  NEUROLOGY: non-focal  SKIN: No rashes or lesions    LABS:                        8.0    10.17 )-----------( 311      ( 2021 09:49 )             29.0         141  |  107  |  46<H>  ----------------------------<  108<H>  4.6   |  19<L>  |  3.32<H>    Ca    8.6      2021 21:39    TPro  7.7  /  Alb  4.0  /  TBili  <0.1<L>  /  DBili  x   /  AST  17  /  ALT  11  /  AlkPhos  139<H>  06-27    PT/INR - ( 2021 21:39 )   PT: 12.4 sec;   INR: 1.04 ratio         PTT - ( 2021 21:39 )  PTT:36.2 sec      Urinalysis Basic - ( 2021 23:42 )    Color: Light Yellow / Appearance: Clear / S.014 / pH: x  Gluc: x / Ketone: Negative  / Bili: Negative / Urobili: Negative   Blood: x / Protein: 300 mg/dL / Nitrite: Negative   Leuk Esterase: Negative / RBC: 11 /hpf / WBC 5 /HPF   Sq Epi: x / Non Sq Epi: 3 /hpf / Bacteria: Negative        RADIOLOGY & ADDITIONAL TESTS:    Imaging Personally Reviewed:    Consultant(s) Notes Reviewed:      Care Discussed with Consultants/Other Providers:

## 2021-06-28 NOTE — CONSULT NOTE ADULT - SUBJECTIVE AND OBJECTIVE BOX
NEPHROLOGY - NSN    Patient seen and examined.    HPI:  51 F w/ PMH of IgA nephropathy and HTN presents with abnormal Hb of 6.5 drawn yesterday, ordered by Hematologist   Pt states she's fatigued but not more so than usual. Denies lightheadedness, shortness of breath or chest pain and palpitations. Denies any dark/bloody stools, yellowing of eyes, darkening of urine.   Pt notes she has been worked up by GI (endo/colonoscopy/pill swallow), Gynecology, Hematology, Nephro in the past with still unknown etiology. No known prior bone biopsy. Pt takes iron supplements currently. Pt notes she was advised by her hematologist (Dr. Rose Canales) that she should receive procrit while she's here.    Thus far 2 units of blood  Hx of IGA nephropathy that was bx proven and subnephrotic range proteinuria   With respect to anemia she had iron deficiency and did get IV iron        PAST MEDICAL & SURGICAL HISTORY:  DM (diabetes mellitus)    Anemia    HTN (hypertension)    IgA nephropathy determined by renal biopsy    History of endometriosis        MEDICATIONS  (STANDING):  losartan 50 milliGRAM(s) Oral daily      Allergies    No Known Allergies    Intolerances        SOCIAL HISTORY:  Denies alcohol abuse, drug abuse or tobacco usage.     FAMILY HISTORY:  FH: diabetes mellitus        VITALS:  T(C): 37.4 (21 @ 02:38), Max: 37.4 (21 @ 02:38)  HR: 80 (21 @ 02:38) (80 - 98)  BP: 133/83 (21 @ 02:38) (133/83 - 169/93)  RR: 18 (21 @ 02:38) (18 - 20)  SpO2: 100% (21 @ 02:38) (98% - 100%)    REVIEW OF SYSTEMS:  Denies any nausea, vomiting, diarrhea, fever or chills. Denies chest pain, SOB, focal weakness, hematuria or dysuria. Good oral intake and denies fatigue or weakness. All other pertinent systems are reviewed and are negative.    PHYSICAL EXAM:  Constitutional: NAD  HEENT: EOMI  Neck:  No JVD, supple   Respiratory: CTA B/L  Cardiovascular: S1 and S2, RRR  Gastrointestinal: + BS, soft, NT, ND  Extremities: No peripheral edema, + peripheral pulses  Neurological: A/O x 3, CN2-12 intact  Psychiatric: Normal mood, normal affect  : No Hernández  Skin: No rashes, C/D/I  Access: Not applicable    I and O's:    Height (cm): 162.6 ( @ 20:17)  Weight (kg): 86.2 ( @ 20:17)  BMI (kg/m2): 32.6 ( @ 20:17)  BSA (m2): 1.91 ( @ 20:17)    LABS:                        5.8    10.13 )-----------( 325      ( 2021 21:39 )             21.5         141  |  107  |  46<H>  ----------------------------<  108<H>  4.6   |  19<L>  |  3.32<H>    Ca    8.6      2021 21:39    TPro  7.7  /  Alb  4.0  /  TBili  <0.1<L>  /  DBili  x   /  AST  17  /  ALT  11  /  AlkPhos  139<H>        URINE:  Urinalysis Basic - ( 2021 23:42 )    Color: Light Yellow / Appearance: Clear / S.014 / pH: x  Gluc: x / Ketone: Negative  / Bili: Negative / Urobili: Negative   Blood: x / Protein: 300 mg/dL / Nitrite: Negative   Leuk Esterase: Negative / RBC: 11 /hpf / WBC 5 /HPF   Sq Epi: x / Non Sq Epi: 3 /hpf / Bacteria: Negative        RADIOLOGY & ADDITIONAL STUDIES:    < from: CT Abdomen and Pelvis w/ Oral Cont (21 @ 15:06) >    EXAM:  CT ABDOMEN AND PELVIS OC        PROCEDURE DATE:  2021           INTERPRETATION:  CLINICAL INFORMATION: Anemia, bloating. History of endometriosis.    COMPARISON: Noncontrast CT of the abdomen and pelvis dated 2020.    CONTRAST/COMPLICATIONS:  IV Contrast: NONE  Oral Contrast: Omnipaque 300  Complications: None reported at time of study completion    PROCEDURE:  CT of the Abdomen and Pelvis was performed.  Sagittal and coronal reformats were performed.    FINDINGS:  LOWER CHEST: Again is noted decreased attenuation of the vascular blood pool, compatible with anemia. The lung bases are clear.    LIVER: Within normal limits.  BILE DUCTS: Normal caliber.  GALLBLADDER: Within normal limits.  SPLEEN: Within normal limits.  PANCREAS: Within normal limits.  ADRENALS: Within normal limits.  KIDNEYS/URETERS: The kidneys are mildly atrophic. There is no evidence of hydronephrosis, stone, or perinephric stranding bilaterally.    BLADDER: Within normal limits.  REPRODUCTIVE ORGANS: Benign-appearing right adnexal calcification. Otherwise, the uterus and adnexa are unremarkable by CT criteria.    BOWEL: The stomach is unremarkable. Evaluation of the distal small bowel and colon is suboptimal due to limited transit of oral contrast and stool. Scattered colonic diverticula. Otherwise, small and large bowel loops are unremarkable with no evidence of obstruction, bowel wall thickening, or inflammatory stranding of the mesenteric fat. The appendix is unremarkable.[id number]  PERITONEUM: No ascites.  VESSELS: Within normal limits.  RETROPERITONEUM/LYMPH NODES: No lymphadenopathy.  ABDOMINAL WALL: Within normal limits.  BONES: Within normal limits.    IMPRESSION: Similar decreased attenuation of the vascular blood pool, compatible with anemia. Mildly atrophic kidneys. Otherwise, unremarkable noncontrast CT of the abdomen and pelvis.               ROSENDO SINGH M.D., ATTENDING RADIOLOGIST   This document has been electronically signed. 2021  3:54PM    < end of copied text >

## 2021-06-28 NOTE — DISCHARGE NOTE PROVIDER - NSDCCPCAREPLAN_GEN_ALL_CORE_FT
PRINCIPAL DISCHARGE DIAGNOSIS  Diagnosis: Anemia  Assessment and Plan of Treatment: you received 2 units of blood . You are to follow up with GI this week to set up capsule study to continue work up of anemia. return to the hospital if worsens. follow up with pcp in a few days . Last hemoglobin was       PRINCIPAL DISCHARGE DIAGNOSIS  Diagnosis: Anemia  Assessment and Plan of Treatment: you received 2 units of blood . You are to follow up with GI this week to set up capsule study to continue work up of anemia. return to the hospital if worsens. follow up with pcp in a few days . Last hemoglobin was 8.0

## 2021-06-28 NOTE — H&P ADULT - PROBLEM SELECTOR PLAN 1
unclear etiology; no signs of bleeding, noted w/ abnormal labs outpt; previously has responded well to venofer and pRBC transfusions; currently not receiving IV iron but is on daily iron supplements.   --per previous workup (Feb 2020):  B12/folate adeuquate, TFT nml;  SPEP, ZOLTAN,  hemoglobin electrophoresis wnl; EGD and colonoscopy. Found gastritis and diverticuli, hemorrhoids  -pt notes recent "abnormal" colorguard; needs possible colonoscopy   - receiving 2U pRBC

## 2021-06-28 NOTE — DISCHARGE NOTE PROVIDER - CARE PROVIDER_API CALL
Seb Cannon)  Internal Medicine  [address] [place] [address]  Phone: [phone]  Fax: [phone]  Established Patient  Follow Up Time: 1-3 days   Seb Cannon (MD)  Internal Medicine  [address] [place] [address]  Phone: [phone]  Fax: [phone]  Established Patient  Follow Up Time: 1-3 days    Gerardo Jackson (DO)  Gastroenterology; Internal Medicine  [address] [place] [address]  Phone: [phone]  Fax: [phone]  Established Patient  Follow Up Time:    Seb Cannon)  Internal Medicine  [address] [place] [address]  Phone: [phone]  Fax: [phone]  Established Patient  Follow Up Time: 1-3 days    Gerardo Jackson (DO)  Gastroenterology; Internal Medicine  [address] [place] [address]  Phone: [phone]  Fax: [phone]  Established Patient  Follow Up Time:     Pari Martel)  Internal Medicine; Nephrology  [address] [facility], [address]  [place] [address]  Phone: [phone]  Fax: [phone]  Established Patient  Follow Up Time: 1 week    Dr Canales, hematologist, [facility]  Phone: (   )    -  Fax: (   )    -  Established Patient  Follow Up Time: 1 week

## 2021-06-28 NOTE — H&P ADULT - NSHPPHYSICALEXAM_GEN_ALL_CORE
PHYSICAL EXAM:   GENERAL: Alert. Not confused. No acute distress. Not thin. Not cachectic. Not obese.  HEAD:  Atraumatic. Normocephalic.  EYES: EOMI. PERRLA. Normal conjunctiva/sclera.  ENT: Neck supple. No JVD. Moist oral mucosa. Not edentulous. No thrush.  LYMPH: Normal supraclavicular/cervical lymph nodes.   CARDIAC: No tachy, Not ena. Regular rhythm. Not irregularly irregular. S1. S2. No murmur. No rub. No distant heart sounds.  LUNG/CHEST: CTAB. BS equal bilaterally. No wheezes. No rales. No rhonchi.  ABDOMEN: Soft. No tenderness. No distension. No fluid wave. Normal bowel sounds.  BACK: No midline/vertebral tenderness. No flank tenderness.  VASCULAR: +2 b/l radial or ulnar pulses. Palpable DP pulses.  EXTREMITIES:  No clubbing. No cyanosis. No edema. Moving all 4.  NEUROLOGY: A&Ox3. Non-focal exam. Cranial nerves intact. Normal speech. Sensation intact.  PSYCH: Normal behavior. Normal affect.  SKIN: No jaundice. No erythema. No rash/lesion.  Vascular Access:     ICU Vital Signs Last 24 Hrs  T(C): 37.4 (28 Jun 2021 02:38), Max: 37.4 (28 Jun 2021 02:38)  T(F): 99.4 (28 Jun 2021 02:38), Max: 99.4 (28 Jun 2021 02:38)  HR: 80 (28 Jun 2021 02:38) (80 - 98)  BP: 133/83 (28 Jun 2021 02:38) (133/83 - 169/93)  BP(mean): --  ABP: --  ABP(mean): --  RR: 18 (28 Jun 2021 02:38) (18 - 20)  SpO2: 100% (28 Jun 2021 02:38) (98% - 100%)      I&O's Summary

## 2021-06-28 NOTE — H&P ADULT - NSHPLABSRESULTS_GEN_ALL_CORE
Personally reviewed labs. Personally reviewed imaging.                         5.8    10.13 )-----------( 325      ( 2021 21:39 )             21.5     141  |  107  |  46<H>  ----------------------------<  108<H>  4.6   |  19<L>  |  3.32<H>    Ca    8.6      2021 21:39    TPro  7.7  /  Alb  4.0  /  TBili  <0.1<L>  /  DBili  x   /  AST  17  /  ALT  11  /  AlkPhos  139<H>      LIVER FUNCTIONS - ( 2021 21:39 )  Alb: 4.0 g/dL / Pro: 7.7 g/dL / ALK PHOS: 139 U/L / ALT: 11 U/L / AST: 17 U/L / GGT: x           PT/INR - ( 2021 21:39 )   PT: 12.4 sec;   INR: 1.04 ratio    PTT - ( 2021 21:39 )  PTT:36.2 sec    Urinalysis Basic - ( 2021 23:42 )  Color: Light Yellow / Appearance: Clear / S.014 / pH: x  Gluc: x / Ketone: Negative  / Bili: Negative / Urobili: Negative   Blood: x / Protein: 300 mg/dL / Nitrite: Negative   Leuk Esterase: Negative / RBC: 11 /hpf / WBC 5 /HPF   Sq Epi: x / Non Sq Epi: 3 /hpf / Bacteria: Negative

## 2021-06-28 NOTE — DISCHARGE NOTE PROVIDER - PROVIDER TOKENS
PROVIDER:[TOKEN:[[id number]],FOLLOWUP:[1-3 days],ESTABLISHEDPATIENT:[T]] PROVIDER:[TOKEN:[[id number]],FOLLOWUP:[1-3 days],ESTABLISHEDPATIENT:[T]],PROVIDER:[TOKEN:[[id number]],ESTABLISHEDPATIENT:[T]] PROVIDER:[TOKEN:[[id number]],FOLLOWUP:[1-3 days],ESTABLISHEDPATIENT:[T]],PROVIDER:[TOKEN:[[id number]],ESTABLISHEDPATIENT:[T]],PROVIDER:[TOKEN:[[id number]],FOLLOWUP:[1 week],ESTABLISHEDPATIENT:[T]],FREE:[LAST:[Dr Canales, hematologist],FIRST:[[facility]],PHONE:[(   )    -],FAX:[(   )    -],FOLLOWUP:[1 week],ESTABLISHEDPATIENT:[T]]

## 2021-06-28 NOTE — H&P ADULT - NSHPREVIEWOFSYSTEMS_GEN_ALL_CORE
REVIEW OF SYSTEMS:  CONSTITUTIONAL: +Fatigue. No fevers. No chills. No rigors. No weight loss. No night sweats. No poor appetite.  EYES: No blurry or double vision. No eye pain.  ENT: No hearing difficulty. No vertigo. No dysphagia. No sore throat. No Sinusitis/rhinorrhea.   NECK: No pain. No stiffness/rigidity.  CARDIAC: No chest pain. No palpitations. No lightheadedness. No syncope.  RESPIRATORY: No cough. No SOB. No hemoptysis.  GASTROINTESTINAL: No abdominal pain. No nausea. No vomiting. No hematemesis. No diarrhea. No constipation. No melena. No hematochezia.  GENITOURINARY: No dysuria. No frequency. No hesitancy. No hematuria. No oliguria.  NEUROLOGICAL: No numbness/tingling. No focal weakness. No urinary or fecal incontinence. No headache. No unsteady gait.  BACK: No back pain. No flank pain.  EXTREMITIES: No lower extremity edema. Full ROM. No joint pain.  SKIN: No rashes. No itching. No other lesions.  PSYCHIATRIC: No depression. No anxiety. No SI/HI.  ALLERGIC: No lip swelling. No hives.  All other review of systems is negative unless indicated above.  Unless indicated above, unable to assess ROS 2/2

## 2021-06-28 NOTE — DISCHARGE NOTE PROVIDER - HOSPITAL COURSE
51 F w/ PMH of IgA nephropathy and HTN presents with abnormal Hb of 6.5 drawn yesterday, ordered by Nephrologist.   Pt states she's fatigued but not more so than usual. .   Pt notes she has been worked up by GI (endo/colonoscopy/pill swallow), Gyn, Heme, Nephro, all without any cause for pts anemia. Pt takes iron pills currently.  Dx Anemia Hgb 5.8   patient given 2 units PRBC, IV iron. she will follow up outpatient for repeat GI capsule study with GI. Patient explained all discharge instructions and understands.   Cleared for discharge home by Dr greenwood to follow up with him, renal, GI and heme

## 2021-06-28 NOTE — H&P ADULT - HISTORY OF PRESENT ILLNESS
51 F w/ PMH of IgA nephropathy and HTN presents with abnormal Hb of 6.5 drawn yesterday, ordered by Nephrologist.   Pt states she's fatigued but not more so than usual. Denies lightheadedness, shortness of breath or chest pain and palpitations. Denies any dark/bloody stools, yellowing of eyes, darkening of urine.   Pt notes she has been worked up by GI (endo/colonoscopy/pill swallow), Gyn, Heme, Nephro, all without any cause for pts anemia. Pt takes iron pills currently. 51 F w/ PMH of IgA nephropathy and HTN presents with abnormal Hb of 6.5 drawn yesterday, ordered by Nephrologist.   Pt states she's fatigued but not more so than usual. Denies lightheadedness, shortness of breath or chest pain and palpitations. Denies any dark/bloody stools, yellowing of eyes, darkening of urine.   Pt notes she has been worked up by GI (endo/colonoscopy/pill swallow), Gynecology, Hematology, Nephro in the past with still unknown etiology. No known prior bone biopsy. Pt takes iron supplements currently. Pt notes she was advised by her hematologist (Dr. Rose Canales) that she should receive procrit while she's here.

## 2021-06-28 NOTE — CONSULT NOTE ADULT - ASSESSMENT
51 F w/ PMH of IgA nephropathy and HTN and subnephrotic range proteinuria pw anemia sp blood xfusion     1 Renal-Cont the losartan for now;  Quantify proteinuria and there is no need for renal sono at present  Advance renal failure   2 Anemia- IV Venofer again.  She has had an extensive GI work up that failed to reveal a source of bleeding(but she clearly still is)  Retacrit only p the iron stores have been repleted   3 Endo-GFR too low for SGLT2 agents     Sayed [facility]   [phone]

## 2021-06-28 NOTE — DISCHARGE NOTE PROVIDER - NSDCMRMEDTOKEN_GEN_ALL_CORE_FT
Bumex 1 mg oral tablet: 1 tab(s) orally once a day  LOSARTAN POTASSIUM 50 MG TAB: TAKE 1 TABLET BY MOUTH EVERY DAY

## 2021-06-28 NOTE — PROGRESS NOTE ADULT - PROBLEM SELECTOR PLAN 1
unclear etiology; no signs of bleeding, noted w/ abnormal labs outpt; previously has responded well to venofer and pRBC transfusions; currently not receiving IV iron but is on daily iron supplements.   --per previous workup (Feb 2020):  B12/folate adeuquate, TFT nml;  SPEP, ZOLTAN,  hemoglobin electrophoresis wnl; EGD and colonoscopy. Found gastritis and diverticuli, hemorrhoids  -pt notes recent "abnormal" colorguard; needs possible colonoscopy   - receiving 2U pRBC  GI consult called

## 2021-07-20 ENCOUNTER — APPOINTMENT (OUTPATIENT)
Dept: OBGYN | Facility: CLINIC | Age: 51
End: 2021-07-20
Payer: MEDICAID

## 2021-07-20 VITALS
TEMPERATURE: 98.2 F | HEIGHT: 64 IN | DIASTOLIC BLOOD PRESSURE: 89 MMHG | SYSTOLIC BLOOD PRESSURE: 144 MMHG | HEART RATE: 97 BPM | WEIGHT: 204 LBS | BODY MASS INDEX: 34.83 KG/M2

## 2021-07-20 DIAGNOSIS — I10 ESSENTIAL (PRIMARY) HYPERTENSION: ICD-10-CM

## 2021-07-20 PROCEDURE — 99203 OFFICE O/P NEW LOW 30 MIN: CPT

## 2021-07-20 NOTE — DISCUSSION/SUMMARY
[FreeTextEntry1] : 52 yo P0 w/anemia likely secondary to kidney disease.\par No obvious source of bleeding from GYN source.\par \par Plan\par Pt will continue eval w/GI and Int med.

## 2021-07-20 NOTE — HISTORY OF PRESENT ILLNESS
[FreeTextEntry1] : 50 yo P0 LMP 2019\par Pt here w/decreasing hct of unclear etiology.\par \par No vag bleeding now.\par Pt just wants to find out why she may be anemic.\par Currently in midst of eval w/GI.\par H/o endometrioma (17 cm).\par \par Pt w/occasional vasomotor symptoms.

## 2021-07-20 NOTE — PHYSICAL EXAM
[FreeTextEntry7] : soft, NT, ND [Labia Majora] : normal [Normal] : normal [FreeTextEntry6] : ~9 wks, mobile, NT. Exam limited by habitus

## 2021-08-01 PROCEDURE — G9005: CPT

## 2021-08-12 ENCOUNTER — EMERGENCY (EMERGENCY)
Facility: HOSPITAL | Age: 51
LOS: 1 days | Discharge: HOME | End: 2021-08-12
Attending: EMERGENCY MEDICINE
Payer: MEDICAID

## 2021-08-12 VITALS
OXYGEN SATURATION: 99 % | TEMPERATURE: 98 F | DIASTOLIC BLOOD PRESSURE: 93 MMHG | WEIGHT: 199.96 LBS | SYSTOLIC BLOOD PRESSURE: 157 MMHG | HEIGHT: 64 IN | HEART RATE: 99 BPM | RESPIRATION RATE: 20 BRPM

## 2021-08-12 DIAGNOSIS — Z87.42 PERSONAL HISTORY OF OTHER DISEASES OF THE FEMALE GENITAL TRACT: Chronic | ICD-10-CM

## 2021-08-12 LAB
BASE EXCESS BLDV CALC-SCNC: -0.4 MMOL/L — SIGNIFICANT CHANGE UP (ref -2–2)
CA-I SERPL-SCNC: 1.17 MMOL/L — SIGNIFICANT CHANGE UP (ref 1.12–1.3)
CHLORIDE BLDV-SCNC: 108 MMOL/L — SIGNIFICANT CHANGE UP (ref 96–108)
CO2 BLDV-SCNC: 26 MMOL/L — SIGNIFICANT CHANGE UP (ref 22–30)
GAS PNL BLDV: 140 MMOL/L — SIGNIFICANT CHANGE UP (ref 135–145)
GAS PNL BLDV: SIGNIFICANT CHANGE UP
GLUCOSE BLDV-MCNC: 109 MG/DL — HIGH (ref 70–99)
HCO3 BLDV-SCNC: 25 MMOL/L — SIGNIFICANT CHANGE UP (ref 21–29)
HCT VFR BLDA CALC: 17 % — CRITICAL LOW (ref 39–50)
HGB BLD CALC-MCNC: 5.3 G/DL — CRITICAL LOW (ref 11.5–15.5)
LACTATE BLDV-MCNC: 1.1 MMOL/L — SIGNIFICANT CHANGE UP (ref 0.7–2)
PCO2 BLDV: 49 MMHG — SIGNIFICANT CHANGE UP (ref 35–50)
PH BLDV: 7.33 — LOW (ref 7.35–7.45)
PO2 BLDV: 42 MMHG — SIGNIFICANT CHANGE UP (ref 25–45)
POTASSIUM BLDV-SCNC: 4.9 MMOL/L — SIGNIFICANT CHANGE UP (ref 3.5–5.3)
SAO2 % BLDV: 66 % — LOW (ref 67–88)

## 2021-08-12 PROCEDURE — 99284 EMERGENCY DEPT VISIT MOD MDM: CPT

## 2021-08-12 NOTE — ED ADULT NURSE NOTE - OBJECTIVE STATEMENT
50 y/o F AAOX4 PMH anemia presents to ED for low H&H. Pt receives weekly outpatient lab work, and on Tuesday received result of 5.8. Pt endorses chronic blood transfusions every 4-6 weeks, starting last year. Pt endorses mild dizziness. Upon assessment, pt breathing spontaneously, unlabored, SPO2 98% on RA. Denies chest pain, palpitations, SOB, HA, numbness, tingling, fever, chills, abdominal pain, n/v/d, urinary symptoms. Patient placed in position of comfort, bed locked and in lowest position. 52 y/o F AAOX4 PMH anemia presents to ED for low H&H. Pt receives weekly outpatient lab work, and on Tuesday received result of 5.8. Pt endorses chronic blood transfusions every 4-6 weeks, starting last year. Pt endorses mild dizziness, SOB when walking long distances. Pt states "if I need a blood transfusion, I will only do it in the observation unit where I usually do, because I need privacy and will not do it in the ER." Upon assessment, pt breathing spontaneously, unlabored, SPO2 98% on RA. Denies chest pain, palpitations, HA, numbness, tingling, fever, chills, abdominal pain, n/v/d, urinary symptoms. Patient placed in position of comfort, bed locked and in lowest position. 50 y/o F AAOX4 PMH anemia presents to ED for low H&H. Pt receives weekly outpatient lab work, and on Tuesday received result of 5.8. Pt endorses chronic blood transfusions every 4-6 weeks, starting last year. Pt endorses mild dizziness, SOB when walking long distances. Pt states "if I need a blood transfusion, I will only do it in the observation unit where I usually do, because I need privacy and will not do it in the ER." Pt refuses second IV for blood transfusion, educated regarding risks. Upon assessment, pt breathing spontaneously, unlabored, SPO2 98% on RA. Denies chest pain, palpitations, HA, numbness, tingling, fever, chills, abdominal pain, n/v/d, urinary symptoms. Patient placed in position of comfort, bed locked and in lowest position.

## 2021-08-12 NOTE — ED PROVIDER NOTE - CLINICAL SUMMARY MEDICAL DECISION MAKING FREE TEXT BOX
51 year old post-menopausal female with history of IgA nephropathy on iron infusions, currently requiring blood tranfusions q4-6 weeks, undergoing work up for chronic anemia, sent in by PMD for pRBC transfusion. 51 year old post-menopausal female with history of IgA nephropathy on iron infusions, currently requiring blood tranfusions q4-6 weeks, undergoing work up for chronic anemia, sent in by PMD for pRBC transfusion. Appears well here, afebrile, VS wnl, exam benign. likely will need pRBC transfusion 51 year old post-menopausal female with history of IgA nephropathy on iron infusions, currently requiring blood tranfusions q4-6 weeks, undergoing work up for chronic anemia, sent in by PMD for pRBC transfusion. Appears well here, afebrile, VS wnl, exam benign. likely will need pRBC transfusion  Attending Latonia Montes: 50 yo female post menopausal with h/o IgA nephropathy, anemia, on procrit requring frequring blood transfusion followed by hematology presenting with concern for anemia and need for blood transfusion. pt is symptomatic with fatigue. no black o rbloody stools to suggest GIB. abdomen soft on exam. will repeat blood work type and screen. likely cdu for transfusion

## 2021-08-12 NOTE — ED PROVIDER NOTE - PHYSICAL EXAMINATION
Gen - NAD; well-appearing; A+Ox3   HEENT - NCAT, EOMI  Neck - supple  Resp - CTAB  CV -  RRR  Abd - soft, NT, ND; no guarding or rebound  Back - no midline, paraspinous, or CVA tenderness  MSK - 5/5 strength and FROM b/l UE and LE  Extrem - some LE edema b/l  Neuro - no focal motor or sensation deficits Gen - NAD; well-appearing; A+Ox3   HEENT - NCAT, EOMI  Neck - supple  Resp - CTAB  CV -  RRR  Abd - soft, NT, ND; no guarding or rebound  Back - no midline, paraspinous, or CVA tenderness  MSK - 5/5 strength and FROM b/l UE and LE  Extrem - some LE edema b/l  Neuro - no focal motor or sensation deficits  Attending Latonia Montes: Gen: NAD, heent: atrauamtic, eomi, conjunctiva pale , mmm,, neck; nttp, no nuchal rigidity, chest: nttp, no crepitus, cv: rrr, no murmurs, lungs: ctab, abd: soft, nontender, nondistended, no peritoneal signs, no guarding, ext: wwp, neg homans, skin: no rash, neuro: awake and alert, following commands, speech clear, sensation and strength intact, no focal deficits

## 2021-08-12 NOTE — ED PROVIDER NOTE - ATTENDING CONTRIBUTION TO CARE
Attending MD Latonia Montes:  I personally have seen and examined this patient.  Resident note reviewed and agree on plan of care and except where noted.  See HPI, PE, and MDM for details.

## 2021-08-12 NOTE — ED PROVIDER NOTE - OBJECTIVE STATEMENT
51 year old female with history of ?iron deficiency anemia, currently requiring blood tranfusions q4-6 weeks, sent in by PMD for pRBC transfusion. States that 51 year old post-menopausal female with history of IgA nephropathy on procrit/iron infusions, currently requiring blood tranfusions q4-6 weeks, undergoing work up for chronic anemia, sent in by PMD for pRBC transfusion. States having fatigue chronically, denies any fevers/chills, CP, SOB, dizziness, syncope, abdominal pain, diarrhea, bloody stools, vaginal bleeding. Reports that she currently has bloodwork checked weekly, last Hgb 5.8, now sent in for 2u PRBC, which she tolerates without issues.

## 2021-08-13 VITALS
HEART RATE: 84 BPM | TEMPERATURE: 98 F | SYSTOLIC BLOOD PRESSURE: 158 MMHG | RESPIRATION RATE: 18 BRPM | DIASTOLIC BLOOD PRESSURE: 95 MMHG | OXYGEN SATURATION: 100 %

## 2021-08-13 LAB
ALBUMIN SERPL ELPH-MCNC: 3.6 G/DL — SIGNIFICANT CHANGE UP (ref 3.3–5)
ALP SERPL-CCNC: 118 U/L — SIGNIFICANT CHANGE UP (ref 40–120)
ALT FLD-CCNC: 13 U/L — SIGNIFICANT CHANGE UP (ref 10–45)
ANION GAP SERPL CALC-SCNC: 12 MMOL/L — SIGNIFICANT CHANGE UP (ref 5–17)
ANISOCYTOSIS BLD QL: SIGNIFICANT CHANGE UP
APTT BLD: 27.2 SEC — LOW (ref 27.5–35.5)
AST SERPL-CCNC: 18 U/L — SIGNIFICANT CHANGE UP (ref 10–40)
BASOPHILS # BLD AUTO: 0 K/UL — SIGNIFICANT CHANGE UP (ref 0–0.2)
BASOPHILS NFR BLD AUTO: 0 % — SIGNIFICANT CHANGE UP (ref 0–2)
BILIRUB SERPL-MCNC: <0.1 MG/DL — LOW (ref 0.2–1.2)
BLD GP AB SCN SERPL QL: NEGATIVE — SIGNIFICANT CHANGE UP
BUN SERPL-MCNC: 46 MG/DL — HIGH (ref 7–23)
CALCIUM SERPL-MCNC: 8.9 MG/DL — SIGNIFICANT CHANGE UP (ref 8.4–10.5)
CHLORIDE SERPL-SCNC: 106 MMOL/L — SIGNIFICANT CHANGE UP (ref 96–108)
CO2 SERPL-SCNC: 21 MMOL/L — LOW (ref 22–31)
CREAT SERPL-MCNC: 2.89 MG/DL — HIGH (ref 0.5–1.3)
DACRYOCYTES BLD QL SMEAR: SIGNIFICANT CHANGE UP
ELLIPTOCYTES BLD QL SMEAR: SLIGHT — SIGNIFICANT CHANGE UP
EOSINOPHIL # BLD AUTO: 0.18 K/UL — SIGNIFICANT CHANGE UP (ref 0–0.5)
EOSINOPHIL NFR BLD AUTO: 1.7 % — SIGNIFICANT CHANGE UP (ref 0–6)
GLUCOSE SERPL-MCNC: 102 MG/DL — HIGH (ref 70–99)
HAPTOGLOB SERPL-MCNC: 172 MG/DL — SIGNIFICANT CHANGE UP (ref 34–200)
HCG SERPL-ACNC: <2 MIU/ML — SIGNIFICANT CHANGE UP
HCT VFR BLD CALC: 20.6 % — CRITICAL LOW (ref 34.5–45)
HCT VFR BLD CALC: 32.4 % — LOW (ref 34.5–45)
HGB BLD-MCNC: 5.2 G/DL — CRITICAL LOW (ref 11.5–15.5)
HGB BLD-MCNC: 9 G/DL — LOW (ref 11.5–15.5)
HYPOCHROMIA BLD QL: SIGNIFICANT CHANGE UP
INR BLD: 1.01 RATIO — SIGNIFICANT CHANGE UP (ref 0.88–1.16)
LYMPHOCYTES # BLD AUTO: 1.8 K/UL — SIGNIFICANT CHANGE UP (ref 1–3.3)
LYMPHOCYTES # BLD AUTO: 17.4 % — SIGNIFICANT CHANGE UP (ref 13–44)
MANUAL SMEAR VERIFICATION: SIGNIFICANT CHANGE UP
MCHC RBC-ENTMCNC: 21.2 PG — LOW (ref 27–34)
MCHC RBC-ENTMCNC: 24.2 PG — LOW (ref 27–34)
MCHC RBC-ENTMCNC: 25.2 GM/DL — LOW (ref 32–36)
MCHC RBC-ENTMCNC: 27.8 GM/DL — LOW (ref 32–36)
MCV RBC AUTO: 84.1 FL — SIGNIFICANT CHANGE UP (ref 80–100)
MCV RBC AUTO: 87.1 FL — SIGNIFICANT CHANGE UP (ref 80–100)
MONOCYTES # BLD AUTO: 0.27 K/UL — SIGNIFICANT CHANGE UP (ref 0–0.9)
MONOCYTES NFR BLD AUTO: 2.6 % — SIGNIFICANT CHANGE UP (ref 2–14)
NEUTROPHILS # BLD AUTO: 7.99 K/UL — HIGH (ref 1.8–7.4)
NEUTROPHILS NFR BLD AUTO: 77.4 % — HIGH (ref 43–77)
NRBC # BLD: 0 /100 WBCS — SIGNIFICANT CHANGE UP (ref 0–0)
OVALOCYTES BLD QL SMEAR: SLIGHT — SIGNIFICANT CHANGE UP
PLAT MORPH BLD: NORMAL — SIGNIFICANT CHANGE UP
PLATELET # BLD AUTO: 284 K/UL — SIGNIFICANT CHANGE UP (ref 150–400)
PLATELET # BLD AUTO: 294 K/UL — SIGNIFICANT CHANGE UP (ref 150–400)
POIKILOCYTOSIS BLD QL AUTO: SLIGHT — SIGNIFICANT CHANGE UP
POLYCHROMASIA BLD QL SMEAR: SIGNIFICANT CHANGE UP
POTASSIUM SERPL-MCNC: 5 MMOL/L — SIGNIFICANT CHANGE UP (ref 3.5–5.3)
POTASSIUM SERPL-SCNC: 5 MMOL/L — SIGNIFICANT CHANGE UP (ref 3.5–5.3)
PROT SERPL-MCNC: 7 G/DL — SIGNIFICANT CHANGE UP (ref 6–8.3)
PROTHROM AB SERPL-ACNC: 12.1 SEC — SIGNIFICANT CHANGE UP (ref 10.6–13.6)
RBC # BLD: 2.45 M/UL — LOW (ref 3.8–5.2)
RBC # BLD: 3.72 M/UL — LOW (ref 3.8–5.2)
RBC # FLD: 18.3 % — HIGH (ref 10.3–14.5)
RBC # FLD: 21.1 % — HIGH (ref 10.3–14.5)
RBC BLD AUTO: ABNORMAL
RH IG SCN BLD-IMP: POSITIVE — SIGNIFICANT CHANGE UP
SARS-COV-2 RNA SPEC QL NAA+PROBE: SIGNIFICANT CHANGE UP
SCHISTOCYTES BLD QL AUTO: SLIGHT — SIGNIFICANT CHANGE UP
SODIUM SERPL-SCNC: 139 MMOL/L — SIGNIFICANT CHANGE UP (ref 135–145)
TARGETS BLD QL SMEAR: SIGNIFICANT CHANGE UP
VARIANT LYMPHS # BLD: 0.9 % — SIGNIFICANT CHANGE UP (ref 0–6)
WBC # BLD: 10.32 K/UL — SIGNIFICANT CHANGE UP (ref 3.8–10.5)
WBC # BLD: 12.08 K/UL — HIGH (ref 3.8–10.5)
WBC # FLD AUTO: 10.32 K/UL — SIGNIFICANT CHANGE UP (ref 3.8–10.5)
WBC # FLD AUTO: 12.08 K/UL — HIGH (ref 3.8–10.5)

## 2021-08-13 PROCEDURE — 82435 ASSAY OF BLOOD CHLORIDE: CPT

## 2021-08-13 PROCEDURE — G0378: CPT

## 2021-08-13 PROCEDURE — 86923 COMPATIBILITY TEST ELECTRIC: CPT

## 2021-08-13 PROCEDURE — 36430 TRANSFUSION BLD/BLD COMPNT: CPT

## 2021-08-13 PROCEDURE — 86900 BLOOD TYPING SEROLOGIC ABO: CPT

## 2021-08-13 PROCEDURE — 86850 RBC ANTIBODY SCREEN: CPT

## 2021-08-13 PROCEDURE — 93005 ELECTROCARDIOGRAM TRACING: CPT

## 2021-08-13 PROCEDURE — 84295 ASSAY OF SERUM SODIUM: CPT

## 2021-08-13 PROCEDURE — 83615 LACTATE (LD) (LDH) ENZYME: CPT

## 2021-08-13 PROCEDURE — U0003: CPT

## 2021-08-13 PROCEDURE — 83010 ASSAY OF HAPTOGLOBIN QUANT: CPT

## 2021-08-13 PROCEDURE — P9016: CPT

## 2021-08-13 PROCEDURE — 85014 HEMATOCRIT: CPT

## 2021-08-13 PROCEDURE — 82947 ASSAY GLUCOSE BLOOD QUANT: CPT

## 2021-08-13 PROCEDURE — 85025 COMPLETE CBC W/AUTO DIFF WBC: CPT

## 2021-08-13 PROCEDURE — 82330 ASSAY OF CALCIUM: CPT

## 2021-08-13 PROCEDURE — 86901 BLOOD TYPING SEROLOGIC RH(D): CPT

## 2021-08-13 PROCEDURE — 85018 HEMOGLOBIN: CPT

## 2021-08-13 PROCEDURE — 80053 COMPREHEN METABOLIC PANEL: CPT

## 2021-08-13 PROCEDURE — 82565 ASSAY OF CREATININE: CPT

## 2021-08-13 PROCEDURE — 85027 COMPLETE CBC AUTOMATED: CPT

## 2021-08-13 PROCEDURE — 84702 CHORIONIC GONADOTROPIN TEST: CPT

## 2021-08-13 PROCEDURE — 85610 PROTHROMBIN TIME: CPT

## 2021-08-13 PROCEDURE — 99236 HOSP IP/OBS SAME DATE HI 85: CPT

## 2021-08-13 PROCEDURE — 83605 ASSAY OF LACTIC ACID: CPT

## 2021-08-13 PROCEDURE — 85730 THROMBOPLASTIN TIME PARTIAL: CPT

## 2021-08-13 PROCEDURE — 84132 ASSAY OF SERUM POTASSIUM: CPT

## 2021-08-13 PROCEDURE — 82803 BLOOD GASES ANY COMBINATION: CPT

## 2021-08-13 PROCEDURE — 99283 EMERGENCY DEPT VISIT LOW MDM: CPT | Mod: 25

## 2021-08-13 RX ORDER — BUMETANIDE 0.25 MG/ML
1 INJECTION INTRAMUSCULAR; INTRAVENOUS DAILY
Refills: 0 | Status: DISCONTINUED | OUTPATIENT
Start: 2021-08-13 | End: 2021-08-16

## 2021-08-13 RX ORDER — LOSARTAN POTASSIUM 100 MG/1
50 TABLET, FILM COATED ORAL DAILY
Refills: 0 | Status: DISCONTINUED | OUTPATIENT
Start: 2021-08-13 | End: 2021-08-16

## 2021-08-13 RX ADMIN — LOSARTAN POTASSIUM 50 MILLIGRAM(S): 100 TABLET, FILM COATED ORAL at 05:26

## 2021-08-13 NOTE — ED CDU PROVIDER DISPOSITION NOTE - ATTENDING CONTRIBUTION TO CARE
Attending MD Sun:   I personally have seen and examined this patient.  Physician assistant note reviewed and agree on plan of care and except where noted.  See below for details.     Seen in [facility]    51F with PMH/PSH including anemia on ProCrit, Fe infuion, IgA nephropathy, procrit, Fe sent to the CDU after presenting to the ED with Hb 5.8 sent in by Hematologist.  Reports is on her 3rd unit pRBCs.  Denies any physical complaints, reports came in only bc sent in.  Denies chest pain, shortness of breath, palpitations.  Denies abdominal pain, nausea, vomiting, diarrhea, bloody or black stools. Denies dysuria, hematuria, change in urinary habits including frequency, urgency. Denies fevers, chills, dizziness, weakness. A ten (10) point review of systems was negative other than as stated in the HPI or elsewhere in the chart.     Exam:   General: NAD  HENT: head NCAT, airway patent with moist mucous membranes  Eyes: PERRL  Lungs: lungs CTAB with good inspiratory effort, no wheezing, no rhonchi, no rales  Cardiac: +S1S2, no m/r/g  GI: abdomen soft with +BS, NT, ND  : no CVAT  MSK: FROM at neck, no tenderness to midline palpation  Neuro: moving all extremities spontaneously, sensory grossly intact, no gross neuro deficits  Psych: normal mood and affect     A/P: 44F with anemia, here for transfusion, will check post transfusion CBC, patient has planned trip to [place] so will have delay in follow up. Attending MD Sun:   I personally have seen and examined this patient.  Physician assistant note reviewed and agree on plan of care and except where noted.  See below for details.     Seen in [facility] 44    51F with PMH/PSH including anemia on ProCrit, Fe infuion, IgA nephropathy, procrit, Fe sent to the CDU after presenting to the ED with Hb 5.8 sent in by Hematologist.  Reports is on her 3rd unit pRBCs.  Denies any physical complaints, reports came in only bc sent in.  Denies chest pain, shortness of breath, palpitations.  Denies abdominal pain, nausea, vomiting, diarrhea, bloody or black stools. Denies dysuria, hematuria, change in urinary habits including frequency, urgency. Denies fevers, chills, dizziness, weakness. A ten (10) point review of systems was negative other than as stated in the HPI or elsewhere in the chart.     Exam:   General: NAD  HENT: head NCAT, airway patent with moist mucous membranes  Eyes: PERRL  Lungs: lungs CTAB with good inspiratory effort, no wheezing, no rhonchi, no rales  Cardiac: +S1S2, no m/r/g  GI: abdomen soft with +BS, NT, ND  : no CVAT  MSK: FROM at neck, no tenderness to midline palpation  Neuro: moving all extremities spontaneously, sensory grossly intact, no gross neuro deficits  Psych: normal mood and affect     A/P: 44F with anemia, here for transfusion, will check post transfusion CBC, patient has planned trip to [place] so will have delay in follow up.    ADDENDUM 12:35pm: CBC noted.  No acute issues at  this time.  Lab tests reviewed with patient.  Patient stable for discharge. Follow up instructions given, importance of follow up emphasized, return to ED parameters reviewed and patient verbalized understanding.  All questions answered, all concerns addressed.

## 2021-08-13 NOTE — ED CDU PROVIDER DISPOSITION NOTE - PATIENT PORTAL LINK FT
You can access the FollowMyHealth Patient Portal offered by [facility] by registering at the following website: [email or web address]. By joining [facility]’s FollowMyHealth portal, you will also be able to view your health information using other applications (apps) compatible with our system.

## 2021-08-13 NOTE — ED CDU PROVIDER INITIAL DAY NOTE - PHYSICAL EXAMINATION
Gen - NAD; well-appearing; A+Ox3   	HEENT - NCAT, EOMI  	Psych- Appropriate mood and affect   	Resp - CTAB  	CV -  RRR  	Abd - soft, NT, ND; no guarding or rebound  	MSK -FROMx4  	Extrem - some LE edema b/l  Neuro - follows commands, clear speech, no gross deficits

## 2021-08-13 NOTE — CONSULT NOTE ADULT - ASSESSMENT
51 F w/ PMH of IgA nephropathy and HTN and subnephrotic range proteinuria pw anemia and getting  blood xfusion in the ER     1 Renal-Cont the losartan for now;  Quantify proteinuria and there is no need for renal sono at present  Advance renal failure   2 Anemia- IV Venofer again to cont in the office BUT where is it going????  She has had an extensive GI work up that failed to reveal a source of bleeding(but she clearly still is).  SHe is slated to have another colonoscopy 9/3  Retacrit only p the iron stores have been repleted   Total of 3 units in the ER   3 Endo-GFR too low for SGLT2 agents     Sayed [facility]   [phone]

## 2021-08-13 NOTE — ED CDU PROVIDER INITIAL DAY NOTE - PROGRESS NOTE DETAILS
Pt comfortable. No complaints. Vital signs stable. H/H improved. discussed with Dr. Sun, pt stable for d/c home. -Bessie Ma PA-C

## 2021-08-13 NOTE — CONSULT NOTE ADULT - SUBJECTIVE AND OBJECTIVE BOX
NEPHROLOGY - NSN    Patient seen and examined.    HPI:  51 year old post-menopausal female with history of IgA nephropathy on procrit/iron infusions, currently requiring blood tranfusions q4-6 weeks, undergoing work up for chronic anemia, sent in by PMD for pRBC transfusion. States having fatigue chronically, denies any fevers/chills, CP, SOB, dizziness, syncope, abdominal pain, diarrhea, bloody stools, vaginal bleeding. Reports that she currently has bloodwork checked weekly, last Hgb 5.8, now sent in for 2u PRBC, which she tolerates without issues.  She has seen GI and egd/colonoscopy and capsular camera done and she is also seeing Heme.  She is currently getting IV iron infusions and her iron keeps dropping.  She has no menstruation anymore and denies black stools   There is no evidence of hematoma.  CT of the abd was done in the past but without contrast due to CKD      PAST MEDICAL & SURGICAL HISTORY:  DM (diabetes mellitus)    Anemia    HTN (hypertension)    IgA nephropathy determined by renal biopsy    History of endometriosis        MEDICATIONS  (STANDING):  buMETAnide 1 milliGRAM(s) Oral daily  losartan 50 milliGRAM(s) Oral daily      Allergies    No Known Allergies    Intolerances        SOCIAL HISTORY:  Denies alcohol abuse, drug abuse or tobacco usage.     FAMILY HISTORY:  FH: diabetes mellitus        VITALS:  T(C): 36.6 (08-13-21 @ 10:58), Max: 36.8 (08-12-21 @ 20:05)  HR: 84 (08-13-21 @ 10:58) (77 - 99)  BP: 158/95 (08-13-21 @ 10:58) (138/83 - 161/95)  RR: 18 (08-13-21 @ 10:58) (18 - 20)  SpO2: 100% (08-13-21 @ 10:58) (98% - 100%)    REVIEW OF SYSTEMS:  Denies any nausea, vomiting, diarrhea, fever or chills.  Good oral intake and denies fatigue or weakness. All other pertinent systems are reviewed and are negative.    PHYSICAL EXAM:  Constitutional: NAD  HEENT: EOMI  Neck:  No JVD, supple   Respiratory: CTA B/L  Cardiovascular: S1 and S2, RRR  Gastrointestinal: + BS, soft, NT, ND  Extremities: No peripheral edema, + peripheral pulses  Neurological: A/O x 3, CN2-12 intact  Psychiatric: Normal mood, normal affect  : No Hernández  Skin: No rashes, C/D/I  Access: Not applicable    I and O's:    Height (cm): 162.6 (08-12 @ 20:05)  Weight (kg): 90.7 (08-12 @ 20:05)  BMI (kg/m2): 34.3 (08-12 @ 20:05)  BSA (m2): 1.96 (08-12 @ 20:05)    LABS:                        5.2    10.32 )-----------( 294      ( 12 Aug 2021 23:34 )             20.6     08-12    139  |  106  |  46<H>  ----------------------------<  102<H>  5.0   |  21<L>  |  2.89<H>    Ca    8.9      12 Aug 2021 23:34    TPro  7.0  /  Alb  3.6  /  TBili  <0.1<L>  /  DBili  x   /  AST  18  /  ALT  13  /  AlkPhos  118  08-12      URINE:      RADIOLOGY & ADDITIONAL STUDIES:

## 2021-08-13 NOTE — ED CDU PROVIDER DISPOSITION NOTE - NSFOLLOWUPINSTRUCTIONS_ED_ALL_ED_FT
Hydrate.     We recommend you follow up with your primary care provider within the next 2-3 days, please bring all of your results with you. Please follow up regularly with your nephrologist, hematologist, and your gastroenterologist.     Please return to the Emergency Department with new, worsening, or concerning symptoms, such as:  -Dizziness, syncope  -Heavy vaginal/nose bleeding, rectal bleeding  -Shortness of breath or trouble breathing  -Pressure, pain, tightness in chest  -Facial drooping, arm weakness, or speech difficulty       *More detailed information regarding your visit and discharge can be found by reviewing this packet Stay hydrated.     We recommend you follow up with your primary care provider within the next 2-3 days, please bring all of your results with you. Please follow up regularly with your nephrologist, hematologist, and your gastroenterologist.     Please return to the Emergency Department with new, worsening, or concerning symptoms, such as:  -Dizziness, syncope  -Any bleeding  -Shortness of breath   -Pressure, pain, tightness in chest  -Facial drooping, arm weakness, or speech difficulty   or any other concerns      *More detailed information regarding your visit and discharge can be found by reviewing this packet

## 2021-08-13 NOTE — ED ADULT NURSE REASSESSMENT NOTE - NS ED NURSE REASSESS COMMENT FT1
Pt A+Ox3, VSS, tolerated 1st unit PRBCs well, and denies any adverse effects. 2nd unit PRBCs initiated by 2 ED RNs, and medication administered. Pt resting comfortably in stretcher, call bell in reach.

## 2021-08-13 NOTE — ED CDU PROVIDER INITIAL DAY NOTE - MEDICAL DECISION MAKING DETAILS
Attending Latonia Montes: 52 yo female with complex pmhh including h/o IGa nephropathy presenting with concern for low hemoglobin. has required blood transufsion in the past. not currently on menstrual cycle. placed in cdu for blood transfusion. no black or bloody stools.

## 2021-08-13 NOTE — ED CDU PROVIDER INITIAL DAY NOTE - ATTENDING CONTRIBUTION TO CARE
Attending MD Latonia Montes:   I personally have seen and examined this patient.  Physician assistant note reviewed and agree on plan of care and except where noted.

## 2021-08-13 NOTE — ED CDU PROVIDER DISPOSITION NOTE - CLINICAL COURSE
51 year old PMH IgA nephropathy on procrit/iron infusions, currently requiring blood tranfusions q4-6 weeks, undergoing work up for chronic anemia, sent in by PMD for pRBC transfusion. States having fatigue chronically, denies any fevers/chills, CP, SOB, dizziness, syncope, abdominal pain, diarrhea, bloody stools, vaginal bleeding. Reports that she currently has bloodwork checked weekly, last Hgb 5.8, now sent in for 2u PRBC. Nepho-Ali Junie-Donisoh[facility]   ED course: H/H 5.2/20.6. Plan for 3 units PRBC and repeat CBC post transfusion in CDU. 51 year old PMH IgA nephropathy on procrit/iron infusions, currently requiring blood tranfusions q4-6 weeks, undergoing work up for chronic anemia, sent in by PMD for pRBC transfusion. States having fatigue chronically, denies any fevers/chills, CP, SOB, dizziness, syncope, abdominal pain, diarrhea, bloody stools, vaginal bleeding. Reports that she currently has bloodwork checked weekly, last Hgb 5.8, now sent in for 2u PRBC. [facility]   ED course: H/H 5.2/20.6. Plan for 3 units PRBC and repeat CBC post transfusion in CDU.  H/H improved. pt was discharged home.

## 2021-08-13 NOTE — ED CDU PROVIDER INITIAL DAY NOTE - NS ED ROS FT
Constitutional: No fever or chills +anemia   Eyes: No visual changes, eye pain   CV: No chest pain or lower extremity edema  Resp: No SOB no cough  GI: No abd pain. No nausea or vomiting. No diarrhea.   : No dysuria, hematuria.   MSK: No musculoskeletal pain  Skin: No rash  Psych: No complaints   Neuro: No headache. No new numbness or tingling  Otherwise see HPI

## 2021-08-13 NOTE — ED CDU PROVIDER INITIAL DAY NOTE - OBJECTIVE STATEMENT
51 year old PMH IgA nephropathy on procrit/iron infusions, currently requiring blood tranfusions q4-6 weeks, undergoing work up for chronic anemia, sent in by PMD for pRBC transfusion. States having fatigue chronically, denies any fevers/chills, CP, SOB, dizziness, syncope, abdominal pain, diarrhea, bloody stools, vaginal bleeding. Reports that she currently has bloodwork checked weekly, last Hgb 5.8, now sent in for 2u PRBC. Nepho-Ali Junie-Donisoh[facility]   ED course: H/H 5.2/20.6. Plan for 3 units PRBC and repeat CBC post transfusion in CDU.

## 2021-08-13 NOTE — ED ADULT NURSE REASSESSMENT NOTE - NS ED NURSE REASSESS COMMENT FT1
Report received from VIVIANA Alanis. Pt A+Ox3, VSS, pt resting in stretcher, call bell in reach, provided extra gown and blankets. PRBC's initiated by 2 ED RNs.

## 2021-09-03 NOTE — ED ADULT NURSE NOTE - CADM POA PRESS ULCER
"         [facility]   Pediatric Dermatologic Laser Surgery   Procedure Note         Patient Name:              Maritza Gallardo  Patient :                1994  Medical Record #:       [id number]  Date of Operation:    September 3, 2021       Past Medical History           Past Medical History:   Diagnosis Date     Amenorrhea       Chronic low back pain       Gastroesophageal reflux disease       PONV (postoperative nausea and vomiting)       Sleep apnea       Venous malformation       right arm         /71   Pulse 79   Temp 98.1  F (36.7  C) (Axillary)   Resp 13   Ht 5' 5\" (165.1 cm)   Wt 103.5 kg (228 lb 2.8 oz)   SpO2 97%   BMI 37.97 kg/m         SURGEON:  Ghislaine Bautista MD     ASSISTANT:  Myrna Fritz MD     PREOPERATIVE DIAGNOSIS:  gloluvenous malformation     POSTOPERATIVE DIAGNOSIS:  same     INDICATION: treatment of glomuvenous malformation x3     PROCEDURE PERFORMED:  Nd-YAG 1064nm laser and 4 mm punch biopsy from the R arm     PROCEDURE:   H & P reviewed prior to the procedure.  After discussion of risks and benefits of the procedure including the presence of pain, blister formation, scarring,  pigmentary changes or bruising following the procedure, written consent was obtained from the patient, and the patient was taken to the procedure room. General anesthesia was induced per the anesthesia team.  The patient was placed in the supine position.  Appropriate eyewear in place for all in attendance.  The lesion on the right upper extremity was delineated by a marking pen.            SITE: right upper extremity   SPOT SIZE: 12 mm  FLUENCE: 48 J/cm2  PULSE DURATION: 3 ms  PULSE NUMBER: 194 pulses  COOLIN/20  TOTAL AREA TREATED: 60x10 sq cm.  NOTES:   Vaseline and ice pack applied after procedure and while in recovery.               There were no complications to the procedure or abnormal findings.  Post-op care discussed including sun " protection, use of analgesia.  Follow up in  6-8 weeks for re-treatment.    Ghislaine Bautista MD  , Dermatology & Pediatrics  , Pediatric Dermatology  Director, [facility], [facility]  Faculty Advisor    [facility]  [facility], 12th Floor  [address] [place] [address] [phone] (clinic phone)  [phone] (fax)                               No

## 2021-09-11 ENCOUNTER — EMERGENCY (EMERGENCY)
Facility: HOSPITAL | Age: 51
LOS: 1 days | Discharge: HOME | End: 2021-09-11
Attending: EMERGENCY MEDICINE
Payer: MEDICAID

## 2021-09-11 VITALS
OXYGEN SATURATION: 99 % | HEART RATE: 111 BPM | WEIGHT: 199.96 LBS | DIASTOLIC BLOOD PRESSURE: 91 MMHG | RESPIRATION RATE: 18 BRPM | SYSTOLIC BLOOD PRESSURE: 156 MMHG | TEMPERATURE: 99 F | HEIGHT: 64 IN

## 2021-09-11 DIAGNOSIS — Z87.42 PERSONAL HISTORY OF OTHER DISEASES OF THE FEMALE GENITAL TRACT: Chronic | ICD-10-CM

## 2021-09-11 LAB
ALBUMIN SERPL ELPH-MCNC: 3.5 G/DL — SIGNIFICANT CHANGE UP (ref 3.3–5)
ALP SERPL-CCNC: 116 U/L — SIGNIFICANT CHANGE UP (ref 40–120)
ALT FLD-CCNC: 7 U/L — LOW (ref 10–45)
ANION GAP SERPL CALC-SCNC: 12 MMOL/L — SIGNIFICANT CHANGE UP (ref 5–17)
ANISOCYTOSIS BLD QL: SIGNIFICANT CHANGE UP
APTT BLD: 32 SEC — SIGNIFICANT CHANGE UP (ref 27.5–35.5)
AST SERPL-CCNC: 13 U/L — SIGNIFICANT CHANGE UP (ref 10–40)
BASOPHILS # BLD AUTO: 0.1 K/UL — SIGNIFICANT CHANGE UP (ref 0–0.2)
BASOPHILS NFR BLD AUTO: 0.9 % — SIGNIFICANT CHANGE UP (ref 0–2)
BILIRUB SERPL-MCNC: <0.1 MG/DL — LOW (ref 0.2–1.2)
BLD GP AB SCN SERPL QL: NEGATIVE — SIGNIFICANT CHANGE UP
BUN SERPL-MCNC: 40 MG/DL — HIGH (ref 7–23)
CALCIUM SERPL-MCNC: 8.4 MG/DL — SIGNIFICANT CHANGE UP (ref 8.4–10.5)
CHLORIDE SERPL-SCNC: 107 MMOL/L — SIGNIFICANT CHANGE UP (ref 96–108)
CO2 SERPL-SCNC: 19 MMOL/L — LOW (ref 22–31)
CREAT SERPL-MCNC: 2.95 MG/DL — HIGH (ref 0.5–1.3)
DACRYOCYTES BLD QL SMEAR: SLIGHT — SIGNIFICANT CHANGE UP
ELLIPTOCYTES BLD QL SMEAR: SLIGHT — SIGNIFICANT CHANGE UP
EOSINOPHIL # BLD AUTO: 0.1 K/UL — SIGNIFICANT CHANGE UP (ref 0–0.5)
EOSINOPHIL NFR BLD AUTO: 0.9 % — SIGNIFICANT CHANGE UP (ref 0–6)
GLUCOSE SERPL-MCNC: 136 MG/DL — HIGH (ref 70–99)
HCT VFR BLD CALC: 18.5 % — CRITICAL LOW (ref 34.5–45)
HGB BLD-MCNC: 4.6 G/DL — CRITICAL LOW (ref 11.5–15.5)
HYPOCHROMIA BLD QL: SIGNIFICANT CHANGE UP
INR BLD: 1.01 RATIO — SIGNIFICANT CHANGE UP (ref 0.88–1.16)
LYMPHOCYTES # BLD AUTO: 1.15 K/UL — SIGNIFICANT CHANGE UP (ref 1–3.3)
LYMPHOCYTES # BLD AUTO: 10.8 % — LOW (ref 13–44)
MACROCYTES BLD QL: SLIGHT — SIGNIFICANT CHANGE UP
MANUAL SMEAR VERIFICATION: SIGNIFICANT CHANGE UP
MCHC RBC-ENTMCNC: 20.7 PG — LOW (ref 27–34)
MCHC RBC-ENTMCNC: 24.9 GM/DL — LOW (ref 32–36)
MCV RBC AUTO: 83.3 FL — SIGNIFICANT CHANGE UP (ref 80–100)
MICROCYTES BLD QL: SIGNIFICANT CHANGE UP
MONOCYTES # BLD AUTO: 0.67 K/UL — SIGNIFICANT CHANGE UP (ref 0–0.9)
MONOCYTES NFR BLD AUTO: 6.3 % — SIGNIFICANT CHANGE UP (ref 2–14)
NEUTROPHILS # BLD AUTO: 8.64 K/UL — HIGH (ref 1.8–7.4)
NEUTROPHILS NFR BLD AUTO: 81.1 % — HIGH (ref 43–77)
OVALOCYTES BLD QL SMEAR: SLIGHT — SIGNIFICANT CHANGE UP
PLAT MORPH BLD: NORMAL — SIGNIFICANT CHANGE UP
PLATELET # BLD AUTO: 299 K/UL — SIGNIFICANT CHANGE UP (ref 150–400)
POIKILOCYTOSIS BLD QL AUTO: SLIGHT — SIGNIFICANT CHANGE UP
POLYCHROMASIA BLD QL SMEAR: SIGNIFICANT CHANGE UP
POTASSIUM SERPL-MCNC: 4.8 MMOL/L — SIGNIFICANT CHANGE UP (ref 3.5–5.3)
POTASSIUM SERPL-SCNC: 4.8 MMOL/L — SIGNIFICANT CHANGE UP (ref 3.5–5.3)
PROT SERPL-MCNC: 7.2 G/DL — SIGNIFICANT CHANGE UP (ref 6–8.3)
PROTHROM AB SERPL-ACNC: 12.1 SEC — SIGNIFICANT CHANGE UP (ref 10.6–13.6)
RBC # BLD: 2.22 M/UL — LOW (ref 3.8–5.2)
RBC # FLD: 21.3 % — HIGH (ref 10.3–14.5)
RBC BLD AUTO: ABNORMAL
RH IG SCN BLD-IMP: POSITIVE — SIGNIFICANT CHANGE UP
SCHISTOCYTES BLD QL AUTO: SLIGHT — SIGNIFICANT CHANGE UP
SODIUM SERPL-SCNC: 138 MMOL/L — SIGNIFICANT CHANGE UP (ref 135–145)
WBC # BLD: 10.65 K/UL — HIGH (ref 3.8–10.5)
WBC # FLD AUTO: 10.65 K/UL — HIGH (ref 3.8–10.5)

## 2021-09-11 PROCEDURE — 99291 CRITICAL CARE FIRST HOUR: CPT

## 2021-09-11 RX ORDER — PANTOPRAZOLE SODIUM 20 MG/1
80 TABLET, DELAYED RELEASE ORAL ONCE
Refills: 0 | Status: DISCONTINUED | OUTPATIENT
Start: 2021-09-11 | End: 2021-09-15

## 2021-09-11 NOTE — ED ADULT NURSE REASSESSMENT NOTE - NS ED NURSE REASSESS COMMENT FT1
Patient asked RN to have CDU PA call MD Abhinav Gold (GI) to discuss patient's scheduled colonoscopy monday. Patient provides two phone numbers: [phone] and [phone].

## 2021-09-11 NOTE — ED CDU PROVIDER DISPOSITION NOTE - ATTENDING CONTRIBUTION TO CARE
Attending Statement (DAVID Baker MD):    HPI: 50 y/o F with h/o IgA nephropathy, HTN, DM and anemia (per patient, unclear etiology, concerned for possible "slow GI bleed") requiring transfusions in past; presenting yesterday to ED for ARIAS and fatigue x 2 days; denies bloody or black stools; and found in ED to have Hgb 4.6; Cr 2.95 (~baseline); not hemodynamically unstable, so was sent to CDU for transfusion of 3 U of PRBC and then re-evaluation.  Has colonoscopy for this scheduled for tomorrow (patient asking to be discharged as wants to have colonoscopy and endoscopy to try to identify any source of bleeding). Feels much better this morning and reports currently being asymptomatic (no ARIAS with walking to bathroom).  Repeat Hgb 8.0 3 hours s/p transfusion.    Review of Systems:  -General: no fever or chills  -ENT: no congestion, no difficulty swallowing  -Pulmonary: no cough; see hpi  -Cardiac: no chest pain, no palpitations  -Gastrointestinal: no abdominal pain, no nausea, no vomiting, and no diarrhea.  -Genitourinary: no blood or pain with urination  -Musculoskeletal: no back or neck pain  -Skin: no rashes  -Endocrine: +h/o diabetes  -Neurologic: No new weakness or numbness in extremities    All else negative unless otherwise specified elsewhere in this note.    PSH/PMH as noted above    On Physical Exam:  General: well appearing, in NAD, speaking clearly in full sentences and without difficulty; cooperative with exam  HEENT: anicteric, airway patent  Cardiac: normal s1, s2; RRR; no MGR  Lungs: CTABL  Abdomen: soft nontender/nondistended  : no bladder tenderness or distension  Skin: intact, no rash  Extremities: no peripheral edema, no gross deformities    MDM:  50 y/o F with h/o IgA nephropathy, HTN, DM and anemia; found to have Hgb 4.6; now 8.0 s/p 3 U, is stable for dc, with strict return precautions and follow-up instructions.

## 2021-09-11 NOTE — ED PROVIDER NOTE - NS ED ROS FT
General: Reports fatigue; Denies fever, chills, unexpected weight loss/gain  HEENT: Denies headache, vision changes, tinnitus, sore throat  Cardiovascular: Denies chest pain, palpitations  Skin: Denies rash, erythema, color changes  Respiratory: Reports shortness of breath; denies difficulty breathing, cough  Endocrine: Denies sensitivity to heat, cold, increased urination  Gastrointestinal: Denies abdominal pain, nausea, vomiting, diarrhea, constipation, changes in bowel habits  Musculoskeletal: Denies back pain, lower extremity swelling, extremity pain  Genitourinary: Denies hematuria, dysuria, increased frequency  Neurologic: Denies loss of consciousness, weakness, numbness, tingling  Psychiatric: Denies history of psych, hallucinations

## 2021-09-11 NOTE — ED PROVIDER NOTE - PHYSICAL EXAMINATION
General: non-toxic appearing, in no respiratory distress  HEENT: atraumatic, normocephalic; pupils are equal, round and react to light, extraocular movements intact bilaterally without deficits, conjunctival pallor, mucous membranes moist  Neck: no jugular venous distension, full range of motion  Chest/Lung: clear to auscultation bilaterally, no wheezes/rhonchi/rales  Heart: regular rate and rhythm, no murmur/gallops/rubs  Abdomen: normal bowel sounds, soft, non-tender, non-distended  Extremities: no lower extremity edema, +2 radial pulses bilaterally, +2 dorsalis pedis pulses bilaterally  Musculoskeletal: full range of motion of all 4 extremities, 5/5 strength in all 4 extremities   Nervous System: alert and oriented, no motor deficits or sensory deficits; CNII-XII grossly intact; no focal neurologic deficits  Skin: no rashes/lacerations noted

## 2021-09-11 NOTE — ED PROVIDER NOTE - ATTENDING CONTRIBUTION TO CARE
50 yo female complex PMH as noted multiple prior transfusions with colonoscopy scheduled in 2d presents with outpatient anemia.  critical anemia noted, Hgb reportedly in the 5 range.  endorses mild exertional dyspnea.  repeat CBC, type/screen, coags, likely transfuse/CDU eval.

## 2021-09-11 NOTE — ED ADULT NURSE NOTE - OBJECTIVE STATEMENT
52 y/o female PMHx Anemia, DM, HTN, IgA nephropathy determined by renal biopsy arrives to [facility] ED by car from home with c/o low H&H. Pt states sent in to [facility] ED by Dr. Martel for low H&H. Patient seeing specialist monday for testing to find out why pt has low H&H, gets blood work done weekly, known pathology at this time. Patient states sudden onset dizziness walking from triage to room, but now resolved. Patient is A&Ox4. Respirations spontaneous and unlabored. Denies SOB, dyspnea, cough, chest pain, palpitations. No abdominal pain, soft NT/ND. No n/v/d. Denies urinary symptoms. Denies fever/chills. No sick contacts. Skin is warm/dry and normal for race. Ambulates independently at baseline.

## 2021-09-11 NOTE — ED CDU PROVIDER INITIAL DAY NOTE - PROGRESS NOTE DETAILS
CDU PROGRESS NOTE BERNARD BAKER: call placed to Abhinav Gold (GI) to discuss patient's scheduled colonoscopy monday. awaiting callback. CDU PROGRESS NOTE BERNARD BAKER: case d/w GI Dr. Gold - agrees to plan for transfusion. If stable h/h, pt can followup monday as planned. CDU PROGRESS NOTE BERNARD BAKER: pt consented for blood. call placed to Abhinav Gold (GI) to discuss patient's scheduled colonoscopy monday. awaiting callback.

## 2021-09-11 NOTE — ED PROVIDER NOTE - CLINICAL SUMMARY MEDICAL DECISION MAKING FREE TEXT BOX
51 year-old-female with history of IgA nephropathy, HTN, DM, and chronic anemia with unknown etiology presents with low H&H from nephrologist. CBC, CMP, coags, type and screen. Anticipate CDU admit for transfusion.

## 2021-09-11 NOTE — ED PROVIDER NOTE - OBJECTIVE STATEMENT
Patient is a 51 year-old-female with history of IgA nephropathy, HTN, DM, and chronic anemia with unknown etiology presents with low H&H. Had bloodwork done with Dr. Martel (nephrologist) and was told to come in for a transfusion today. Reports 2 day history of fatigue and exertional shortness of breath. + dark stools. Denies fever, chills, chest pain, abdominal pain, dysuria, hematuria, urinary frequency, diarrhea, constipation. Had a transfusion about a month ago, no reaction to transfusion thus far.

## 2021-09-11 NOTE — ED CDU PROVIDER INITIAL DAY NOTE - OBJECTIVE STATEMENT
Patient is a 51 year-old-female with history of IgA nephropathy, HTN, DM, and chronic anemia with unknown etiology presents with low H&H. Had bloodwork done with Dr. Martel (nephrologist) and was told to come in for a transfusion today. Reports 2 day history of fatigue and exertional shortness of breath. + dark stools. Denies fever, chills, chest pain, abdominal pain, dysuria, hematuria, urinary frequency, diarrhea, constipation. Had a transfusion about a month ago, no reaction to transfusion thus far. Pt has scheduled colonoscopy with her gastroenterologist monday.   In ED pt found to have H/H of 4.6/18.5, Cr. 2.95 at baseline. vitals stable. pt went to CDU for PRBC transfusions (3+) with target Hgb over 8.

## 2021-09-11 NOTE — ED CDU PROVIDER DISPOSITION NOTE - CLINICAL COURSE
Patient is a 51 year-old-female with history of IgA nephropathy, HTN, DM, and chronic anemia with unknown etiology presents with low H&H. Had bloodwork done with Dr. Martel (nephrologist) and was told to come in for a transfusion today. Reports 2 day history of fatigue and exertional shortness of breath. + dark stools. Denies fever, chills, chest pain, abdominal pain, dysuria, hematuria, urinary frequency, diarrhea, constipation. Had a transfusion about a month ago, no reaction to transfusion thus far. Pt has scheduled colonoscopy with her gastroenterologist monday.   In ED pt found to have H/H of 4.6/18.5, Cr. 2.95 at baseline. vitals stable. pt went to CDU for PRBC transfusions (3+) with target Hgb over 8.   In CDU, Patient is a 51 year-old-female with history of IgA nephropathy, HTN, DM, and chronic anemia with unknown etiology presents with low H&H. Had bloodwork done with Dr. Maretl (nephrologist) and was told to come in for a transfusion today. Reports 2 day history of fatigue and exertional shortness of breath. + dark stools. Denies fever, chills, chest pain, abdominal pain, dysuria, hematuria, urinary frequency, diarrhea, constipation. Had a transfusion about a month ago, no reaction to transfusion thus far. Pt has scheduled colonoscopy with her gastroenterologist monday.   In ED pt found to have H/H of 4.6/18.5, Cr. 2.95 at baseline. vitals stable. pt went to CDU for PRBC transfusions (3+) with target Hgb over 8.   In CDU, Patient tolerated transfusions well.  Currently without symptoms.  No active bleeding.  Scheduled for endoscopy and colonoscopy tomorrow.  Repeat hgb 8.  WIll DC home with close outpatient follow up and strict return precautions.

## 2021-09-11 NOTE — ED CDU PROVIDER DISPOSITION NOTE - NSFOLLOWUPINSTRUCTIONS_ED_ALL_ED_FT
Please follow up with your primary care doctor within 1 week.  Please follow up with your gastroenterologist within 1 week    *Bring all printed lab/test results to your appointment(s).*    Continue all home medications as prescribed.    Return to the ED for weakness, chest pain, shortness of breath, dizziness, loss of consciousness, rectal bleeding, or any other concerns. Please follow up with your primary care doctor within 1 week.  Please follow up with your gastroenterologist for colonoscopy tomorrow.      *Bring all printed lab/test results to your appointment(s).*    Continue all home medications as prescribed.    Return to the ED for weakness, chest pain, shortness of breath, dizziness, loss of consciousness, rectal bleeding, or any other concerns.

## 2021-09-11 NOTE — ED ADULT NURSE REASSESSMENT NOTE - NS ED NURSE REASSESS COMMENT FT1
Report received from Maribel Upton RN. Pt AAOx4, NAD, resp nonlabored, skin warm/dry, resting comfortably in bed with call bell at bedside. Pt denies headache, dizziness, chest pain, palpitations, SOB, abd pain, n/v/d, urinary symptoms, fevers, chills at this time. Pt awaiting 1st unit PRBC. Safety maintained.

## 2021-09-11 NOTE — ED CDU PROVIDER INITIAL DAY NOTE - DETAILS
51y female p/w anemia:  -transfuse 3u PRBCs, repeat CBC, tele, frequent julia;s    d/w Dr. Mclaughlin 51y female p/w anemia:  -transfuse 3u PRBCs, repeat CBC, tele, frequent evals    d/w Dr. Mclaughlin

## 2021-09-11 NOTE — ED CDU PROVIDER DISPOSITION NOTE - PATIENT PORTAL LINK FT
You can access the FollowMyHealth Patient Portal offered by [facility] by registering at the following website: [email or web address]. By joining [employer]’s FollowMyHealth portal, you will also be able to view your health information using other applications (apps) compatible with our system.

## 2021-09-12 VITALS
TEMPERATURE: 98 F | OXYGEN SATURATION: 100 % | HEART RATE: 75 BPM | SYSTOLIC BLOOD PRESSURE: 142 MMHG | RESPIRATION RATE: 17 BRPM | DIASTOLIC BLOOD PRESSURE: 101 MMHG

## 2021-09-12 LAB
HCT VFR BLD CALC: 28.3 % — LOW (ref 34.5–45)
HGB BLD-MCNC: 8 G/DL — LOW (ref 11.5–15.5)
MCHC RBC-ENTMCNC: 24.3 PG — LOW (ref 27–34)
MCHC RBC-ENTMCNC: 28.3 GM/DL — LOW (ref 32–36)
MCV RBC AUTO: 86 FL — SIGNIFICANT CHANGE UP (ref 80–100)
NRBC # BLD: 0 /100 WBCS — SIGNIFICANT CHANGE UP (ref 0–0)
PLATELET # BLD AUTO: 303 K/UL — SIGNIFICANT CHANGE UP (ref 150–400)
RBC # BLD: 3.29 M/UL — LOW (ref 3.8–5.2)
RBC # FLD: 17.9 % — HIGH (ref 10.3–14.5)
SARS-COV-2 RNA SPEC QL NAA+PROBE: SIGNIFICANT CHANGE UP
WBC # BLD: 11.71 K/UL — HIGH (ref 3.8–10.5)
WBC # FLD AUTO: 11.71 K/UL — HIGH (ref 3.8–10.5)

## 2021-09-12 PROCEDURE — 85610 PROTHROMBIN TIME: CPT

## 2021-09-12 PROCEDURE — P9016: CPT

## 2021-09-12 PROCEDURE — 85025 COMPLETE CBC W/AUTO DIFF WBC: CPT

## 2021-09-12 PROCEDURE — 86901 BLOOD TYPING SEROLOGIC RH(D): CPT

## 2021-09-12 PROCEDURE — G0378: CPT

## 2021-09-12 PROCEDURE — 80053 COMPREHEN METABOLIC PANEL: CPT

## 2021-09-12 PROCEDURE — 99283 EMERGENCY DEPT VISIT LOW MDM: CPT

## 2021-09-12 PROCEDURE — 86923 COMPATIBILITY TEST ELECTRIC: CPT

## 2021-09-12 PROCEDURE — 36430 TRANSFUSION BLD/BLD COMPNT: CPT

## 2021-09-12 PROCEDURE — 85730 THROMBOPLASTIN TIME PARTIAL: CPT

## 2021-09-12 PROCEDURE — 86850 RBC ANTIBODY SCREEN: CPT

## 2021-09-12 PROCEDURE — 86900 BLOOD TYPING SEROLOGIC ABO: CPT

## 2021-09-12 NOTE — ED CDU PROVIDER SUBSEQUENT DAY NOTE - PHYSICAL EXAMINATION
GEN: Pt in NAD  PSYCH: Affect appropriate.  EYES: Sclera white w/o injection.   ENT: Head NCAT. Neck supple FROM.  RESP: CTA b/l  CARDIAC: RRR, clear distinct S1, S2, no appreciable murmurs.  ABD: Abdomen soft, non-tender.   VASC: No edema or calf tenderness.  SKIN: No notable rash.

## 2021-09-12 NOTE — ED CDU PROVIDER SUBSEQUENT DAY NOTE - PROGRESS NOTE DETAILS
Patient seen at bedside in NAD.  VSS.  Patient resting comfortably without complaints. Patient tolerated transfusions well.  Currently without symptoms.  No active bleeding.  Schedule for endoscopy and colonoscopy tomorrow.  Repeat hgb 8.  WIll DC home with close outpatient follow up and strict return precautions.  -Hans Zepeda PA-C

## 2021-10-12 ENCOUNTER — TRANSCRIPTION ENCOUNTER (OUTPATIENT)
Age: 51
End: 2021-10-12

## 2021-12-28 NOTE — ED PROVIDER NOTE - EYES, MLM
Left sided chest pain x4 days. Reports cough last week. Denies shortness of breath, N/V, dizziness. Clear bilaterally, pupils equal, round and reactive to light.

## 2022-02-01 NOTE — ED CDU PROVIDER DISPOSITION NOTE - NSDCPRINTRESULTS_ED_ALL_ED
Hematology/Oncology Progress Note        Subjective  Patient reports she is feeling better and she is anxious for discharge      Medications  Current Facility-Administered Medications   Medication Dose Route Frequency Provider Last Rate Last Admin   • polyethylene glycol (MIRALAX) packet 17 g  17 g Oral Daily Emily Orr DO   17 g at 02/01/22 0820   • heparin 100 UNIT/ML lock flush 500 Units  500 Units Intracatheter PRN Jeovanny Parkinson MD       • furosemide (LASIX) tablet 160 mg  160 mg Oral BID Dillan Shay MD   160 mg at 02/01/22 0819   • albuterol inhaler 2 puff  2 puff Inhalation Q4H Resp PRN Jeovanny Parkinson MD   2 puff at 01/27/22 0824   • polyethylene glycol (MIRALAX) packet 17 g  17 g Oral Daily PRN Jeovanny Parkinson MD   17 g at 01/25/22 1113   • sodium chloride 0.9 % flush bag 25 mL  25 mL Intravenous PRN Irving Soriano MD       • sodium chloride (PF) 0.9 % injection 2 mL  2 mL Intracatheter 2 times per day Irving Soriano MD   2 mL at 02/01/22 0821   • ondansetron (ZOFRAN) injection 4 mg  4 mg Intravenous BID PRN Irving Soriano MD       • acetaminophen (TYLENOL) tablet 650 mg  650 mg Oral Q4H PRN Irving Soriano MD       • sodium chloride 0.9 % flush bag 25 mL  25 mL Intravenous PRN Irving Soriano MD       • sodium chloride (NORMAL SALINE) 0.9 % bolus 500 mL  500 mL Intravenous PRN Irving Soriano MD       • famotidine (PEPCID) tablet 20 mg  20 mg Oral Daily Irving Soriano MD   20 mg at 02/01/22 0820   • loperamide (IMODIUM) capsule 2 mg  2 mg Oral PRN Jeovanny Parkinson MD       • benzonatate (TESSALON PERLES) capsule 100 mg  100 mg Oral TID PRN Jeovanny Parkinson MD   100 mg at 01/25/22 0402   • acyclovir (ZOVIRAX) capsule 200 mg  200 mg Oral Daily Jeovanny Parkinson MD   200 mg at 02/01/22 0820   • ALPRAZolam (XANAX) tablet 0.25 mg  0.25 mg Oral Q8H PRN Jeovanny Parkinson MD   0.25 mg at 01/23/22 0002   • apixaBAN (ELIQUIS) tablet 2.5 mg  2.5 mg Oral 2 times per day Jeovanny Parkinson MD   2.5 mg at 02/01/22 0819   • gabapentin  (NEURONTIN) capsule 100 mg  100 mg Oral TID Jeovanny Parkinson MD   100 mg at 02/01/22 1300   • HYDROcodone-acetaminophen (NORCO) 5-325 MG per tablet 1 tablet  1 tablet Oral Q6H PRN Jeovanny Parkinson MD       • metoPROLOL succinate (TOPROL-XL) ER tablet 25 mg  25 mg Oral Daily Jeovanny Parkinson MD   25 mg at 01/31/22 1729   • rosuvastatin (CRESTOR) tablet 10 mg  10 mg Oral Daily Jeovanny Parkinson MD   10 mg at 01/31/22 2000   • sevelamer carbonate (RENVELA) tablet 1,600 mg  1,600 mg Oral TID WC Jeovanny Parkinson MD   1,600 mg at 02/01/22 1258   • gentamicin (GARAMYCIN) 0.1 % ointment   Topical TID Dillan Shay MD   Given at 02/01/22 1038   • cetirizine (ZyrTEC) tablet 10 mg  10 mg Oral Daily Irving Soriano MD   10 mg at 02/01/22 0819   • cholecalciferol (VITAMIN D) tablet 50 mcg  50 mcg Oral Daily Irving Soriano MD   50 mcg at 02/01/22 0819   • dexamethasone (PF) (DECADRON) injection 6 mg  6 mg Intravenous Daily Irivng Soriano MD   6 mg at 02/01/22 0819   • guaiFENesin-DM) (ROBITUSSIN DM) 100-10 MG/5ML syrup 10 mL  10 mL Oral Q4H PRN Irving Soriano MD   10 mL at 01/24/22 0836   • melatonin tablet 6 mg  6 mg Oral Nightly Irving Soriano MD   6 mg at 01/31/22 2000   • thiamine (VITAMIN B1) tablet 200 mg  200 mg Oral Daily Irving Soriano MD   200 mg at 02/01/22 0820   • HYDROmorphone (DILAUDID) injection 0.5 mg  0.5 mg Intravenous Q4H PRN Irving Soriano MD       • dextrose 50 % injection 25 g  25 g Intravenous PRN Irving Soriano MD       • dextrose 50 % injection 12.5 g  12.5 g Intravenous PRN Irving Soriano MD       • glucagon (GLUCAGEN) injection 1 mg  1 mg Intramuscular PRN Irving Soriano MD       • dextrose (GLUTOSE) 40 % gel 15 g  15 g Oral PRN Irving Soriano MD       • dextrose (GLUTOSE) 40 % gel 30 g  30 g Oral PRN Irving Soriano MD       • insulin lispro (ADMELOG,HumaLOG) - Correction Dose   Subcutaneous Nightly Irving Soriano MD   2 Units at 01/31/22 1220   • insulin lispro (ADMELOG,HumaLOG) - Correction Dose   Subcutaneous TID WC Irving Soriano MD   1 Units at 02/01/22 1259          Vitals  Vitals with min/max:    Vital Last Value 24 Hour Range   Temperature 97.5 °F (36.4 °C) (02/01/22 1447) Temp  Min: 97.5 °F (36.4 °C)  Max: 97.9 °F (36.6 °C)   Pulse 82 (02/01/22 1447) Pulse  Min: 62  Max: 82   Respiratory 16 (02/01/22 1447) Resp  Min: 16  Max: 16   Non-Invasive  Blood Pressure 128/77 (02/01/22 1447) BP  Min: 117/80  Max: 128/77   Pulse Oximetry 99 % (02/01/22 1447) SpO2  Min: 95 %  Max: 99 %   Arterial   Blood Pressure   No data recorded        Physical Exam        General:  Well developed well nourished in no distress  Skin:  Warm and dry   Oropharynx:  Clear  Lungs:  Clear  Heart:  Regular  Abdomen:  Soft and nontender.  Nondistended  Extremities:  Without edema    Labs   Recent Labs   Lab 02/01/22  0527 01/31/22  0527 01/30/22  0532 01/29/22  0607 01/28/22  0547 01/27/22  0550   WBC 6.3 6.6 5.1 6.0 5.3 5.6   HGB 10.1* 11.0* 10.8* 11.1* 11.1* 11.0*   HCT 32.0* 34.0* 33.3* 34.9* 34.8* 34.5*   MCV 93.3 93.4 92.0 94.1 93.8 92.7    269 247 248 218 218   Absolute Neutrophils  --  4.1 3.2 3.8 3.5 4.0       Recent Labs   Lab 02/01/22  0527 01/31/22  0527 01/30/22  0532   SODIUM 137 138 139   CHLORIDE 98 98 99   CO2 29 29 28   BUN 78* 82* 84*   CREATININE 3.46* 3.74* 3.42*   CALCIUM 8.0* 6.6* 8.4   ALBUMIN 2.2* 2.4* 2.2*   BILIRUBIN 0.3 0.3 0.3   ALKPT 465* 527* 531*   GPT 73* 93* 101*   AST 26 29 41*   GLUCOSE 102* 113* 105*        Lab Results   Component Value Date     01/22/2022       Lab Results   Component Value Date    PT 9.9 01/05/2021    PT 9.5 (L) 10/26/2020    PT 9.5 (L) 10/26/2020    PTT 25 01/04/2021    PTT 26 03/23/2020       Imaging    XR CHEST PA OR AP 1 VIEW   Final Result      1.    Low lung volumes with bibasilar opacities representing atelectasis or   pneumonia.    2.    Bilateral interstitial disease which has increased from the prior,   possibly due to an atypical infectious process such as COVID-19.   3.    Small bilateral pleural effusions.       Electronically Signed by: DAJA CORTES MD    Signed on: 1/30/2022 2:04 PM          XR CHEST PA OR AP 1 VIEW   Final Result          1.  New patchy bibasilar opacities. Findings are compatible with infection   in the appropriate clinical setting.  This includes atypical/viral   infections as possible etiologies, such as Covid 19.     2.  Unchanged small bilateral pleural effusions.      Electronically Signed by: LIUDMILA SANDOVAL M.D.    Signed on: 1/21/2022 5:27 PM                 Impression  1.  Multiple myeloma on Darzalex which is on hold due to current medical issues  2.  Covid pneumonia on treatment per primary service  3.  History of venous thromboembolism in the past currently on 2.5 mg of Eliquis   4.  Chronic renal failure on peritoneal dialysis per nephrology    Plan  Continue treatment of Covid pneumonia per other services  Continue Eliquis 2.5 mg twice daily  We will continue to monitor for thrombosis  She will follow-up with Dr. Mac Patton upon discharge      Romero Mcdermott MD  Hematology/Oncology    2/1/2022     Patient requests all Lab and Radiology Results on their Discharge Instructions

## 2022-02-02 ENCOUNTER — INPATIENT (INPATIENT)
Facility: HOSPITAL | Age: 52
LOS: 1 days | Discharge: LEFT BEFORE DISCHARGE | DRG: 812 | End: 2022-02-04
Attending: INTERNAL MEDICINE | Admitting: INTERNAL MEDICINE
Payer: MEDICAID

## 2022-02-02 VITALS
OXYGEN SATURATION: 100 % | HEART RATE: 120 BPM | TEMPERATURE: 99 F | SYSTOLIC BLOOD PRESSURE: 132 MMHG | HEIGHT: 64 IN | DIASTOLIC BLOOD PRESSURE: 78 MMHG | RESPIRATION RATE: 19 BRPM

## 2022-02-02 DIAGNOSIS — Z87.42 PERSONAL HISTORY OF OTHER DISEASES OF THE FEMALE GENITAL TRACT: Chronic | ICD-10-CM

## 2022-02-02 LAB
ALBUMIN SERPL ELPH-MCNC: 3.5 G/DL — SIGNIFICANT CHANGE UP (ref 3.3–5)
ALP SERPL-CCNC: 95 U/L — SIGNIFICANT CHANGE UP (ref 40–120)
ALT FLD-CCNC: 8 U/L — LOW (ref 10–45)
ANION GAP SERPL CALC-SCNC: 16 MMOL/L — SIGNIFICANT CHANGE UP (ref 5–17)
ANISOCYTOSIS BLD QL: SIGNIFICANT CHANGE UP
APTT BLD: 33.3 SEC — SIGNIFICANT CHANGE UP (ref 27.5–35.5)
AST SERPL-CCNC: 13 U/L — SIGNIFICANT CHANGE UP (ref 10–40)
BASE EXCESS BLDV CALC-SCNC: -6.5 MMOL/L — LOW (ref -2–2)
BASOPHILS # BLD AUTO: 0 K/UL — SIGNIFICANT CHANGE UP (ref 0–0.2)
BASOPHILS NFR BLD AUTO: 0 % — SIGNIFICANT CHANGE UP (ref 0–2)
BILIRUB SERPL-MCNC: <0.1 MG/DL — LOW (ref 0.2–1.2)
BLD GP AB SCN SERPL QL: NEGATIVE — SIGNIFICANT CHANGE UP
BLOOD GAS VENOUS - CREATININE: SIGNIFICANT CHANGE UP MG/DL (ref 0.5–1.3)
BUN SERPL-MCNC: 62 MG/DL — HIGH (ref 7–23)
CA-I SERPL-SCNC: 1.12 MMOL/L — LOW (ref 1.15–1.33)
CALCIUM SERPL-MCNC: 7.9 MG/DL — LOW (ref 8.4–10.5)
CHLORIDE BLDV-SCNC: 108 MMOL/L — SIGNIFICANT CHANGE UP (ref 96–108)
CHLORIDE SERPL-SCNC: 105 MMOL/L — SIGNIFICANT CHANGE UP (ref 96–108)
CO2 BLDV-SCNC: 20 MMOL/L — LOW (ref 22–26)
CO2 SERPL-SCNC: 17 MMOL/L — LOW (ref 22–31)
CREAT SERPL-MCNC: 3.63 MG/DL — HIGH (ref 0.5–1.3)
DACRYOCYTES BLD QL SMEAR: SLIGHT — SIGNIFICANT CHANGE UP
ELLIPTOCYTES BLD QL SMEAR: SIGNIFICANT CHANGE UP
EOSINOPHIL # BLD AUTO: 0 K/UL — SIGNIFICANT CHANGE UP (ref 0–0.5)
EOSINOPHIL NFR BLD AUTO: 0 % — SIGNIFICANT CHANGE UP (ref 0–6)
FLUAV AG NPH QL: SIGNIFICANT CHANGE UP
FLUBV AG NPH QL: SIGNIFICANT CHANGE UP
GAS PNL BLDV: 136 MMOL/L — SIGNIFICANT CHANGE UP (ref 136–145)
GAS PNL BLDV: SIGNIFICANT CHANGE UP
GAS PNL BLDV: SIGNIFICANT CHANGE UP
GIANT PLATELETS BLD QL SMEAR: PRESENT — SIGNIFICANT CHANGE UP
GLUCOSE BLDV-MCNC: 132 MG/DL — HIGH (ref 70–99)
GLUCOSE SERPL-MCNC: 132 MG/DL — HIGH (ref 70–99)
HCO3 BLDV-SCNC: 19 MMOL/L — LOW (ref 22–29)
HCT VFR BLD CALC: 15.2 % — CRITICAL LOW (ref 34.5–45)
HCT VFR BLDA CALC: 11 % — CRITICAL LOW (ref 34.5–46.5)
HGB BLD CALC-MCNC: <4 G/DL — CRITICAL LOW (ref 11.7–16.1)
HGB BLD-MCNC: 3.7 G/DL — CRITICAL LOW (ref 11.5–15.5)
HYPOCHROMIA BLD QL: SIGNIFICANT CHANGE UP
INR BLD: 0.99 RATIO — SIGNIFICANT CHANGE UP (ref 0.88–1.16)
LACTATE BLDV-MCNC: 1.1 MMOL/L — SIGNIFICANT CHANGE UP (ref 0.7–2)
LYMPHOCYTES # BLD AUTO: 1.19 K/UL — SIGNIFICANT CHANGE UP (ref 1–3.3)
LYMPHOCYTES # BLD AUTO: 9.7 % — LOW (ref 13–44)
MACROCYTES BLD QL: SLIGHT — SIGNIFICANT CHANGE UP
MANUAL SMEAR VERIFICATION: SIGNIFICANT CHANGE UP
MCHC RBC-ENTMCNC: 22.6 PG — LOW (ref 27–34)
MCHC RBC-ENTMCNC: 24.3 GM/DL — LOW (ref 32–36)
MCV RBC AUTO: 92.7 FL — SIGNIFICANT CHANGE UP (ref 80–100)
MONOCYTES # BLD AUTO: 0.32 K/UL — SIGNIFICANT CHANGE UP (ref 0–0.9)
MONOCYTES NFR BLD AUTO: 2.6 % — SIGNIFICANT CHANGE UP (ref 2–14)
NEUTROPHILS # BLD AUTO: 10.63 K/UL — HIGH (ref 1.8–7.4)
NEUTROPHILS NFR BLD AUTO: 86.8 % — HIGH (ref 43–77)
NRBC # BLD: 1 /100 — HIGH (ref 0–0)
NT-PROBNP SERPL-SCNC: 1137 PG/ML — HIGH (ref 0–300)
OVALOCYTES BLD QL SMEAR: SIGNIFICANT CHANGE UP
PCO2 BLDV: 38 MMHG — LOW (ref 39–42)
PH BLDV: 7.31 — LOW (ref 7.32–7.43)
PLAT MORPH BLD: NORMAL — SIGNIFICANT CHANGE UP
PLATELET # BLD AUTO: 263 K/UL — SIGNIFICANT CHANGE UP (ref 150–400)
PO2 BLDV: 61 MMHG — HIGH (ref 25–45)
POIKILOCYTOSIS BLD QL AUTO: SIGNIFICANT CHANGE UP
POLYCHROMASIA BLD QL SMEAR: SIGNIFICANT CHANGE UP
POTASSIUM BLDV-SCNC: 4.8 MMOL/L — SIGNIFICANT CHANGE UP (ref 3.5–5.1)
POTASSIUM SERPL-MCNC: 4.7 MMOL/L — SIGNIFICANT CHANGE UP (ref 3.5–5.3)
POTASSIUM SERPL-SCNC: 4.7 MMOL/L — SIGNIFICANT CHANGE UP (ref 3.5–5.3)
PROCALCITONIN SERPL-MCNC: 0.28 NG/ML — HIGH (ref 0.02–0.1)
PROT SERPL-MCNC: 6.3 G/DL — SIGNIFICANT CHANGE UP (ref 6–8.3)
PROTHROM AB SERPL-ACNC: 11.9 SEC — SIGNIFICANT CHANGE UP (ref 10.6–13.6)
RBC # BLD: 1.64 M/UL — LOW (ref 3.8–5.2)
RBC # FLD: 16.5 % — HIGH (ref 10.3–14.5)
RBC BLD AUTO: ABNORMAL
RH IG SCN BLD-IMP: POSITIVE — SIGNIFICANT CHANGE UP
RSV RNA NPH QL NAA+NON-PROBE: SIGNIFICANT CHANGE UP
SAO2 % BLDV: 94.9 % — HIGH (ref 67–88)
SARS-COV-2 RNA SPEC QL NAA+PROBE: SIGNIFICANT CHANGE UP
SODIUM SERPL-SCNC: 138 MMOL/L — SIGNIFICANT CHANGE UP (ref 135–145)
TARGETS BLD QL SMEAR: SIGNIFICANT CHANGE UP
TROPONIN T, HIGH SENSITIVITY RESULT: 24 NG/L — SIGNIFICANT CHANGE UP (ref 0–51)
VARIANT LYMPHS # BLD: 0.9 % — SIGNIFICANT CHANGE UP (ref 0–6)
WBC # BLD: 12.25 K/UL — HIGH (ref 3.8–10.5)
WBC # FLD AUTO: 12.25 K/UL — HIGH (ref 3.8–10.5)

## 2022-02-02 PROCEDURE — 71045 X-RAY EXAM CHEST 1 VIEW: CPT | Mod: 26

## 2022-02-02 PROCEDURE — 99291 CRITICAL CARE FIRST HOUR: CPT

## 2022-02-02 PROCEDURE — 93010 ELECTROCARDIOGRAM REPORT: CPT

## 2022-02-02 NOTE — ED PROVIDER NOTE - OBJECTIVE STATEMENT
Patient is a 50yo woman with a PMHx of  IgA nephropathy, NTH, DM and anemia requiring blood transfusions every 6weeks -2 months that presents for 1 week of SOB, palpitations, and abdominal distension x1week. Patient states that following IgA nephropathy diagnosis in 2019, has required blood transfusions every 6 weeks-2 months. Patient states that she has undergone workup for anemia, including bone marrow biopsy in Dec 2021 that was normal. Denies fever, cough, HA, change in appetite. Patient is a 50yo woman with a PMHx IgA nephropathy, NTH, DM and anemia requiring blood transfusions every 6weeks -2 months that presents for 1 week of SOB, palpitations, and abdominal distension x1week. Patient states that following IgA nephropathy diagnosis in 2019, has required blood transfusions every 6 weeks-2 months. Patient states that she has undergone workup for anemia, including bone marrow biopsy in Dec 2021 that was normal. Denies fever, cough, HA, change in appetite.

## 2022-02-02 NOTE — ED PROVIDER NOTE - CARE PLAN
1 Principal Discharge DX:	Shortness of breath   Principal Discharge DX:	Shortness of breath  Secondary Diagnosis:	Symptomatic anemia

## 2022-02-02 NOTE — ED PROVIDER NOTE - ATTENDING CONTRIBUTION TO CARE
Patient seen in purple 20L room: presents with  1week of bloating, palpitations, and sob. Pt believes it is due to a low hemoglobin for which she has required many blood transfusions, pt has had a negative bone marrow biopsy for this as part of her work up in the past.   htn, dm, endometriosis  qdoc labs appreciated  will likely transfuse and admit  patient chronically iron deficient and questionable compliance with iron  Will follow up on labs, analgesia, imaging, reassess and disposition to the inpatient team as clinically indicated.  *The above represents an initial assessment/impression. Please refer to my progress notes below for potential changes in patient clinical course*

## 2022-02-02 NOTE — ED PROVIDER NOTE - CLINICAL SUMMARY MEDICAL DECISION MAKING FREE TEXT BOX
50yo woman with a PMHx IgA nephropathy, NTH, DM and anemia requiring blood transfusions every 6weeks -2 months that presents for 1 week of SOB, palpitations, and abdominal distension x1week. High suspicion for anemia given significant prior history. Dispo pending but transfuse as indicated, no clear bleeding source, states taking iron as prescribed without issues, ?renal vs hematologic etiology

## 2022-02-02 NOTE — ED ADULT NURSE NOTE - OBJECTIVE STATEMENT
Patient is a 50 y/o female with PMH Patient is a 50 y/o female with PMH of nephropathy, DM, and anemia presenting to the ED with c/o SOB, dyspnea on exertion, and abdominal distention x2 days. Patient reports frequent blood transfusions and states she feels she may need a blood transfusion. Patient reports having blood transfusion and bone marrow biopsy in December. Patient states her HR feels fast, "my heart feels like it is pounding and fast." Patient A&Ox4, breathing slightly labored when speaking, denies chest pain, abdomen distended, reports nausea, denies v/d, denies painful urination/difficulty urinating, radial/pedal pulses strong/present bilaterally, moving all extremities w/o difficulty, denies fever, cough, chills. Comfort and safety maintained.

## 2022-02-02 NOTE — ED PROVIDER NOTE - RAPID ASSESSMENT
51 y F with PMHx of IgA nephropathy, NTH, DM and anemia presents to the ED with SOB, chest pain, weakness, and R abdomen discomfort due to distension x2 days. Denies fever. in NAD.       Scribe Statement: Abelino JOSEPH Tiffany, attest that this documentation has been prepared under the direction and in the presence of James Suero) 51 y F with PMHx of IgA nephropathy, NTH, DM and anemia presents to the ED with SOB, chest pain, weakness, and R abdomen discomfort due to distension x2 days. Denies fever. in NAD.       Romeoibe Statement: I, Kyra Roca, attest that this documentation has been prepared under the direction and in the presence of James Suero (MD)  I, Dr. Suero, personally performed the service described in the documentation recorded by the scribe in my presence, and it accurately and completely records my words and actions.

## 2022-02-02 NOTE — ED PROVIDER NOTE - NS ED ROS FT
Gen: No fever, no weight loss, +fatigue  CV: No chest pain, +palpitations  HEENT: No sore throat, no hoarseness  Skin: No rash, no color changes  Resp: +SOB, no cough  Endo: No sensitivity to heat or cold, no appetite changes  GI: No constipation, no diarrhea, no nausea, no vomiting. +bloating  Msk: No back pain, no LE swelling, no extremity pain  : No dysuria, no increased frequency  Neuro: No LOC, no weakness, no numbness

## 2022-02-03 DIAGNOSIS — N02.8 RECURRENT AND PERSISTENT HEMATURIA WITH OTHER MORPHOLOGIC CHANGES: ICD-10-CM

## 2022-02-03 DIAGNOSIS — Z29.9 ENCOUNTER FOR PROPHYLACTIC MEASURES, UNSPECIFIED: ICD-10-CM

## 2022-02-03 DIAGNOSIS — I10 ESSENTIAL (PRIMARY) HYPERTENSION: ICD-10-CM

## 2022-02-03 DIAGNOSIS — R79.89 OTHER SPECIFIED ABNORMAL FINDINGS OF BLOOD CHEMISTRY: ICD-10-CM

## 2022-02-03 DIAGNOSIS — R06.02 SHORTNESS OF BREATH: ICD-10-CM

## 2022-02-03 DIAGNOSIS — N17.9 ACUTE KIDNEY FAILURE, UNSPECIFIED: ICD-10-CM

## 2022-02-03 DIAGNOSIS — D64.9 ANEMIA, UNSPECIFIED: ICD-10-CM

## 2022-02-03 LAB
ALBUMIN SERPL ELPH-MCNC: 3.4 G/DL — SIGNIFICANT CHANGE UP (ref 3.3–5)
ALP SERPL-CCNC: 101 U/L — SIGNIFICANT CHANGE UP (ref 40–120)
ALT FLD-CCNC: 9 U/L — LOW (ref 10–45)
ANION GAP SERPL CALC-SCNC: 11 MMOL/L — SIGNIFICANT CHANGE UP (ref 5–17)
AST SERPL-CCNC: 13 U/L — SIGNIFICANT CHANGE UP (ref 10–40)
BILIRUB SERPL-MCNC: 0.2 MG/DL — SIGNIFICANT CHANGE UP (ref 0.2–1.2)
BUN SERPL-MCNC: 58 MG/DL — HIGH (ref 7–23)
CALCIUM SERPL-MCNC: 7.8 MG/DL — LOW (ref 8.4–10.5)
CHLORIDE SERPL-SCNC: 111 MMOL/L — HIGH (ref 96–108)
CO2 SERPL-SCNC: 16 MMOL/L — LOW (ref 22–31)
CREAT SERPL-MCNC: 3.62 MG/DL — HIGH (ref 0.5–1.3)
GLUCOSE SERPL-MCNC: 136 MG/DL — HIGH (ref 70–99)
HCT VFR BLD CALC: 26.5 % — LOW (ref 34.5–45)
HGB BLD-MCNC: 7.4 G/DL — LOW (ref 11.5–15.5)
LDH SERPL L TO P-CCNC: 160 U/L — SIGNIFICANT CHANGE UP (ref 50–242)
LDH SERPL L TO P-CCNC: 451 U/L — HIGH (ref 50–242)
MCHC RBC-ENTMCNC: 26.1 PG — LOW (ref 27–34)
MCHC RBC-ENTMCNC: 27.9 GM/DL — LOW (ref 32–36)
MCV RBC AUTO: 93.6 FL — SIGNIFICANT CHANGE UP (ref 80–100)
NRBC # BLD: 0 /100 WBCS — SIGNIFICANT CHANGE UP (ref 0–0)
PLATELET # BLD AUTO: 244 K/UL — SIGNIFICANT CHANGE UP (ref 150–400)
POTASSIUM SERPL-MCNC: 5.2 MMOL/L — SIGNIFICANT CHANGE UP (ref 3.5–5.3)
POTASSIUM SERPL-SCNC: 5.2 MMOL/L — SIGNIFICANT CHANGE UP (ref 3.5–5.3)
PROT SERPL-MCNC: 6.3 G/DL — SIGNIFICANT CHANGE UP (ref 6–8.3)
RBC # BLD: 2.83 M/UL — LOW (ref 3.8–5.2)
RBC # FLD: 15 % — HIGH (ref 10.3–14.5)
SODIUM SERPL-SCNC: 138 MMOL/L — SIGNIFICANT CHANGE UP (ref 135–145)
TROPONIN T, HIGH SENSITIVITY RESULT: 22 NG/L — SIGNIFICANT CHANGE UP (ref 0–51)
WBC # BLD: 12.8 K/UL — HIGH (ref 3.8–10.5)
WBC # FLD AUTO: 12.8 K/UL — HIGH (ref 3.8–10.5)

## 2022-02-03 PROCEDURE — 99223 1ST HOSP IP/OBS HIGH 75: CPT

## 2022-02-03 RX ORDER — BUMETANIDE 0.25 MG/ML
1 INJECTION INTRAMUSCULAR; INTRAVENOUS
Qty: 0 | Refills: 0 | DISCHARGE

## 2022-02-03 RX ORDER — ACETAMINOPHEN 500 MG
650 TABLET ORAL EVERY 6 HOURS
Refills: 0 | Status: DISCONTINUED | OUTPATIENT
Start: 2022-02-03 | End: 2022-02-04

## 2022-02-03 RX ORDER — PANTOPRAZOLE SODIUM 20 MG/1
40 TABLET, DELAYED RELEASE ORAL
Refills: 0 | Status: DISCONTINUED | OUTPATIENT
Start: 2022-02-03 | End: 2022-02-04

## 2022-02-03 RX ORDER — BUMETANIDE 0.25 MG/ML
0.5 INJECTION INTRAMUSCULAR; INTRAVENOUS ONCE
Refills: 0 | Status: COMPLETED | OUTPATIENT
Start: 2022-02-03 | End: 2022-02-03

## 2022-02-03 RX ADMIN — BUMETANIDE 0.5 MILLIGRAM(S): 0.25 INJECTION INTRAMUSCULAR; INTRAVENOUS at 09:10

## 2022-02-03 NOTE — H&P ADULT - PROBLEM SELECTOR PLAN 1
unclear etiology; pt reports prior GI and heme workup that has been largely unrevealing; s/p EGD, colonoscopy (Dr. Gold) and capsule endoscopy; also s/p BM bx at [facility] in Dec 2021, all unrevealing. Has had IV iron on/off in past  Pt on iron supp so unable to state if dark stools, no zeeshan blood  - hgb 3.3 at admission  - transfuse to hgb 7.0  - keep active T&S unclear etiology; pt reports prior GI and heme workup that has been largely unrevealing; s/p EGD, colonoscopy and capsule endoscopy w/ Dr Gold in 2021 and one prior in 2020; also s/p BM bx at [facility] in Dec 2021, all unrevealing. Has had IV iron on/off in past  Pt on iron supp so unable to state if dark stools, no zeeshan blood  - hgb 3.7 at admission  - currently receiving 2nd unit pRBC; will order one additional unit pRBC then recheck CBC  - transfuse to hgb 7.0  - keep active T&S  - f/up iron, fobt, ferritin  - on bumex 1 mg BID at home, w/o hx of CHF? will give 0.5 mg IV bumex x1 between 2nd and 3rd unit pRBC  - unclear etiology; pt reports prior GI and heme workup that has been largely unrevealing; s/p EGD, colonoscopy and capsule endoscopy w/ Dr Gold in 2021 and one prior in 2020; also s/p BM bx at [facility] in Dec 2021, all unrevealing. Has had IV iron on/off in past  Pt on iron supp so unable to state if dark stools, no zeeshan blood  - hgb 3.7 at admission  - currently receiving 2nd unit pRBC; will order one additional unit pRBC then recheck CBC  - transfuse to hgb 7.0  - keep active T&S  - f/up iron, fobt, ferritin  - on bumex 1 mg BID at home, w/o hx of CHF? will give 0.5 mg IV bumex x1 between 2nd and 3rd unit pRBC  - am team to arrange GI and Heme eval unclear etiology; pt reports prior GI and heme workup that has been largely unrevealing; s/p EGD, colonoscopy and capsule endoscopy w/ Dr Gold in 2021 and one prior in 2020; also s/p BM bx at [facility] in Dec 2021, all unrevealing. Has had IV iron on/off in past  Pt on iron supp so unable to state if dark stools, no zeeshan blood  - hgb 3.7 at admission  - currently receiving 2nd unit pRBC; will order one additional unit pRBC then recheck CBC  - transfuse to hgb 7.0  - keep active T&S  - f/up iron, fobt, ferritin  - on bumex 1 mg BID at home, w/o hx of CHF? will give 0.5 mg IV bumex x1 between 2nd and 3rd unit pRBC    Prior workup:  -- B12/folate adeuquate, TFT nml; SPEP, ZOLTAN,  hemoglobin electrophoresis wnl; EGD and colonoscopy. Found gastritis and diverticuli, hemorrhoids; reportedly negative BM bx as well (Dec 2021 at [facility])    - am team to arrange GI and Heme eval; consider house heme

## 2022-02-03 NOTE — H&P ADULT - NSHPLABSRESULTS_GEN_ALL_CORE
Personally reviewed labs.   Personally reviewed imaging.   Personally reviewed EKG.                           3.7    12.25 )-----------( 263      ( 02 Feb 2022 22:24 )             15.2     138  |  105  |  62<H>  ----------------------------<  132<H>  4.7   |  17<L>  |  3.63<H>    Ca    7.9<L>      02 Feb 2022 22:24    TPro  6.3  /  Alb  3.5  /  TBili  <0.1<L>  /  DBili  x   /  AST  13  /  ALT  8<L>  /  AlkPhos  95  02-02      LIVER FUNCTIONS - ( 02 Feb 2022 22:24 )  Alb: 3.5 g/dL / Pro: 6.3 g/dL / ALK PHOS: 95 U/L / ALT: 8 U/L / AST: 13 U/L / GGT: x             PT/INR - ( 02 Feb 2022 22:24 )   PT: 11.9 sec;   INR: 0.99 ratio    PTT - ( 02 Feb 2022 22:24 )  PTT:33.3 sec Personally reviewed labs.   Personally reviewed imaging.   Personally reviewed EKG.                           3.7    12.25 )-----------( 263      ( 02 Feb 2022 22:24 )             15.2     138  |  105  |  62<H>  ----------------------------<  132<H>  4.7   |  17<L>  |  3.63<H>    Ca    7.9<L>      02 Feb 2022 22:24    TPro  6.3  /  Alb  3.5  /  TBili  <0.1<L>  /  DBili  x   /  AST  13  /  ALT  8<L>  /  AlkPhos  95  02-02      LIVER FUNCTIONS - ( 02 Feb 2022 22:24 )  Alb: 3.5 g/dL / Pro: 6.3 g/dL / ALK PHOS: 95 U/L / ALT: 8 U/L / AST: 13 U/L / GGT: x             PT/INR - ( 02 Feb 2022 22:24 )   PT: 11.9 sec;   INR: 0.99 ratio    PTT - ( 02 Feb 2022 22:24 )  PTT:33.3 sec    CXR w/ clear lungs  EKG reviewed    Colonoscopy 2/26/20:  A few small-mouthed diverticula were found in the sigmoid colon.       Internal hemorrhoids were found during retroflexion. The hemorrhoids were moderate.       The exam was otherwise without abnormality.                                                                                                        Impression:          - Diverticulosis in the sigmoid colon.                       - Internal hemorrhoids.                       - The examination was otherwise normal.                       - No specimens collected.  Recommendation:      - [facility] patient to [facility] for [facility] care.                       - Resume regular diet.    EGD 2/26/20:                                                                                           Impression:          - Normal esophagus.                       - Gastritis. Biopsied.                       - Normal examined duodenum. Biopsied.  Recommendation:      - Return patient to [facility] for ongoing care.                       - Await pathology results.                       - Perform a colonoscopy today

## 2022-02-03 NOTE — H&P ADULT - PROBLEM SELECTOR PLAN 4
hold losartan due to EVELYN followed by Dr. Camarena in past followed by Dr. Camarena in past  monitor sCr as above

## 2022-02-03 NOTE — CONSULT NOTE ADULT - SUBJECTIVE AND OBJECTIVE BOX
Chief Complaint:  Patient is a 51y old  Female who presents with a chief complaint of Anemia 51 yr old woman with a PMHx IgA nephropathy, HTN, DM and anemia requiring blood transfusions every 6weeks -2 months presents with 1 week of shortness of breath on exertion, palpitations, bloating and abdominal distension x 1 week. Patient reports frequent blood transfusions and states she feels she may need a blood transfusion. Denies any bloody or dark stools. Patient states that following IgA nephropathy diagnosis in 2019, she has required blood transfusions every 6 weeks-2 months. Found in ED to have hgb 3.7. Patient states that she has undergone workup for anemia, including bone marrow biopsy in Dec 2021 that was normal. Denies fever, cough, HA, change in appetite.  In ED, vital signs significant for tachycardia, normal BP. Hgb noted to be 3.7 at presentation. Plt wnl.        Review of Systems:  Review of Systems: REVIEW OF SYSTEMS:  CONSTITUTIONAL: Fatigue, some lightheadedness. No fevers. No chills. No rigors. No weight loss. No night sweats. No poor appetite.  EYES: No blurry or double vision. No eye pain.  ENT: No hearing difficulty. No vertigo. No dysphagia. No sore throat. No Sinusitis/rhinorrhea.   NECK: No pain. No stiffness/rigidity.  CARDIAC: No chest pain. + palpitations. No lightheadedness. No syncope.  RESPIRATORY: No cough. + exertional SOB. No hemoptysis.  GASTROINTESTINAL: No abdominal pain. No nausea. No vomiting. No hematemesis. No diarrhea. No constipation. No melena. No hematochezia.  GENITOURINARY: No dysuria. No frequency. No hesitancy. No hematuria. No oliguria.  NEUROLOGICAL: No numbness/tingling. No focal weakness. No urinary or fecal incontinence. No headache. No unsteady gait.  BACK: No back pain. No flank pain.  EXTREMITIES: No lower extremity edema. Full ROM. No joint pain.  SKIN: No rashes. No itching. No other lesions.  PSYCHIATRIC: No depression. No anxiety. No SI/HI.  ALLERGIC: No lip swelling. No hives.  All other review of systems is negative unless indicated above.  Unless indicated above, unable to assess ROS 2/2      Allergies:  No Known Allergies      Medications:  acetaminophen     Tablet .. 650 milliGRAM(s) Oral every 6 hours PRN      PMHX/PSHX:  DM (diabetes mellitus)    Anemia    HTN (hypertension)    IgA nephropathy determined by renal biopsy    No significant past surgical history    History of endometriosis        Family history:  No pertinent family history in first degree relatives    FH: diabetes mellitus        Social History:   no etoh no cigs no ivda    ROS:     General:  No wt loss, fevers, chills, night sweats, fatigue,   Eyes:  Good vision, no reported pain  ENT:  No sore throat, pain, runny nose, dysphagia  CV:  No pain, palpitations, hypo/hypertension  Resp:  No dyspnea, cough, tachypnea, wheezing  GI:  No pain, No nausea, No vomiting, No diarrhea, No constipation, No weight loss, No fever, No pruritis, No rectal bleeding, No tarry stools, No dysphagia,  :  No pain, bleeding, incontinence, nocturia  Muscle:  No pain, weakness  Neuro:  No weakness, tingling, memory problems  Psych:  No fatigue, insomnia, mood problems, depression  Endocrine:  No polyuria, polydipsia, cold/heat intolerance  Heme:  No petechiae, ecchymosis, easy bruisability  Skin:  No rash, tattoos, scars, edema      PHYSICAL EXAM:   Vital Signs:  Vital Signs Last 24 Hrs  T(C): 36.8 (03 Feb 2022 12:55), Max: 37.4 (02 Feb 2022 21:24)  T(F): 98.3 (03 Feb 2022 12:55), Max: 99.4 (02 Feb 2022 21:24)  HR: 89 (03 Feb 2022 12:55) (88 - 120)  BP: 159/78 (03 Feb 2022 12:55) (119/73 - 159/78)  BP(mean): --  RR: 18 (03 Feb 2022 12:55) (18 - 20)  SpO2: 100% (03 Feb 2022 12:55) (99% - 100%)  Daily Height in cm: 162.56 (02 Feb 2022 21:24)    Daily     GENERAL:  Appears stated age, well-groomed, well-nourished, no distress  HEENT:  NC/AT,  conjunctivae clear and pink, no thyromegaly, nodules, adenopathy, no JVD, sclera -anicteric  CHEST:  Full & symmetric excursion, no increased effort, breath sounds clear  HEART:  Regular rhythm, S1, S2, no murmur/rub/S3/S4, no abdominal bruit, no edema  ABDOMEN:  Soft, non-tender, non-distended, normoactive bowel sounds,  no masses ,no hepato-splenomegaly, no signs of chronic liver disease  EXTEREMITIES:  no cyanosis,clubbing or edema  SKIN:  No rash/erythema/ecchymoses/petechiae/wounds/abscess/warm/dry  NEURO:  Alert, oriented, no asterixis, no tremor, no encephalopathy    LABS:                        3.7    12.25 )-----------( 263      ( 02 Feb 2022 22:24 )             15.2     02-02    138  |  105  |  62<H>  ----------------------------<  132<H>  4.7   |  17<L>  |  3.63<H>    Ca    7.9<L>      02 Feb 2022 22:24    TPro  6.3  /  Alb  3.5  /  TBili  <0.1<L>  /  DBili  x   /  AST  13  /  ALT  8<L>  /  AlkPhos  95  02-02    LIVER FUNCTIONS - ( 02 Feb 2022 22:24 )  Alb: 3.5 g/dL / Pro: 6.3 g/dL / ALK PHOS: 95 U/L / ALT: 8 U/L / AST: 13 U/L / GGT: x           PT/INR - ( 02 Feb 2022 22:24 )   PT: 11.9 sec;   INR: 0.99 ratio         PTT - ( 02 Feb 2022 22:24 )  PTT:33.3 sec        Imaging:          
NEPHROLOGY - NSN    Patient seen and examined.    HPI:  51 yr old woman with a PMHx IgA nephropathy, HTN, DM and anemia requiring blood transfusions every 6weeks -2 months presents with 1 week of shortness of breath on exertion, palpitations, bloating and abdominal distension x 1 week. Patient reports frequent blood transfusions and states she feels she may need a blood transfusion. Denies any bloody or dark stools. Patient states that following IgA nephropathy diagnosis in 2019, she has required blood transfusions every 6 weeks-2 months. Found in ED to have hgb 3.7. Patient states that she has undergone workup for anemia, including bone marrow biopsy in Dec 2021 that was normal. Denies fever, cough, HA, change in appetite.  In ED, vital signs significant for tachycardia, normal BP. Hgb noted to be 3.7 at presentation. Plt wnl.      She has seen GI and egd/colonoscopy and capsular camera done and she is also seeing Heme.  She is currently getting IV iron infusions and her iron keeps dropping.  She has no menstruation anymore and denies black stools   There is no evidence of hematoma.  CT of the abd was done in the past but without contrast due to CKD      PAST MEDICAL & SURGICAL HISTORY:  DM (diabetes mellitus)    Anemia    HTN (hypertension)    IgA nephropathy determined by renal biopsy    History of endometriosis        MEDICATIONS  (STANDING):  buMETAnide Injectable 0.5 milliGRAM(s) IV Push once      51 F w/ PMH of IgA nephropathy and HTN and subnephrotic range proteinuria pw anemia and getting  blood xfusion in the ER     1 Renal-Cont the losartan for now;  Quantify proteinuria and there is no need for renal sono at present  Advance renal failure   2 Anemia- IV Venofer again to cont in the office BUT where is it going????  She has had an extensive GI work up that failed to reveal a source of bleeding(but she clearly still is).  SHe is slated to have another colonoscopy 9/3  Retacrit only p the iron stores have been repleted   Total of 3 units in the ER   3 Endo-GFR too low for SGLT2 agents     Allergies    No Known Allergies    Intolerances        SOCIAL HISTORY:  Denies alcohol abuse, drug abuse or tobacco usage.     FAMILY HISTORY:  FH: diabetes mellitus        VITALS:  T(C): 36.3 (02-03-22 @ 04:24), Max: 37.4 (02-02-22 @ 21:24)  HR: 91 (02-03-22 @ 04:24) (91 - 120)  BP: 119/73 (02-03-22 @ 04:24) (119/73 - 142/88)  RR: 18 (02-03-22 @ 04:24) (18 - 20)  SpO2: 100% (02-03-22 @ 04:24) (99% - 100%)    REVIEW OF SYSTEMS:  Denies any nausea, vomiting, diarrhea, fever or chills. Denies chest pain, SOB, focal weakness, hematuria or dysuria. Good oral intake and denies fatigue or weakness. All other pertinent systems are reviewed and are negative.    PHYSICAL EXAM:  Constitutional: NAD  HEENT: EOMI  Neck:  No JVD, supple   Respiratory: CTA B/L  Cardiovascular: S1 and S2, RRR  Gastrointestinal: + BS, soft, NT, ND  Extremities: No peripheral edema, + peripheral pulses  Neurological: A/O x 3, CN2-12 intact  Psychiatric: Normal mood, normal affect  : No Hernández  Skin: No rashes, C/D/I  Access: Not applicable    I and O's:    Height (cm): 162.6 (02-02 @ 21:24)    LABS:                        3.7    12.25 )-----------( 263      ( 02 Feb 2022 22:24 )             15.2     02-02    138  |  105  |  62<H>  ----------------------------<  132<H>  4.7   |  17<L>  |  3.63<H>    Ca    7.9<L>      02 Feb 2022 22:24    TPro  6.3  /  Alb  3.5  /  TBili  <0.1<L>  /  DBili  x   /  AST  13  /  ALT  8<L>  /  AlkPhos  95  02-02      URINE:      RADIOLOGY & ADDITIONAL STUDIES:    
Reason for consult: Anemia    HPI:  51 yr old woman with a PMHx IgA nephropathy, HTN, DM and anemia requiring blood transfusions every 6weeks -2 months presents with 1 week of shortness of breath on exertion, palpitations, bloating and abdominal distension x 1 week. Patient reports frequent blood transfusions and states she feels she may need a blood transfusion. Denies any bloody or dark stools. Patient states that following IgA nephropathy diagnosis in 2019, she has required blood transfusions every 6 weeks-2 months. Found in ED to have hgb 3.7. Patient states that she has undergone workup for anemia, including bone marrow biopsy in Dec 2021 that was normal. Denies fever, cough, HA, change in appetite.  In ED, vital signs significant for tachycardia, normal BP. Hgb noted to be 3.7 at presentation. Plt wnl.    (03 Feb 2022 02:19)    Hematology/Oncology consulted on this 51 year old female with history of Anemia requiring multiple transfusions who presented today to the ED with SOB and a Hgb of 3.7, 3 units being given at this time. Patient reports having had a negative Bone marrow Biopsy in 2021, unable to confirm.  She was diagnosed with IgA nephropathy, in 2019. She denies dark stools.        PAST MEDICAL & SURGICAL HISTORY:  DM (diabetes mellitus)    Anemia    HTN (hypertension)    IgA nephropathy determined by renal biopsy    History of endometriosis        FAMILY HISTORY:  FH: diabetes mellitus        Alochol: Denied  Smoking: Nonsmoker  Drug Use: Denied  Marital Status:         Allergies    No Known Allergies    Intolerances        MEDICATIONS  (STANDING):  pantoprazole    Tablet 40 milliGRAM(s) Oral before breakfast    MEDICATIONS  (PRN):  acetaminophen     Tablet .. 650 milliGRAM(s) Oral every 6 hours PRN Temp greater or equal to 38C (100.4F), Mild Pain (1 - 3)      ROS  No fever, sweats, chills  No epistaxis, HA, sore throat  No CP, SOB, cough, sputum  No n/v/d, abd pain, melena, hematochezia  No edema  No rash  No anxiety  No back pain, joint pain  No bleeding, bruising  No dysuria, hematuria    T(C): 36.8 (02-03-22 @ 12:55), Max: 37.4 (02-02-22 @ 21:24)  HR: 89 (02-03-22 @ 12:55) (88 - 120)  BP: 159/78 (02-03-22 @ 12:55) (119/73 - 159/78)  RR: 18 (02-03-22 @ 12:55) (18 - 20)  SpO2: 100% (02-03-22 @ 12:55) (99% - 100%)  Wt(kg): --    PE  NAD  Awake, alert  Anicteric, MMM  RRR  CTAB  Abd soft, NT, ND  No c/c/e  No rash grossly  FROM                          3.7    12.25 )-----------( 263      ( 02 Feb 2022 22:24 )             15.2       02-02    138  |  105  |  62<H>  ----------------------------<  132<H>  4.7   |  17<L>  |  3.63<H>    Ca    7.9<L>      02 Feb 2022 22:24    TPro  6.3  /  Alb  3.5  /  TBili  <0.1<L>  /  DBili  x   /  AST  13  /  ALT  8<L>  /  AlkPhos  95  02-02

## 2022-02-03 NOTE — H&P ADULT - NSHPPHYSICALEXAM_GEN_ALL_CORE
PHYSICAL EXAM:   GENERAL: Alert. Not confused. No acute distress. Not thin. Not cachectic. Not obese.  HEAD:  Atraumatic. Normocephalic.  EYES: EOMI. PERRLA. Normal conjunctiva/sclera.  ENT: Neck supple. No JVD. Moist oral mucosa. Not edentulous. No thrush.  LYMPH: Normal supraclavicular/cervical lymph nodes.   CARDIAC: Mildly tachy. Regular rhythm. Not irregularly irregular. S1. S2. No murmur. No rub. No distant heart sounds.  LUNG/CHEST: CTAB. BS equal bilaterally. No wheezes. No rales. No rhonchi.  ABDOMEN: Soft. No tenderness. +mild distension. No fluid wave. Normal bowel sounds.  BACK: No midline/vertebral tenderness. No flank tenderness.  VASCULAR: +2 b/l radial pulses. Palpable DP pulses.  EXTREMITIES:  No clubbing. No cyanosis. No edema. Moving all 4.  NEUROLOGY: A&Ox3. Non-focal exam. Cranial nerves intact. Normal speech. Sensation intact.  PSYCH: Normal behavior. Normal affect.  SKIN: No jaundice. No erythema. No rash/lesion.  Vascular Access:     ICU Vital Signs Last 24 Hrs  T(C): 36.9 (02 Feb 2022 23:35), Max: 37.4 (02 Feb 2022 21:24)  T(F): 98.4 (02 Feb 2022 23:35), Max: 99.4 (02 Feb 2022 21:24)  HR: 97 (02 Feb 2022 23:35) (97 - 120)  BP: 122/76 (02 Feb 2022 23:35) (122/76 - 142/88)  BP(mean): --  ABP: --  ABP(mean): --  RR: 18 (02 Feb 2022 23:35) (18 - 20)  SpO2: 99% (02 Feb 2022 23:35) (99% - 100%)      I&O's Summary

## 2022-02-03 NOTE — H&P ADULT - ASSESSMENT
51 yr old woman with a PMHx IgA nephropathy, HTN, DM and anemia requiring blood transfusions presents w/ anemia.

## 2022-02-03 NOTE — H&P ADULT - PROBLEM SELECTOR PLAN 2
sCr 3.63 from 2.95, baseline ~2.95  hx of underlying IgA nephropathy; may have worsening renal function in setting of anemia and dec po intake sCr 3.63 from baseline ~2.95  hx of underlying IgA nephropathy; may have worsening renal function in setting of anemia and dec po intake  - repeat BMP in am sCr 3.63 from baseline ~2.95  hx of underlying IgA nephropathy; may have worsening renal function in setting of anemia and dec po intake/ pre-renal  - repeat BMP in am  - hold losartan and bumex; will use bumex prn w/ large volume transfusions

## 2022-02-03 NOTE — H&P ADULT - NSHPREVIEWOFSYSTEMS_GEN_ALL_CORE
REVIEW OF SYSTEMS:  CONSTITUTIONAL: Fatigue, some lightheadedness. No fevers. No chills. No rigors. No weight loss. No night sweats. No poor appetite.  EYES: No blurry or double vision. No eye pain.  ENT: No hearing difficulty. No vertigo. No dysphagia. No sore throat. No Sinusitis/rhinorrhea.   NECK: No pain. No stiffness/rigidity.  CARDIAC: No chest pain. + palpitations. No lightheadedness. No syncope.  RESPIRATORY: No cough. + exertional SOB. No hemoptysis.  GASTROINTESTINAL: No abdominal pain. No nausea. No vomiting. No hematemesis. No diarrhea. No constipation. No melena. No hematochezia.  GENITOURINARY: No dysuria. No frequency. No hesitancy. No hematuria. No oliguria.  NEUROLOGICAL: No numbness/tingling. No focal weakness. No urinary or fecal incontinence. No headache. No unsteady gait.  BACK: No back pain. No flank pain.  EXTREMITIES: No lower extremity edema. Full ROM. No joint pain.  SKIN: No rashes. No itching. No other lesions.  PSYCHIATRIC: No depression. No anxiety. No SI/HI.  ALLERGIC: No lip swelling. No hives.  All other review of systems is negative unless indicated above.  Unless indicated above, unable to assess ROS 2/2

## 2022-02-03 NOTE — PROGRESS NOTE ADULT - PROBLEM SELECTOR PLAN 2
sCr 3.63 from baseline ~2.95  hx of underlying IgA nephropathy; may have worsening renal function in setting of anemia and dec po intake/ pre-renal  Renal FU noted

## 2022-02-03 NOTE — CONSULT NOTE ADULT - ASSESSMENT
Hematology/Oncology consulted on this 51 year old female with history of Anemia requiring multiple transfusions who presented today to the ED with SOB and a Hgb of 3.7, 3 units being given at this time. Patient reports having had a negative Bone marrow Biopsy in 2021, unable to confirm.  She was diagnosed with IgA nephropathy in 2019. She denies dark stools.    Anemia  --Hgb 3.7  --please transfuse for Hgb <7.0  --TIBC 292, Iron sat 28%  --RBC 1.64, Thalassemia?  --checking B12, Folate, Haptoglobin, LDH, SPEP, Immunofixation, Immunoglobulins and Lupus screen  --Please check FOBT  --GI following, will need EGD/Colonoscopy when stable    IgA Nephropathy  --diagnosed by renal Biopsy 2019  --creatinine 3.63  --renal following    Patient will need close follow up and may follow with Dr Gaffney at [facility] after discharge    Thank you for the courtesy of this referral    Mary Sanchez NP  Hematology/Oncology  [facility]  [phone] (Cell)  [phone] (Office)  [phone] (Alt office)  Evenings and weekends please call MD on call or office  
anemia   gi bleed ?  iga nephropathy    plaN  daily cbc   transfuse prn   consider hematology eval  check iron studies   ppi once a day  check stool occult blood  further recommendations pending above    may need  upper gastrointestinal endoscopy and colonosocpy  Advanced care planning was discussed with patient and family.  Advanced care planning forms were reviewed and discussed.  Risks, benefits and alternatives of gastroenterologic procedures were discussed in detail and all questions were answered.    30 minutes spent.  
51 F w/ PMH of IgA nephropathy and HTN and subnephrotic range proteinuria pw anemia and getting  blood xfusion in the ER     1 Renal-Cont the losartan for now;  Quantify proteinuria and there is no need for renal sono at present  Advance renal failure   2 Anemia- IV Venofer was given in past but this is the first time her iron stores are near normal.  She has had an extensive GI work up that failed to reveal a source of bleeding(but she clearly still is).  SHe also has had a bone marrow and there is no hemolysis.  Thus far 2 capsular studies/EGD and Colonoscopy  There may be a component of low Erythropoietin levels BUT THIS DOES NOT lead to a level of HGB of 3.     3 Endo-GFR too low for SGLT2 agents   4 GI-GI eval and US of the [facility]     [facility]   [phone]

## 2022-02-03 NOTE — CONSULT NOTE ADULT - ATTENDING COMMENTS
50 y/o female presenting with severe anemia with hemoglobin in the 3. She has had extensive workup in the past and has required frequent blood transfusions. Workup in the past included normal hemoglobin electrophoresis, normal iron level, normal vitamin B12/folate, no evidence of plasma cell neoplasms, no evidence of hemolysis. Would recommend repeating labs as above, gastroenterology consultation for GI bleed. Recommend erythropoietin level. She will need bone marrow biopsy if she is unable to produce a report of a recent marrow. May need alpha thalassemia trait check as well.     Will continue to follow.

## 2022-02-03 NOTE — PROGRESS NOTE ADULT - PROBLEM SELECTOR PLAN 1
unclear etiology; pt reports prior GI and heme workup that has been largely unrevealing; s/p EGD, colonoscopy and capsule endoscopy w/ Dr Gold in 2021 and one prior in 2020; also s/p BM bx at [facility] in Dec 2021, all unrevealing. Has had IV iron on/off in past  FU CBC

## 2022-02-03 NOTE — H&P ADULT - HISTORY OF PRESENT ILLNESS
51 yr old woman with a PMHx IgA nephropathy, HTN, DM and anemia requiring blood transfusions every 6weeks -2 months presents with 1 week of shortness of breath on exertion, palpitations, bloating and abdominal distension x 1 week. Patient reports frequent blood transfusions and states she feels she may need a blood transfusion. Denies any bloody or dark stools. Patient states that following IgA nephropathy diagnosis in 2019, she has required blood transfusions every 6 weeks-2 months. Found in ED to have hgb 3.7. Patient states that she has undergone workup for anemia, including bone marrow biopsy in Dec 2021 that was normal. Denies fever, cough, HA, change in appetite.  In ED, vital signs significant for tachycardia, normal BP. Hgb noted to be 3.7 at presentation. Plt wnl.

## 2022-02-03 NOTE — H&P ADULT - PROBLEM SELECTOR PLAN 3
followed by Dr. Camarena in past elevated probnp; also elevated in past  will obtain TTE< none on file

## 2022-02-04 VITALS
DIASTOLIC BLOOD PRESSURE: 90 MMHG | TEMPERATURE: 98 F | RESPIRATION RATE: 18 BRPM | HEART RATE: 88 BPM | OXYGEN SATURATION: 98 % | SYSTOLIC BLOOD PRESSURE: 166 MMHG

## 2022-02-04 LAB
FERRITIN SERPL-MCNC: 19 NG/ML — SIGNIFICANT CHANGE UP (ref 15–150)
FOLATE SERPL-MCNC: 17.9 NG/ML — SIGNIFICANT CHANGE UP
HAPTOGLOB SERPL-MCNC: 137 MG/DL — SIGNIFICANT CHANGE UP (ref 34–200)
HCT VFR BLD CALC: 25.1 % — LOW (ref 34.5–45)
HGB BLD-MCNC: 7.1 G/DL — LOW (ref 11.5–15.5)
LDH SERPL L TO P-CCNC: 178 U/L — SIGNIFICANT CHANGE UP (ref 50–242)
MCHC RBC-ENTMCNC: 25.8 PG — LOW (ref 27–34)
MCHC RBC-ENTMCNC: 28.3 GM/DL — LOW (ref 32–36)
MCV RBC AUTO: 91.3 FL — SIGNIFICANT CHANGE UP (ref 80–100)
NRBC # BLD: 0 /100 WBCS — SIGNIFICANT CHANGE UP (ref 0–0)
PLATELET # BLD AUTO: 240 K/UL — SIGNIFICANT CHANGE UP (ref 150–400)
RBC # BLD: 2.75 M/UL — LOW (ref 3.8–5.2)
RBC # FLD: 15 % — HIGH (ref 10.3–14.5)
VIT B12 SERPL-MCNC: 813 PG/ML — SIGNIFICANT CHANGE UP (ref 232–1245)
WBC # BLD: 10.78 K/UL — HIGH (ref 3.8–10.5)
WBC # FLD AUTO: 10.78 K/UL — HIGH (ref 3.8–10.5)

## 2022-02-04 PROCEDURE — 84132 ASSAY OF SERUM POTASSIUM: CPT

## 2022-02-04 PROCEDURE — 86923 COMPATIBILITY TEST ELECTRIC: CPT

## 2022-02-04 PROCEDURE — 86900 BLOOD TYPING SEROLOGIC ABO: CPT

## 2022-02-04 PROCEDURE — 85610 PROTHROMBIN TIME: CPT

## 2022-02-04 PROCEDURE — 85025 COMPLETE CBC W/AUTO DIFF WBC: CPT

## 2022-02-04 PROCEDURE — 82746 ASSAY OF FOLIC ACID SERUM: CPT

## 2022-02-04 PROCEDURE — 84145 PROCALCITONIN (PCT): CPT

## 2022-02-04 PROCEDURE — 85027 COMPLETE CBC AUTOMATED: CPT

## 2022-02-04 PROCEDURE — 36430 TRANSFUSION BLD/BLD COMPNT: CPT

## 2022-02-04 PROCEDURE — 83615 LACTATE (LD) (LDH) ENZYME: CPT

## 2022-02-04 PROCEDURE — 71045 X-RAY EXAM CHEST 1 VIEW: CPT

## 2022-02-04 PROCEDURE — 82565 ASSAY OF CREATININE: CPT

## 2022-02-04 PROCEDURE — 83550 IRON BINDING TEST: CPT

## 2022-02-04 PROCEDURE — 87040 BLOOD CULTURE FOR BACTERIA: CPT

## 2022-02-04 PROCEDURE — 85018 HEMOGLOBIN: CPT

## 2022-02-04 PROCEDURE — 86901 BLOOD TYPING SEROLOGIC RH(D): CPT

## 2022-02-04 PROCEDURE — 85014 HEMATOCRIT: CPT

## 2022-02-04 PROCEDURE — 82435 ASSAY OF BLOOD CHLORIDE: CPT

## 2022-02-04 PROCEDURE — 82330 ASSAY OF CALCIUM: CPT

## 2022-02-04 PROCEDURE — 83880 ASSAY OF NATRIURETIC PEPTIDE: CPT

## 2022-02-04 PROCEDURE — 87637 SARSCOV2&INF A&B&RSV AMP PRB: CPT

## 2022-02-04 PROCEDURE — 85730 THROMBOPLASTIN TIME PARTIAL: CPT

## 2022-02-04 PROCEDURE — 93005 ELECTROCARDIOGRAM TRACING: CPT

## 2022-02-04 PROCEDURE — 84484 ASSAY OF TROPONIN QUANT: CPT

## 2022-02-04 PROCEDURE — 82607 VITAMIN B-12: CPT

## 2022-02-04 PROCEDURE — 82728 ASSAY OF FERRITIN: CPT

## 2022-02-04 PROCEDURE — 82947 ASSAY GLUCOSE BLOOD QUANT: CPT

## 2022-02-04 PROCEDURE — P9016: CPT

## 2022-02-04 PROCEDURE — 84295 ASSAY OF SERUM SODIUM: CPT

## 2022-02-04 PROCEDURE — 83605 ASSAY OF LACTIC ACID: CPT

## 2022-02-04 PROCEDURE — 36415 COLL VENOUS BLD VENIPUNCTURE: CPT

## 2022-02-04 PROCEDURE — 82803 BLOOD GASES ANY COMBINATION: CPT

## 2022-02-04 PROCEDURE — 99291 CRITICAL CARE FIRST HOUR: CPT | Mod: 25

## 2022-02-04 PROCEDURE — 83010 ASSAY OF HAPTOGLOBIN QUANT: CPT

## 2022-02-04 PROCEDURE — 83540 ASSAY OF IRON: CPT

## 2022-02-04 PROCEDURE — 86850 RBC ANTIBODY SCREEN: CPT

## 2022-02-04 PROCEDURE — 80053 COMPREHEN METABOLIC PANEL: CPT

## 2022-02-04 RX ORDER — IRON SUCROSE 20 MG/ML
200 INJECTION, SOLUTION INTRAVENOUS EVERY 24 HOURS
Refills: 0 | Status: DISCONTINUED | OUTPATIENT
Start: 2022-02-04 | End: 2022-02-04

## 2022-02-04 RX ORDER — LOSARTAN POTASSIUM 100 MG/1
50 TABLET, FILM COATED ORAL DAILY
Refills: 0 | Status: DISCONTINUED | OUTPATIENT
Start: 2022-02-04 | End: 2022-02-04

## 2022-02-04 RX ADMIN — LOSARTAN POTASSIUM 50 MILLIGRAM(S): 100 TABLET, FILM COATED ORAL at 05:25

## 2022-02-04 RX ADMIN — PANTOPRAZOLE SODIUM 40 MILLIGRAM(S): 20 TABLET, DELAYED RELEASE ORAL at 05:25

## 2022-02-04 NOTE — CHART NOTE - NSCHARTNOTEFT_GEN_A_CORE
Pt insisted on leaving Against Medical Advice earlier this morning.  As per discussion with Dr. Cannon, Nephrology and GI would need to advise prior to her leaving.  However, Ms. Robert refused to wait for additional recommendations and left the hospital before being given discharge instructions by the staff, myself or any other disciplines taking care of her on this admission.

## 2022-02-04 NOTE — PROGRESS NOTE ADULT - ASSESSMENT
51 F w/ PMH of IgA nephropathy and HTN and subnephrotic range proteinuria pw anemia and getting  blood xfusion in the ER     1 Renal-Cont the losartan for now;  Quantify proteinuria and there is no need for renal sono at present  Advance renal failure at baseline   2 Anemia- IV Venofer was given in past but this is the first time her iron stores are near normal(so dont  the normal stores to ensue there is no GI losses) .  She has had an extensive GI work up that failed to reveal a source of bleeding(but she clearly still is).  SHe also has had a bone marrow and there is no hemolysis.  Thus far 2 capsular studies/EGD and Colonoscopy  GI eval noted and likely have to repeat the scopes again   There may be a component of low Erythropoietin levels BUT THIS DOES NOT lead to a level of HGB of 3.     3 Endo-GFR too low for SGLT2 agents       Sayed Delonte   [facility]   [phone]     
51 yr old woman with a PMHx IgA nephropathy, HTN, DM and anemia requiring blood transfusions presents w/ anemia.

## 2022-02-04 NOTE — PROGRESS NOTE ADULT - SUBJECTIVE AND OBJECTIVE BOX
NEPHROLOGY-NSN [phone]        Patient seen and examined in bed.  SHe was in good spirits   SP blood xfusion         MEDICATIONS  (STANDING):  losartan 50 milliGRAM(s) Oral daily  pantoprazole    Tablet 40 milliGRAM(s) Oral before breakfast      VITAL:  T(C): , Max: 37 (02-03-22 @ 16:25)  T(F): , Max: 98.6 (02-03-22 @ 16:25)  HR: 90 (02-04-22 @ 05:04)  BP: 126/76 (02-04-22 @ 05:04)  BP(mean): --  RR: 18 (02-04-22 @ 05:04)  SpO2: 98% (02-04-22 @ 05:04)  Wt(kg): --    I and O's:        PHYSICAL EXAM:    Constitutional: NAD  Neck:  No JVD  Respiratory: CTAB/L  Cardiovascular: S1 and S2  Gastrointestinal: BS+, soft, NT/ND  Extremities: No peripheral edema  Neurological: A/O x 3, no focal deficits  Psychiatric: Normal mood, normal affect  : No Hernández  Skin: No rashes  Access: Not applicable    LABS:                        7.1    10.78 )-----------( 240      ( 04 Feb 2022 07:10 )             25.1     02-03    138  |  111<H>  |  58<H>  ----------------------------<  136<H>  5.2   |  16<L>  |  3.62<H>    Ca    7.8<L>      03 Feb 2022 17:49    TPro  6.3  /  Alb  3.4  /  TBili  0.2  /  DBili  x   /  AST  13  /  ALT  9<L>  /  AlkPhos  101  02-03          Urine Studies:          RADIOLOGY & ADDITIONAL STUDIES:        < from: Xray Chest 1 View AP/PA (02.02.22 @ 22:40) >    ACC: [id number] EXAM:  XR CHEST AP OR PA 1V                          PROCEDURE DATE:  02/02/2022          INTERPRETATION:  CLINICAL INDICATION: Dyspnea    TECHNIQUE: Single frontal, portable view of the chest was obtained.    COMPARISON: Chest Radiograph dated 2/21/2020    FINDINGS:    Heart size is normal.  Clear lungs.  The visualized osseous structures demonstrate no acute pathology.      IMPRESSION:  Clear lungs.    --- End of Report ---          WALLY KENNEDY MD; Resident Radiologist  This document has been electronically signed.  TATYANA LYNN MD; Attending Radiologist  This document has been    < end of copied text >< from: CT Abdomen and Pelvis w/ Oral Cont (05.29.21 @ 15:06) >    EXAM:  CT ABDOMEN AND PELVIS OC        PROCEDURE DATE:  05/29/2021           INTERPRETATION:  CLINICAL INFORMATION: Anemia, bloating. History of endometriosis.    COMPARISON: Noncontrast CT of the abdomen and pelvis dated April 21, 2020.    CONTRAST/COMPLICATIONS:  IV Contrast: NONE  Oral Contrast: Omnipaque 300  Complications: None reported at time of study completion    PROCEDURE:  CT of the Abdomen and Pelvis was performed.  Sagittal and coronal reformats were performed.    FINDINGS:  LOWER CHEST: Again is noted decreased attenuation of the vascular blood pool, compatible with anemia. The lung bases are clear.    LIVER: Within normal limits.  BILE DUCTS: Normal caliber.  GALLBLADDER: Within normal limits.  SPLEEN: Within normal limits.  PANCREAS: Within normal limits.  ADRENALS: Within normal limits.  KIDNEYS/URETERS: The kidneys are mildly atrophic. There is no evidence of hydronephrosis, stone, or perinephric stranding bilaterally.    BLADDER: Within normal limits.  REPRODUCTIVE ORGANS: Benign-appearing right adnexal calcification. Otherwise, the uterus and adnexa are unremarkable by CT criteria.    BOWEL: The stomach is unremarkable. Evaluation of the distal small bowel and colon is suboptimal due to limited transit of oral contrast and stool. Scattered colonic diverticula. Otherwise, small and large bowel loops are unremarkable with no evidence of obstruction, bowel wall thickening, or inflammatory stranding of the mesenteric fat. The appendix is unremarkable.[id number]  PERITONEUM: No ascites.  VESSELS: Within normal limits.  RETROPERITONEUM/LYMPH NODES: No lymphadenopathy.  ABDOMINAL WALL: Within normal limits.  BONES: Within normal limits.    IMPRESSION: Similar decreased attenuation of the vascular blood pool, compatible with anemia. Mildly atrophic kidneys. Otherwise, unremarkable noncontrast CT of the abdomen and pelvis.               ROSENDO SINGH M.D., ATTENDING RADIOLOGIST   This document has been electronically signed. Jun 1 2021  3:54PM    < end of copied text >        
Patient is a 51y old  Female who presents with a chief complaint of Anemia (03 Feb 2022 14:35)      HPI:  No dizziness or SOB. 2 U transfused.      PAST MEDICAL & SURGICAL HISTORY:  DM (diabetes mellitus)    Anemia    HTN (hypertension)    IgA nephropathy determined by renal biopsy    History of endometriosis        Review of Systems:   CONSTITUTIONAL: No fever, weight loss, or fatigue  EYES: No eye pain, visual disturbances, or discharge  ENMT:  No difficulty hearing, tinnitus, vertigo; No sinus or throat pain  NECK: No pain or stiffness  BREASTS: No pain, masses, or nipple discharge  RESPIRATORY: No cough, wheezing, chills or hemoptysis; No shortness of breath  CARDIOVASCULAR: No chest pain, palpitations, dizziness, or leg swelling  GASTROINTESTINAL: No abdominal or epigastric pain. No nausea, vomiting, or hematemesis; No diarrhea or constipation. No melena or hematochezia.  GENITOURINARY: No dysuria, frequency, hematuria, or incontinence  NEUROLOGICAL: No headaches, memory loss, loss of strength, numbness, or tremors  SKIN: No itching, burning, rashes, or lesions   LYMPH NODES: No enlarged glands  ENDOCRINE: No heat or cold intolerance; No hair loss  MUSCULOSKELETAL: No joint pain or swelling; No muscle, back, or extremity pain  PSYCHIATRIC: No depression, anxiety, mood swings, or difficulty sleeping  HEME/LYMPH: No easy bruising, or bleeding gums  ALLERY AND IMMUNOLOGIC: No hives or eczema    Allergies    No Known Allergies    Intolerances        Social History:     FAMILY HISTORY:  FH: diabetes mellitus        MEDICATIONS  (STANDING):  pantoprazole    Tablet 40 milliGRAM(s) Oral before breakfast    MEDICATIONS  (PRN):  acetaminophen     Tablet .. 650 milliGRAM(s) Oral every 6 hours PRN Temp greater or equal to 38C (100.4F), Mild Pain (1 - 3)        CAPILLARY BLOOD GLUCOSE        I&O's Summary      PHYSICAL EXAM:  Vital Signs Last 24 Hrs  T(C): 37 (03 Feb 2022 16:25), Max: 37.4 (02 Feb 2022 21:24)  T(F): 98.6 (03 Feb 2022 16:25), Max: 99.4 (02 Feb 2022 21:24)  HR: 83 (03 Feb 2022 16:25) (83 - 120)  BP: 154/97 (03 Feb 2022 16:25) (119/73 - 159/78)  BP(mean): --  RR: 18 (03 Feb 2022 16:25) (18 - 20)  SpO2: 98% (03 Feb 2022 16:25) (98% - 100%)    GENERAL: NAD, well-developed  HEAD:  Atraumatic, Normocephalic  EYES: EOMI, PERRLA, conjunctiva and sclera clear  NECK: Supple, No JVD  CHEST/LUNG: Clear to auscultation bilaterally; No wheeze  HEART: Regular rate and rhythm; No murmurs, rubs, or gallops  ABDOMEN: Soft, Nontender, Nondistended; Bowel sounds present  EXTREMITIES:  2+ Peripheral Pulses, No clubbing, cyanosis, or edema  PSYCH: AAOx3  NEUROLOGY: non-focal  SKIN: No rashes or lesions    LABS:                        3.7    12.25 )-----------( 263      ( 02 Feb 2022 22:24 )             15.2     02-02    138  |  105  |  62<H>  ----------------------------<  132<H>  4.7   |  17<L>  |  3.63<H>    Ca    7.9<L>      02 Feb 2022 22:24    TPro  6.3  /  Alb  3.5  /  TBili  <0.1<L>  /  DBili  x   /  AST  13  /  ALT  8<L>  /  AlkPhos  95  02-02    PT/INR - ( 02 Feb 2022 22:24 )   PT: 11.9 sec;   INR: 0.99 ratio         PTT - ( 02 Feb 2022 22:24 )  PTT:33.3 sec          RADIOLOGY & ADDITIONAL TESTS:    Imaging Personally Reviewed:    Consultant(s) Notes Reviewed:      Care Discussed with Consultants/Other Providers:

## 2022-02-04 NOTE — CHART NOTE - NSCHARTNOTEFT_GEN_A_CORE
confirmed BMB done at [facility] 2 mos ago, negative for MDS, believes this to be blood loss.  Dr Hemanth Gaffney of [facility] has confirmed this, Dr Ion Sawant at [facility]. Patient will need IV Iron if not discharged today. Order placed.

## 2022-02-08 LAB
CULTURE RESULTS: SIGNIFICANT CHANGE UP
CULTURE RESULTS: SIGNIFICANT CHANGE UP
SPECIMEN SOURCE: SIGNIFICANT CHANGE UP
SPECIMEN SOURCE: SIGNIFICANT CHANGE UP

## 2022-04-05 NOTE — ED ADULT TRIAGE NOTE - CHIEF COMPLAINT QUOTE
low back pain; abdominal distention; general malaise; symptoms started yesterday; hx anemia requiring blood transfusions; no blood thinners; LMP year ago; [place]

## 2022-04-21 NOTE — ED ADULT NURSE NOTE - NS ED NURSE DC INFO COMPLEXITY
PROCEDURES:  JAIME & BSO 21-Apr-2022 13:49:34  Tomi Baeza  
Patient asked questions/Verbalized Understanding/Simple: Patient demonstrates quick and easy understanding

## 2022-07-12 ENCOUNTER — OUTPATIENT (OUTPATIENT)
Dept: OUTPATIENT SERVICES | Facility: HOSPITAL | Age: 52
LOS: 1 days | Discharge: HOME | End: 2022-07-12

## 2022-07-12 DIAGNOSIS — D64.9 ANEMIA, UNSPECIFIED: ICD-10-CM

## 2022-07-12 DIAGNOSIS — Z87.42 PERSONAL HISTORY OF OTHER DISEASES OF THE FEMALE GENITAL TRACT: Chronic | ICD-10-CM

## 2022-07-19 ENCOUNTER — LABORATORY RESULT (OUTPATIENT)
Age: 52
End: 2022-07-19

## 2022-07-19 ENCOUNTER — RESULT REVIEW (OUTPATIENT)
Age: 52
End: 2022-07-19

## 2022-07-19 ENCOUNTER — APPOINTMENT (OUTPATIENT)
Dept: HEMATOLOGY ONCOLOGY | Facility: CLINIC | Age: 52
End: 2022-07-19

## 2022-07-19 ENCOUNTER — OUTPATIENT (OUTPATIENT)
Dept: OUTPATIENT SERVICES | Facility: HOSPITAL | Age: 52
LOS: 1 days | End: 2022-07-19
Payer: MEDICAID

## 2022-07-19 VITALS
OXYGEN SATURATION: 99 % | SYSTOLIC BLOOD PRESSURE: 152 MMHG | RESPIRATION RATE: 16 BRPM | HEIGHT: 63.66 IN | WEIGHT: 207.23 LBS | HEART RATE: 99 BPM | TEMPERATURE: 97.7 F | DIASTOLIC BLOOD PRESSURE: 92 MMHG | BODY MASS INDEX: 35.82 KG/M2

## 2022-07-19 DIAGNOSIS — D64.9 ANEMIA, UNSPECIFIED: ICD-10-CM

## 2022-07-19 DIAGNOSIS — Z80.42 FAMILY HISTORY OF MALIGNANT NEOPLASM OF PROSTATE: ICD-10-CM

## 2022-07-19 DIAGNOSIS — Z87.42 PERSONAL HISTORY OF OTHER DISEASES OF THE FEMALE GENITAL TRACT: Chronic | ICD-10-CM

## 2022-07-19 DIAGNOSIS — Z87.42 PERSONAL HISTORY OF OTHER DISEASES OF THE FEMALE GENITAL TRACT: ICD-10-CM

## 2022-07-19 DIAGNOSIS — Z80.0 FAMILY HISTORY OF MALIGNANT NEOPLASM OF DIGESTIVE ORGANS: ICD-10-CM

## 2022-07-19 LAB
BASOPHILS # BLD AUTO: 0.02 K/UL — SIGNIFICANT CHANGE UP (ref 0–0.2)
BASOPHILS NFR BLD AUTO: 0.2 % — SIGNIFICANT CHANGE UP (ref 0–2)
EOSINOPHIL # BLD AUTO: 0.19 K/UL — SIGNIFICANT CHANGE UP (ref 0–0.5)
EOSINOPHIL NFR BLD AUTO: 1.5 % — SIGNIFICANT CHANGE UP (ref 0–6)
HCT VFR BLD CALC: 18.8 % — CRITICAL LOW (ref 34.5–45)
HGB BLD-MCNC: 4.7 G/DL — CRITICAL LOW (ref 11.5–15.5)
IMM GRANULOCYTES NFR BLD AUTO: 0.8 % — SIGNIFICANT CHANGE UP (ref 0–1.5)
LYMPHOCYTES # BLD AUTO: 1.43 K/UL — SIGNIFICANT CHANGE UP (ref 1–3.3)
LYMPHOCYTES # BLD AUTO: 11.3 % — LOW (ref 13–44)
MCHC RBC-ENTMCNC: 21.6 PG — LOW (ref 27–34)
MCHC RBC-ENTMCNC: 25 G/DL — LOW (ref 32–36)
MCV RBC AUTO: 86.2 FL — SIGNIFICANT CHANGE UP (ref 80–100)
MONOCYTES # BLD AUTO: 0.85 K/UL — SIGNIFICANT CHANGE UP (ref 0–0.9)
MONOCYTES NFR BLD AUTO: 6.7 % — SIGNIFICANT CHANGE UP (ref 2–14)
NEUTROPHILS # BLD AUTO: 10.07 K/UL — HIGH (ref 1.8–7.4)
NEUTROPHILS NFR BLD AUTO: 79.5 % — HIGH (ref 43–77)
NRBC # BLD: 0 /100 WBCS — SIGNIFICANT CHANGE UP (ref 0–0)
PLATELET # BLD AUTO: 363 K/UL — SIGNIFICANT CHANGE UP (ref 150–400)
RBC # BLD: 2.18 M/UL — LOW (ref 3.8–5.2)
RBC # FLD: 21.4 % — HIGH (ref 10.3–14.5)
WBC # BLD: 12.66 K/UL — HIGH (ref 3.8–10.5)
WBC # FLD AUTO: 12.66 K/UL — HIGH (ref 3.8–10.5)

## 2022-07-19 PROCEDURE — 99204 OFFICE O/P NEW MOD 45 MIN: CPT

## 2022-07-21 ENCOUNTER — APPOINTMENT (OUTPATIENT)
Dept: INFUSION THERAPY | Facility: HOSPITAL | Age: 52
End: 2022-07-21

## 2022-07-22 DIAGNOSIS — Z51.89 ENCOUNTER FOR OTHER SPECIFIED AFTERCARE: ICD-10-CM

## 2022-07-24 PROBLEM — Z87.42 HISTORY OF ENDOMETRIOSIS: Status: RESOLVED | Noted: 2022-07-24 | Resolved: 2022-07-24

## 2022-07-24 PROBLEM — Z80.42 FAMILY HISTORY OF MALIGNANT NEOPLASM OF PROSTATE: Status: ACTIVE | Noted: 2022-07-24

## 2022-07-24 PROBLEM — Z80.0 FAMILY HISTORY OF MALIGNANT NEOPLASM OF COLON: Status: ACTIVE | Noted: 2022-07-24

## 2022-07-24 RX ORDER — TRIAMCINOLONE ACETONIDE 1 MG/G
0.1 OINTMENT TOPICAL
Qty: 1 | Refills: 2 | Status: DISCONTINUED | COMMUNITY
Start: 2020-08-27 | End: 2022-07-24

## 2022-07-24 NOTE — ASSESSMENT
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  She receives her medical care at [facility], but lives in [place], and desires to establish care here to enable transfusional support.  I advised admission today in view of her severe anemia.  She adamantly declined.  She is minimally symptomatic, appears well and is in no distress, and reports that her transfusions usually occur at this level as an outpatient.  I therefore agreed to outpatient transfusion.  I recommended to her that we attempt to maintain her at a hemoglobin of 7 or above to improve her overall situation.  Informed consent for blood product transfusion was obtained.\par \par Plan:\par Rest\par Transfuse 2 units of leukocyte depleted packed red blood cells\par To ER in the event of any clinical deterioration including chest pain or shortness of breath\par Serum cortisol level\par Von Willebrand panel\par Consider octreotide therapy\par Continue iron supplementation\par Continue Retacrit–consider dose escalation after iron repleted if no response to current dose\par Dialysis as per Rogosin\par Return to clinic 1 week and transfuse again if hemoglobin less than 7\par \par

## 2022-07-24 NOTE — PHYSICAL EXAM
[Restricted in physically strenuous activity but ambulatory and able to carry out work of a light or sedentary nature] : Status 1- Restricted in physically strenuous activity but ambulatory and able to carry out work of a light or sedentary nature, e.g., light house work, office work [Normal] : affect appropriate [de-identified] : Moon facies [de-identified] : Pale conjunctivae

## 2022-07-24 NOTE — RESULTS/DATA
[FreeTextEntry1] : WBC 12,700 Hgb 4.7 Hct 18.8 Platelets 363,000 Diff normal\par CMP: creat 3.81, eGFR 14, Ca 8.2\par Ferritin 14\par Fe sat 44%\par \par TFTs normal

## 2022-07-24 NOTE — CONSULT LETTER
[Dear  ___] : Dear ~JOSÉ, [Consult Letter:] : I had the pleasure of evaluating your patient, [unfilled]. [Please see my note below.] : Please see my note below. [Consult Closing:] : Thank you very much for allowing me to participate in the care of this patient.  If you have any questions, please do not hesitate to contact me. [Sincerely,] : Sincerely, [DrJohnathan  ___] : Dr. PINTO [DrJohnathan ___] : Dr. PINTO [FreeTextEntry2] : Tip Waters MD [FreeTextEntry3] : Juan\par Nando Ceron M.D., FACP\par Professor of Medicine\par [facility] [employer] at [facility]\par Associate Chief, Division of Hematology\par [facility]\par [facility]\par [address] [place] [address]\[place] [address]\par [phone]\par \par \par \par \par

## 2022-07-24 NOTE — REVIEW OF SYSTEMS
[Fatigue] : fatigue [Lower Ext Edema] : lower extremity edema [Muscle Pain] : muscle pain [Negative] : Allergic/Immunologic

## 2022-07-24 NOTE — HISTORY OF PRESENT ILLNESS
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  She has been receiving packed red blood cell transfusions every 3 to 4 weeks, oral iron, intravenous iron (last dose June 22, 2022), and Retacrit 10,000 units subq weekly.  Bone marrow examination in December 2021 revealed erythroid hyperplasia.  Serum protein electrophoresis has been normal.  Recent hemoglobin was 3.9.\par \par She feels well.  She is tired.  She has scattered cramps in her shoulders and back.  Her legs occasionally swell.  She notes no headaches, visual problems, chest pain, shortness of breath, abdominal pain, melena, rectal bleeding, jaundice, dark urine, hematuria, vaginal bleeding, other bleeding, bruising.  Despite her anemia, she maintains an active schedule.

## 2022-07-25 LAB
ALBUMIN SERPL ELPH-MCNC: 4 G/DL
ALP BLD-CCNC: 135 U/L
ALT SERPL-CCNC: 9 U/L
ANION GAP SERPL CALC-SCNC: 14 MMOL/L
AST SERPL-CCNC: 16 U/L
BILIRUB SERPL-MCNC: <0.2 MG/DL
BUN SERPL-MCNC: 48 MG/DL
CALCIUM SERPL-MCNC: 8.2 MG/DL
CHLORIDE SERPL-SCNC: 104 MMOL/L
CO2 SERPL-SCNC: 19 MMOL/L
CORTICOSTEROID BIND GLOBULIN: 2.2 MG/DL
CORTIS SERPL-MCNC: 8.1 UG/DL
CORTISOL, FREE: 0.38 UG/DL
CREAT SERPL-MCNC: 3.81 MG/DL
EGFR: 14 ML/MIN/1.73M2
FERRITIN SERPL-MCNC: 14 NG/ML
GLUCOSE SERPL-MCNC: 76 MG/DL
IRON SATN MFR SERPL: 44 %
IRON SERPL-MCNC: 148 UG/DL
LDH SERPL-CCNC: 176 U/L
PFCX: 4.7 %
POTASSIUM SERPL-SCNC: 5 MMOL/L
PROT SERPL-MCNC: 7 G/DL
SARS-COV-2 N GENE NPH QL NAA+PROBE: NOT DETECTED
SODIUM SERPL-SCNC: 137 MMOL/L
T3FREE SERPL-MCNC: 2.91 PG/ML
T3RU NFR SERPL: 1.1 TBI
T4 FREE SERPL-MCNC: 1 NG/DL
T4 SERPL-MCNC: 7.3 UG/DL
TIBC SERPL-MCNC: 332 UG/DL
TSH SERPL-ACNC: 1.23 UIU/ML
UIBC SERPL-MCNC: 185 UG/DL
VWF AG PPP IA-ACNC: 192 %
VWF:RCO ACT/NOR PPP PL AGG: 117 %

## 2022-07-26 ENCOUNTER — APPOINTMENT (OUTPATIENT)
Dept: HEMATOLOGY ONCOLOGY | Facility: CLINIC | Age: 52
End: 2022-07-26

## 2022-07-27 LAB — VWF MULTIMERS PPP IA-ACNC: NORMAL

## 2022-07-28 ENCOUNTER — NON-APPOINTMENT (OUTPATIENT)
Age: 52
End: 2022-07-28

## 2022-07-28 ENCOUNTER — APPOINTMENT (OUTPATIENT)
Dept: HEMATOLOGY ONCOLOGY | Facility: CLINIC | Age: 52
End: 2022-07-28

## 2022-07-28 ENCOUNTER — RESULT REVIEW (OUTPATIENT)
Age: 52
End: 2022-07-28

## 2022-07-28 VITALS
WEIGHT: 209 LBS | BODY MASS INDEX: 36.26 KG/M2 | SYSTOLIC BLOOD PRESSURE: 163 MMHG | OXYGEN SATURATION: 99 % | HEART RATE: 103 BPM | DIASTOLIC BLOOD PRESSURE: 93 MMHG | TEMPERATURE: 97 F | RESPIRATION RATE: 16 BRPM

## 2022-07-28 DIAGNOSIS — Z78.9 OTHER SPECIFIED HEALTH STATUS: ICD-10-CM

## 2022-07-28 LAB
BASOPHILS # BLD AUTO: 0.02 K/UL — SIGNIFICANT CHANGE UP (ref 0–0.2)
BASOPHILS NFR BLD AUTO: 0.2 % — SIGNIFICANT CHANGE UP (ref 0–2)
EOSINOPHIL # BLD AUTO: 0.13 K/UL — SIGNIFICANT CHANGE UP (ref 0–0.5)
EOSINOPHIL NFR BLD AUTO: 1.5 % — SIGNIFICANT CHANGE UP (ref 0–6)
HCT VFR BLD CALC: 22.1 % — LOW (ref 34.5–45)
HGB BLD-MCNC: 6.1 G/DL — CRITICAL LOW (ref 11.5–15.5)
IMM GRANULOCYTES NFR BLD AUTO: 0.6 % — SIGNIFICANT CHANGE UP (ref 0–1.5)
LYMPHOCYTES # BLD AUTO: 0.95 K/UL — LOW (ref 1–3.3)
LYMPHOCYTES # BLD AUTO: 10.6 % — LOW (ref 13–44)
MCHC RBC-ENTMCNC: 23.7 PG — LOW (ref 27–34)
MCHC RBC-ENTMCNC: 27.6 G/DL — LOW (ref 32–36)
MCV RBC AUTO: 86 FL — SIGNIFICANT CHANGE UP (ref 80–100)
MONOCYTES # BLD AUTO: 0.57 K/UL — SIGNIFICANT CHANGE UP (ref 0–0.9)
MONOCYTES NFR BLD AUTO: 6.4 % — SIGNIFICANT CHANGE UP (ref 2–14)
NEUTROPHILS # BLD AUTO: 7.22 K/UL — SIGNIFICANT CHANGE UP (ref 1.8–7.4)
NEUTROPHILS NFR BLD AUTO: 80.7 % — HIGH (ref 43–77)
NRBC # BLD: 0 /100 WBCS — SIGNIFICANT CHANGE UP (ref 0–0)
PLATELET # BLD AUTO: 280 K/UL — SIGNIFICANT CHANGE UP (ref 150–400)
RBC # BLD: 2.57 M/UL — LOW (ref 3.8–5.2)
RBC # FLD: 17.8 % — HIGH (ref 10.3–14.5)
WBC # BLD: 8.94 K/UL — SIGNIFICANT CHANGE UP (ref 3.8–10.5)
WBC # FLD AUTO: 8.94 K/UL — SIGNIFICANT CHANGE UP (ref 3.8–10.5)

## 2022-07-28 PROCEDURE — 86850 RBC ANTIBODY SCREEN: CPT

## 2022-07-28 PROCEDURE — 99214 OFFICE O/P EST MOD 30 MIN: CPT

## 2022-07-28 PROCEDURE — 86923 COMPATIBILITY TEST ELECTRIC: CPT

## 2022-07-28 PROCEDURE — 86901 BLOOD TYPING SEROLOGIC RH(D): CPT

## 2022-07-28 PROCEDURE — 86900 BLOOD TYPING SEROLOGIC ABO: CPT

## 2022-07-30 ENCOUNTER — APPOINTMENT (OUTPATIENT)
Dept: INFUSION THERAPY | Facility: HOSPITAL | Age: 52
End: 2022-07-30

## 2022-08-24 ENCOUNTER — RESULT REVIEW (OUTPATIENT)
Age: 52
End: 2022-08-24

## 2022-08-24 ENCOUNTER — APPOINTMENT (OUTPATIENT)
Dept: HEMATOLOGY ONCOLOGY | Facility: CLINIC | Age: 52
End: 2022-08-24

## 2022-08-24 ENCOUNTER — OUTPATIENT (OUTPATIENT)
Dept: OUTPATIENT SERVICES | Facility: HOSPITAL | Age: 52
LOS: 1 days | End: 2022-08-24
Payer: MEDICAID

## 2022-08-24 VITALS
HEART RATE: 85 BPM | SYSTOLIC BLOOD PRESSURE: 142 MMHG | WEIGHT: 205.03 LBS | BODY MASS INDEX: 35.57 KG/M2 | DIASTOLIC BLOOD PRESSURE: 83 MMHG | OXYGEN SATURATION: 99 % | TEMPERATURE: 97.3 F | RESPIRATION RATE: 17 BRPM

## 2022-08-24 DIAGNOSIS — N18.9 CHRONIC KIDNEY DISEASE, UNSPECIFIED: ICD-10-CM

## 2022-08-24 DIAGNOSIS — Z98.890 OTHER SPECIFIED POSTPROCEDURAL STATES: Chronic | ICD-10-CM

## 2022-08-24 LAB
BASOPHILS # BLD AUTO: 0.02 K/UL — SIGNIFICANT CHANGE UP (ref 0–0.2)
BASOPHILS NFR BLD AUTO: 0.2 % — SIGNIFICANT CHANGE UP (ref 0–2)
EOSINOPHIL # BLD AUTO: 0.2 K/UL — SIGNIFICANT CHANGE UP (ref 0–0.5)
EOSINOPHIL NFR BLD AUTO: 2.1 % — SIGNIFICANT CHANGE UP (ref 0–6)
HCT VFR BLD CALC: 21.2 % — LOW (ref 34.5–45)
HGB BLD-MCNC: 5.5 G/DL — CRITICAL LOW (ref 11.5–15.5)
IMM GRANULOCYTES NFR BLD AUTO: 0.3 % — SIGNIFICANT CHANGE UP (ref 0–1.5)
LYMPHOCYTES # BLD AUTO: 1.15 K/UL — SIGNIFICANT CHANGE UP (ref 1–3.3)
LYMPHOCYTES # BLD AUTO: 12.1 % — LOW (ref 13–44)
MCHC RBC-ENTMCNC: 22 PG — LOW (ref 27–34)
MCHC RBC-ENTMCNC: 25.9 G/DL — LOW (ref 32–36)
MCV RBC AUTO: 84.8 FL — SIGNIFICANT CHANGE UP (ref 80–100)
MONOCYTES # BLD AUTO: 0.63 K/UL — SIGNIFICANT CHANGE UP (ref 0–0.9)
MONOCYTES NFR BLD AUTO: 6.6 % — SIGNIFICANT CHANGE UP (ref 2–14)
NEUTROPHILS # BLD AUTO: 7.51 K/UL — HIGH (ref 1.8–7.4)
NEUTROPHILS NFR BLD AUTO: 78.7 % — HIGH (ref 43–77)
NRBC # BLD: 0 /100 WBCS — SIGNIFICANT CHANGE UP (ref 0–0)
PLATELET # BLD AUTO: 289 K/UL — SIGNIFICANT CHANGE UP (ref 150–400)
RBC # BLD: 2.5 M/UL — LOW (ref 3.8–5.2)
RBC # FLD: 15.7 % — HIGH (ref 10.3–14.5)
WBC # BLD: 9.54 K/UL — SIGNIFICANT CHANGE UP (ref 3.8–10.5)
WBC # FLD AUTO: 9.54 K/UL — SIGNIFICANT CHANGE UP (ref 3.8–10.5)

## 2022-08-24 PROCEDURE — 99213 OFFICE O/P EST LOW 20 MIN: CPT

## 2022-08-25 ENCOUNTER — APPOINTMENT (OUTPATIENT)
Dept: INFUSION THERAPY | Facility: HOSPITAL | Age: 52
End: 2022-08-25

## 2022-08-25 ENCOUNTER — NON-APPOINTMENT (OUTPATIENT)
Age: 52
End: 2022-08-25

## 2022-08-28 PROBLEM — Z78.9 RARELY CONSUMES ALCOHOL: Status: ACTIVE | Noted: 2022-07-24

## 2022-08-28 NOTE — HISTORY OF PRESENT ILLNESS
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  She has been receiving packed red blood cell transfusions every 3 to 4 weeks, oral iron, intravenous iron (last dose June 22, 2022), and Retacrit 10,000 units subq weekly.  Bone marrow examination in December 2021 revealed erythroid hyperplasia.  Serum protein electrophoresis has been normal.  Recent hemoglobin was 3.9.\par \par She feels well.  She is tired.  She has scattered cramps in her shoulders and back.  Her legs occasionally swell.  She notes no headaches, visual problems, chest pain, shortness of breath, abdominal pain, melena, rectal bleeding, jaundice, dark urine, hematuria, vaginal bleeding, other bleeding, bruising.  Despite her anemia, she maintains an active schedule.

## 2022-08-28 NOTE — ASSESSMENT
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  She receives her medical care at [facility], but lives in [place], and desires to establish care here to enable transfusional support.  I advised admission today in view of her severe anemia.  She adamantly declined.  She is minimally symptomatic, appears well and is in no distress, and reports that her transfusions usually occur at this level as an outpatient.  I therefore agreed to outpatient transfusion.  I recommended to her that we attempt to maintain her at a hemoglobin of 7 or above to improve her overall situation.  Informed consent for blood product transfusion was obtained.\par \par Plan:\par Rest\par Transfuse 2 units of leukocyte depleted packed red blood cells\par To ER in the event of any clinical deterioration including chest pain or shortness of breath\par Consider octreotide therapy\par Continue iron supplementation\par Continue Retacrit–consider dose escalation after iron repleted if no response to current dose\par Dialysis as per Romeoosin\par CBC and plan reviewed with patient\par Return to clinic 1 week and transfuse again if hemoglobin less than 7\par \par

## 2022-08-31 NOTE — ASSESSMENT
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  She receives her medical care at [facility], but lives in [place], and desires to establish care here to enable transfusional support.  I advised admission today in view of her severe anemia.  She adamantly declined.  She is minimally symptomatic, appears well and is in no distress, and reports that her transfusions usually occur at this level as an outpatient.  I therefore agreed to outpatient transfusion.  I recommended to her that we attempt to maintain her at a hemoglobin of 7 or above to improve her overall situation.  Informed consent for blood product transfusion was obtained.  Has been taking Retacrit 10, 000 U weekly.  Hgb 5.5 today.  Has started Ocreotide. .  \par \par Plan:\par Rest\par Transfuse 2 units of leukocyte depleted packed red blood cells\par To ER in the event of any clinical deterioration including chest pain or shortness of breath\par Octreotide therapy\par Continue iron supplementation\par Increase Retacrit to 20,000 U weekly.\par Dialysis as per [facility]osin\par BW in one week \par \par

## 2022-08-31 NOTE — PHYSICAL EXAM
[Restricted in physically strenuous activity but ambulatory and able to carry out work of a light or sedentary nature] : Status 1- Restricted in physically strenuous activity but ambulatory and able to carry out work of a light or sedentary nature, e.g., light house work, office work [Normal] : affect appropriate [de-identified] : Moon facies [de-identified] : Pale conjunctivae

## 2022-08-31 NOTE — CONSULT LETTER
[Dear  ___] : Dear ~JOSÉ, [Consult Letter:] : I had the pleasure of evaluating your patient, [unfilled]. [Please see my note below.] : Please see my note below. [Consult Closing:] : Thank you very much for allowing me to participate in the care of this patient.  If you have any questions, please do not hesitate to contact me. [Sincerely,] : Sincerely, [DrJohnathan  ___] : Dr. PINTO [DrJohnathan ___] : Dr. PINTO [FreeTextEntry2] : Tip Waters MD [FreeTextEntry3] : Juan\par Nando Ceron M.D., FACP\par Professor of Medicine\par [facility] [employer] at [facility]\par Associate Chief, Division of Hematology\par [facility]\par [facility]\par [address] [place] [address]\[place] [address]\par [phone]\par \par \par \par \par

## 2022-08-31 NOTE — HISTORY OF PRESENT ILLNESS
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  She has been receiving packed red blood cell transfusions every 3 to 4 weeks, oral iron, intravenous iron (last dose June 22, 2022), and Retacrit 10,000 units subq weekly.  Bone marrow examination in December 2021 revealed erythroid hyperplasia.  Serum protein electrophoresis has been normal.  \par \par She feels well.  She is tired but remains very active.  Works as .  She has scattered cramps in her shoulders and back.  Her legs occasionally swell.  She notes no headaches, visual problems, chest pain, shortness of breath, abdominal pain, melena, rectal bleeding, jaundice, dark urine, hematuria, vaginal bleeding, other bleeding, bruising.  .  Started Sandostatin end of July., getting weekly Retacrit.  Has had intermittent constipation, uses Miralax occasionally.

## 2022-09-07 NOTE — ED ADULT TRIAGE NOTE - SOURCE OF INFORMATION
Patient is a 24-year-old white male here for evaluation. Patient is complaining of bilateral hip pain and also right knee pain. No particular trauma. I have seen the patient for problems with his hips in the past.  Patient states that his right knee has been bothering him on an intermittent basis over the last several months. Patient's exam shows him to have a minimally antalgic gait. Patient has tenderness over the trochanteric bursal regions of both hips. Passive range of motion of his hips shows limited guarding and minimal pain associated with it. Exam of his right knee shows him to have some tenderness along the medial aspect of the right knee. Ligaments are stable. Lachman's negative. Minimal effusion of the knee. Range of motion shows near full extension flexion to 135 degrees. Neurologically intact lower extremities to light touch. Motor exam grossly 5/5. Good capillary refill in the lower extremities. X-rays of his hips show mild degenerative changes of the right hip and minimal degenerative change of the left hip. X-rays of his knee shows him to have moderate to severe medial joint line arthritis of the right knee. Left knee shows mild to moderate degenerative changes medially. Impression is that of trochanteric bursitis of the hips bilaterally. Second impression is that of mild degenerative arthritis of his hips. Third impression is that of degenerative arthritis of his right knee. Recommendations are to go ahead with cortisone injections of both hips and the trochanteric bursal regions which were done under sterile technique with 2 cc Xylocaine and Marcaine and 40 mg of Kenalog. I also injected his right knee through an anteromedial approach with 4 cc of Xylocaine and Marcaine and 40 mg of Kenalog. He tolerated the injections well. Follow-up with me as needed. Patient understands that over time he might need to consider a total knee arthroplasty.   He however could also go ahead with a gel injection at his next visit if his pain of the knee is persisting or progressing. Patient

## 2022-09-10 LAB
ALBUMIN SERPL ELPH-MCNC: 3.9 G/DL
ALP BLD-CCNC: 112 U/L
ALT SERPL-CCNC: 12 U/L
ANION GAP SERPL CALC-SCNC: 14 MMOL/L
AST SERPL-CCNC: 12 U/L
BILIRUB SERPL-MCNC: <0.2 MG/DL
BUN SERPL-MCNC: 52 MG/DL
CALCIUM SERPL-MCNC: 8.4 MG/DL
CHLORIDE SERPL-SCNC: 107 MMOL/L
CO2 SERPL-SCNC: 19 MMOL/L
CREAT SERPL-MCNC: 4.36 MG/DL
EGFR: 12 ML/MIN/1.73M2
FERRITIN SERPL-MCNC: 11 NG/ML
FOLATE SERPL-MCNC: 5.6 NG/ML
GLUCOSE SERPL-MCNC: 219 MG/DL
IRON SATN MFR SERPL: 43 %
IRON SERPL-MCNC: 125 UG/DL
POTASSIUM SERPL-SCNC: 4.7 MMOL/L
PROT SERPL-MCNC: 7 G/DL
SODIUM SERPL-SCNC: 141 MMOL/L
TIBC SERPL-MCNC: 288 UG/DL
UIBC SERPL-MCNC: 163 UG/DL
VIT B12 SERPL-MCNC: 773 PG/ML

## 2022-09-20 ENCOUNTER — RESULT REVIEW (OUTPATIENT)
Age: 52
End: 2022-09-20

## 2022-09-20 ENCOUNTER — NON-APPOINTMENT (OUTPATIENT)
Age: 52
End: 2022-09-20

## 2022-09-20 ENCOUNTER — APPOINTMENT (OUTPATIENT)
Dept: HEMATOLOGY ONCOLOGY | Facility: CLINIC | Age: 52
End: 2022-09-20

## 2022-09-20 ENCOUNTER — OUTPATIENT (OUTPATIENT)
Dept: OUTPATIENT SERVICES | Facility: HOSPITAL | Age: 52
LOS: 1 days | Discharge: HOME | End: 2022-09-20

## 2022-09-20 DIAGNOSIS — Z98.890 OTHER SPECIFIED POSTPROCEDURAL STATES: Chronic | ICD-10-CM

## 2022-09-20 DIAGNOSIS — D64.9 ANEMIA, UNSPECIFIED: ICD-10-CM

## 2022-09-20 LAB
BASOPHILS # BLD AUTO: 0.02 K/UL — SIGNIFICANT CHANGE UP (ref 0–0.2)
BASOPHILS NFR BLD AUTO: 0.2 % — SIGNIFICANT CHANGE UP (ref 0–2)
EOSINOPHIL # BLD AUTO: 0.21 K/UL — SIGNIFICANT CHANGE UP (ref 0–0.5)
EOSINOPHIL NFR BLD AUTO: 2 % — SIGNIFICANT CHANGE UP (ref 0–6)
HCT VFR BLD CALC: 18.7 % — CRITICAL LOW (ref 34.5–45)
HGB BLD-MCNC: 4.8 G/DL — CRITICAL LOW (ref 11.5–15.5)
IMM GRANULOCYTES NFR BLD AUTO: 0.7 % — SIGNIFICANT CHANGE UP (ref 0–0.9)
LYMPHOCYTES # BLD AUTO: 1.21 K/UL — SIGNIFICANT CHANGE UP (ref 1–3.3)
LYMPHOCYTES # BLD AUTO: 11.4 % — LOW (ref 13–44)
MCHC RBC-ENTMCNC: 20.2 PG — LOW (ref 27–34)
MCHC RBC-ENTMCNC: 25.7 G/DL — LOW (ref 32–36)
MCV RBC AUTO: 78.6 FL — LOW (ref 80–100)
MONOCYTES # BLD AUTO: 0.71 K/UL — SIGNIFICANT CHANGE UP (ref 0–0.9)
MONOCYTES NFR BLD AUTO: 6.7 % — SIGNIFICANT CHANGE UP (ref 2–14)
NEUTROPHILS # BLD AUTO: 8.39 K/UL — HIGH (ref 1.8–7.4)
NEUTROPHILS NFR BLD AUTO: 79 % — HIGH (ref 43–77)
NRBC # BLD: 0 /100 WBCS — SIGNIFICANT CHANGE UP (ref 0–0)
PLATELET # BLD AUTO: 350 K/UL — SIGNIFICANT CHANGE UP (ref 150–400)
RBC # BLD: 2.38 M/UL — LOW (ref 3.8–5.2)
RBC # FLD: 17.8 % — HIGH (ref 10.3–14.5)
WBC # BLD: 10.61 K/UL — HIGH (ref 3.8–10.5)
WBC # FLD AUTO: 10.61 K/UL — HIGH (ref 3.8–10.5)

## 2022-09-20 PROCEDURE — 86923 COMPATIBILITY TEST ELECTRIC: CPT

## 2022-09-20 PROCEDURE — 86850 RBC ANTIBODY SCREEN: CPT

## 2022-09-20 PROCEDURE — 86900 BLOOD TYPING SEROLOGIC ABO: CPT

## 2022-09-20 PROCEDURE — 86901 BLOOD TYPING SEROLOGIC RH(D): CPT

## 2022-09-21 ENCOUNTER — APPOINTMENT (OUTPATIENT)
Dept: INFUSION THERAPY | Facility: HOSPITAL | Age: 52
End: 2022-09-21

## 2022-09-22 DIAGNOSIS — Z51.89 ENCOUNTER FOR OTHER SPECIFIED AFTERCARE: ICD-10-CM

## 2022-09-26 NOTE — ED ADULT NURSE NOTE - NS ED NURSE LEVEL OF CONSCIOUSNESS AFFECT
Calm Niacinamide Pregnancy And Lactation Text: These medications are considered safe during pregnancy.

## 2022-11-04 ENCOUNTER — OUTPATIENT (OUTPATIENT)
Dept: OUTPATIENT SERVICES | Facility: HOSPITAL | Age: 52
LOS: 1 days | End: 2022-11-04

## 2022-11-04 ENCOUNTER — RESULT REVIEW (OUTPATIENT)
Age: 52
End: 2022-11-04

## 2022-11-04 ENCOUNTER — NON-APPOINTMENT (OUTPATIENT)
Age: 52
End: 2022-11-04

## 2022-11-04 ENCOUNTER — APPOINTMENT (OUTPATIENT)
Dept: HEMATOLOGY ONCOLOGY | Facility: CLINIC | Age: 52
End: 2022-11-04

## 2022-11-04 ENCOUNTER — INPATIENT (INPATIENT)
Facility: HOSPITAL | Age: 52
LOS: 1 days | Discharge: HOME | DRG: 378 | End: 2022-11-06
Attending: STUDENT IN AN ORGANIZED HEALTH CARE EDUCATION/TRAINING PROGRAM | Admitting: STUDENT IN AN ORGANIZED HEALTH CARE EDUCATION/TRAINING PROGRAM
Payer: MEDICAID

## 2022-11-04 VITALS
HEART RATE: 101 BPM | HEIGHT: 64 IN | TEMPERATURE: 99 F | OXYGEN SATURATION: 100 % | RESPIRATION RATE: 18 BRPM | SYSTOLIC BLOOD PRESSURE: 154 MMHG | WEIGHT: 199.96 LBS | DIASTOLIC BLOOD PRESSURE: 89 MMHG

## 2022-11-04 DIAGNOSIS — E11.9 TYPE 2 DIABETES MELLITUS WITHOUT COMPLICATIONS: ICD-10-CM

## 2022-11-04 DIAGNOSIS — Z98.890 OTHER SPECIFIED POSTPROCEDURAL STATES: Chronic | ICD-10-CM

## 2022-11-04 DIAGNOSIS — N17.9 ACUTE KIDNEY FAILURE, UNSPECIFIED: ICD-10-CM

## 2022-11-04 DIAGNOSIS — N02.8 RECURRENT AND PERSISTENT HEMATURIA WITH OTHER MORPHOLOGIC CHANGES: ICD-10-CM

## 2022-11-04 DIAGNOSIS — K74.60 UNSPECIFIED CIRRHOSIS OF LIVER: ICD-10-CM

## 2022-11-04 DIAGNOSIS — I10 ESSENTIAL (PRIMARY) HYPERTENSION: ICD-10-CM

## 2022-11-04 DIAGNOSIS — Z29.9 ENCOUNTER FOR PROPHYLACTIC MEASURES, UNSPECIFIED: ICD-10-CM

## 2022-11-04 DIAGNOSIS — D62 ACUTE POSTHEMORRHAGIC ANEMIA: ICD-10-CM

## 2022-11-04 DIAGNOSIS — D50.9 IRON DEFICIENCY ANEMIA, UNSPECIFIED: ICD-10-CM

## 2022-11-04 DIAGNOSIS — K92.2 GASTROINTESTINAL HEMORRHAGE, UNSPECIFIED: ICD-10-CM

## 2022-11-04 LAB
ALBUMIN SERPL ELPH-MCNC: 3.5 G/DL — SIGNIFICANT CHANGE UP (ref 3.3–5)
ALP SERPL-CCNC: 112 U/L — SIGNIFICANT CHANGE UP (ref 40–120)
ALT FLD-CCNC: 10 U/L — SIGNIFICANT CHANGE UP (ref 10–45)
ANION GAP SERPL CALC-SCNC: 13 MMOL/L — SIGNIFICANT CHANGE UP (ref 5–17)
ANISOCYTOSIS BLD QL: SIGNIFICANT CHANGE UP
APTT BLD: 32.3 SEC — SIGNIFICANT CHANGE UP (ref 27.5–35.5)
AST SERPL-CCNC: 17 U/L — SIGNIFICANT CHANGE UP (ref 10–40)
BASOPHILS # BLD AUTO: 0 K/UL — SIGNIFICANT CHANGE UP (ref 0–0.2)
BASOPHILS # BLD AUTO: 0.02 K/UL — SIGNIFICANT CHANGE UP (ref 0–0.2)
BASOPHILS NFR BLD AUTO: 0 % — SIGNIFICANT CHANGE UP (ref 0–2)
BASOPHILS NFR BLD AUTO: 0.2 % — SIGNIFICANT CHANGE UP (ref 0–2)
BILIRUB SERPL-MCNC: <0.1 MG/DL — LOW (ref 0.2–1.2)
BLD GP AB SCN SERPL QL: NEGATIVE — SIGNIFICANT CHANGE UP
BUN SERPL-MCNC: 54 MG/DL — HIGH (ref 7–23)
CALCIUM SERPL-MCNC: 7.7 MG/DL — LOW (ref 8.4–10.5)
CHLORIDE SERPL-SCNC: 105 MMOL/L — SIGNIFICANT CHANGE UP (ref 96–108)
CO2 SERPL-SCNC: 19 MMOL/L — LOW (ref 22–31)
CREAT SERPL-MCNC: 4.64 MG/DL — HIGH (ref 0.5–1.3)
DACRYOCYTES BLD QL SMEAR: SLIGHT — SIGNIFICANT CHANGE UP
EGFR: 11 ML/MIN/1.73M2 — LOW
ELLIPTOCYTES BLD QL SMEAR: SLIGHT — SIGNIFICANT CHANGE UP
EOSINOPHIL # BLD AUTO: 0 K/UL — SIGNIFICANT CHANGE UP (ref 0–0.5)
EOSINOPHIL # BLD AUTO: 0.23 K/UL — SIGNIFICANT CHANGE UP (ref 0–0.5)
EOSINOPHIL NFR BLD AUTO: 0 % — SIGNIFICANT CHANGE UP (ref 0–6)
EOSINOPHIL NFR BLD AUTO: 1.9 % — SIGNIFICANT CHANGE UP (ref 0–6)
FLUAV AG NPH QL: SIGNIFICANT CHANGE UP
FLUBV AG NPH QL: SIGNIFICANT CHANGE UP
GIANT PLATELETS BLD QL SMEAR: PRESENT — SIGNIFICANT CHANGE UP
GLUCOSE SERPL-MCNC: 242 MG/DL — HIGH (ref 70–99)
HCT VFR BLD CALC: 15.1 % — CRITICAL LOW (ref 34.5–45)
HCT VFR BLD CALC: 15.9 % — CRITICAL LOW (ref 34.5–45)
HGB BLD-MCNC: 3.9 G/DL — CRITICAL LOW (ref 11.5–15.5)
HGB BLD-MCNC: 4 G/DL — CRITICAL LOW (ref 11.5–15.5)
HYPOCHROMIA BLD QL: SIGNIFICANT CHANGE UP
IMM GRANULOCYTES NFR BLD AUTO: 0.4 % — SIGNIFICANT CHANGE UP (ref 0–0.9)
INR BLD: 1.13 RATIO — SIGNIFICANT CHANGE UP (ref 0.88–1.16)
LYMPHOCYTES # BLD AUTO: 1.15 K/UL — SIGNIFICANT CHANGE UP (ref 1–3.3)
LYMPHOCYTES # BLD AUTO: 1.26 K/UL — SIGNIFICANT CHANGE UP (ref 1–3.3)
LYMPHOCYTES # BLD AUTO: 10.3 % — LOW (ref 13–44)
LYMPHOCYTES # BLD AUTO: 8.7 % — LOW (ref 13–44)
MANUAL SMEAR VERIFICATION: SIGNIFICANT CHANGE UP
MCHC RBC-ENTMCNC: 20.4 PG — LOW (ref 27–34)
MCHC RBC-ENTMCNC: 21 PG — LOW (ref 27–34)
MCHC RBC-ENTMCNC: 25.2 GM/DL — LOW (ref 32–36)
MCHC RBC-ENTMCNC: 25.8 G/DL — LOW (ref 32–36)
MCV RBC AUTO: 81.1 FL — SIGNIFICANT CHANGE UP (ref 80–100)
MCV RBC AUTO: 81.2 FL — SIGNIFICANT CHANGE UP (ref 80–100)
MONOCYTES # BLD AUTO: 0.57 K/UL — SIGNIFICANT CHANGE UP (ref 0–0.9)
MONOCYTES # BLD AUTO: 0.8 K/UL — SIGNIFICANT CHANGE UP (ref 0–0.9)
MONOCYTES NFR BLD AUTO: 4.3 % — SIGNIFICANT CHANGE UP (ref 2–14)
MONOCYTES NFR BLD AUTO: 6.5 % — SIGNIFICANT CHANGE UP (ref 2–14)
NEUTROPHILS # BLD AUTO: 11.48 K/UL — HIGH (ref 1.8–7.4)
NEUTROPHILS # BLD AUTO: 9.91 K/UL — HIGH (ref 1.8–7.4)
NEUTROPHILS NFR BLD AUTO: 80.7 % — HIGH (ref 43–77)
NEUTROPHILS NFR BLD AUTO: 87 % — HIGH (ref 43–77)
NRBC # BLD: 0 /100 WBCS — SIGNIFICANT CHANGE UP (ref 0–0)
OVALOCYTES BLD QL SMEAR: SLIGHT — SIGNIFICANT CHANGE UP
PLAT MORPH BLD: NORMAL — SIGNIFICANT CHANGE UP
PLATELET # BLD AUTO: 273 K/UL — SIGNIFICANT CHANGE UP (ref 150–400)
PLATELET # BLD AUTO: 296 K/UL — SIGNIFICANT CHANGE UP (ref 150–400)
POIKILOCYTOSIS BLD QL AUTO: SIGNIFICANT CHANGE UP
POLYCHROMASIA BLD QL SMEAR: SIGNIFICANT CHANGE UP
POTASSIUM SERPL-MCNC: 4.8 MMOL/L — SIGNIFICANT CHANGE UP (ref 3.5–5.3)
POTASSIUM SERPL-SCNC: 4.8 MMOL/L — SIGNIFICANT CHANGE UP (ref 3.5–5.3)
PROT SERPL-MCNC: 7.5 G/DL — SIGNIFICANT CHANGE UP (ref 6–8.3)
PROTHROM AB SERPL-ACNC: 13 SEC — SIGNIFICANT CHANGE UP (ref 10.5–13.4)
RBC # BLD: 1.86 M/UL — LOW (ref 3.8–5.2)
RBC # BLD: 1.96 M/UL — LOW (ref 3.8–5.2)
RBC # FLD: 19.9 % — HIGH (ref 10.3–14.5)
RBC # FLD: 20.4 % — HIGH (ref 10.3–14.5)
RBC BLD AUTO: ABNORMAL
RH IG SCN BLD-IMP: POSITIVE — SIGNIFICANT CHANGE UP
RSV RNA NPH QL NAA+NON-PROBE: SIGNIFICANT CHANGE UP
SARS-COV-2 RNA SPEC QL NAA+PROBE: SIGNIFICANT CHANGE UP
SODIUM SERPL-SCNC: 137 MMOL/L — SIGNIFICANT CHANGE UP (ref 135–145)
WBC # BLD: 12.27 K/UL — HIGH (ref 3.8–10.5)
WBC # BLD: 13.2 K/UL — HIGH (ref 3.8–10.5)
WBC # FLD AUTO: 12.27 K/UL — HIGH (ref 3.8–10.5)
WBC # FLD AUTO: 13.2 K/UL — HIGH (ref 3.8–10.5)

## 2022-11-04 PROCEDURE — 93010 ELECTROCARDIOGRAM REPORT: CPT

## 2022-11-04 PROCEDURE — 71045 X-RAY EXAM CHEST 1 VIEW: CPT | Mod: 26

## 2022-11-04 PROCEDURE — 99223 1ST HOSP IP/OBS HIGH 75: CPT

## 2022-11-04 PROCEDURE — 99285 EMERGENCY DEPT VISIT HI MDM: CPT

## 2022-11-04 RX ORDER — FERROUS SULFATE 325(65) MG
325 TABLET ORAL DAILY
Refills: 0 | Status: DISCONTINUED | OUTPATIENT
Start: 2022-11-04 | End: 2022-11-06

## 2022-11-04 RX ORDER — INSULIN LISPRO 100/ML
VIAL (ML) SUBCUTANEOUS EVERY 6 HOURS
Refills: 0 | Status: DISCONTINUED | OUTPATIENT
Start: 2022-11-04 | End: 2022-11-06

## 2022-11-04 RX ORDER — SODIUM CHLORIDE 9 MG/ML
1000 INJECTION, SOLUTION INTRAVENOUS
Refills: 0 | Status: DISCONTINUED | OUTPATIENT
Start: 2022-11-04 | End: 2022-11-06

## 2022-11-04 RX ORDER — SODIUM CHLORIDE 9 MG/ML
1000 INJECTION INTRAMUSCULAR; INTRAVENOUS; SUBCUTANEOUS ONCE
Refills: 0 | Status: COMPLETED | OUTPATIENT
Start: 2022-11-04 | End: 2022-11-04

## 2022-11-04 RX ORDER — DEXTROSE 50 % IN WATER 50 %
12.5 SYRINGE (ML) INTRAVENOUS ONCE
Refills: 0 | Status: DISCONTINUED | OUTPATIENT
Start: 2022-11-04 | End: 2022-11-06

## 2022-11-04 RX ORDER — GLUCAGON INJECTION, SOLUTION 0.5 MG/.1ML
1 INJECTION, SOLUTION SUBCUTANEOUS ONCE
Refills: 0 | Status: DISCONTINUED | OUTPATIENT
Start: 2022-11-04 | End: 2022-11-06

## 2022-11-04 RX ORDER — PANTOPRAZOLE SODIUM 20 MG/1
40 TABLET, DELAYED RELEASE ORAL DAILY
Refills: 0 | Status: DISCONTINUED | OUTPATIENT
Start: 2022-11-05 | End: 2022-11-06

## 2022-11-04 RX ORDER — DEXTROSE 50 % IN WATER 50 %
25 SYRINGE (ML) INTRAVENOUS ONCE
Refills: 0 | Status: DISCONTINUED | OUTPATIENT
Start: 2022-11-04 | End: 2022-11-06

## 2022-11-04 RX ORDER — BUMETANIDE 0.25 MG/ML
1 INJECTION INTRAMUSCULAR; INTRAVENOUS
Qty: 0 | Refills: 0 | DISCHARGE

## 2022-11-04 RX ORDER — DEXTROSE 50 % IN WATER 50 %
15 SYRINGE (ML) INTRAVENOUS ONCE
Refills: 0 | Status: DISCONTINUED | OUTPATIENT
Start: 2022-11-04 | End: 2022-11-06

## 2022-11-04 RX ORDER — ACETAMINOPHEN 500 MG
650 TABLET ORAL EVERY 6 HOURS
Refills: 0 | Status: DISCONTINUED | OUTPATIENT
Start: 2022-11-04 | End: 2022-11-06

## 2022-11-04 RX ORDER — BUMETANIDE 0.25 MG/ML
1 INJECTION INTRAMUSCULAR; INTRAVENOUS DAILY
Refills: 0 | Status: DISCONTINUED | OUTPATIENT
Start: 2022-11-04 | End: 2022-11-06

## 2022-11-04 RX ORDER — LOSARTAN POTASSIUM 100 MG/1
0 TABLET, FILM COATED ORAL
Qty: 0 | Refills: 3 | DISCHARGE

## 2022-11-04 RX ADMIN — SODIUM CHLORIDE 1000 MILLILITER(S): 9 INJECTION INTRAMUSCULAR; INTRAVENOUS; SUBCUTANEOUS at 18:47

## 2022-11-04 NOTE — H&P ADULT - PROBLEM SELECTOR PLAN 2
- Admission Cr of 4.64 w/ baseline within range of 2.6-3.2  - Likely i/s/o anemia vs worsening renal disease  - Trend BUN/Cr daily  - Avoid nephrotoxic medications  - Renally dose all medications  - c/w Bumex 1mg QD

## 2022-11-04 NOTE — H&P ADULT - NSHPPHYSICALEXAM_GEN_ALL_CORE
Vital Signs Last 24 Hrs  T(C): 37.2 (04 Nov 2022 17:12), Max: 37.2 (04 Nov 2022 17:12)  T(F): 99 (04 Nov 2022 17:12), Max: 99 (04 Nov 2022 17:12)  HR: 101 (04 Nov 2022 17:12) (101 - 101)  BP: 154/89 (04 Nov 2022 17:12) (154/89 - 154/89)  RR: 18 (04 Nov 2022 17:12) (18 - 18)  SpO2: 100% (04 Nov 2022 17:12) (100% - 100%)    CONSTITUTIONAL: Well-groomed, in no apparent distress  EYES: No conjunctival or scleral injection, non-icteric;   ENMT: No external nasal lesions; MMM  NECK: Trachea midline without palpable neck mass; thyroid not enlarged and non-tender  RESPIRATORY: Breathing comfortably; no dullness to percussion; lungs CTA without wheeze/rhonchi/rales  CARDIOVASCULAR: +S1S2, RRR, no M/G/R; pedal pulses full and symmetric; no lower extremity edema  GASTROINTESTINAL: No palpable masses or tenderness, +BS throughout, no rebound/guarding; no hepatosplenomegaly; no hernia palpated  LYMPHATIC: No cervical LAD or tenderness  SKIN: No rashes or ulcers noted  NEUROLOGIC: CN II-XII intact; sensation intact in LEs b/l to light touch  PSYCHIATRIC: A&Ox3; mood and affect appropriate; appropriate insight and judgment

## 2022-11-04 NOTE — H&P ADULT - NSICDXPASTMEDICALHX_GEN_ALL_CORE_FT
PAST MEDICAL HISTORY:  Anemia     Benign essential hypertension     DM (diabetes mellitus), type 2     History of endometriosis     IgA nephropathy determined by renal biopsy

## 2022-11-04 NOTE — ED PROVIDER NOTE - PROGRESS NOTE DETAILS
Bhavik Sun MD (PGY-2): pt clinically stable. critical anemia, likely 2/2 avm w/ gi source. transfusion and admission.

## 2022-11-04 NOTE — H&P ADULT - PROBLEM SELECTOR PLAN 4
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 Given jejunal hx and no reported hematemesis, would not benefit from gtt  - GI emailed, f/u recs

## 2022-11-04 NOTE — ED ADULT NURSE NOTE - NSIMPLEMENTINTERV_GEN_ALL_ED
Implemented All Universal Safety Interventions:  [place] to call system. Call bell, personal items and telephone within reach. Instruct patient to call for assistance. Room bathroom lighting operational. Non-slip footwear when patient is off stretcher. Physically safe environment: no spills, clutter or unnecessary equipment. Stretcher in lowest position, wheels locked, appropriate side rails in place.

## 2022-11-04 NOTE — ED PROVIDER NOTE - CONSTITUTIONAL, MLM
normal... morbid obesity , awake, alert, oriented to person, place, time/situation and in no apparent distress.

## 2022-11-04 NOTE — H&P ADULT - NSHPLABSRESULTS_GEN_ALL_CORE
LABS:                      4.0    13.20 )-----------( 273      ( 04 Nov 2022 18:55 )             15.9     11-04    137  |  105  |  54<H>  ----------------------------<  242<H>  4.8   |  19<L>  |  4.64<H>    Ca    7.7<L>      04 Nov 2022 18:55    TPro  7.5  /  Alb  3.5  /  TBili  <0.1<L>  /  DBili  x   /  AST  17  /  ALT  10  /  AlkPhos  112  11-04    LIVER FUNCTIONS - ( 04 Nov 2022 18:55 )  Alb: 3.5 g/dL / Pro: 7.5 g/dL / ALK PHOS: 112 U/L / ALT: 10 U/L / AST: 17 U/L / GGT: x           PT/INR - ( 04 Nov 2022 18:55 )   PT: 13.0 sec;   INR: 1.13 ratio    PTT - ( 04 Nov 2022 18:55 )  PTT:32.3 sec    IMAGING:  Xray Chest 1 View- PORTABLE-Urgent (11.04.22 @ 19:29)  IMPRESSION:  - Clear lungs.  ******PRELIMINARY REPORT******      [X] Imaging personally reviewed by me  [X] ECG personally reviewed by me- NSR, prolonged QTc

## 2022-11-04 NOTE — ED ADULT NURSE NOTE - OBJECTIVE STATEMENT
53 yo f arrived to the ed hx of stage 5 kidney failure not on dialysis, endometriosis, htn, dm, anemia, AVM of the small bowel c/o low H&H; pt reports she had bloodwork done today found hemoglobin to be 3.9; c/o sob/weakness/tired/fatigue; denies chest pain, HA, N/V/D, abdominal pain, fever/chills, urinary symptoms, hematuria, dizziness, numbness, tinging. Peripheral pulses present b/l. Skin warm, dry and pink. Pt placed in position of comfort. Pt educated on call bell system and provided call bell. Bed in lowest position, wheels locked, appropriate side rails raised. Pt denies needs at this time.

## 2022-11-04 NOTE — H&P ADULT - NSICDXFAMILYHX_GEN_ALL_CORE_FT
FAMILY HISTORY:  FH: diabetes mellitus    Sibling  Still living? No  FH: colon cancer, Age at diagnosis: Age Unknown

## 2022-11-04 NOTE — H&P ADULT - PROBLEM SELECTOR PLAN 3
- Reports currently undergoing transplant eval at [facility]   - Outpt renal f/u on Monday 11/07/22  - c/w Bumex   - Rest of as above

## 2022-11-04 NOTE — ED PROVIDER NOTE - NSICDXPASTMEDICALHX_GEN_ALL_CORE_FT
PAST MEDICAL HISTORY:  Anemia     DM (diabetes mellitus)     H/O endometritis     History of endometriosis     HTN (hypertension)     IgA nephropathy determined by renal biopsy

## 2022-11-04 NOTE — H&P ADULT - HISTORY OF PRESENT ILLNESS
Pt is a 51yo F w/ PMH of HTN, DM (diet controlled), IgA nephropathy, chronic anemia 2/2 CKD and jejunal AVMs requiring multiple endoscopies w/ intervention (last ~6 weeks prior to admission here) pw abnormal blood work (hgb 3.9) from outpt hematology office.    Endorses fatigue and mild SOB which she initially related to recent traveling. Denies any N/V, abd pain, constipation, diarrhea, hematemesis, melena, or hematochezia. Endorses dark brown stool. Of note, pt w/ extensive w/u previously including BMB which has been unremarkable. In the outpt setting she is on Retacrit 10,000U weekly (increased to 20k this week), as well as oral Fe supplementation and recent initiation of Octreotide therapy and planned for outpt transfusions w/ hematology to maintain hgb goal of 7 or greater.    In the ED she was noted to be mildly tachycardic to 101 otherwise stable w/ out acute complaints. She was administered 1L IVF and being transfused total 2U pRBCs.    Of note, pt w/ transplant appointment at [facility] on 11/07 to discuss renal transplantation and she would like to make that appointment unless she is otherwise unstable.

## 2022-11-04 NOTE — ED ADULT NURSE REASSESSMENT NOTE - NS ED NURSE REASSESS COMMENT FT1
Risks and benefits of blood product explained to patient, patient verbalized understanding of risks and benefits.  Patient aware of possible side effects. Consent to blood product in patient's chart.  Vital signs updated. Second RN at bedside for confirmation. PRBC infusion initiated for patient.

## 2022-11-04 NOTE — H&P ADULT - PROBLEM SELECTOR PLAN 1
- Acute on chronic anemia i/s/o CKD and potential GI source (AVMs) given hx though can be slow as no hemodynamic instability on admission w/out c/o acute bleeding episode noted  - On outpt Procrit 20,000U qweekly (last on 10/30), daily sq Octreotide (pt unsure of dose), and PO Fe  - Will c/w PO Fe supplementation for now, determine dose of Octreotide in AM and dose appropriately  - s/p 2U pRBCs in the ED, will follow up post-transfusion CBC and likely transfuse again for goal hgb >7  - Maintain active T&S  - f/u Fe studies, retic, B12 and folate (added to pre-transfusion CBC)  - Monitor CMP for electrolyte abnormalities given amount of pRBC transfusions  - If becomes hemodynamically unstable or w/ s/s of acute active bleed such as melena, hematemesis -> consider CTA abdomen (risk/benefit given CKD) and consult IR vs call GI for urgent consult  - GI routine consult emailed, f/u recs  - NPO for now pending possibility of intervention  - SCDs for DVT ppx given anemia  - Fall precautions

## 2022-11-04 NOTE — H&P ADULT - ASSESSMENT
Pt is a 51yo F w/ PMH of HTN, DM (diet controlled), IgA nephropathy, chronic anemia 2/2 CKD and jejunal AVMs requiring multiple endoscopies w/ intervention (last ~6 weeks prior to admission here) pw abnormal blood work (hgb 3.9) from outpt hematology office, admitted for acute on chronic anemia i/s/o CKD and potential GI source (AVMs) given hx.

## 2022-11-04 NOTE — ED PROVIDER NOTE - OBJECTIVE STATEMENT
52 y old f  morbidly obese with hx DM ,Hypertension ,IGA nephropathy  AVM in bowel with several episodes of GI bleed required endoscopic tratment and blood transfusion ,send from dr Nando Ceron office with anemia hb 3,9 ,pt denies any evidence of bleeding ,feels dizzy and SOB on exertion

## 2022-11-04 NOTE — H&P ADULT - NSHPREVIEWOFSYSTEMS_GEN_ALL_CORE
CONSTITUTIONAL: Fatigue. No fever, weight loss  EYES: No eye pain, visual disturbances, or discharge  ENMT: No difficulty hearing, tinnitus, vertigo; No sinus or throat pain  RESPIRATORY: No SOB. No cough, wheezing, chills or hemoptysis  CARDIOVASCULAR: No chest pain, palpitations, dizziness, or leg swelling  GASTROINTESTINAL: No abdominal or epigastric pain. No nausea, vomiting, or hematemesis; No diarrhea or constipation. No melena or hematochezia.  GENITOURINARY: No dysuria, frequency, hematuria, or incontinence  NEUROLOGICAL: No headaches, memory loss, loss of strength, numbness, or tremors  SKIN: No itching, burning, rashes, or lesions   LYMPH NODES: No enlarged glands  ENDOCRINE: No heat or cold intolerance; No hair loss  MUSCULOSKELETAL: No joint pain or swelling; No muscle, back pain  PSYCHIATRIC: No depression, anxiety, mood swings, or difficulty sleeping  HEME/LYMPH: No easy bruising, or bleeding gums

## 2022-11-04 NOTE — ED PROVIDER NOTE - CLINICAL SUMMARY MEDICAL DECISION MAKING FREE TEXT BOX
62 y old f with chronic AVM of intestines ,chronic anemia send from hematologist for symptomatic anemia ,will transfuse 2 u of blood admission to the hospital ZR

## 2022-11-05 LAB
A1C WITH ESTIMATED AVERAGE GLUCOSE RESULT: 5.4 % — SIGNIFICANT CHANGE UP (ref 4–5.6)
ALBUMIN SERPL ELPH-MCNC: 3.5 G/DL — SIGNIFICANT CHANGE UP (ref 3.3–5)
ALP SERPL-CCNC: 109 U/L — SIGNIFICANT CHANGE UP (ref 40–120)
ALT FLD-CCNC: 7 U/L — LOW (ref 10–45)
ANION GAP SERPL CALC-SCNC: 11 MMOL/L — SIGNIFICANT CHANGE UP (ref 5–17)
APPEARANCE UR: CLEAR — SIGNIFICANT CHANGE UP
AST SERPL-CCNC: 12 U/L — SIGNIFICANT CHANGE UP (ref 10–40)
BACTERIA # UR AUTO: NEGATIVE — SIGNIFICANT CHANGE UP
BILIRUB SERPL-MCNC: 0.4 MG/DL — SIGNIFICANT CHANGE UP (ref 0.2–1.2)
BILIRUB UR-MCNC: NEGATIVE — SIGNIFICANT CHANGE UP
BUN SERPL-MCNC: 50 MG/DL — HIGH (ref 7–23)
CALCIUM SERPL-MCNC: 7.8 MG/DL — LOW (ref 8.4–10.5)
CHLORIDE SERPL-SCNC: 111 MMOL/L — HIGH (ref 96–108)
CK MB BLD-MCNC: 3.3 % — SIGNIFICANT CHANGE UP (ref 0–3.5)
CK MB CFR SERPL CALC: 3.8 NG/ML — SIGNIFICANT CHANGE UP (ref 0–3.8)
CK SERPL-CCNC: 115 U/L — SIGNIFICANT CHANGE UP (ref 25–170)
CO2 SERPL-SCNC: 18 MMOL/L — LOW (ref 22–31)
COLOR SPEC: SIGNIFICANT CHANGE UP
CREAT SERPL-MCNC: 4.39 MG/DL — HIGH (ref 0.5–1.3)
DIFF PNL FLD: ABNORMAL
EGFR: 12 ML/MIN/1.73M2 — LOW
EPI CELLS # UR: 1 /HPF — SIGNIFICANT CHANGE UP
ESTIMATED AVERAGE GLUCOSE: 108 MG/DL — SIGNIFICANT CHANGE UP (ref 68–114)
FERRITIN SERPL-MCNC: 19 NG/ML — SIGNIFICANT CHANGE UP (ref 15–150)
FOLATE SERPL-MCNC: 8.4 NG/ML — SIGNIFICANT CHANGE UP
GLUCOSE BLDC GLUCOMTR-MCNC: 111 MG/DL — HIGH (ref 70–99)
GLUCOSE BLDC GLUCOMTR-MCNC: 119 MG/DL — HIGH (ref 70–99)
GLUCOSE BLDC GLUCOMTR-MCNC: 156 MG/DL — HIGH (ref 70–99)
GLUCOSE BLDC GLUCOMTR-MCNC: 167 MG/DL — HIGH (ref 70–99)
GLUCOSE SERPL-MCNC: 135 MG/DL — HIGH (ref 70–99)
GLUCOSE UR QL: NEGATIVE — SIGNIFICANT CHANGE UP
HCG UR QL: NEGATIVE — SIGNIFICANT CHANGE UP
HCT VFR BLD CALC: 22.3 % — LOW (ref 34.5–45)
HCT VFR BLD CALC: 23.9 % — LOW (ref 34.5–45)
HCT VFR BLD CALC: 28.4 % — LOW (ref 34.5–45)
HGB BLD-MCNC: 6.2 G/DL — CRITICAL LOW (ref 11.5–15.5)
HGB BLD-MCNC: 6.8 G/DL — CRITICAL LOW (ref 11.5–15.5)
HGB BLD-MCNC: 8.1 G/DL — LOW (ref 11.5–15.5)
IRON SATN MFR SERPL: 12 UG/DL — LOW (ref 30–160)
IRON SATN MFR SERPL: 4 % — LOW (ref 14–50)
KETONES UR-MCNC: NEGATIVE — SIGNIFICANT CHANGE UP
LEUKOCYTE ESTERASE UR-ACNC: NEGATIVE — SIGNIFICANT CHANGE UP
MAGNESIUM SERPL-MCNC: 2.1 MG/DL — SIGNIFICANT CHANGE UP (ref 1.6–2.6)
MCHC RBC-ENTMCNC: 23.4 PG — LOW (ref 27–34)
MCHC RBC-ENTMCNC: 23.6 PG — LOW (ref 27–34)
MCHC RBC-ENTMCNC: 24 PG — LOW (ref 27–34)
MCHC RBC-ENTMCNC: 27.8 GM/DL — LOW (ref 32–36)
MCHC RBC-ENTMCNC: 28.5 GM/DL — LOW (ref 32–36)
MCHC RBC-ENTMCNC: 28.5 GM/DL — LOW (ref 32–36)
MCV RBC AUTO: 82.4 FL — SIGNIFICANT CHANGE UP (ref 80–100)
MCV RBC AUTO: 84.3 FL — SIGNIFICANT CHANGE UP (ref 80–100)
MCV RBC AUTO: 84.8 FL — SIGNIFICANT CHANGE UP (ref 80–100)
NITRITE UR-MCNC: NEGATIVE — SIGNIFICANT CHANGE UP
NRBC # BLD: 0 /100 WBCS — SIGNIFICANT CHANGE UP (ref 0–0)
NT-PROBNP SERPL-SCNC: 3662 PG/ML — HIGH (ref 0–300)
PH UR: 6.5 — SIGNIFICANT CHANGE UP (ref 5–8)
PHOSPHATE SERPL-MCNC: 5.4 MG/DL — HIGH (ref 2.5–4.5)
PLATELET # BLD AUTO: 271 K/UL — SIGNIFICANT CHANGE UP (ref 150–400)
PLATELET # BLD AUTO: 274 K/UL — SIGNIFICANT CHANGE UP (ref 150–400)
PLATELET # BLD AUTO: 287 K/UL — SIGNIFICANT CHANGE UP (ref 150–400)
POTASSIUM SERPL-MCNC: 5 MMOL/L — SIGNIFICANT CHANGE UP (ref 3.5–5.3)
POTASSIUM SERPL-SCNC: 5 MMOL/L — SIGNIFICANT CHANGE UP (ref 3.5–5.3)
PROT SERPL-MCNC: 7.1 G/DL — SIGNIFICANT CHANGE UP (ref 6–8.3)
PROT UR-MCNC: ABNORMAL
RBC # BLD: 2.63 M/UL — LOW (ref 3.8–5.2)
RBC # BLD: 2.9 M/UL — LOW (ref 3.8–5.2)
RBC # BLD: 3.37 M/UL — LOW (ref 3.8–5.2)
RBC # FLD: 18.8 % — HIGH (ref 10.3–14.5)
RBC # FLD: 18.8 % — HIGH (ref 10.3–14.5)
RBC # FLD: 19.4 % — HIGH (ref 10.3–14.5)
RBC CASTS # UR COMP ASSIST: 3 /HPF — SIGNIFICANT CHANGE UP (ref 0–4)
SODIUM SERPL-SCNC: 140 MMOL/L — SIGNIFICANT CHANGE UP (ref 135–145)
SP GR SPEC: 1.01 — SIGNIFICANT CHANGE UP (ref 1.01–1.02)
TIBC SERPL-MCNC: 300 UG/DL — SIGNIFICANT CHANGE UP (ref 220–430)
TROPONIN T, HIGH SENSITIVITY RESULT: 25 NG/L — SIGNIFICANT CHANGE UP (ref 0–51)
TROPONIN T, HIGH SENSITIVITY RESULT: 26 NG/L — SIGNIFICANT CHANGE UP (ref 0–51)
UIBC SERPL-MCNC: 288 UG/DL — SIGNIFICANT CHANGE UP (ref 110–370)
UROBILINOGEN FLD QL: NEGATIVE — SIGNIFICANT CHANGE UP
VIT B12 SERPL-MCNC: 529 PG/ML — SIGNIFICANT CHANGE UP (ref 232–1245)
WBC # BLD: 13.6 K/UL — HIGH (ref 3.8–10.5)
WBC # BLD: 14.97 K/UL — HIGH (ref 3.8–10.5)
WBC # BLD: 16.53 K/UL — HIGH (ref 3.8–10.5)
WBC # FLD AUTO: 13.6 K/UL — HIGH (ref 3.8–10.5)
WBC # FLD AUTO: 14.97 K/UL — HIGH (ref 3.8–10.5)
WBC # FLD AUTO: 16.53 K/UL — HIGH (ref 3.8–10.5)
WBC UR QL: 4 /HPF — SIGNIFICANT CHANGE UP (ref 0–5)

## 2022-11-05 PROCEDURE — 93306 TTE W/DOPPLER COMPLETE: CPT | Mod: 26

## 2022-11-05 PROCEDURE — 99223 1ST HOSP IP/OBS HIGH 75: CPT

## 2022-11-05 PROCEDURE — 99232 SBSQ HOSP IP/OBS MODERATE 35: CPT

## 2022-11-05 RX ORDER — ACETAMINOPHEN 500 MG
1000 TABLET ORAL ONCE
Refills: 0 | Status: COMPLETED | OUTPATIENT
Start: 2022-11-05 | End: 2022-11-05

## 2022-11-05 RX ADMIN — Medication 325 MILLIGRAM(S): at 11:09

## 2022-11-05 RX ADMIN — PANTOPRAZOLE SODIUM 40 MILLIGRAM(S): 20 TABLET, DELAYED RELEASE ORAL at 11:09

## 2022-11-05 RX ADMIN — Medication 1: at 05:52

## 2022-11-05 RX ADMIN — Medication 1: at 01:47

## 2022-11-05 RX ADMIN — Medication 400 MILLIGRAM(S): at 23:51

## 2022-11-05 NOTE — PROGRESS NOTE ADULT - SUBJECTIVE AND OBJECTIVE BOX
[facility] Division of Hospital Medicine  Elizabeth Roman MD  Available via MS Teams  Pager: [phone]    SUBJECTIVE / OVERNIGHT EVENTS: Patient seen and examined at bedside. Reports dizziness and appetite. States she has been noticing dark stool     ADDITIONAL REVIEW OF SYSTEMS:    MEDICATIONS  (STANDING):  buMETAnide 1 milliGRAM(s) Oral daily  dextrose 5%. 1000 milliLiter(s) (50 mL/Hr) IV Continuous <Continuous>  dextrose 5%. 1000 milliLiter(s) (100 mL/Hr) IV Continuous <Continuous>  dextrose 50% Injectable 25 Gram(s) IV Push once  dextrose 50% Injectable 12.5 Gram(s) IV Push once  dextrose 50% Injectable 25 Gram(s) IV Push once  ferrous    sulfate 325 milliGRAM(s) Oral daily  glucagon  Injectable 1 milliGRAM(s) IntraMuscular once  insulin lispro (ADMELOG) corrective regimen sliding scale   SubCutaneous every 6 hours  pantoprazole  Injectable 40 milliGRAM(s) IV Push daily    MEDICATIONS  (PRN):  acetaminophen     Tablet .. 650 milliGRAM(s) Oral every 6 hours PRN Temp greater or equal to 38C (100.4F), Mild Pain (1 - 3)  dextrose Oral Gel 15 Gram(s) Oral once PRN Blood Glucose LESS THAN 70 milliGRAM(s)/deciliter      I&O's Summary      PHYSICAL EXAM:  Vital Signs Last 24 Hrs  T(C): 37.2 (05 Nov 2022 15:16), Max: 37.2 (04 Nov 2022 17:12)  T(F): 99 (05 Nov 2022 15:16), Max: 99 (04 Nov 2022 17:12)  HR: 89 (05 Nov 2022 15:16) (81 - 101)  BP: 147/88 (05 Nov 2022 15:16) (135/85 - 165/95)  BP(mean): --  RR: 18 (05 Nov 2022 15:16) (18 - 18)  SpO2: 99% (05 Nov 2022 15:16) (95% - 100%)    Parameters below as of 05 Nov 2022 15:16  Patient On (Oxygen Delivery Method): room air      CONSTITUTIONAL: NAD, well-developed, well-groomed  EYES: PERRLA; conjunctiva and sclera clear  ENMT: Moist oral mucosa, no pharyngeal injection or exudates; normal dentition  NECK: Supple, no palpable masses; no thyromegaly  RESPIRATORY: Normal respiratory effort; lungs are clear to auscultation bilaterally  CARDIOVASCULAR: Regular rate and rhythm, normal S1 and S2, no murmur/rub/gallop; No lower extremity edema; Peripheral pulses are 2+ bilaterally  ABDOMEN: Nontender to palpation, normoactive bowel sounds, no rebound/guarding; No hepatosplenomegaly  MUSCULOSKELETAL:   no clubbing or cyanosis of digits; no joint swelling or tenderness to palpation  PSYCH: A+O to person, place, and time; affect appropriate  NEUROLOGY: CN 2-12 are intact and symmetric; no gross sensory deficits   SKIN: No rashes; no palpable lesions    LABS:                        6.8    14.97 )-----------( 274      ( 05 Nov 2022 10:11 )             23.9     11-05    140  |  111<H>  |  50<H>  ----------------------------<  135<H>  5.0   |  18<L>  |  4.39<H>    Ca    7.8<L>      05 Nov 2022 10:11  Phos  5.4     11-05  Mg     2.1     11-05    TPro  7.1  /  Alb  3.5  /  TBili  0.4  /  DBili  x   /  AST  12  /  ALT  7<L>  /  AlkPhos  109  11-05    PT/INR - ( 04 Nov 2022 18:55 )   PT: 13.0 sec;   INR: 1.13 ratio         PTT - ( 04 Nov 2022 18:55 )  PTT:32.3 sec  CARDIAC MARKERS ( 05 Nov 2022 13:02 )  x     / x     / 115 U/L / x     / 3.8 ng/mL              RADIOLOGY & ADDITIONAL TESTS:  New Results Reviewed Today:   New Imaging Personally Reviewed Today:  New Electrocardiogram Personally Reviewed Today:  Prior or Outpatient Records Reviewed Today:    COMMUNICATION:  Care Discussed with Consultants/Other Providers and Details of Discussion:  Discussions with Patient/Family:  PCP Communication:     [facility] Division of Hospital Medicine  Elizabeth Roman MD  Available via MS Teams  Pager: [phone]    SUBJECTIVE / OVERNIGHT EVENTS: Patient seen and examined at bedside. Reports dizziness and appetite. States she has been noticing dark stools in her BM but no hematochezia, hematemesis. States she had a EGD 6 weeks ago with Dr. Jeremiah Aguirre ([phone]) with cauterization of the AVM. Reports close follow up with him. Otherwise denies chest pain, sob, abd pain, n/v/d/f.     ADDITIONAL REVIEW OF SYSTEMS: as stated in HPI     MEDICATIONS  (STANDING):  buMETAnide 1 milliGRAM(s) Oral daily  dextrose 5%. 1000 milliLiter(s) (50 mL/Hr) IV Continuous <Continuous>  dextrose 5%. 1000 milliLiter(s) (100 mL/Hr) IV Continuous <Continuous>  dextrose 50% Injectable 25 Gram(s) IV Push once  dextrose 50% Injectable 12.5 Gram(s) IV Push once  dextrose 50% Injectable 25 Gram(s) IV Push once  ferrous    sulfate 325 milliGRAM(s) Oral daily  glucagon  Injectable 1 milliGRAM(s) IntraMuscular once  insulin lispro (ADMELOG) corrective regimen sliding scale   SubCutaneous every 6 hours  pantoprazole  Injectable 40 milliGRAM(s) IV Push daily    MEDICATIONS  (PRN):  acetaminophen     Tablet .. 650 milliGRAM(s) Oral every 6 hours PRN Temp greater or equal to 38C (100.4F), Mild Pain (1 - 3)  dextrose Oral Gel 15 Gram(s) Oral once PRN Blood Glucose LESS THAN 70 milliGRAM(s)/deciliter      I&O's Summary      PHYSICAL EXAM:  Vital Signs Last 24 Hrs  T(C): 37.2 (05 Nov 2022 15:16), Max: 37.2 (04 Nov 2022 17:12)  T(F): 99 (05 Nov 2022 15:16), Max: 99 (04 Nov 2022 17:12)  HR: 89 (05 Nov 2022 15:16) (81 - 101)  BP: 147/88 (05 Nov 2022 15:16) (135/85 - 165/95)  BP(mean): --  RR: 18 (05 Nov 2022 15:16) (18 - 18)  SpO2: 99% (05 Nov 2022 15:16) (95% - 100%)    Parameters below as of 05 Nov 2022 15:16  Patient On (Oxygen Delivery Method): room air      CONSTITUTIONAL: NAD, well-developed, well-groomed  EYES: PERRLA; conjunctiva and sclera clear  ENMT: Moist oral mucosa, no pharyngeal injection or exudates; normal dentition  NECK: Supple, no palpable masses; no thyromegaly  RESPIRATORY: Normal respiratory effort; lungs are clear to auscultation bilaterally  CARDIOVASCULAR: Regular rate and rhythm, normal S1 and S2, no murmur/rub/gallop; No lower extremity edema; Peripheral pulses are 2+ bilaterally  ABDOMEN: Nontender to palpation, normoactive bowel sounds, no rebound/guarding; No hepatosplenomegaly  MUSCULOSKELETAL:   no clubbing or cyanosis of digits; no joint swelling or tenderness to palpation  PSYCH: A+O to person, place, and time; affect appropriate  NEUROLOGY: CN 2-12 are intact and symmetric; no gross sensory deficits   SKIN: No rashes; no palpable lesions    LABS:                        6.8    14.97 )-----------( 274      ( 05 Nov 2022 10:11 )             23.9     11-05    140  |  111<H>  |  50<H>  ----------------------------<  135<H>  5.0   |  18<L>  |  4.39<H>    Ca    7.8<L>      05 Nov 2022 10:11  Phos  5.4     11-05  Mg     2.1     11-05    TPro  7.1  /  Alb  3.5  /  TBili  0.4  /  DBili  x   /  AST  12  /  ALT  7<L>  /  AlkPhos  109  11-05    PT/INR - ( 04 Nov 2022 18:55 )   PT: 13.0 sec;   INR: 1.13 ratio         PTT - ( 04 Nov 2022 18:55 )  PTT:32.3 sec  CARDIAC MARKERS ( 05 Nov 2022 13:02 )  x     / x     / 115 U/L / x     / 3.8 ng/mL              RADIOLOGY & ADDITIONAL TESTS:  New Results Reviewed Today:     New Imaging Personally Reviewed Today:    ------------------------------------------------------------------------  Conclusions:  1. Tethered mitral valve leaflets with normal opening.  Severe mitral regurgitation.  2. Moderate left ventricular enlargement.  3. Moderate global left ventricular systolic dysfunction.  Endocardial visualization enhanced with intravenous  injection of Ultrasonic Enhancing Agent (Lumason).  4. Moderate diastolic dysfunction (Stage II).  *** No previous Echo exam.  ------------------------------------------------------------------------  Confirmed on  11/5/2022 - 16:01:03 by MARIA EUGENIA Goss  ------------------------------------------------------------------------    New Electrocardiogram Personally Reviewed Today:  Prior or Outpatient Records Reviewed Today:    COMMUNICATION:  Care Discussed with Consultants/Other Providers and Details of Discussion:  Discussions with Patient/Family:  PCP Communication:

## 2022-11-05 NOTE — CONSULT NOTE ADULT - SUBJECTIVE AND OBJECTIVE BOX
Hematology Consult Note    HPI:  Pt is a 51yo F w/ PMH of HTN, DM (diet controlled), IgA nephropathy, chronic anemia 2/2 CKD and jejunal AVMs requiring multiple endoscopies w/ intervention (last ~6 weeks prior to admission here) pw abnormal blood work (hgb 3.9) from outpt hematology office.    Endorses fatigue and mild SOB which she initially related to recent traveling. Denies any N/V, abd pain, constipation, diarrhea, hematemesis, melena, or hematochezia. Endorses dark brown stool. Of note, pt w/ extensive w/u previously including BMB which has been unremarkable. In the outpt setting she is on Retacrit 10,000U weekly (increased to 20k this week), as well as oral Fe supplementation and recent initiation of Octreotide therapy and planned for outpt transfusions w/ hematology to maintain hgb goal of 7 or greater.    In the ED she was noted to be mildly tachycardic to 101 otherwise stable w/ out acute complaints. She was administered 1L IVF and being transfused total 2U pRBCs.    Of note, pt w/ transplant appointment at [facility] on 11/07 to discuss renal transplantation and she would like to make that appointment unless she is otherwise unstable.   (04 Nov 2022 23:05)      PAST MEDICAL & SURGICAL HISTORY:  Anemia      IgA nephropathy determined by renal biopsy      DM (diabetes mellitus), type 2      Benign essential hypertension      History of endometriosis      H/O left breast biopsy      H/O laparoscopy          FAMILY HISTORY:  FH: diabetes mellitus    FH: colon cancer (Sibling)  dx at age 50        MEDICATIONS  (STANDING):  buMETAnide 1 milliGRAM(s) Oral daily  dextrose 5%. 1000 milliLiter(s) (50 mL/Hr) IV Continuous <Continuous>  dextrose 5%. 1000 milliLiter(s) (100 mL/Hr) IV Continuous <Continuous>  dextrose 50% Injectable 25 Gram(s) IV Push once  dextrose 50% Injectable 12.5 Gram(s) IV Push once  dextrose 50% Injectable 25 Gram(s) IV Push once  ferrous    sulfate 325 milliGRAM(s) Oral daily  glucagon  Injectable 1 milliGRAM(s) IntraMuscular once  insulin lispro (ADMELOG) corrective regimen sliding scale   SubCutaneous every 6 hours  pantoprazole  Injectable 40 milliGRAM(s) IV Push daily    MEDICATIONS  (PRN):  acetaminophen     Tablet .. 650 milliGRAM(s) Oral every 6 hours PRN Temp greater or equal to 38C (100.4F), Mild Pain (1 - 3)  dextrose Oral Gel 15 Gram(s) Oral once PRN Blood Glucose LESS THAN 70 milliGRAM(s)/deciliter      Allergies    ACE inhibitors (Short breath)    Intolerances        SOCIAL HISTORY: No EtOH, no tobacco    Height (cm): 162.6 (11-04 @ 17:12)  Weight (kg): 90.7 (11-04 @ 17:12)  BMI (kg/m2): 34.3 (11-04 @ 17:12)  BSA (m2): 1.96 (11-04 @ 17:12)    T(F): 97.3 (11-05-22 @ 06:38), Max: 99 (11-04-22 @ 17:12)  HR: 93 (11-05-22 @ 06:38)  BP: 159/91 (11-05-22 @ 06:38)  RR: 18 (11-05-22 @ 06:38)  SpO2: 98% (11-05-22 @ 06:38)  Wt(kg): --    GENERAL: NAD, well-developed  HEAD:  Atraumatic, Normocephalic  EYES: EOMI, PERRLA, conjunctiva and sclera clear  NECK: Supple, No JVD  CHEST/LUNG: Clear to auscultation bilaterally; No wheeze  HEART: Regular rate and rhythm; No murmurs, rubs, or gallops  ABDOMEN: Soft, Nontender, Nondistended; Bowel sounds present  EXTREMITIES:  2+ Peripheral Pulses, No clubbing, cyanosis, or edema  NEUROLOGY: non-focal  SKIN: No rashes or lesions                          6.2    13.60 )-----------( 287      ( 05 Nov 2022 02:05 )             22.3       11-04    137  |  105  |  54<H>  ----------------------------<  242<H>  4.8   |  19<L>  |  4.64<H>    Ca    7.7<L>      04 Nov 2022 18:55    TPro  7.5  /  Alb  3.5  /  TBili  <0.1<L>  /  DBili  x   /  AST  17  /  ALT  10  /  AlkPhos  112  11-04           Hematology Consult Note    HPI:  Pt is a 51 yo F w/ PMH of HTN, DM (diet controlled), IgA nephropathy, chronic anemia 2/2 CKD and jejunal AVMs requiring multiple endoscopies w/ intervention (last ~6 weeks prior to admission here) pw abnormal blood work (hgb 3.9) from outpt hematology office.    Endorses fatigue and mild SOB which she initially related to recent traveling. Denies any N/V, abd pain, constipation, diarrhea, hematemesis, melena, or hematochezia. Endorses dark brown stool. Of note, pt w/ extensive w/u previously including BMB which has been unremarkable. In the outpt setting she is on Retacrit 10,000U weekly (increased to 20k this week), as well as oral Fe supplementation and recent initiation of Octreotide therapy and planned for outpt transfusions w/ hematology to maintain hgb goal of 7 or greater.    In the ED she was noted to be mildly tachycardic to 101 otherwise stable w/ out acute complaints. She was administered 1L IVF and being transfused total 2U pRBCs.    Of note, pt w/ transplant appointment at [facility] on 11/07 to discuss renal transplantation and she would like to make that appointment unless she is otherwise unstable.   (04 Nov 2022 23:05)      PAST MEDICAL & SURGICAL HISTORY:  Anemia      IgA nephropathy determined by renal biopsy      DM (diabetes mellitus), type 2      Benign essential hypertension      History of endometriosis      H/O left breast biopsy      H/O laparoscopy          FAMILY HISTORY:  FH: diabetes mellitus    FH: colon cancer (Sibling)  dx at age 50        MEDICATIONS  (STANDING):  buMETAnide 1 milliGRAM(s) Oral daily  dextrose 5%. 1000 milliLiter(s) (50 mL/Hr) IV Continuous <Continuous>  dextrose 5%. 1000 milliLiter(s) (100 mL/Hr) IV Continuous <Continuous>  dextrose 50% Injectable 25 Gram(s) IV Push once  dextrose 50% Injectable 12.5 Gram(s) IV Push once  dextrose 50% Injectable 25 Gram(s) IV Push once  ferrous    sulfate 325 milliGRAM(s) Oral daily  glucagon  Injectable 1 milliGRAM(s) IntraMuscular once  insulin lispro (ADMELOG) corrective regimen sliding scale   SubCutaneous every 6 hours  pantoprazole  Injectable 40 milliGRAM(s) IV Push daily    MEDICATIONS  (PRN):  acetaminophen     Tablet .. 650 milliGRAM(s) Oral every 6 hours PRN Temp greater or equal to 38C (100.4F), Mild Pain (1 - 3)  dextrose Oral Gel 15 Gram(s) Oral once PRN Blood Glucose LESS THAN 70 milliGRAM(s)/deciliter      Allergies    ACE inhibitors (Short breath)    Intolerances        SOCIAL HISTORY: No EtOH, no tobacco    Height (cm): 162.6 (11-04 @ 17:12)  Weight (kg): 90.7 (11-04 @ 17:12)  BMI (kg/m2): 34.3 (11-04 @ 17:12)  BSA (m2): 1.96 (11-04 @ 17:12)    T(F): 97.3 (11-05-22 @ 06:38), Max: 99 (11-04-22 @ 17:12)  HR: 93 (11-05-22 @ 06:38)  BP: 159/91 (11-05-22 @ 06:38)  RR: 18 (11-05-22 @ 06:38)  SpO2: 98% (11-05-22 @ 06:38)  Wt(kg): --    GENERAL: NAD, well-developed  HEAD:  Atraumatic, Normocephalic  EYES: EOMI, PERRLA, conjunctiva and sclera clear  NECK: Supple, No JVD  CHEST/LUNG: Clear to auscultation bilaterally; No wheeze  HEART: Regular rate and rhythm; No murmurs, rubs, or gallops  ABDOMEN: Soft, Nontender, Nondistended; Bowel sounds present  EXTREMITIES:  2+ Peripheral Pulses, No clubbing, cyanosis, or edema  NEUROLOGY: non-focal  SKIN: No rashes or lesions                          6.2    13.60 )-----------( 287      ( 05 Nov 2022 02:05 )             22.3       11-04    137  |  105  |  54<H>  ----------------------------<  242<H>  4.8   |  19<L>  |  4.64<H>    Ca    7.7<L>      04 Nov 2022 18:55    TPro  7.5  /  Alb  3.5  /  TBili  <0.1<L>  /  DBili  x   /  AST  17  /  ALT  10  /  AlkPhos  112  11-04

## 2022-11-05 NOTE — ED ADULT NURSE REASSESSMENT NOTE - NS ED NURSE REASSESS COMMENT FT1
pt refusing repeat CBC post two units of blood states " we need to give her veins a break." pt educated on risk and benefits of repeat blood draw. pt continuing to refuse. ACP chayo contacted.

## 2022-11-05 NOTE — PROGRESS NOTE ADULT - PROBLEM SELECTOR PLAN 2
Admission Cr of 4.64 w/ baseline within range of 2.6-3.2, hx of IgA nephropathy   - Likely i/s/o anemia vs worsening renal disease  - Trend BUN/Cr daily  - Avoid nephrotoxic medications  - Renally dose all medications  - c/w Bumex 1mg QD

## 2022-11-05 NOTE — CONSULT NOTE ADULT - ATTENDING COMMENTS
53 yo female with known medical history of HTN, DM, IgA nephropathy with stage 5 ESRD not on dialysis, jejunal AVMs s/p multiple procedures, chronic anemia with transfusion dependance sent in by outpatient hematologist for hbg <4. Labs at admission showing hbg of 3.9; (MCV 81.2) wbc 12.27, plts 296. Iron studies showing decreased iron 912), TIBC normal, % sat of 4%. ferritin, folate, B12 pending.  CMP showing acute on chronic renal failure with elevated Cr, decreased calcium, normal LFTs. Recommend transfusion until hgb > 9 (recommendation per Dr Ceron given patient non compliance). Retacrit increased to 20, 000 units subq weekly, starting 10/30. She will follow up with Dr. Ceron as an outpatient.

## 2022-11-05 NOTE — PROGRESS NOTE ADULT - PROBLEM SELECTOR PLAN 3
- Reports currently undergoing transplant eval at [facility]   - Outpt renal f/u on Monday 11/07/22  - c/w Bumex   - Rest of as above

## 2022-11-05 NOTE — PROGRESS NOTE ADULT - PROBLEM SELECTOR PLAN 4
- c/f AVMs given prior history  - Pt follows w/ GI at [facility] w/ previous push endoscopies and intervention on jejunal AVMs  - Transfuse as above  - Protonix qd. Given jejunal hx and no reported hematemesis, would not benefit from gtt  - GI emailed, f/u recs

## 2022-11-05 NOTE — CONSULT NOTE ADULT - ASSESSMENT
53 yo female with known medical history of HTN, DM, IgA nephropathy with stage 5 ESRD not on dialysis, jejunal AVMs s/p multiple procedures, chronic anemia with transfusion dependance sent in by outpatient hematologist for hbg <4.     #Chronic transfusion dependant anemia   - Symptomatic at admission with SOB, palpitations, fatigue   - Follows with Dr Ceron at [facility]   - Chronic anemia 2/2 to CKD and intestinal AVMs. Outpatient treatment include oral iron, IV iron, retacrit subq weekly and pRBC transfusion every 3-4 weeks. Per outpatient hematologist, poor compliance with outpatient treatments.   - Labs at admission showing hbg of 3.9; (MCV 81.2) wbc 12.27, plts 296. Iron studies showing decreased iron 912), TIBC normal, % sat of 4%. ferritin, folate, B12 pending.  CMP showing acute on chronic renal failure with elevated Cr, decreased calcium, normal LFTs.   - Unclear when last retacrit administered; to follow up with patient.   - At this time, recommend transfusion until hbg <9 (recommendations per Dr Ceron given patient non compliance).   - Will increase weekly retacrit to 20 000 units subq; will determine next dose with patient.     INCOMPLETE to be updated shortly.  51 yo female with known medical history of HTN, DM, IgA nephropathy with stage 5 ESRD not on dialysis, jejunal AVMs s/p multiple procedures, chronic anemia with transfusion dependance sent in by outpatient hematologist for hbg <4.     #Chronic transfusion dependant anemia   - Symptomatic at admission with SOB, palpitations, fatigue   - Follows with Dr Ceron at [facility]   - Chronic anemia 2/2 to CKD and intestinal AVMs. Outpatient treatment include oral iron, IV iron, retacrit subq weekly and pRBC transfusion every 3-4 weeks. Per outpatient hematologist, poor compliance with outpatient treatments.   - Labs at admission showing hbg of 3.9; (MCV 81.2) wbc 12.27, plts 296. Iron studies showing decreased iron 912), TIBC normal, % sat of 4%. ferritin, folate, B12 pending.  CMP showing acute on chronic renal failure with elevated Cr, decreased calcium, normal LFTs.   - Unclear when last retacrit administered; to follow up with patient.   - At this time, recommend transfusion until hgb > 9 (recommendations per Dr Ceron given patient non compliance).   - Will increase weekly retacrit to 20 000 units subq weekly as per Dr. Ceron; will determine next dose with patient.     INCOMPLETE to be updated shortly.  51 yo female with known medical history of HTN, DM, IgA nephropathy with stage 5 ESRD not on dialysis, jejunal AVMs s/p multiple procedures, chronic anemia with transfusion dependance sent in by outpatient hematologist for hbg <4.     #Chronic transfusion dependant anemia   - Symptomatic at admission with SOB, palpitations, fatigue   - Follows with Dr Ceron at [facility]   - Chronic anemia 2/2 to CKD and intestinal AVMs. Outpatient treatment include oral iron, IV iron, retacrit subq weekly and pRBC transfusion every 3-4 weeks. Per outpatient hematologist, poor compliance with outpatient treatments.   - Labs at admission showing hbg of 3.9; (MCV 81.2) wbc 12.27, plts 296. Iron studies showing decreased iron 912), TIBC normal, % sat of 4%. ferritin, folate, B12 pending.  CMP showing acute on chronic renal failure with elevated Cr, decreased calcium, normal LFTs.   - At this time, recommend transfusion until hgb > 9 (recommendations per Dr Ceron given patient non compliance).   - Weekly retacrit increased to 20 000 units subq weekly as per Dr. Ceron. This was done starting 10/30.     51 yo female with known medical history of HTN, DM, IgA nephropathy with stage 5 ESRD not on dialysis, jejunal AVMs s/p multiple procedures, chronic anemia with transfusion dependance sent in by outpatient hematologist for hbg <4.     #Chronic transfusion dependant anemia   - Symptomatic at admission with SOB, palpitations, fatigue   - Follows with Dr Ceron at [facility]   - Chronic anemia 2/2 to CKD and intestinal AVMs. Outpatient treatment include oral iron, IV iron, retacrit subq weekly and pRBC transfusion every 3-4 weeks. Per outpatient hematologist, poor compliance with outpatient treatments. However, patient reports compliance in last 4 weeks. Last transfusion and venofer 4 weeks ago per patient.   - Labs at admission showing hbg of 3.9; (MCV 81.2) wbc 12.27, plts 296. Iron studies showing decreased iron 912), TIBC normal, % sat of 4%. ferritin, folate, B12 pending.  CMP showing acute on chronic renal failure with elevated Cr, decreased calcium, normal LFTs.   - At this time, recommend transfusion until hgb > 9 (recommendations per Dr Ceron given patient non compliance).   - Weekly retacrit increased to 20 000 units subq weekly as per Dr. Ceron. This was done starting 10/30. next dose due on 11/6.   - Would not recommend Venofer replacement at this time in hospital given each pRBC unit contains 250mg iron.     Patty Alcantar MD   PGY 5 heme onc fellow   covering only today

## 2022-11-05 NOTE — PROGRESS NOTE ADULT - PROBLEM SELECTOR PLAN 1
- Receiving 1upRBC today, s/p 2u pRBC in ED yesterday   - On outpt Procrit 20,000U qweekly (last on 10/30), daily sq Octreotide (pt unsure of dose), and PO Fe  - C/w PO Fe supplementation for now  - Transfuse again for goal hgb >9, T&S  - If becomes hemodynamically unstable or w/ s/s of acute active bleed such as melena, hematemesis -> consider CTA abdomen (risk/benefit given CKD) and consult IR vs call GI for urgent consult  - Clear liquid diet   - GI routine consult emailed, f/u recs

## 2022-11-06 ENCOUNTER — TRANSCRIPTION ENCOUNTER (OUTPATIENT)
Age: 52
End: 2022-11-06

## 2022-11-06 VITALS
SYSTOLIC BLOOD PRESSURE: 154 MMHG | HEART RATE: 92 BPM | RESPIRATION RATE: 18 BRPM | TEMPERATURE: 100 F | OXYGEN SATURATION: 96 % | DIASTOLIC BLOOD PRESSURE: 90 MMHG

## 2022-11-06 LAB
ANION GAP SERPL CALC-SCNC: 11 MMOL/L — SIGNIFICANT CHANGE UP (ref 5–17)
APTT BLD: 30.7 SEC — SIGNIFICANT CHANGE UP (ref 27.5–35.5)
BILIRUB DIRECT SERPL-MCNC: 0.1 MG/DL — SIGNIFICANT CHANGE UP (ref 0–0.3)
BILIRUB DIRECT SERPL-MCNC: 0.2 MG/DL — SIGNIFICANT CHANGE UP (ref 0–0.3)
BILIRUB INDIRECT FLD-MCNC: 0.4 MG/DL — SIGNIFICANT CHANGE UP (ref 0.2–1)
BILIRUB SERPL-MCNC: 0.6 MG/DL — SIGNIFICANT CHANGE UP (ref 0.2–1.2)
BILIRUB SERPL-MCNC: 0.6 MG/DL — SIGNIFICANT CHANGE UP (ref 0.2–1.2)
BUN SERPL-MCNC: 44 MG/DL — HIGH (ref 7–23)
CALCIUM SERPL-MCNC: 8 MG/DL — LOW (ref 8.4–10.5)
CHLORIDE SERPL-SCNC: 110 MMOL/L — HIGH (ref 96–108)
CO2 SERPL-SCNC: 18 MMOL/L — LOW (ref 22–31)
CREAT SERPL-MCNC: 4.3 MG/DL — HIGH (ref 0.5–1.3)
CREAT SERPL-MCNC: 4.31 MG/DL — HIGH (ref 0.5–1.3)
EGFR: 12 ML/MIN/1.73M2 — LOW
EGFR: 12 ML/MIN/1.73M2 — LOW
GLUCOSE BLDC GLUCOMTR-MCNC: 137 MG/DL — HIGH (ref 70–99)
GLUCOSE BLDC GLUCOMTR-MCNC: 199 MG/DL — HIGH (ref 70–99)
GLUCOSE BLDC GLUCOMTR-MCNC: 200 MG/DL — HIGH (ref 70–99)
GLUCOSE SERPL-MCNC: 175 MG/DL — HIGH (ref 70–99)
HAPTOGLOB SERPL-MCNC: 233 MG/DL — HIGH (ref 34–200)
HCT VFR BLD CALC: 27 % — LOW (ref 34.5–45)
HCT VFR BLD CALC: 31.7 % — LOW (ref 34.5–45)
HGB BLD-MCNC: 8.3 G/DL — LOW (ref 11.5–15.5)
HGB BLD-MCNC: 9.5 G/DL — LOW (ref 11.5–15.5)
INR BLD: 1.21 RATIO — HIGH (ref 0.88–1.16)
INR BLD: 1.22 RATIO — HIGH (ref 0.88–1.16)
LDH SERPL L TO P-CCNC: 212 U/L — SIGNIFICANT CHANGE UP (ref 50–242)
MCHC RBC-ENTMCNC: 25 PG — LOW (ref 27–34)
MCHC RBC-ENTMCNC: 25.1 PG — LOW (ref 27–34)
MCHC RBC-ENTMCNC: 30 GM/DL — LOW (ref 32–36)
MCHC RBC-ENTMCNC: 30.7 GM/DL — LOW (ref 32–36)
MCV RBC AUTO: 81.3 FL — SIGNIFICANT CHANGE UP (ref 80–100)
MCV RBC AUTO: 83.6 FL — SIGNIFICANT CHANGE UP (ref 80–100)
MELD SCORE WITH DIALYSIS: 22 POINTS — SIGNIFICANT CHANGE UP
MELD SCORE WITHOUT DIALYSIS: 22 POINTS — SIGNIFICANT CHANGE UP
NRBC # BLD: 0 /100 WBCS — SIGNIFICANT CHANGE UP (ref 0–0)
NRBC # BLD: 0 /100 WBCS — SIGNIFICANT CHANGE UP (ref 0–0)
NT-PROBNP SERPL-SCNC: HIGH PG/ML (ref 0–300)
PLATELET # BLD AUTO: 262 K/UL — SIGNIFICANT CHANGE UP (ref 150–400)
PLATELET # BLD AUTO: 277 K/UL — SIGNIFICANT CHANGE UP (ref 150–400)
POTASSIUM SERPL-MCNC: 5 MMOL/L — SIGNIFICANT CHANGE UP (ref 3.5–5.3)
POTASSIUM SERPL-SCNC: 5 MMOL/L — SIGNIFICANT CHANGE UP (ref 3.5–5.3)
PROTHROM AB SERPL-ACNC: 14 SEC — HIGH (ref 10.5–13.4)
PROTHROM AB SERPL-ACNC: 14.1 SEC — HIGH (ref 10.5–13.4)
RAPID RVP RESULT: SIGNIFICANT CHANGE UP
RBC # BLD: 3.32 M/UL — LOW (ref 3.8–5.2)
RBC # BLD: 3.79 M/UL — LOW (ref 3.8–5.2)
RBC # BLD: 3.81 M/UL — SIGNIFICANT CHANGE UP (ref 3.8–5.2)
RBC # FLD: 18.4 % — HIGH (ref 10.3–14.5)
RBC # FLD: 18.9 % — HIGH (ref 10.3–14.5)
RETICS #: 93 K/UL — SIGNIFICANT CHANGE UP (ref 25–125)
RETICS/RBC NFR: 2.4 % — SIGNIFICANT CHANGE UP (ref 0.5–2.5)
SARS-COV-2 RNA SPEC QL NAA+PROBE: SIGNIFICANT CHANGE UP
SODIUM SERPL-SCNC: 138 MMOL/L — SIGNIFICANT CHANGE UP (ref 135–145)
SODIUM SERPL-SCNC: 139 MMOL/L — SIGNIFICANT CHANGE UP (ref 135–145)
WBC # BLD: 16.72 K/UL — HIGH (ref 3.8–10.5)
WBC # BLD: 16.94 K/UL — HIGH (ref 3.8–10.5)
WBC # FLD AUTO: 16.72 K/UL — HIGH (ref 3.8–10.5)
WBC # FLD AUTO: 16.94 K/UL — HIGH (ref 3.8–10.5)

## 2022-11-06 PROCEDURE — 36430 TRANSFUSION BLD/BLD COMPNT: CPT

## 2022-11-06 PROCEDURE — 0225U NFCT DS DNA&RNA 21 SARSCOV2: CPT

## 2022-11-06 PROCEDURE — 86900 BLOOD TYPING SEROLOGIC ABO: CPT

## 2022-11-06 PROCEDURE — 81001 URINALYSIS AUTO W/SCOPE: CPT

## 2022-11-06 PROCEDURE — 85045 AUTOMATED RETICULOCYTE COUNT: CPT

## 2022-11-06 PROCEDURE — 82607 VITAMIN B-12: CPT

## 2022-11-06 PROCEDURE — 99221 1ST HOSP IP/OBS SF/LOW 40: CPT

## 2022-11-06 PROCEDURE — 83036 HEMOGLOBIN GLYCOSYLATED A1C: CPT

## 2022-11-06 PROCEDURE — 71045 X-RAY EXAM CHEST 1 VIEW: CPT | Mod: 26

## 2022-11-06 PROCEDURE — 82746 ASSAY OF FOLIC ACID SERUM: CPT

## 2022-11-06 PROCEDURE — 80048 BASIC METABOLIC PNL TOTAL CA: CPT

## 2022-11-06 PROCEDURE — 83880 ASSAY OF NATRIURETIC PEPTIDE: CPT

## 2022-11-06 PROCEDURE — 85610 PROTHROMBIN TIME: CPT

## 2022-11-06 PROCEDURE — 85730 THROMBOPLASTIN TIME PARTIAL: CPT

## 2022-11-06 PROCEDURE — 82550 ASSAY OF CK (CPK): CPT

## 2022-11-06 PROCEDURE — P9040: CPT

## 2022-11-06 PROCEDURE — 85027 COMPLETE CBC AUTOMATED: CPT

## 2022-11-06 PROCEDURE — 86850 RBC ANTIBODY SCREEN: CPT

## 2022-11-06 PROCEDURE — 82247 BILIRUBIN TOTAL: CPT

## 2022-11-06 PROCEDURE — 86901 BLOOD TYPING SEROLOGIC RH(D): CPT

## 2022-11-06 PROCEDURE — 93306 TTE W/DOPPLER COMPLETE: CPT

## 2022-11-06 PROCEDURE — 82553 CREATINE MB FRACTION: CPT

## 2022-11-06 PROCEDURE — 82728 ASSAY OF FERRITIN: CPT

## 2022-11-06 PROCEDURE — 82962 GLUCOSE BLOOD TEST: CPT

## 2022-11-06 PROCEDURE — 82248 BILIRUBIN DIRECT: CPT

## 2022-11-06 PROCEDURE — 83550 IRON BINDING TEST: CPT

## 2022-11-06 PROCEDURE — 83540 ASSAY OF IRON: CPT

## 2022-11-06 PROCEDURE — 36415 COLL VENOUS BLD VENIPUNCTURE: CPT

## 2022-11-06 PROCEDURE — 83615 LACTATE (LD) (LDH) ENZYME: CPT

## 2022-11-06 PROCEDURE — 86923 COMPATIBILITY TEST ELECTRIC: CPT

## 2022-11-06 PROCEDURE — 81025 URINE PREGNANCY TEST: CPT

## 2022-11-06 PROCEDURE — 85025 COMPLETE CBC W/AUTO DIFF WBC: CPT

## 2022-11-06 PROCEDURE — 80053 COMPREHEN METABOLIC PANEL: CPT

## 2022-11-06 PROCEDURE — 99238 HOSP IP/OBS DSCHRG MGMT 30/<: CPT

## 2022-11-06 PROCEDURE — 87040 BLOOD CULTURE FOR BACTERIA: CPT

## 2022-11-06 PROCEDURE — 99285 EMERGENCY DEPT VISIT HI MDM: CPT

## 2022-11-06 PROCEDURE — 71045 X-RAY EXAM CHEST 1 VIEW: CPT

## 2022-11-06 PROCEDURE — 83735 ASSAY OF MAGNESIUM: CPT

## 2022-11-06 PROCEDURE — 87086 URINE CULTURE/COLONY COUNT: CPT

## 2022-11-06 PROCEDURE — 99232 SBSQ HOSP IP/OBS MODERATE 35: CPT

## 2022-11-06 PROCEDURE — 87637 SARSCOV2&INF A&B&RSV AMP PRB: CPT

## 2022-11-06 PROCEDURE — 84484 ASSAY OF TROPONIN QUANT: CPT

## 2022-11-06 PROCEDURE — 83010 ASSAY OF HAPTOGLOBIN QUANT: CPT

## 2022-11-06 PROCEDURE — 84100 ASSAY OF PHOSPHORUS: CPT

## 2022-11-06 RX ORDER — SODIUM CHLORIDE 9 MG/ML
500 INJECTION, SOLUTION INTRAVENOUS
Refills: 0 | Status: DISCONTINUED | OUTPATIENT
Start: 2022-11-06 | End: 2022-11-06

## 2022-11-06 RX ORDER — SODIUM CHLORIDE 9 MG/ML
1000 INJECTION, SOLUTION INTRAVENOUS
Refills: 0 | Status: DISCONTINUED | OUTPATIENT
Start: 2022-11-06 | End: 2022-11-06

## 2022-11-06 RX ORDER — PANTOPRAZOLE SODIUM 20 MG/1
40 TABLET, DELAYED RELEASE ORAL
Refills: 0 | Status: DISCONTINUED | OUTPATIENT
Start: 2022-11-06 | End: 2022-11-06

## 2022-11-06 RX ORDER — PANTOPRAZOLE SODIUM 20 MG/1
1 TABLET, DELAYED RELEASE ORAL
Qty: 60 | Refills: 1
Start: 2022-11-06 | End: 2023-01-04

## 2022-11-06 RX ADMIN — Medication 1: at 01:00

## 2022-11-06 RX ADMIN — Medication 650 MILLIGRAM(S): at 11:39

## 2022-11-06 RX ADMIN — Medication 1: at 12:36

## 2022-11-06 RX ADMIN — BUMETANIDE 1 MILLIGRAM(S): 0.25 INJECTION INTRAMUSCULAR; INTRAVENOUS at 12:36

## 2022-11-06 RX ADMIN — Medication 650 MILLIGRAM(S): at 12:42

## 2022-11-06 RX ADMIN — Medication 325 MILLIGRAM(S): at 11:39

## 2022-11-06 RX ADMIN — Medication 1000 MILLIGRAM(S): at 00:44

## 2022-11-06 NOTE — CHART NOTE - NSCHARTNOTEFT_GEN_A_CORE
Brief Update Note    Contacted by primary regarding patient with known chronic anemia, transfusion dependent and following heme outpatient on retacrit, iron, and octreotide and prior history of AVMs s/p intervention about 6 weeks prior with OSH and plans for possible renal transplant in setting of IgA nephropathy.    Per primary, no melena, hematochezia, hematemesis, coffee ground emesis. HDS and asymptomatic aside from sob and fatigue.  Anemia levels likely multifactorial given medical history.    Emergent endoscopic evaluation is not indicated at this time and patient is not medically optimized.     -Keep NPO while monitoring and ensure adequate hydration  -PPI 40 IV BID, cannot rule out PUD, though less likely  -Ensure adequate hydration   -Continue to trend Hgb and transfuse to maintain Hgb > 7  -Ensure has 3 AM CBC, CMP, coags  -Will re-assess in AM to determine utility/timing of potential endoscopic evaluation   -Maintain active T&S and 2 large bore IVs  -Should patient become hemodynamically unstable with profuse overt bleeding, consult MICU, obtain stat CTA, inform GI and consult IR  -Evaluate for potential consumptive process in meantime  -Heme on board appreciate recs  -Echo  -Recs conveyed to primary  -Rest of care per primary  -Formal note to follow in AM    Thank you for involving us in this patient's care.    Abigail Middleton MD  Gastroenterology/Hepatology Fellow, PGY-VI    NON-URGENT CONSULTS:  Please email [email or web address]  AT NIGHT AND ON WEEKENDS:  Contact on-call GI fellow via answering service ([phone]) from 5pm-8am and on weekends/holidays  MONDAY-FRIDAY 8AM-5PM:  Pager# [phone] ([facility])  GI Phone# [phone] ([facility])
MEDICINE PA    Notified by RN patient with temperature of 102.3F.  Seen and examined patient at bedside.  Patient is alert, NAD, asymptomatic.  Denies HA, CP, SOB, cough, N/V, or abd pain.      VITAL SIGNS:  T(C): 37.1 (11-06-22 @ 05:30), Max: 39.1 (11-05-22 @ 23:23)  HR: 93 (11-06-22 @ 05:30) (87 - 98)  BP: 146/88 (11-06-22 @ 05:30) (143/75 - 159/91)  RR: 16 (11-06-22 @ 05:30) (16 - 20)  SpO2: 97% (11-06-22 @ 05:30) (96% - 100%)  Wt(kg): --      LABORATORY:                          8.1    16.53 )-----------( 271      ( 05 Nov 2022 18:47 )             28.4       11-05    140  |  111<H>  |  50<H>  ----------------------------<  135<H>  5.0   |  18<L>  |  4.39<H>    Ca    7.8<L>      05 Nov 2022 10:11  Phos  5.4     11-05  Mg     2.1     11-05    TPro  7.1  /  Alb  3.5  /  TBili  0.4  /  DBili  x   /  AST  12  /  ALT  7<L>  /  AlkPhos  109  11-05          MICROBIOLOGY:   - BC x2, UA/UC ordered.    Urinalysis (11.05.22 @ 20:58)    pH Urine: 6.5    Glucose Qualitative, Urine: Negative    Blood, Urine: Trace    Color: Light Yellow    Urine Appearance: Clear    Bilirubin: Negative    Ketone - Urine: Negative    Specific Gravity: 1.013    Protein, Urine: 300 mg/dL    Urobilinogen: Negative    Nitrite: Negative    Leukocyte Esterase Concentration: Negative        RADIOLOGY:  < from: Xray Chest 1 View- PORTABLE-Urgent (Xray Chest 1 View- PORTABLE-Urgent .) (11.04.22 @ 19:29) >    IMPRESSION:    Clear lungs.        PHYSICAL EXAM:  Constitutional: AOx3. NAD.  Respiratory: clear lungs bilaterally. No wheezing, rhonchi, or crackles.  Cardiovascular: S1 S2. No murmurs.  Gastrointestinal: BS X4 active. soft. nontender.  Extremities/Vascular: +2 pulses bilaterally. No BLE edema.      ASSESSMENT/PLAN:   HPI:  Pt is a 51yo F w/ PMH of HTN, DM (diet controlled), IgA nephropathy, chronic anemia 2/2 CKD and jejunal AVMs requiring multiple endoscopies w/ intervention (last ~6 weeks prior to admission here) pw abnormal blood work (hgb 3.9) from outpt hematology office.    Now presents with new onset fever.    1. Fever  - IV Tylenol and cooling measures prn for pyrexia.  - BC x2, UC, RVP ordered.  - CXR (see above).  - Continue to monitor off abx  - Continue to trend fever curve, monitor patient's clinical status and vital signs.  - Will discuss with primary team in AM.      HIREN AckermanC  Department of Medicine  Spectra [phone]

## 2022-11-06 NOTE — PROGRESS NOTE ADULT - SUBJECTIVE AND OBJECTIVE BOX
INTERVAL HPI/OVERNIGHT EVENTS:  Patient S&E at bedside. Low grade fever overnight with no associated symptoms    VITAL SIGNS:  T(F): 100.3 (22 @ 13:10)  HR: 96 (22 @ 13:10)  BP: 143/85 (22 @ 13:10)  RR: 18 (22 @ 13:10)  SpO2: 96% (22 @ 13:10)  Wt(kg): --    PHYSICAL EXAM:    Constitutional: NAD  Eyes: EOMI, sclera non-icteric  Neck: supple, no masses, no JVD  Respiratory: CTA b/l, good air entry b/l  Cardiovascular: RRR, no M/R/G  Gastrointestinal: soft, NTND, no masses palpable, + BS, no hepatosplenomegaly  Extremities: no c/c/e  Neurological: AAOx3      MEDICATIONS  (STANDING):  buMETAnide 1 milliGRAM(s) Oral daily  dextrose 5%. 1000 milliLiter(s) (50 mL/Hr) IV Continuous <Continuous>  dextrose 5%. 1000 milliLiter(s) (100 mL/Hr) IV Continuous <Continuous>  dextrose 50% Injectable 25 Gram(s) IV Push once  dextrose 50% Injectable 12.5 Gram(s) IV Push once  dextrose 50% Injectable 25 Gram(s) IV Push once  ferrous    sulfate 325 milliGRAM(s) Oral daily  glucagon  Injectable 1 milliGRAM(s) IntraMuscular once  insulin lispro (ADMELOG) corrective regimen sliding scale   SubCutaneous every 6 hours  pantoprazole  Injectable 40 milliGRAM(s) IV Push two times a day    MEDICATIONS  (PRN):  acetaminophen     Tablet .. 650 milliGRAM(s) Oral every 6 hours PRN Temp greater or equal to 38C (100.4F), Mild Pain (1 - 3)  dextrose Oral Gel 15 Gram(s) Oral once PRN Blood Glucose LESS THAN 70 milliGRAM(s)/deciliter      Allergies    ACE inhibitors (Short breath)    Intolerances        LABS:                        8.3    16.72 )-----------( 262      ( 2022 09:45 )             27.0         138  |  110<H>  |  44<H>  ----------------------------<  175<H>  5.0   |  18<L>  |  4.30<H>    Ca    8.0<L>      2022 09:45  Phos  5.4     11-  Mg     2.1     -    TPro  x   /  Alb  x   /  TBili  0.6  /  DBili  x   /  AST  x   /  ALT  x   /  AlkPhos  x   11-    PT/INR - ( 2022 09:45 )   PT: 14.0 sec;   INR: 1.21 ratio         PTT - ( 2022 09:45 )  PTT:30.7 sec  Urinalysis Basic - ( 2022 20:58 )    Color: Light Yellow / Appearance: Clear / S.013 / pH: x  Gluc: x / Ketone: Negative  / Bili: Negative / Urobili: Negative   Blood: x / Protein: 300 mg/dL / Nitrite: Negative   Leuk Esterase: Negative / RBC: 3 /hpf / WBC 4 /HPF   Sq Epi: x / Non Sq Epi: 1 /hpf / Bacteria: Negative        RADIOLOGY & ADDITIONAL TESTS:  Studies reviewed.   INTERVAL HPI/OVERNIGHT EVENTS:  Patient S&E at bedside. Low grade fever overnight with no associated symptoms    VITAL SIGNS:  T(F): 100.3 (22 @ 13:10)  HR: 96 (22 @ 13:10)  BP: 143/85 (22 @ 13:10)  RR: 18 (22 @ 13:10)  SpO2: 96% (22 @ 13:10)  Wt(kg): --    PHYSICAL EXAM:    Constitutional: NAD  Eyes: EOMI, sclera non-icteric  Neck: supple, no masses, no JVD  Respiratory: CTA b/l, good air entry b/l  Cardiovascular: RRR, no M/R/G  Gastrointestinal: soft, NTND, no masses palpable, + BS, no hepatosplenomegaly  Extremities: no c/c/e  Neurological: AAOx3      MEDICATIONS  (STANDING):  buMETAnide 1 milliGRAM(s) Oral daily  dextrose 5%. 1000 milliLiter(s) (50 mL/Hr) IV Continuous <Continuous>  dextrose 5%. 1000 milliLiter(s) (100 mL/Hr) IV Continuous <Continuous>  dextrose 50% Injectable 25 Gram(s) IV Push once  dextrose 50% Injectable 12.5 Gram(s) IV Push once  dextrose 50% Injectable 25 Gram(s) IV Push once  ferrous    sulfate 325 milliGRAM(s) Oral daily  glucagon  Injectable 1 milliGRAM(s) IntraMuscular once  insulin lispro (ADMELOG) corrective regimen sliding scale   SubCutaneous every 6 hours  pantoprazole  Injectable 40 milliGRAM(s) IV Push two times a day    MEDICATIONS  (PRN):  acetaminophen     Tablet .. 650 milliGRAM(s) Oral every 6 hours PRN Temp greater or equal to 38C (100.4F), Mild Pain (1 - 3)  dextrose Oral Gel 15 Gram(s) Oral once PRN Blood Glucose LESS THAN 70 milliGRAM(s)/deciliter      Allergies    ACE inhibitors (Short breath)    Intolerances        LABS:                        8.3    16.72 )-----------( 262      ( 2022 09:45 )             27.0         138  |  110<H>  |  44<H>  ----------------------------<  175<H>  5.0   |  18<L>  |  4.30<H>    Ca    8.0<L>      2022 09:45  Phos  5.4     -  Mg     2.1         TPro  x   /  Alb  x   /  TBili  0.6  /  DBili  x   /  AST  x   /  ALT  x   /  AlkPhos  x       Vitamin B12, Serum: 529 pg/mL (..22 @ 22:08)  Folate, Serum: 8.4 ng/mL (. @ 22:08)    Ferritin, Serum: 19 ng/mL (..22 @ 22:08)      PT/INR - ( 2022 09:45 )   PT: 14.0 sec;   INR: 1.21 ratio         PTT - ( 2022 09:45 )  PTT:30.7 sec  Urinalysis Basic - ( 2022 20:58 )    Color: Light Yellow / Appearance: Clear / S.013 / pH: x  Gluc: x / Ketone: Negative  / Bili: Negative / Urobili: Negative   Blood: x / Protein: 300 mg/dL / Nitrite: Negative   Leuk Esterase: Negative / RBC: 3 /hpf / WBC 4 /HPF   Sq Epi: x / Non Sq Epi: 1 /hpf / Bacteria: Negative        RADIOLOGY & ADDITIONAL TESTS:  Studies reviewed.

## 2022-11-06 NOTE — DISCHARGE NOTE PROVIDER - ATTENDING DISCHARGE PHYSICAL EXAMINATION:
CONSTITUTIONAL: NAD, well-developed, well-groomed  EYES: PERRLA; conjunctiva and sclera clear  ENMT: Moist oral mucosa, no pharyngeal injection or exudates; normal dentition  NECK: Supple, no palpable masses; no thyromegaly  RESPIRATORY: Normal respiratory effort; lungs are clear to auscultation bilaterally  CARDIOVASCULAR: Regular rate and rhythm, normal S1 and S2, no murmur/rub/gallop; No lower extremity edema; Peripheral pulses are 2+ bilaterally  ABDOMEN: Nontender to palpation, normoactive bowel sounds, no rebound/guarding; No hepatosplenomegaly  MUSCULOSKELETAL:   no clubbing or cyanosis of digits; no joint swelling or tenderness to palpation  PSYCH: A+O to person, place, and time; affect appropriate  NEUROLOGY: CN 2-12 are intact and symmetric; no gross sensory deficits   SKIN: No rashes; no palpable lesions

## 2022-11-06 NOTE — DISCHARGE NOTE PROVIDER - HOSPITAL COURSE
51yo F w/ PMH of HTN, DM (diet controlled), IgA nephropathy, chronic anemia 2/2 CKD and jejunal AVMs requiring multiple endoscopies w/ intervention (last ~6 weeks prior to admission here) pw abnormal blood work (hgb 3.9) from outpt hematology office, admitted for acute on chronic anemia i/s/o CKD and potential GI source (AVMs) given hx.    Acute on chronic blood loss anemia.     - s/p PRBC  On outpt Procrit 20,000U qweekly (last on 10/30), daily sq Octreotide (pt unsure of dose), and PO Fe  - C/w PO Fe supplementation for now  Seen by GI, diet advanced. cleared for DC.      Acute renal failure superimposed on stage 5 chronic kidney disease, not on chronic dialysis.   Likely i/s/o anemia vs worsening renal disease  monitor labs.   - c/w Bumex 1mg QD.  IgA nephropathy determined by renal biopsy.   ·  Plan: - Reports currently undergoing transplant eval at [facility]   - Outpt renal f/u on Monday 11/07/22  - c/w Bumex   - Rest of as above.  UGIB (upper gastrointestinal bleed).   ·  Plan: - c/f AVMs given prior history  - Pt follows w/ GI at [facility] w/ previous push endoscopies and intervention on jejunal AVMs  - Transfuse as above  - Protonix qd. Given jejunal hx and no reported hematemesis, would not benefit from gtt  - GI emailed, No acute intervention. Can resume diet.   Benign essential hypertension.   Was previously on Losartan, but discontinued 2/2 hyperK  - Monitor hemodynamics closely.  Patient is cleared for discharge home.        51yo F w/ PMH of HTN, DM (diet controlled), IgA nephropathy, chronic anemia 2/2 CKD and jejunal AVMs requiring multiple endoscopies w/ intervention (last ~6 weeks prior to admission here) pw abnormal blood work (hgb 3.9) from outpt hematology office, admitted for acute on chronic anemia i/s/o CKD and potential GI source (AVMs) given hx.        Acute on chronic blood loss anemia. Patient received several transfusions of packed RBC, with H/H stable today. Would recommend to f/u with Dr. Ceron to continue with outpt Procrit 20,000U qweekly (last on 10/30), daily sq Octreotide (pt unsure of dose), and PO Fe. Gastroenterology was consulted, recommends non urgent endoscopic evaluation sometime this week, patient follows up closely with own Gastroenterologist.     Acute renal failure superimposed on stage 5 chronic kidney disease, not on chronic dialysis.   Likely i/s/o anemia vs worsening renal disease  monitor labs.   - Continue with Bumex 1mg QD.    Low grade fever  -CXR negative , Urinalysis negative   -F/u on Blood culture, f/u on transfusion hemolysis workup. Less likely given low grade fever did not result at the time of transfusions.     IgA nephropathy determined by renal biopsy. Patient has f/u appointment with kidney transplant tomorrow, f/u with recommendations.   - Reports currently undergoing transplant eval at [facility]   - Outpt renal f/u on Monday 11/07/22      UGIB (upper gastrointestinal bleed) d/t AVMs given prior history  - Pt follows w/ GI at [facility] w/ previous push endoscopies and intervention on jejunal AVMs  - Transfuse as above  - Protonix qd. Given jejunal hx and no reported hematemesis, would not benefit from gtt  - GI emailed, No acute intervention. Can resume diet.     Benign essential hypertension.   Was previously on Losartan, but discontinued 2/2 hyperK  - Monitor hemodynamics closely.    Patient is cleared for discharge home. PLEASE F/U WITH PCP REGARDING INFECTIOUS WORKUP. SINCE PATIENT HAS HIGH PRIORITY KIDNEY TRANSPLANT APPOINTMENT TOMORROW, WILL DISCHARGE WITH CLOSE FOLLOW UP WITH GASTROENTEROLOGIST, HEME/ONC, KIDNEY TRANSPLANT DOCTOR AND REGULAR PCP. CONTINUE WITH NEW MEDICATION: PANTOPRAZOLE 40MG BID.     IF YOU EXPERIENCE DIZZINESS, SYNCOPE, FEVER, GI BLEED, PLEASE RETURN TO THE HOSPITAL ASAP.

## 2022-11-06 NOTE — CONSULT NOTE ADULT - SUBJECTIVE AND OBJECTIVE BOX
Chief Complaint:  Patient is a 52y old  Female who presents with a chief complaint of Anemia (06 Nov 2022 10:23)      HPI:ROBERTO YOUNG is a 52y Female    PMHX/PSHX:  DM (diabetes mellitus)    Anemia    HTN (hypertension)    IgA nephropathy determined by renal biopsy    H/O endometritis    History of endometriosis    DM (diabetes mellitus), type 2    Benign essential hypertension    History of endometriosis    No significant past surgical history    History of endometriosis    H/O left breast biopsy    H/O laparoscopy      Allergies:  ACE inhibitors (Short breath)      Home Medications: reviewed  Hospital Medications:  acetaminophen     Tablet .. 650 milliGRAM(s) Oral every 6 hours PRN  buMETAnide 1 milliGRAM(s) Oral daily  dextrose 5%. 1000 milliLiter(s) IV Continuous <Continuous>  dextrose 5%. 1000 milliLiter(s) IV Continuous <Continuous>  dextrose 50% Injectable 25 Gram(s) IV Push once  dextrose 50% Injectable 12.5 Gram(s) IV Push once  dextrose 50% Injectable 25 Gram(s) IV Push once  dextrose Oral Gel 15 Gram(s) Oral once PRN  ferrous    sulfate 325 milliGRAM(s) Oral daily  glucagon  Injectable 1 milliGRAM(s) IntraMuscular once  insulin lispro (ADMELOG) corrective regimen sliding scale   SubCutaneous every 6 hours  pantoprazole  Injectable 40 milliGRAM(s) IV Push two times a day      Social History:   Tob: Denies  EtOH: Denies  Illicit Drugs: Denies    Family history:  No pertinent family history in first degree relatives    FH: diabetes mellitus    FH: colon cancer (Sibling)      Denies family history of colon cancer/polyps, stomach cancer/polyps, pancreatic cancer/masses, liver cancer/disease, ovarian cancer and endometrial cancer.    ROS:   General:  No  fevers, chills, night sweats, fatigue  Eyes:  Good vision, no reported pain  ENT:  No sore throat, pain, runny nose  CV:  No pain, palpitations  Pulm:  No dyspnea, cough  GI:  See HPI, otherwise negative  :  No  incontinence, nocturia  Muscle:  No pain, weakness  Neuro:  No memory problems  Psych:  No insomnia, mood problems, depression  Endocrine:  No polyuria, polydipsia, cold/heat intolerance  Heme:  No petechiae, ecchymosis, easy bruisability  Skin:  No rash    PHYSICAL EXAM:   Vital Signs:  Vital Signs Last 24 Hrs  T(C): 38.2 (06 Nov 2022 11:38), Max: 39.1 (05 Nov 2022 23:23)  T(F): 100.8 (06 Nov 2022 11:38), Max: 102.3 (05 Nov 2022 23:23)  HR: 103 (06 Nov 2022 11:38) (87 - 103)  BP: 168/99 (06 Nov 2022 11:38) (135/81 - 168/99)  BP(mean): --  RR: 18 (06 Nov 2022 11:38) (16 - 20)  SpO2: 96% (06 Nov 2022 11:38) (96% - 100%)    Parameters below as of 06 Nov 2022 11:38  Patient On (Oxygen Delivery Method): room air      Daily     Daily     GENERAL: no acute distress  NEURO: alert  HEENT: anicteric sclera, no conjunctival pallor appreciated  CHEST: no respiratory distress, no accessory muscle use  CARDIAC: regular rate, rhythm  ABDOMEN: soft, non-tender, non-distended, no rebound or guarding  EXTREMITIES: warm, well perfused, no edema  SKIN: no lesions noted    LABS: reviewed                        8.3    16.72 )-----------( 262      ( 06 Nov 2022 09:45 )             27.0     11-06    138  |  110<H>  |  44<H>  ----------------------------<  175<H>  5.0   |  18<L>  |  4.30<H>    Ca    8.0<L>      06 Nov 2022 09:45  Phos  5.4     11-05  Mg     2.1     11-05    TPro  x   /  Alb  x   /  TBili  0.6  /  DBili  x   /  AST  x   /  ALT  x   /  AlkPhos  x   11-06    LIVER FUNCTIONS - ( 05 Nov 2022 10:11 )  Alb: 3.5 g/dL / Pro: 7.1 g/dL / ALK PHOS: 109 U/L / ALT: 7 U/L / AST: 12 U/L / GGT: x               Diagnostic Studies: see sunrise for full report         Chief Complaint:  Patient is a 52y old  Female who presents with a chief complaint of Anemia (06 Nov 2022 10:23)      HPI:ROBERTO YOUNG is a 52y Female with HTN, DM (diet controlled), IgA nephropathy undergoing renal transplant evaluation, severe chronic anemia 2/2 CKD and angioectasias s/p multiple interventions (most recently jejunal angioectasias s/p ablation 6 weeks PTA, per patient at [place]), requiring weekly Retacrit (with increasing requirements), octreotide, PO/IV iron, and q3-4 weeks blood transfusion - non compliant with heme treatment who presents now to the ED after being sent from her outpatient Heme for Hgb 3.9 w/o overt bleeding. Now with hgb 8 s/p 4 pRBCs. GI consulted for concerns for bleeding angioectasias.     Patient denying n/v/d constipation, no bloody red/black BMs; says even with prior endoscopies never had any of the aforementioned and she really only came in because she was having sob and fatigue. Note, also reported palpitations. She has been HDS and until today, when she spiked temperature of of 102.3 in setting of leukocytosis. Total iron and % iron sat are low. She is adamant about being discharged today for her renal transplant appt tomorrow at [place] and wants to f/u with her GI there.         PMHX/PSHX:  DM (diabetes mellitus)    Anemia    HTN (hypertension)    IgA nephropathy determined by renal biopsy    H/O endometritis    History of endometriosis    DM (diabetes mellitus), type 2    Benign essential hypertension    History of endometriosis    No significant past surgical history    History of endometriosis    H/O left breast biopsy    H/O laparoscopy      Allergies:  ACE inhibitors (Short breath)      Home Medications: reviewed  Hospital Medications:  acetaminophen     Tablet .. 650 milliGRAM(s) Oral every 6 hours PRN  buMETAnide 1 milliGRAM(s) Oral daily  dextrose 5%. 1000 milliLiter(s) IV Continuous <Continuous>  dextrose 5%. 1000 milliLiter(s) IV Continuous <Continuous>  dextrose 50% Injectable 25 Gram(s) IV Push once  dextrose 50% Injectable 12.5 Gram(s) IV Push once  dextrose 50% Injectable 25 Gram(s) IV Push once  dextrose Oral Gel 15 Gram(s) Oral once PRN  ferrous    sulfate 325 milliGRAM(s) Oral daily  glucagon  Injectable 1 milliGRAM(s) IntraMuscular once  insulin lispro (ADMELOG) corrective regimen sliding scale   SubCutaneous every 6 hours  pantoprazole  Injectable 40 milliGRAM(s) IV Push two times a day      Social History:   Tob: Denies  EtOH: Denies  Illicit Drugs: Denies    Family history:  No pertinent family history in first degree relatives    FH: diabetes mellitus    FH: colon cancer (Sibling)      Denies family history of colon cancer/polyps, stomach cancer/polyps, pancreatic cancer/masses, liver cancer/disease, ovarian cancer and endometrial cancer.    ROS:   Complete and normal except as mentioned above.    PHYSICAL EXAM:   Vital Signs:  Vital Signs Last 24 Hrs  T(C): 38.2 (06 Nov 2022 11:38), Max: 39.1 (05 Nov 2022 23:23)  T(F): 100.8 (06 Nov 2022 11:38), Max: 102.3 (05 Nov 2022 23:23)  HR: 103 (06 Nov 2022 11:38) (87 - 103)  BP: 168/99 (06 Nov 2022 11:38) (135/81 - 168/99)  BP(mean): --  RR: 18 (06 Nov 2022 11:38) (16 - 20)  SpO2: 96% (06 Nov 2022 11:38) (96% - 100%)    Parameters below as of 06 Nov 2022 11:38  Patient On (Oxygen Delivery Method): room air      Daily     Daily     GENERAL: no acute distress  NEURO: alert  HEENT: anicteric sclera, no conjunctival pallor appreciated  CHEST: no respiratory distress, no accessory muscle use  CARDIAC: regular rate, rhythm  ABDOMEN: soft, non-tender, non-distended, no rebound or guarding  DONTE: brown stool  EXTREMITIES: warm, well perfused, no edema  SKIN: no lesions noted    LABS: reviewed                        8.3    16.72 )-----------( 262      ( 06 Nov 2022 09:45 )             27.0     11-06    138  |  110<H>  |  44<H>  ----------------------------<  175<H>  5.0   |  18<L>  |  4.30<H>    Ca    8.0<L>      06 Nov 2022 09:45  Phos  5.4     11-05  Mg     2.1     11-05    TPro  x   /  Alb  x   /  TBili  0.6  /  DBili  x   /  AST  x   /  ALT  x   /  AlkPhos  x   11-06    LIVER FUNCTIONS - ( 05 Nov 2022 10:11 )  Alb: 3.5 g/dL / Pro: 7.1 g/dL / ALK PHOS: 109 U/L / ALT: 7 U/L / AST: 12 U/L / GGT: x               Diagnostic Studies: see sunrise for full report         Chief Complaint:  Patient is a 52y old  Female who presents with a chief complaint of Anemia (06 Nov 2022 10:23)      HPI:ROBERTO YOUNG is a 52y Female with HTN, DM (diet controlled), IgA nephropathy undergoing renal transplant evaluation, severe chronic anemia 2/2 CKD and angioectasias s/p multiple interventions (most recently jejunal angioectasias s/p ablation 6 weeks PTA, per patient at [place]), requiring weekly Retacrit (with increasing requirements), octreotide, PO/IV iron, and q3-4 weeks blood transfusion - non compliant with heme treatment who presents now to the ED after being sent from her outpatient Heme for Hgb 3.9 w/o overt bleeding. Now with hgb 8 s/p 4 pRBCs. GI consulted for concerns for bleeding angioectasias.     Patient denying n/v/d constipation, no bloody red/black BMs; says even with prior endoscopies never had any of the aforementioned and she really only came in because she was having sob and fatigue. Note, also reported palpitations. Denies heavy use of nsaid, alcohol use, smoking or illicit drug use. She has been HDS and until today, when she spiked temperature of of 102.3 in setting of leukocytosis. Total iron and % iron sat are low. She is adamant about being discharged today for her renal transplant appt tomorrow at [place] and wants to f/u with her GI there.     Seen By Dr. Hernandez group 2/2022 for anemia in 3's w/o overt bleeding; transfusion dependent, no EGD/colon at that time.  EGD/colon 2/2020 by Dr Jackson for [facility] revealed gastritis w/o H pylori or intestinal metaplasia; sigmoid diverticulosis and internal hemorrhoids. Good prep.        PMHX/PSHX:  DM (diabetes mellitus)    Anemia    HTN (hypertension)    IgA nephropathy determined by renal biopsy    H/O endometritis    History of endometriosis    DM (diabetes mellitus), type 2    Benign essential hypertension    History of endometriosis    No significant past surgical history    History of endometriosis    H/O left breast biopsy    H/O laparoscopy      Allergies:  ACE inhibitors (Short breath)      Home Medications: reviewed  Hospital Medications:  acetaminophen     Tablet .. 650 milliGRAM(s) Oral every 6 hours PRN  buMETAnide 1 milliGRAM(s) Oral daily  dextrose 5%. 1000 milliLiter(s) IV Continuous <Continuous>  dextrose 5%. 1000 milliLiter(s) IV Continuous <Continuous>  dextrose 50% Injectable 25 Gram(s) IV Push once  dextrose 50% Injectable 12.5 Gram(s) IV Push once  dextrose 50% Injectable 25 Gram(s) IV Push once  dextrose Oral Gel 15 Gram(s) Oral once PRN  ferrous    sulfate 325 milliGRAM(s) Oral daily  glucagon  Injectable 1 milliGRAM(s) IntraMuscular once  insulin lispro (ADMELOG) corrective regimen sliding scale   SubCutaneous every 6 hours  pantoprazole  Injectable 40 milliGRAM(s) IV Push two times a day      Social History:   Tob: Denies  EtOH: Denies  Illicit Drugs: Denies    Family history:  No pertinent family history in first degree relatives    FH: diabetes mellitus    FH: colon cancer (Sibling)      Denies family history of colon cancer/polyps, stomach cancer/polyps, pancreatic cancer/masses, liver cancer/disease, ovarian cancer and endometrial cancer.    ROS:   Complete and normal except as mentioned above.    PHYSICAL EXAM:   Vital Signs:  Vital Signs Last 24 Hrs  T(C): 38.2 (06 Nov 2022 11:38), Max: 39.1 (05 Nov 2022 23:23)  T(F): 100.8 (06 Nov 2022 11:38), Max: 102.3 (05 Nov 2022 23:23)  HR: 103 (06 Nov 2022 11:38) (87 - 103)  BP: 168/99 (06 Nov 2022 11:38) (135/81 - 168/99)  BP(mean): --  RR: 18 (06 Nov 2022 11:38) (16 - 20)  SpO2: 96% (06 Nov 2022 11:38) (96% - 100%)    Parameters below as of 06 Nov 2022 11:38  Patient On (Oxygen Delivery Method): room air      Daily     Daily     GENERAL: no acute distress  NEURO: alert  HEENT: anicteric sclera, no conjunctival pallor appreciated  CHEST: no respiratory distress, no accessory muscle use  CARDIAC: regular rate, rhythm  ABDOMEN: soft, non-tender, non-distended, no rebound or guarding  DONTE: brown stool  EXTREMITIES: warm, well perfused, no edema  SKIN: no lesions noted    LABS: reviewed                        8.3    16.72 )-----------( 262      ( 06 Nov 2022 09:45 )             27.0     11-06    138  |  110<H>  |  44<H>  ----------------------------<  175<H>  5.0   |  18<L>  |  4.30<H>    Ca    8.0<L>      06 Nov 2022 09:45  Phos  5.4     11-05  Mg     2.1     11-05    TPro  x   /  Alb  x   /  TBili  0.6  /  DBili  x   /  AST  x   /  ALT  x   /  AlkPhos  x   11-06    LIVER FUNCTIONS - ( 05 Nov 2022 10:11 )  Alb: 3.5 g/dL / Pro: 7.1 g/dL / ALK PHOS: 109 U/L / ALT: 7 U/L / AST: 12 U/L / GGT: x               Diagnostic Studies: see sunrise for full report

## 2022-11-06 NOTE — DISCHARGE NOTE NURSING/CASE MANAGEMENT/SOCIAL WORK - NSDCPEFALRISK_GEN_ALL_CORE
For information on Fall & Injury Prevention, visit: [email or web address] OR  [email or web address] OR  https://www.cdc.gov/steadi/patient.html
no

## 2022-11-06 NOTE — DISCHARGE NOTE PROVIDER - NSDCFUADDINST_GEN_ALL_CORE_FT
- Weekly retacrit increased to 20 000 units subq weekly as per Dr. Ceron. This was done starting 10/30. next dose due on 11/6.     Please follow up as directed.

## 2022-11-06 NOTE — CONSULT NOTE ADULT - ATTENDING COMMENTS
Patient seen and examined. Agree with above. 51 yo undergoing renal transplant evaluation at [place], hx of anemia s/p APC of jejunal AVMs at [place] noted with severe anemia. Patient offered endoscopic evaluation but prefers to go home to pursue evaluation by her primary team at [place].

## 2022-11-06 NOTE — DISCHARGE NOTE PROVIDER - CARE PROVIDERS DIRECT ADDRESSES
[email or web address]

## 2022-11-06 NOTE — PROGRESS NOTE ADULT - ASSESSMENT
53 yo female with known medical history of HTN, DM, IgA nephropathy with stage 5 ESRD not on dialysis, jejunal AVMs s/p multiple procedures, chronic anemia with transfusion dependence sent in by outpatient hematologist for hbg <4. She has known chronic anemia 2/2 to CKD and intestinal AVMs. Outpatient treatment include oral iron, IV iron, retacrit subq weekly and pRBC transfusion every 3-4 weeks. Per outpatient hematologist, poor compliance with outpatient treatments. However, patient reports compliance in last 4 weeks. Last transfusion and venofer 4 weeks ago per patient.     - Labs at admission showing hgb of 3.9; (MCV 81.2) wbc 12.27, plts 296. Iron studies showing decreased iron 912), TIBC normal, % sat of 4%. ferritin, folate, B12 all normal. CMP showing acute on chronic renal failure with elevated Cr, decreased calcium, normal LFTs.   - Retacrit was increased to 20,000 units subcut weekly starting 10/30. Next dose due 11/6.  - Would not recommend Venofer replacement at this time in hospital given each pRBC unit contains 250mg iron.   - As per her primary hematologist Dr. Ceron goal hgb is 9.0  - After five units her hgb is 8.3. Minimal increase from 8.1 to 8.3 with last unit, possibly due to some destruction of cells.  - Fever overnight and blood cultures were sent and are pending.  - Plan is to transfuse at least one more unit of blood and then if she remains afebrile discharge to home  - She will follow up with her outpatient GI doctor  - She will follow up with Dr. Ceron at [facility] as scheduled and as needed.
53yo F w/ PMH of HTN, DM (diet controlled), IgA nephropathy, chronic anemia 2/2 CKD and jejunal AVMs requiring multiple endoscopies w/ intervention (last ~6 weeks prior to admission here) pw abnormal blood work (hgb 3.9) from outpt hematology office, admitted for acute on chronic anemia i/s/o CKD and potential GI source (AVMs) given hx.

## 2022-11-06 NOTE — CONSULT NOTE ADULT - ASSESSMENT
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 Now with hgb 8 s/p 4 pRBCs. GI consulted for concerns for bleeding angioectasias.    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, per patient at [facility]) - DONTE with brown stool, HDS  # Dyspnea c/b palpitations, possibly 2/2 anemia  # Fever    Recommendations:  - Reasonable to pursue non urgent endoscopic evaluation sometime this week if patient remains inhouse and afebrile  - Infectious work up  - Echo  - PPI 40 IV BID, though less likely PUD  - Daily CBC, CMP, coags  - Transfuse if Hgb < 9, per heme recs  - Additional transfusion requirements as per heme  - If discharged, patient should follow up with her established GI  - Diet as tolerated  - Avoid NSAIDs  - Rest of care per primary    Preliminary note until signed by Attending.    Thank you for involving us in this patient's care.     Abigail Middleton MD  Gastroenterology/Hepatology Fellow, PGY-VI    NON-URGENT CONSULTS:  Please email [email or web address]  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 Now with hgb 8 s/p 4 pRBCs. GI consulted for concerns for bleeding angioectasias.    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 David mckeon 2/2022 for anemia in 3's w/o overt bleeding; transfusion dependent, no EGD/colon at that time.  - EGD/colon 2/2020 by Dr Jackson for [facility] revealed gastritis w/o H pylori or intestinal metaplasia; sigmoid diverticulosis and internal hemorrhoids. Good prep.    Recommendations:  - Reasonable to pursue non urgent endoscopic evaluation sometime this week if patient remains inhouse and afebrile  - Infectious work up  - Echo  - PPI 40 IV BID, though less likely PUD --> ppi 40 po bid until she sees her GI, if discharged today  - Daily CBC, CMP, coags  - Transfuse if Hgb < 9, per heme recs  - Additional transfusion requirements as per heme  - If discharged, patient should follow up with her established GI  - Diet as tolerated  - Avoid NSAIDs  - Rest of care per primary    Preliminary note until signed by Attending.    Thank you for involving us in this patient's care.     Abigail Middleton MD  Gastroenterology/Hepatology Fellow, PGY-VI    NON-URGENT CONSULTS:  Please email [email or web address]  [email or web address]  AT NIGHT AND ON WEEKENDS:  Contact on-call GI fellow via answering service ([phone]) from 5pm-8am and on weekends/holidays  MONDAY-FRIDAY 8AM-5PM:  Pager# [phone] ([facility])  GI Phone# [phone] ([facility])

## 2022-11-06 NOTE — DISCHARGE NOTE PROVIDER - NSDCMRMEDTOKEN_GEN_ALL_CORE_FT
Bumex 1 mg oral tablet: 1 tab(s) orally once a day  ferrous sulfate 325 mg (65 mg elemental iron) oral delayed release tablet: 1 tab(s) orally once a day  octreotide 2500 mcg/mL subcutaneous solution:   pantoprazole 40 mg oral delayed release tablet: 1 tab(s) orally 2 times a day   Procrit 20,000 units/mL injectable solution:

## 2022-11-06 NOTE — DISCHARGE NOTE NURSING/CASE MANAGEMENT/SOCIAL WORK - PATIENT PORTAL LINK FT
You can access the FollowMyHealth Patient Portal offered by [facility] by registering at the following website: [email or web address]. By joining [employer]’s FollowMyHealth portal, you will also be able to view your health information using other applications (apps) compatible with our system.

## 2022-11-06 NOTE — DISCHARGE NOTE PROVIDER - CARE PROVIDER_API CALL
Nando Ceron)  Hematology; Internal Medicine; Medical Oncology  [address] [place] [address]  Phone: [phone]  Fax: [phone]  Follow Up Time:

## 2022-11-06 NOTE — DISCHARGE NOTE PROVIDER - NSDCCPCAREPLAN_GEN_ALL_CORE_FT
PRINCIPAL DISCHARGE DIAGNOSIS  Diagnosis: Anemia of chronic disease  Assessment and Plan of Treatment: Patient is post blood transfusion. Cleared for discharge home. Please follow up as directed. Please call your Dr. if you develop fever 100.4 . or if you show signs of bleeding, dark tarry stool, severe persistent nausea vomiting diarrhea chest pain shortness of breath headache, weakness.

## 2022-11-07 LAB
CULTURE RESULTS: SIGNIFICANT CHANGE UP
SPECIMEN SOURCE: SIGNIFICANT CHANGE UP

## 2022-11-08 RX ORDER — SYRINGE, DISPOSABLE, 1 ML
27G X 1/2" SYRINGE, EMPTY DISPOSABLE MISCELLANEOUS
Qty: 15 | Refills: 2 | Status: ACTIVE | COMMUNITY
Start: 2022-08-08 | End: 1900-01-01

## 2022-12-01 ENCOUNTER — OUTPATIENT (OUTPATIENT)
Dept: OUTPATIENT SERVICES | Facility: HOSPITAL | Age: 52
LOS: 1 days | End: 2022-12-01
Payer: MEDICAID

## 2022-12-01 ENCOUNTER — OUTPATIENT (OUTPATIENT)
Dept: OUTPATIENT SERVICES | Facility: HOSPITAL | Age: 52
LOS: 1 days | Discharge: HOME | End: 2022-12-01

## 2022-12-01 DIAGNOSIS — D64.9 ANEMIA, UNSPECIFIED: ICD-10-CM

## 2022-12-01 DIAGNOSIS — D50.9 IRON DEFICIENCY ANEMIA, UNSPECIFIED: ICD-10-CM

## 2022-12-01 DIAGNOSIS — Z98.890 OTHER SPECIFIED POSTPROCEDURAL STATES: Chronic | ICD-10-CM

## 2022-12-01 PROBLEM — I10 ESSENTIAL (PRIMARY) HYPERTENSION: Chronic | Status: ACTIVE | Noted: 2022-11-04

## 2022-12-01 PROBLEM — Z87.42 PERSONAL HISTORY OF OTHER DISEASES OF THE FEMALE GENITAL TRACT: Chronic | Status: ACTIVE | Noted: 2022-11-04

## 2022-12-01 PROBLEM — E11.9 TYPE 2 DIABETES MELLITUS WITHOUT COMPLICATIONS: Chronic | Status: ACTIVE | Noted: 2022-11-04

## 2022-12-02 ENCOUNTER — RESULT REVIEW (OUTPATIENT)
Age: 52
End: 2022-12-02

## 2022-12-02 ENCOUNTER — NON-APPOINTMENT (OUTPATIENT)
Age: 52
End: 2022-12-02

## 2022-12-02 ENCOUNTER — APPOINTMENT (OUTPATIENT)
Dept: HEMATOLOGY ONCOLOGY | Facility: CLINIC | Age: 52
End: 2022-12-02

## 2022-12-02 LAB
BASOPHILS # BLD AUTO: 0.02 K/UL — SIGNIFICANT CHANGE UP (ref 0–0.2)
BASOPHILS NFR BLD AUTO: 0.1 % — SIGNIFICANT CHANGE UP (ref 0–2)
EOSINOPHIL # BLD AUTO: 0.22 K/UL — SIGNIFICANT CHANGE UP (ref 0–0.5)
EOSINOPHIL NFR BLD AUTO: 1.5 % — SIGNIFICANT CHANGE UP (ref 0–6)
HCT VFR BLD CALC: 15.4 % — CRITICAL LOW (ref 34.5–45)
HGB BLD-MCNC: 4.1 G/DL — CRITICAL LOW (ref 11.5–15.5)
IMM GRANULOCYTES NFR BLD AUTO: 0.6 % — SIGNIFICANT CHANGE UP (ref 0–0.9)
LYMPHOCYTES # BLD AUTO: 1.14 K/UL — SIGNIFICANT CHANGE UP (ref 1–3.3)
LYMPHOCYTES # BLD AUTO: 8 % — LOW (ref 13–44)
MCHC RBC-ENTMCNC: 22.5 PG — LOW (ref 27–34)
MCHC RBC-ENTMCNC: 26.6 G/DL — LOW (ref 32–36)
MCV RBC AUTO: 84.6 FL — SIGNIFICANT CHANGE UP (ref 80–100)
MONOCYTES # BLD AUTO: 0.81 K/UL — SIGNIFICANT CHANGE UP (ref 0–0.9)
MONOCYTES NFR BLD AUTO: 5.7 % — SIGNIFICANT CHANGE UP (ref 2–14)
NEUTROPHILS # BLD AUTO: 11.93 K/UL — HIGH (ref 1.8–7.4)
NEUTROPHILS NFR BLD AUTO: 84.1 % — HIGH (ref 43–77)
NRBC # BLD: 0 /100 WBCS — SIGNIFICANT CHANGE UP (ref 0–0)
PLATELET # BLD AUTO: 268 K/UL — SIGNIFICANT CHANGE UP (ref 150–400)
RBC # BLD: 1.82 M/UL — LOW (ref 3.8–5.2)
RBC # FLD: 21 % — HIGH (ref 10.3–14.5)
WBC # BLD: 14.2 K/UL — HIGH (ref 3.8–10.5)
WBC # FLD AUTO: 14.2 K/UL — HIGH (ref 3.8–10.5)

## 2022-12-05 ENCOUNTER — INPATIENT (INPATIENT)
Facility: HOSPITAL | Age: 52
LOS: 1 days | Discharge: HOME | DRG: 684 | End: 2022-12-07
Attending: STUDENT IN AN ORGANIZED HEALTH CARE EDUCATION/TRAINING PROGRAM | Admitting: STUDENT IN AN ORGANIZED HEALTH CARE EDUCATION/TRAINING PROGRAM
Payer: MEDICAID

## 2022-12-05 VITALS
HEART RATE: 105 BPM | RESPIRATION RATE: 24 BRPM | OXYGEN SATURATION: 100 % | SYSTOLIC BLOOD PRESSURE: 149 MMHG | DIASTOLIC BLOOD PRESSURE: 85 MMHG | TEMPERATURE: 100 F | HEIGHT: 64 IN | WEIGHT: 203.93 LBS

## 2022-12-05 DIAGNOSIS — R09.89 OTHER SPECIFIED SYMPTOMS AND SIGNS INVOLVING THE CIRCULATORY AND RESPIRATORY SYSTEMS: ICD-10-CM

## 2022-12-05 DIAGNOSIS — Z98.890 OTHER SPECIFIED POSTPROCEDURAL STATES: Chronic | ICD-10-CM

## 2022-12-05 DIAGNOSIS — D64.9 ANEMIA, UNSPECIFIED: ICD-10-CM

## 2022-12-05 LAB
ALBUMIN SERPL ELPH-MCNC: 3.3 G/DL — SIGNIFICANT CHANGE UP (ref 3.3–5)
ALP SERPL-CCNC: 114 U/L — SIGNIFICANT CHANGE UP (ref 40–120)
ALT FLD-CCNC: 11 U/L — SIGNIFICANT CHANGE UP (ref 10–45)
ANION GAP SERPL CALC-SCNC: 15 MMOL/L — SIGNIFICANT CHANGE UP (ref 5–17)
ANISOCYTOSIS BLD QL: SLIGHT — SIGNIFICANT CHANGE UP
APPEARANCE UR: CLEAR — SIGNIFICANT CHANGE UP
APTT BLD: 30.3 SEC — SIGNIFICANT CHANGE UP (ref 27.5–35.5)
AST SERPL-CCNC: 17 U/L — SIGNIFICANT CHANGE UP (ref 10–40)
BACTERIA # UR AUTO: NEGATIVE — SIGNIFICANT CHANGE UP
BASOPHILS # BLD AUTO: 0.13 K/UL — SIGNIFICANT CHANGE UP (ref 0–0.2)
BASOPHILS NFR BLD AUTO: 0.9 % — SIGNIFICANT CHANGE UP (ref 0–2)
BILIRUB SERPL-MCNC: 0.1 MG/DL — LOW (ref 0.2–1.2)
BILIRUB UR-MCNC: NEGATIVE — SIGNIFICANT CHANGE UP
BLD GP AB SCN SERPL QL: NEGATIVE — SIGNIFICANT CHANGE UP
BUN SERPL-MCNC: 63 MG/DL — HIGH (ref 7–23)
CALCIUM SERPL-MCNC: 8.4 MG/DL — SIGNIFICANT CHANGE UP (ref 8.4–10.5)
CHLORIDE SERPL-SCNC: 105 MMOL/L — SIGNIFICANT CHANGE UP (ref 96–108)
CO2 SERPL-SCNC: 18 MMOL/L — LOW (ref 22–31)
COLOR SPEC: YELLOW — SIGNIFICANT CHANGE UP
CREAT SERPL-MCNC: 4.43 MG/DL — HIGH (ref 0.5–1.3)
DACRYOCYTES BLD QL SMEAR: SLIGHT — SIGNIFICANT CHANGE UP
DIFF PNL FLD: NEGATIVE — SIGNIFICANT CHANGE UP
EGFR: 12 ML/MIN/1.73M2 — LOW
EOSINOPHIL # BLD AUTO: 0.12 K/UL — SIGNIFICANT CHANGE UP (ref 0–0.5)
EOSINOPHIL NFR BLD AUTO: 0.8 % — SIGNIFICANT CHANGE UP (ref 0–6)
EPI CELLS # UR: 1 /HPF — SIGNIFICANT CHANGE UP
GLUCOSE SERPL-MCNC: 138 MG/DL — HIGH (ref 70–99)
GLUCOSE UR QL: NEGATIVE — SIGNIFICANT CHANGE UP
HCG SERPL-ACNC: 3.5 MIU/ML — SIGNIFICANT CHANGE UP
HCT VFR BLD CALC: 16 % — CRITICAL LOW (ref 34.5–45)
HGB BLD-MCNC: 4.1 G/DL — CRITICAL LOW (ref 11.5–15.5)
HYALINE CASTS # UR AUTO: 4 /LPF — HIGH (ref 0–2)
HYPOCHROMIA BLD QL: SLIGHT — SIGNIFICANT CHANGE UP
INR BLD: 1.07 RATIO — SIGNIFICANT CHANGE UP (ref 0.88–1.16)
KETONES UR-MCNC: NEGATIVE — SIGNIFICANT CHANGE UP
LEUKOCYTE ESTERASE UR-ACNC: NEGATIVE — SIGNIFICANT CHANGE UP
LYMPHOCYTES # BLD AUTO: 1.28 K/UL — SIGNIFICANT CHANGE UP (ref 1–3.3)
LYMPHOCYTES # BLD AUTO: 8.7 % — LOW (ref 13–44)
MACROCYTES BLD QL: SLIGHT — SIGNIFICANT CHANGE UP
MAGNESIUM SERPL-MCNC: 2.3 MG/DL — SIGNIFICANT CHANGE UP (ref 1.6–2.6)
MANUAL SMEAR VERIFICATION: SIGNIFICANT CHANGE UP
MCHC RBC-ENTMCNC: 22 PG — LOW (ref 27–34)
MCHC RBC-ENTMCNC: 25.6 GM/DL — LOW (ref 32–36)
MCV RBC AUTO: 86 FL — SIGNIFICANT CHANGE UP (ref 80–100)
MICROCYTES BLD QL: SLIGHT — SIGNIFICANT CHANGE UP
MONOCYTES # BLD AUTO: 0.51 K/UL — SIGNIFICANT CHANGE UP (ref 0–0.9)
MONOCYTES NFR BLD AUTO: 3.5 % — SIGNIFICANT CHANGE UP (ref 2–14)
NEUTROPHILS # BLD AUTO: 12.62 K/UL — HIGH (ref 1.8–7.4)
NEUTROPHILS NFR BLD AUTO: 85.2 % — HIGH (ref 43–77)
NEUTS BAND # BLD: 0.9 % — SIGNIFICANT CHANGE UP (ref 0–8)
NITRITE UR-MCNC: NEGATIVE — SIGNIFICANT CHANGE UP
NRBC # BLD: 1 /100 — HIGH (ref 0–0)
OVALOCYTES BLD QL SMEAR: SLIGHT — SIGNIFICANT CHANGE UP
PH UR: 6 — SIGNIFICANT CHANGE UP (ref 5–8)
PHOSPHATE SERPL-MCNC: 4 MG/DL — SIGNIFICANT CHANGE UP (ref 2.5–4.5)
PLAT MORPH BLD: NORMAL — SIGNIFICANT CHANGE UP
PLATELET # BLD AUTO: 294 K/UL — SIGNIFICANT CHANGE UP (ref 150–400)
POIKILOCYTOSIS BLD QL AUTO: SLIGHT — SIGNIFICANT CHANGE UP
POLYCHROMASIA BLD QL SMEAR: SLIGHT — SIGNIFICANT CHANGE UP
POTASSIUM SERPL-MCNC: 4.3 MMOL/L — SIGNIFICANT CHANGE UP (ref 3.5–5.3)
POTASSIUM SERPL-SCNC: 4.3 MMOL/L — SIGNIFICANT CHANGE UP (ref 3.5–5.3)
PROT SERPL-MCNC: 7.4 G/DL — SIGNIFICANT CHANGE UP (ref 6–8.3)
PROT UR-MCNC: ABNORMAL
PROTHROM AB SERPL-ACNC: 12.4 SEC — SIGNIFICANT CHANGE UP (ref 10.5–13.4)
RBC # BLD: 1.86 M/UL — LOW (ref 3.8–5.2)
RBC # BLD: 1.86 M/UL — LOW (ref 3.8–5.2)
RBC # FLD: 21 % — HIGH (ref 10.3–14.5)
RBC BLD AUTO: ABNORMAL
RBC CASTS # UR COMP ASSIST: 3 /HPF — SIGNIFICANT CHANGE UP (ref 0–4)
RETICS #: 167.5 K/UL — HIGH (ref 25–125)
RETICS/RBC NFR: 8.9 % — HIGH (ref 0.5–2.5)
RH IG SCN BLD-IMP: POSITIVE — SIGNIFICANT CHANGE UP
SODIUM SERPL-SCNC: 138 MMOL/L — SIGNIFICANT CHANGE UP (ref 135–145)
SP GR SPEC: 1.01 — SIGNIFICANT CHANGE UP (ref 1.01–1.02)
UROBILINOGEN FLD QL: NEGATIVE — SIGNIFICANT CHANGE UP
WBC # BLD: 14.66 K/UL — HIGH (ref 3.8–10.5)
WBC # FLD AUTO: 14.66 K/UL — HIGH (ref 3.8–10.5)
WBC UR QL: 4 /HPF — SIGNIFICANT CHANGE UP (ref 0–5)

## 2022-12-05 PROCEDURE — 71046 X-RAY EXAM CHEST 2 VIEWS: CPT | Mod: 26

## 2022-12-05 PROCEDURE — 99291 CRITICAL CARE FIRST HOUR: CPT

## 2022-12-05 PROCEDURE — 99223 1ST HOSP IP/OBS HIGH 75: CPT

## 2022-12-05 PROCEDURE — 99292 CRITICAL CARE ADDL 30 MIN: CPT

## 2022-12-05 PROCEDURE — 93010 ELECTROCARDIOGRAM REPORT: CPT

## 2022-12-05 RX ORDER — BUMETANIDE 0.25 MG/ML
1 INJECTION INTRAMUSCULAR; INTRAVENOUS
Qty: 0 | Refills: 0 | DISCHARGE

## 2022-12-05 RX ORDER — OCTREOTIDE ACETATE 200 UG/ML
0 INJECTION, SOLUTION INTRAVENOUS; SUBCUTANEOUS
Qty: 0 | Refills: 0 | DISCHARGE

## 2022-12-05 RX ORDER — ERYTHROPOIETIN 10000 [IU]/ML
0 INJECTION, SOLUTION INTRAVENOUS; SUBCUTANEOUS
Qty: 0 | Refills: 0 | DISCHARGE

## 2022-12-05 NOTE — H&P ADULT - NSHPLABSRESULTS_GEN_ALL_CORE
I have reviewed the labs, imaging and ekg. CXR w/o focal consolidations, EKG with NSR HR 98, Qtc 490 non-specific ST segment findings

## 2022-12-05 NOTE — ED ADULT TRIAGE NOTE - CHIEF COMPLAINT QUOTE
Blood draw done Friday showed  hgb 4 hx: AVM of small bowel and stage 5 renal disease  no active bleeding c/o dizziness

## 2022-12-05 NOTE — H&P ADULT - PROBLEM SELECTOR PLAN 1
Multifactorial given CKD w/ missed procrit injections as well as extensive hx of prior GIB 2/2 AVMs. Has not been taking subq octreotide since November.  - On outpt Procrit 20,000U qweekly (has missed last 3 doses)  - C/w PO Fe supplementation for now  - Transfuse again for goal hgb > 8-9, T&S  - Clear liquid diet   - GI routine consult emailed, f/u recs  - F/u haptoglobin and LDH. Can consider hem/onc consult as well  - Needs better follow up overall

## 2022-12-05 NOTE — H&P ADULT - HISTORY OF PRESENT ILLNESS
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 Came to ER for this reason. Denies any constipation or diarrhea. States stool is dark due to iron. Still formed but slightly different consistency recently. Also endorses travelling frequently for work and often missing medical appointments as well as treatment. Missed last nephrology appointment at [place] scheduled for November due to fatigue, now has one scheduled for December 20 something. Also missed last 3 procrit shots due to travel. Denies any chest pain, SOB or palpitations.    In ER: Ordered for 2uprbc

## 2022-12-05 NOTE — ED PROVIDER NOTE - NS ED ROS FT
Gen: Denies fever, weight loss; +generalized malaise, +lightheadedness  HEENT: Denies vision changes, ear pain, epistaxis, sore throat  CV: Denies chest pain, palpitations  Skin: Denies rash, erythema, color changes  Resp: Denies SOB, cough  Endo: Denies sensitivity to heat, cold, increased urination  GI: Denies abdominal pain, constipation, nausea, vomiting, diarrhea, blood in stool, melena  Msk: Denies back pain, LE swelling, extremity pain  : Denies dysuria, increased frequency  Neuro: Denies LOC, weakness, numbness, tingling  Psych: Denies hx of psych, hallucinations  ROS statement: all other ROS negative except as per HPI

## 2022-12-05 NOTE — ED PROVIDER NOTE - OBJECTIVE STATEMENT
52 F w/ Hx of HTN, DM (diet controlled), IgA nephropathy, chronic anemia 2/2 CKD and jejunal AVMs requiring multiple endoscopies w/ intervention, presents to ED c/o outpt labs showing Hgb 4.1. Pt also endorses lightheadedness and generalized malaise. Pt states usually she gets multiple blood transfusions and possibly iron infusions. Pt follows w/ Dr. Ceron from hematology. Denies f/c, CP, SOB, abd pain, n/v/d, melena, BRBPR, urinary sx, weakness/numbness, hematuria, VB, hemoptysis, syncope, or any other symptoms at this time.

## 2022-12-05 NOTE — ED PROVIDER NOTE - RAPID ASSESSMENT
52 F w/ Hx of HTN, DM (diet controlled), IgA nephropathy, chronic anemia 2/2 CKD and jejunal AVMs requiring multiple endoscopies w/ intervention, who p/w abnormal lab results with anemia, Hb of 4.1. Associated with lightheadedness, generalized malaise. No CP. Denies any site of bleeding including any rectal bleeding.     *** I, Mt Fletcher MD, performed an initial face to face bedside interview with this patient regarding history of present illness and determined that the patient should be evaluated in the main ED. A physical exam was not performed due to private space availability. This patient's evaluation is NOT COMPLETE and only preliminary. The full assessment, management, and reassessment of this patient, including but not limited to the follow up of ordered laboratory and radiologic testing, is deferred to the main ED provider. ***

## 2022-12-05 NOTE — H&P ADULT - ASSESSMENT
52F w/ PMHx of HTN, DM (diet controlled), IgA nephropathy, chronic anemia 2/2 CKD and jejunal AVMs requiring multiple endoscopies w/ intervention p/w fatigue associated with anemia on outpatient labs

## 2022-12-05 NOTE — H&P ADULT - NSHPPHYSICALEXAM_GEN_ALL_CORE
Vital Signs Last 24 Hrs  T(C): 37.8 (12-05-22 @ 13:58), Max: 37.8 (12-05-22 @ 13:58)  T(F): 100 (12-05-22 @ 13:58), Max: 100 (12-05-22 @ 13:58)  HR: 105 (12-05-22 @ 13:58) (105 - 105)  BP: 149/85 (12-05-22 @ 13:58) (149/85 - 149/85)  BP(mean): --  RR: 24 (12-05-22 @ 13:58) (24 - 24)  SpO2: 100% (12-05-22 @ 13:58) (100% - 100%)
No

## 2022-12-05 NOTE — H&P ADULT - PROBLEM SELECTOR PLAN 4
CKD stable compared to November, however seems to be worsening compared to earlier in year.  -Renal dose medications  -Holding losartan for now  -Unclear when bumex increased to 1mg BID, will cont. current dose for now  -Possibly component of EVELYN from anemia

## 2022-12-05 NOTE — H&P ADULT - PROBLEM SELECTOR PLAN 3
Reportedly following at [facility] for transplant. Pt denies getting any active management on my discussion. Missed previously scheduled November appointment due to fatigue. Has another appointment scheduled December 20 something  -Cont. Bumex dose for now  -Needs to f/u with renal

## 2022-12-05 NOTE — H&P ADULT - PROBLEM SELECTOR PLAN 2
Hx of AVMs in past s/p multiple push endoscopies and capsule studies. Reportedly Jejunal AVMs. Concern for recurrent bleeds.  -Will start PPI IV BID for now  -Trend cbc s/p transfusion  -GI consult e-mailed  -Clear liquid diet for now

## 2022-12-05 NOTE — ED CLERICAL - NS ED CLERK NOTE PRE-ARRIVAL INFORMATION; ADDITIONAL PRE-ARRIVAL INFORMATION
CC/Reason For referral: hx severe anemia due to renal failure. HGB 4.1  Preferred Consultant(if applicable): na  Who admits for you (if needed): na  Do you have documents you would like to fax over? no  Would you still like to speak to an ED attending? yes

## 2022-12-05 NOTE — ED PROVIDER NOTE - ATTENDING CONTRIBUTION TO CARE
------------ATTENDING NOTE------------  pt w/ mother c/o several days of increasing generalized malaise/fatigue, limited exertion ability and near syncope at times, similar to past anemia, no noted melena/hematochezia or any uterine bleeding, today w/ severe anemia, no recent fevers, awaiting transfusion, c/w Heme/Onc, Renal, planned admission for continued w/u, tx, optimize medical mgmt.  - Armen Owens MD   ----------------------------------------------

## 2022-12-05 NOTE — H&P ADULT - NSHPOUTPATIENTPROVIDERS_GEN_ALL_CORE
Dr. Jeremiah Bentley GI/ [facility] [place] [phone] Dr. Jeremiah Bentley GI/ [facility] [place] [phone]  Nando Ceron Hem/onc

## 2022-12-05 NOTE — ED PROVIDER NOTE - PHYSICAL EXAMINATION
PHYSICAL EXAM:  GENERAL: non-toxic appearing; in no respiratory distress  HEENT: Atraumatic, Normocephalic, PERRL, EOMs intact b/l w/out deficits, conjunctival pallor, MMM  NECK: No JVD; FROM  CHEST/LUNG: CTAB no wheezes/rhonchi/rales  HEART: tachycardia, no murmur/gallops/rubs  ABDOMEN: +BS, soft, NT, ND  EXTREMITIES: No LE edema, +2 radial pulses b/l, +2 DP/PT pulses b/l  MUSCULOSKELETAL: FROM of all 4 extremities  NERVOUS SYSTEM:  A&Ox3, No motor deficits or sensory deficits; no focal neurologic deficits  SKIN:  No new rashes

## 2022-12-06 ENCOUNTER — TRANSCRIPTION ENCOUNTER (OUTPATIENT)
Age: 52
End: 2022-12-06

## 2022-12-06 DIAGNOSIS — Q27.30 ARTERIOVENOUS MALFORMATION, SITE UNSPECIFIED: ICD-10-CM

## 2022-12-06 DIAGNOSIS — Z29.9 ENCOUNTER FOR PROPHYLACTIC MEASURES, UNSPECIFIED: ICD-10-CM

## 2022-12-06 DIAGNOSIS — N18.4 CHRONIC KIDNEY DISEASE, STAGE 4 (SEVERE): ICD-10-CM

## 2022-12-06 DIAGNOSIS — N02.8 RECURRENT AND PERSISTENT HEMATURIA WITH OTHER MORPHOLOGIC CHANGES: ICD-10-CM

## 2022-12-06 DIAGNOSIS — D64.9 ANEMIA, UNSPECIFIED: ICD-10-CM

## 2022-12-06 LAB
ALBUMIN SERPL ELPH-MCNC: 3.3 G/DL — SIGNIFICANT CHANGE UP (ref 3.3–5)
ALP SERPL-CCNC: 101 U/L — SIGNIFICANT CHANGE UP (ref 40–120)
ALT FLD-CCNC: 8 U/L — LOW (ref 10–45)
ANION GAP SERPL CALC-SCNC: 11 MMOL/L — SIGNIFICANT CHANGE UP (ref 5–17)
AST SERPL-CCNC: 12 U/L — SIGNIFICANT CHANGE UP (ref 10–40)
BILIRUB SERPL-MCNC: 0.1 MG/DL — LOW (ref 0.2–1.2)
BUN SERPL-MCNC: 64 MG/DL — HIGH (ref 7–23)
CALCIUM SERPL-MCNC: 8.4 MG/DL — SIGNIFICANT CHANGE UP (ref 8.4–10.5)
CHLORIDE SERPL-SCNC: 108 MMOL/L — SIGNIFICANT CHANGE UP (ref 96–108)
CO2 SERPL-SCNC: 22 MMOL/L — SIGNIFICANT CHANGE UP (ref 22–31)
CREAT SERPL-MCNC: 4.8 MG/DL — HIGH (ref 0.5–1.3)
EGFR: 10 ML/MIN/1.73M2 — LOW
GLUCOSE SERPL-MCNC: 110 MG/DL — HIGH (ref 70–99)
HAPTOGLOB SERPL-MCNC: 226 MG/DL — HIGH (ref 34–200)
HCT VFR BLD CALC: 20.4 % — CRITICAL LOW (ref 34.5–45)
HGB BLD-MCNC: 5.6 G/DL — CRITICAL LOW (ref 11.5–15.5)
LDH SERPL L TO P-CCNC: 492 U/L — HIGH (ref 50–242)
MAGNESIUM SERPL-MCNC: 2.4 MG/DL — SIGNIFICANT CHANGE UP (ref 1.6–2.6)
MCHC RBC-ENTMCNC: 24.1 PG — LOW (ref 27–34)
MCHC RBC-ENTMCNC: 27.5 GM/DL — LOW (ref 32–36)
MCV RBC AUTO: 87.9 FL — SIGNIFICANT CHANGE UP (ref 80–100)
NRBC # BLD: 0 /100 WBCS — SIGNIFICANT CHANGE UP (ref 0–0)
PLATELET # BLD AUTO: 231 K/UL — SIGNIFICANT CHANGE UP (ref 150–400)
POTASSIUM SERPL-MCNC: 4.5 MMOL/L — SIGNIFICANT CHANGE UP (ref 3.5–5.3)
POTASSIUM SERPL-SCNC: 4.5 MMOL/L — SIGNIFICANT CHANGE UP (ref 3.5–5.3)
PROT SERPL-MCNC: 6.6 G/DL — SIGNIFICANT CHANGE UP (ref 6–8.3)
RBC # BLD: 2.32 M/UL — LOW (ref 3.8–5.2)
RBC # FLD: 18.2 % — HIGH (ref 10.3–14.5)
SARS-COV-2 RNA SPEC QL NAA+PROBE: SIGNIFICANT CHANGE UP
SODIUM SERPL-SCNC: 141 MMOL/L — SIGNIFICANT CHANGE UP (ref 135–145)
WBC # BLD: 14.92 K/UL — HIGH (ref 3.8–10.5)
WBC # FLD AUTO: 14.92 K/UL — HIGH (ref 3.8–10.5)

## 2022-12-06 PROCEDURE — 99223 1ST HOSP IP/OBS HIGH 75: CPT

## 2022-12-06 PROCEDURE — 99222 1ST HOSP IP/OBS MODERATE 55: CPT | Mod: GC

## 2022-12-06 PROCEDURE — 99239 HOSP IP/OBS DSCHRG MGMT >30: CPT

## 2022-12-06 RX ORDER — FERROUS SULFATE 325(65) MG
325 TABLET ORAL DAILY
Refills: 0 | Status: DISCONTINUED | OUTPATIENT
Start: 2022-12-06 | End: 2022-12-06

## 2022-12-06 RX ORDER — ONDANSETRON 8 MG/1
4 TABLET, FILM COATED ORAL EVERY 8 HOURS
Refills: 0 | Status: DISCONTINUED | OUTPATIENT
Start: 2022-12-06 | End: 2022-12-07

## 2022-12-06 RX ORDER — LANOLIN ALCOHOL/MO/W.PET/CERES
3 CREAM (GRAM) TOPICAL AT BEDTIME
Refills: 0 | Status: DISCONTINUED | OUTPATIENT
Start: 2022-12-06 | End: 2022-12-07

## 2022-12-06 RX ORDER — PANTOPRAZOLE SODIUM 20 MG/1
40 TABLET, DELAYED RELEASE ORAL
Refills: 0 | Status: DISCONTINUED | OUTPATIENT
Start: 2022-12-06 | End: 2022-12-06

## 2022-12-06 RX ORDER — BUMETANIDE 0.25 MG/ML
1 INJECTION INTRAMUSCULAR; INTRAVENOUS
Refills: 0 | Status: DISCONTINUED | OUTPATIENT
Start: 2022-12-06 | End: 2022-12-07

## 2022-12-06 RX ORDER — ACETAMINOPHEN 500 MG
650 TABLET ORAL EVERY 6 HOURS
Refills: 0 | Status: DISCONTINUED | OUTPATIENT
Start: 2022-12-06 | End: 2022-12-07

## 2022-12-06 RX ORDER — IRON SUCROSE 20 MG/ML
200 INJECTION, SOLUTION INTRAVENOUS EVERY 24 HOURS
Refills: 0 | Status: DISCONTINUED | OUTPATIENT
Start: 2022-12-06 | End: 2022-12-07

## 2022-12-06 RX ORDER — PANTOPRAZOLE SODIUM 20 MG/1
1 TABLET, DELAYED RELEASE ORAL
Qty: 60 | Refills: 0
Start: 2022-12-06 | End: 2023-01-04

## 2022-12-06 RX ADMIN — BUMETANIDE 1 MILLIGRAM(S): 0.25 INJECTION INTRAMUSCULAR; INTRAVENOUS at 05:46

## 2022-12-06 RX ADMIN — IRON SUCROSE 110 MILLIGRAM(S): 20 INJECTION, SOLUTION INTRAVENOUS at 13:46

## 2022-12-06 RX ADMIN — BUMETANIDE 1 MILLIGRAM(S): 0.25 INJECTION INTRAMUSCULAR; INTRAVENOUS at 14:47

## 2022-12-06 RX ADMIN — PANTOPRAZOLE SODIUM 40 MILLIGRAM(S): 20 TABLET, DELAYED RELEASE ORAL at 05:46

## 2022-12-06 NOTE — CONSULT NOTE ADULT - PROBLEM SELECTOR RECOMMENDATION 9
multiple presentations this year with Hb 3.9 on 11/4/22, 4.8 on 9/20/22, 4.7 on 7/19/22, and 3.7 on 2/2/22.  current admission with Hb of 4.1, transfused 2 units of pRBCs and 1 unit undergoing currently.     labs in november showing low iron (12), nl TIBC (300), nl ferritin (30).   patient has known AVM and underwent multiple endoscopic interventions, most recently ablation of jejunal angiectasias at [place] 10/22. likely multifactorial as patient also has CKD from IgA nephropathy and on erythropoietin injections weekly outpatient. patient travels frequently for her job and states has missed last 3 procrit injections.     patient is currently hemodynamically stable and without overt signs of bleeding (no melena, hematochezia). per GI note, patient is requesting to follow up with her outpatient GI provider for endoscopy.     Ferrous sulfate 325 mg daily started today (12/6), which is her home dosage.  Iron deficit is calculated to be 811 based on Hb 5.6 today and target Hb of 7, and 1033 if target Hb 8.     Recommend:  - iron labs and ferritin  - post transfusion cbc to guide Venafer injection   - nephrology to guide erythropoietin injection  - endoscopic followup      INCOMPLETE NOTE. WILL DISCUSS DURING ROUNDS multiple presentations this year with Hb 3.9 on 11/4/22, 4.8 on 9/20/22, 4.7 on 7/19/22, and 3.7 on 2/2/22.  current admission with Hb of 4.1, transfused 2 units of pRBCs and 1 unit undergoing currently.     labs in november showing low iron (12), nl TIBC (300), nl ferritin (30).   patient has known AVM and underwent multiple endoscopic interventions, most recently ablation of jejunal angiectasias at [place] 10/22. likely multifactorial as patient also has CKD from IgA nephropathy and on erythropoietin injections weekly outpatient. patient travels frequently for her job and states has missed last 3 procrit injections.     patient is currently hemodynamically stable and without overt signs of bleeding (no melena, hematochezia). per GI note, patient is requesting to follow up with her outpatient GI provider for endoscopy.     Ferrous sulfate 325 mg daily started today (12/6), which is her home dosage. Given that patient has non-dialysis dependent CKD, Hb goal around 11. Iron deficit is calculated to be 1699 based on Hb 5.6 today.   given ferritin <500, can give Venafer infusion. will need post transfusion cbc to guide the dosage of venafer injection    Recommend:  - iron labs and ferritin  - post transfusion cbc to guide Venafer injection   - nephrology to guide erythropoietin injection  - endoscopic followup      INCOMPLETE NOTE. WILL DISCUSS DURING ROUNDS multiple presentations this year with Hb 3.9 on 11/4/22, 4.8 on 9/20/22, 4.7 on 7/19/22, and 3.7 on 2/2/22.  current admission with Hb of 4.1, transfused 2 units of pRBCs and 1 unit undergoing currently.     labs in november showing low iron (12), nl TIBC (300), nl ferritin (30).   patient has known AVM and underwent multiple endoscopic interventions, most recently ablation of jejunal angiectasias at [place] 10/22. likely multifactorial as patient also has CKD from IgA nephropathy and on erythropoietin injections weekly outpatient. patient travels frequently for her job and states has missed last 3 procrit injections. has one delivered home today, but wishes to receive the injection before returning home today if possible.     patient is currently hemodynamically stable and without overt signs of bleeding (no melena, hematochezia). per GI note, patient is requesting to follow up with her outpatient GI provider at [place] for endoscopy.     Ferrous sulfate 325 mg daily started today (12/6), which is her home dosage. Given that patient has non-dialysis dependent CKD, Hb goal higher ~10. Based on Hb 4.1 and 3 units of pRBC transfused, she would still require about 1200mg. Given ferritin <500 and transferring saturation <30%, can give Venafer infusion. Will start 200mg daily x 5 days from today.    Recommend:  - iron labs and ferritin  - Venafer injection 200mg qd x 5 days  - nephrology to guide erythropoietin injection, possible once during admission before d/c  - endoscopic followup      INCOMPLETE NOTE. WILL DISCUSS DURING ROUNDS multiple presentations this year with Hb 3.9 on 11/4/22, 4.8 on 9/20/22, 4.7 on 7/19/22, and 3.7 on 2/2/22.  current admission with Hb of 4.1, transfused 2 units of pRBCs and 1 unit undergoing currently.     labs in november showing low iron (12), nl TIBC (300), low % sat (4), nl ferritin (30).   patient has known AVM and underwent multiple endoscopic interventions, most recently ablation of jejunal angiectasias at [place] 10/22. likely multifactorial as patient also has CKD from IgA nephropathy and on erythropoietin injections weekly outpatient. patient travels frequently for her job and states has missed last 3 procrit injections. has one delivered home today, but wishes to receive the injection before returning home today if possible.     patient is currently hemodynamically stable and without overt signs of bleeding (no melena, hematochezia). per GI note, patient is requesting to follow up with her outpatient GI provider at [place] for endoscopy.     Ferrous sulfate 325 mg daily started today (12/6), which is her home dosage. Given that patient has non-dialysis dependent CKD, Hb goal higher ~10. Based on Hb 4.1 and 3 units of pRBC transfused, she would still require about 1200mg. Given ferritin <500 and transferrin saturation <30%, can give Venafer infusion. Will start 200mg daily x 5 days from today.    Recommend:  - iron labs and ferritin  - Venafer injection 200mg qd x 5 days  - nephrology to guide erythropoietin injection, possible once during admission before d/c  - endoscopic followup      INCOMPLETE NOTE. WILL DISCUSS DURING ROUNDS multiple presentations this year with Hb 3.9 on 11/4/22, 4.8 on 9/20/22, 4.7 on 7/19/22, and 3.7 on 2/2/22.  current admission with Hb of 4.1, transfused 2 units of pRBCs and 1 unit undergoing currently.     labs in november showing low iron (12), nl TIBC (300), low % sat (4), nl ferritin (30).   patient has known AVM and underwent multiple endoscopic interventions, most recently ablation of jejunal angiectasias at [place] 10/22. likely multifactorial as patient also has CKD from IgA nephropathy and on erythropoietin injections weekly outpatient. patient travels frequently for her job and states has missed last 3 procrit injections. has one delivered home today, but wishes to receive the injection before returning home today if possible.     patient is currently hemodynamically stable and without overt signs of bleeding (no melena, hematochezia). per GI note, patient is requesting to follow up with her outpatient GI provider at [place] for endoscopy.     Ferrous sulfate 325 mg daily started today (12/6), which is her home dosage. Given that patient has non-dialysis dependent CKD, Hb goal higher ~10. Based on Hb 4.1 and 3 units of pRBC transfused, she would still require about 1200mg. Given ferritin <500 and transferrin saturation <30%, can give Venafer infusion. Will start 200mg daily x 5 days from today.    Recommend:  - iron labs and ferritin  - Venafer injection 200mg qd x 5 days; d/c po ferrous sulfate.   - nephrology to guide erythropoietin injection, possible once during admission before d/c  - endoscopic followup      INCOMPLETE NOTE. WILL DISCUSS DURING ROUNDS multiple presentations this year with Hb 3.9 on 11/4/22, 4.8 on 9/20/22, 4.7 on 7/19/22, and 3.7 on 2/2/22.  current admission with Hb of 4.1, transfused 2 units of pRBCs and 1 unit undergoing currently.     labs in november showing low iron (12), nl TIBC (300), low % sat (4), nl ferritin (30).   patient has known AVM and underwent multiple endoscopic interventions, most recently ablation of jejunal angiectasias at [place] 10/22. likely multifactorial as patient also has CKD from IgA nephropathy and on erythropoietin injections weekly outpatient. patient travels frequently for her job and states has missed last 3 procrit injections. has one delivered home today    patient is currently hemodynamically stable and without overt signs of bleeding (no melena, hematochezia). per GI note, patient is requesting to follow up with her outpatient GI provider at [place] for endoscopy.     Ferrous sulfate 325 mg daily started today (12/6), which is her home dosage. Given that patient has non-dialysis dependent CKD, Hb goal higher ~10. Based on Hb 4.1 and 3 units of pRBC transfused, she would still require about 1200mg. Given ferritin <500 and transferrin saturation <30%, can give Venafer infusion. Will start 200mg today then patient can receive weekly infusions at [facility] after discharge x 4 times.     Recommend:  - iron labs and ferritin  - Venafer injection 200mg today and weekly injections after discharge x 4.  - continue Procrit weekly injections at home. Patient's iron needs to be repleted for it to have optimum effect.   - endoscopic followup outpatient with her GI provider; has appointment next Monday.

## 2022-12-06 NOTE — ED ADULT NURSE NOTE - OBJECTIVE STATEMENT
52 y.o. F A&Ox4 PMHx of HTN, DM (diet controlled), IgA nephropathy, chronic anemia 2/2 CKD and jejunal AVMs requiring multiple endoscopies w/ intervention presenting to ED via walk-in triage for low hem on outpt labs. Pt states that she gets frequent transfusions, and she had been feeling weak this week. Pt denies any pain. Pt denies fever/chills, H/A, lightheadedness/dizziness, vision changes, SOB, chest pain, abd pain, N/V/D, constipation, dysuria, hematuria, hematochezia, numbness, and tingling. Upon assessment, pt alert, grossly neurologically intact, and well appearing. Pupils PERRLA and no drainage noted. Airway patent, chest rise symmetrical with no advantageous L/S, and pt is eupneic. Skin is warm, dry, and normal for race. No cyanosis noted to lips or fingernails. Mucous membranes moist. Negative clubbed fingernails. Radial pulses equal and +2 bilaterally. Abd soft, nontender, nondistended. ROM intact and strength +5 in all four extremities. No edema noted to bilateral legs or arms. Pt ambulatory to the bathroom, steady gait. Mother at bedside. Pt resting in stretcher in position of comfort. Stretcher locked and lowered and call bell within reach.

## 2022-12-06 NOTE — CONSULT NOTE ADULT - ATTENDING COMMENTS
Patient seen and examined. Agree w above. Known to me w hx of chronic anemia and jejunal AVMs for which she is followed closely at [place] with enteroscopies now again w anemia. Patient reporting she has an appointment w her [place] GI on Monday and prefers to have her GI work up with him. Please reconsult GI as needed.
Patient is requiring packed red cell transfusion today; post transfusion she may be discharged for follow up by Dr Ceron for IV iron sucrose which she will use in conjunction with epoetin alpha

## 2022-12-06 NOTE — DISCHARGE NOTE PROVIDER - HOSPITAL COURSE
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 Her hgb was 4.1 in the ER. She got 4U PRBC and 1 dose of venofer. Feels well, no dizziness/shortness of breath/lightheadedness. Has been walking around. Feels a bit better after getting transfused. Patient was seen by hematology, nephrology, and gastroenterology. Patient has good outpatient follow up scheduled for later this week - heme on Thursday and GI on Saturday. CBC after transfusions was ____. Patient is stable for discharge. 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 Her hgb was 4.1 in the ER. She got 4U PRBC and 1 dose of venofer. Feels well, no dizziness/shortness of breath/lightheadedness. Has been walking around. Feels a bit better after getting transfused. Patient was seen by hematology, nephrology, and gastroenterology. Patient has good outpatient follow up scheduled for later this week - heme on Thursday and GI on Saturday. CBC after transfusions was _8.0___. Patient is stable for discharge.

## 2022-12-06 NOTE — ED ADULT NURSE NOTE - NSIMPLEMENTINTERV_GEN_ALL_ED
Implemented All Universal Safety Interventions:  [facility] to call system. Call bell, personal items and telephone within reach. Instruct patient to call for assistance. Room bathroom lighting operational. Non-slip footwear when patient is off stretcher. Physically safe environment: no spills, clutter or unnecessary equipment. Stretcher in lowest position, wheels locked, appropriate side rails in place.

## 2022-12-06 NOTE — DISCHARGE NOTE PROVIDER - NSDCMRMEDTOKEN_GEN_ALL_CORE_FT
bumetanide 1 mg oral tablet: 1 tab(s) orally 2 times a day  ferrous sulfate 325 mg (65 mg elemental iron) oral delayed release tablet: 1 tab(s) orally once a day  losartan 50 mg oral tablet: 1 tab(s) orally once a day  pantoprazole 40 mg oral delayed release tablet: 1 tab(s) orally 2 times a day   Procrit 20,000 units/mL injectable solution: 1 implant(s) injectable every 7 days Friday   bumetanide 1 mg oral tablet: 1 tab(s) orally 2 times a day  ferrous sulfate 325 mg (65 mg elemental iron) oral delayed release tablet: 1 tab(s) orally once a day  pantoprazole 40 mg oral delayed release tablet: 1 tab(s) orally 2 times a day   Procrit 20,000 units/mL injectable solution: 1 implant(s) injectable every 7 days Friday

## 2022-12-06 NOTE — CONSULT NOTE ADULT - SUBJECTIVE AND OBJECTIVE BOX
NEPHROLOGY - NSN    Patient seen and examined.    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 Came to ER for this reason. Denies any constipation or diarrhea. States stool is dark due to iron. Still formed but slightly different consistency recently. Also endorses travelling frequently for work and often missing medical appointments as well as treatment. Missed last nephrology appointment at [place] scheduled for November due to fatigue, now has one scheduled for  something. Also missed last 3 procrit shots due to travel. Denies any chest pain, SOB or palpitations.    In ER: Ordered for [facility] (05 Dec 2022 23:39)  Renal bx was 3/2020 and she has advanced IGA and no cresents seen       PAST MEDICAL & SURGICAL HISTORY:  Anemia      IgA nephropathy determined by renal biopsy      DM (diabetes mellitus), type 2      Benign essential hypertension      History of endometriosis      H/O left breast biopsy      H/O laparoscopy          MEDICATIONS  (STANDING):  buMETAnide 1 milliGRAM(s) Oral two times a day  ferrous    sulfate 325 milliGRAM(s) Oral daily  pantoprazole  Injectable 40 milliGRAM(s) IV Push two times a day      Allergies    ACE inhibitors (Short breath)    Intolerances        SOCIAL HISTORY:  Denies alcohol abuse, drug abuse or tobacco usage.     FAMILY HISTORY:  FH: diabetes mellitus    FH: colon cancer (Sibling)  dx at age 50        VITALS:  T(C): 36.6 (22 @ 05:00), Max: 37.8 (22 @ 13:58)  HR: 96 (22 @ 05:00) (91 - 105)  BP: 132/82 (22 @ 05:00) (132/82 - 158/88)  RR: 16 (22 @ 05:00) (16 - 24)  SpO2: 100% (22 @ 05:00) (99% - 100%)    REVIEW OF SYSTEMS:  Denies any nausea, vomiting, diarrhea, fever or chills. Denies chest pain, SOB, focal weakness, hematuria or dysuria. Good oral intake and denies fatigue or weakness. All other pertinent systems are reviewed and are negative.    PHYSICAL EXAM:  Constitutional: NAD  HEENT: EOMI  Neck:  No JVD, supple   Respiratory: CTA B/L  Cardiovascular: S1 and S2, RRR  Gastrointestinal: + BS, soft, NT, ND  Extremities: No peripheral edema, + peripheral pulses  Neurological: A/O x 3, CN2-12 intact  Psychiatric: Normal mood, normal affect  : No Hernández  Skin: No rashes, C/D/I  Access: Not applicable    I and O's:    Height (cm): 162.6 ( @ 13:58)  Weight (kg): 92.5 ( @ :58)  BMI (kg/m2): 35 ( @ 13:58)  BSA (m2): 1.97 ( @ 13:58)    LABS:                        5.6    14.92 )-----------( 231      ( 06 Dec 2022 07:33 )             20.4     12    141  |  108  |  64<H>  ----------------------------<  110<H>  4.5   |  22  |  4.80<H>    Ca    8.4      06 Dec 2022 07:33  Phos  4.0       Mg     2.4         TPro  6.6  /  Alb  3.3  /  TBili  0.1<L>  /  DBili  x   /  AST  12  /  ALT  8<L>  /  AlkPhos  101  12      URINE:  Urinalysis Basic - ( 05 Dec 2022 20:35 )    Color: Yellow / Appearance: Clear / S.013 / pH: x  Gluc: x / Ketone: Negative  / Bili: Negative / Urobili: Negative   Blood: x / Protein: 300 mg/dL / Nitrite: Negative   Leuk Esterase: Negative / RBC: 3 /hpf / WBC 4 /HPF   Sq Epi: x / Non Sq Epi: 1 /hpf / Bacteria: Negative        RADIOLOGY & ADDITIONAL STUDIES:     NEPHROLOGY - NSN    Patient seen and examined.    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 Came to ER for this reason. Denies any constipation or diarrhea. States stool is dark due to iron. Still formed but slightly different consistency recently. Also endorses travelling frequently for work and often missing medical appointments as well as treatment. Missed last nephrology appointment at [place] scheduled for November due to fatigue, now has one scheduled for  something. Also missed last 3 procrit shots due to travel. Denies any chest pain, SOB or palpitations.    In ER: Ordered for [facility] (05 Dec 2022 23:39)  Renal bx was 3/2020 and she has advanced IGA and no cresents seen.  Baseline cre about 3.5  As outpt was getting blood xfusion every 3-4 weeks       PAST MEDICAL & SURGICAL HISTORY:  Anemia      IgA nephropathy determined by renal biopsy      DM (diabetes mellitus), type 2      Benign essential hypertension      History of endometriosis      H/O left breast biopsy      H/O laparoscopy          MEDICATIONS  (STANDING):  buMETAnide 1 milliGRAM(s) Oral two times a day  ferrous    sulfate 325 milliGRAM(s) Oral daily  pantoprazole  Injectable 40 milliGRAM(s) IV Push two times a day      Allergies    ACE inhibitors (Short breath)    Intolerances        SOCIAL HISTORY:  Denies alcohol abuse, drug abuse or tobacco usage.     FAMILY HISTORY:  FH: diabetes mellitus    FH: colon cancer (Sibling)  dx at age 50        VITALS:  T(C): 36.6 (22 @ 05:00), Max: 37.8 (22 @ 13:58)  HR: 96 (22 @ 05:00) (91 - 105)  BP: 132/82 (22 @ 05:00) (132/82 - 158/88)  RR: 16 (22 @ 05:00) (16 - 24)  SpO2: 100% (22 @ 05:00) (99% - 100%)    REVIEW OF SYSTEMS:  Denies any nausea, vomiting, diarrhea, fever or chills. + SOB. Good oral intake and denies fatigue or weakness. All other pertinent systems are reviewed and are negative.    PHYSICAL EXAM:  Constitutional: NAD  HEENT: EOMI  Neck:  No JVD, supple   Respiratory: CTA B/L  Cardiovascular: S1 and S2, RRR  Gastrointestinal: + BS, soft, NT, ND  Extremities: No peripheral edema, + peripheral pulses  Neurological: A/O x 3, CN2-12 intact  Psychiatric: Normal mood, normal affect  : No Hernández  Skin: No rashes, C/D/I  Access: Not applicable    I and O's:    Height (cm): 162.6 ( @ 13:58)  Weight (kg): 92.5 ( @ :58)  BMI (kg/m2): 35 ( @ :58)  BSA (m2): 1.97 ( @ :58)    LABS:                        5.6    14.92 )-----------( 231      ( 06 Dec 2022 07:33 )             20.4     12-    141  |  108  |  64<H>  ----------------------------<  110<H>  4.5   |  22  |  4.80<H>    Ca    8.4      06 Dec 2022 07:33  Phos  4.0     -  Mg     2.4         TPro  6.6  /  Alb  3.3  /  TBili  0.1<L>  /  DBili  x   /  AST  12  /  ALT  8<L>  /  AlkPhos  101  12-      URINE:  Urinalysis Basic - ( 05 Dec 2022 20:35 )    Color: Yellow / Appearance: Clear / S.013 / pH: x  Gluc: x / Ketone: Negative  / Bili: Negative / Urobili: Negative   Blood: x / Protein: 300 mg/dL / Nitrite: Negative   Leuk Esterase: Negative / RBC: 3 /hpf / WBC 4 /HPF   Sq Epi: x / Non Sq Epi: 1 /hpf / Bacteria: Negative        RADIOLOGY & ADDITIONAL STUDIES:    < from: Xray Chest 2 Views PA/Lat (22 @ 20:32) >    ACC: [id number] EXAM:  XR CHEST PA LAT 2V                          PROCEDURE DATE:  2022          INTERPRETATION:  CLINICAL INDICATION: Near syncope and anemia.    TECHNIQUE: Frontal and lateral radiographs of the chest were obtained.    COMPARISON: Chest x-ray 2022    FINDINGS:    Clear lungs.  The cardiac silhouette appears mildly enlarged.  The bones demonstrate no acute abnormality.    IMPRESSION:  Clear lungs.    --- End of Report ---           ALEXIS THOMPSON MD; Resident Radiology  This document has been electronically signed.  TATYANA LYNN MD; Attending Radiologist  This document has been electronically signed. Dec  6 2022  9:41AM    < end of copied text >

## 2022-12-06 NOTE — DISCHARGE NOTE PROVIDER - NSDCFUSCHEDAPPT_GEN_ALL_CORE_FT
Nando Ceron Physician Replaced by [facility]  [facility]  Scheduled Appointment: 12/08/2022    Nando Ceron [facility]  [facility]  Scheduled Appointment: 12/08/2022

## 2022-12-06 NOTE — PATIENT PROFILE ADULT - FALL HARM RISK - HARM RISK INTERVENTIONS

## 2022-12-06 NOTE — DISCHARGE NOTE PROVIDER - NSDCCPCAREPLAN_GEN_ALL_CORE_FT
PRINCIPAL DISCHARGE DIAGNOSIS  Diagnosis: Symptomatic anemia  Assessment and Plan of Treatment: Your blood counts were low when you first came into the hospital. You were transfused blood to bring your blood count (hemoglobin) up. Please continue to follow up with GI and hematology later this week for further work-up and care.      SECONDARY DISCHARGE DIAGNOSES  Diagnosis: Anemia of chronic disease  Assessment and Plan of Treatment:

## 2022-12-06 NOTE — CONSULT NOTE ADULT - SUBJECTIVE AND OBJECTIVE BOX
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 Came to ER for this reason. Denies any constipation or diarrhea. States stool is dark due to iron. Still formed but slightly different consistency recently. Also endorses travelling frequently for work and often missing medical appointments as well as treatment. Missed last nephrology appointment at [place] scheduled for November due to fatigue, now has one scheduled for December 20 something. Also missed last 3 procrit shots due to travel. Denies any chest pain, SOB or palpitations.    In ER: Ordered for [facility] (05 Dec 2022 23:39)    Hematology subjective:       Allergies    ACE inhibitors (Short breath)    Intolerances        MEDICATIONS  (STANDING):  buMETAnide 1 milliGRAM(s) Oral two times a day  ferrous    sulfate 325 milliGRAM(s) Oral daily  pantoprazole  Injectable 40 milliGRAM(s) IV Push two times a day    MEDICATIONS  (PRN):  acetaminophen     Tablet .. 650 milliGRAM(s) Oral every 6 hours PRN Temp greater or equal to 38C (100.4F), Mild Pain (1 - 3)  melatonin 3 milliGRAM(s) Oral at bedtime PRN Insomnia  ondansetron Injectable 4 milliGRAM(s) IV Push every 8 hours PRN Nausea and/or Vomiting      PAST MEDICAL & SURGICAL HISTORY:  Anemia      IgA nephropathy determined by renal biopsy      DM (diabetes mellitus), type 2      Benign essential hypertension      History of endometriosis      H/O left breast biopsy      H/O laparoscopy          FAMILY HISTORY:  FH: diabetes mellitus    FH: colon cancer (Sibling)  dx at age 50        SOCIAL HISTORY: No alcohol, tobacco, or drug use history    REVIEW OF SYSTEMS:  CONSTITUTIONAL: no fever  EYES/ENT: No visual changes; no throat pain  NECK: No pain or stiffness  RESPIRATORY: no SOB or cough  CARDIOVASCULAR: No chest pain or palpitations  GASTROINTESTINAL: No abdominal pain. No N/V/D/C  GENITOURINARY: No dysuria, change in frequency, or hematuria  NEUROLOGICAL: No numbness or focal weakness  SKIN: No itching, burning, rashes, or lesions  Psych: No depression  MSK: no joint pain  Allergy: no urticaria    Height (cm): 162.6 (12-05 @ 13:58)  Weight (kg): 92.5 (12-05 @ 13:58)  BMI (kg/m2): 35 (12-05 @ 13:58)  BSA (m2): 1.97 (12-05 @ 13:58)    T(F): 97.8 (12-06-22 @ 05:00), Max: 100 (12-05-22 @ 13:58)  HR: 96 (12-06-22 @ 05:00)  BP: 132/82 (12-06-22 @ 05:00)  RR: 16 (12-06-22 @ 05:00)  SpO2: 100% (12-06-22 @ 05:00)  Wt(kg): --    GENERAL: NAD  HEENT: EOMI, MMM, no oropharyngeal lesions or erythema appreciated  Pulm: no increased WOB, CTAB/L  CV: RRR, S1, S2, no m/g/r  ABDOMEN: soft, NT, ND, no masses felt, no HSM  MSK: nl ROM  EXTREMITIES: no appreciable edema in b/l LE  Neuro: A&Ox3, no focal deficits  SKIN: warm and dry, no visible rash                          5.6    14.92 )-----------( 231      ( 06 Dec 2022 07:33 )             20.4       12-06    141  |  108  |  64<H>  ----------------------------<  110<H>  4.5   |  22  |  4.80<H>    Ca    8.4      06 Dec 2022 07:33  Phos  4.0     12-05  Mg     2.4     12-06    TPro  6.6  /  Alb  3.3  /  TBili  0.1<L>  /  DBili  x   /  AST  12  /  ALT  8<L>  /  AlkPhos  101  12-06      Magnesium, Serum: 2.4 mg/dL (12-06 @ 07:33)  Magnesium, Serum: 2.3 mg/dL (12-05 @ 15:19)  Phosphorus Level, Serum: 4.0 mg/dL (12-05 @ 15:19)  Lactate Dehydrogenase, Serum: 492 U/L (12-05 @ 15:19)       HPI:  52F w/ PMHx of HTN, DM (diet controlled), IgA nephropathy, chronic anemia 2/2 CKD and jejunal AVMs requiring multiple endoscopies w/ intervention p/w fatigue associated with anemia on outpatient labs. Came to ER for this reason. Denies any constipation or diarrhea. States stool is dark due to iron. Still formed but slightly different consistency recently. Also endorses travelling frequently for work and often missing medical appointments as well as treatment. Missed last nephrology appointment at [place] scheduled for November due to fatigue, now has one scheduled for December 20 something. Also missed last 3 procrit shots due to travel. Denies any chest pain, SOB or palpitations.    In ER: Ordered for [facility] (05 Dec 2022 23:39)    Hematology subjective:       Allergies    ACE inhibitors (Short breath)    Intolerances        MEDICATIONS  (STANDING):  buMETAnide 1 milliGRAM(s) Oral two times a day  ferrous    sulfate 325 milliGRAM(s) Oral daily  pantoprazole  Injectable 40 milliGRAM(s) IV Push two times a day    MEDICATIONS  (PRN):  acetaminophen     Tablet .. 650 milliGRAM(s) Oral every 6 hours PRN Temp greater or equal to 38C (100.4F), Mild Pain (1 - 3)  melatonin 3 milliGRAM(s) Oral at bedtime PRN Insomnia  ondansetron Injectable 4 milliGRAM(s) IV Push every 8 hours PRN Nausea and/or Vomiting      PAST MEDICAL & SURGICAL HISTORY:  Anemia    IgA nephropathy determined by renal biopsy    DM (diabetes mellitus), type 2    Benign essential hypertension    History of endometriosis    H/O left breast biopsy    H/O laparoscopy          FAMILY HISTORY:  FH: diabetes mellitus    FH: colon cancer (Sibling)  dx at age 50        SOCIAL HISTORY: No alcohol, tobacco, or drug use history    REVIEW OF SYSTEMS:  CONSTITUTIONAL: no fever  EYES/ENT: No visual changes; no throat pain  NECK: No pain or stiffness  RESPIRATORY: no SOB or cough  CARDIOVASCULAR: No chest pain or palpitations  GASTROINTESTINAL: No abdominal pain. No N/V/D/C  GENITOURINARY: No dysuria, change in frequency, or hematuria  NEUROLOGICAL: No numbness or focal weakness  SKIN: No itching, burning, rashes, or lesions  Psych: No depression  MSK: no joint pain  Allergy: no urticaria    Height (cm): 162.6 (12-05 @ 13:58)  Weight (kg): 92.5 (12-05 @ 13:58)  BMI (kg/m2): 35 (12-05 @ 13:58)  BSA (m2): 1.97 (12-05 @ 13:58)    T(F): 97.8 (12-06-22 @ 05:00), Max: 100 (12-05-22 @ 13:58)  HR: 96 (12-06-22 @ 05:00)  BP: 132/82 (12-06-22 @ 05:00)  RR: 16 (12-06-22 @ 05:00)  SpO2: 100% (12-06-22 @ 05:00)  Wt(kg): --    GENERAL: NAD  HEENT: EOMI, MMM, no oropharyngeal lesions or erythema appreciated  Pulm: no increased WOB, CTAB/L  CV: RRR, S1, S2, no m/g/r  ABDOMEN: soft, NT, ND, no masses felt, no HSM  MSK: nl ROM  EXTREMITIES: no appreciable edema in b/l LE  Neuro: A&Ox3, no focal deficits  SKIN: warm and dry, no visible rash                          5.6    14.92 )-----------( 231      ( 06 Dec 2022 07:33 )             20.4       12-06    141  |  108  |  64<H>  ----------------------------<  110<H>  4.5   |  22  |  4.80<H>    Ca    8.4      06 Dec 2022 07:33  Phos  4.0     12-05  Mg     2.4     12-06    TPro  6.6  /  Alb  3.3  /  TBili  0.1<L>  /  DBili  x   /  AST  12  /  ALT  8<L>  /  AlkPhos  101  12-06      Magnesium, Serum: 2.4 mg/dL (12-06 @ 07:33)  Magnesium, Serum: 2.3 mg/dL (12-05 @ 15:19)  Phosphorus Level, Serum: 4.0 mg/dL (12-05 @ 15:19)  Lactate Dehydrogenase, Serum: 492 U/L (12-05 @ 15:19)       HPI:  52F w/ PMHx of HTN, DM (diet controlled), IgA nephropathy, chronic anemia 2/2 CKD and jejunal AVMs requiring multiple endoscopies w/ intervention p/w fatigue associated with anemia on outpatient labs. Came to ER for this reason. Denies any constipation or diarrhea. States stool is dark due to iron. Still formed but slightly different consistency recently. Also endorses travelling frequently for work and often missing medical appointments as well as treatment. Missed last nephrology appointment at [place] scheduled for November due to fatigue, now has one scheduled for December 20 something. Also missed last 3 procrit shots due to travel. Denies any chest pain, SOB or palpitations.    In ER: Ordered for [facility] (05 Dec 2022 23:39)    Hematology subjective: patient appearing comfortable. states she had sob and fatigue on presentation that have improved significantly. Denies melena, hematochezia, abdominal pain. Denies hematuria, hematemesis, hemoptysis. States she wishes to resume diet and/or return home.      Allergies    ACE inhibitors (Short breath)    Intolerances        MEDICATIONS  (STANDING):  buMETAnide 1 milliGRAM(s) Oral two times a day  ferrous    sulfate 325 milliGRAM(s) Oral daily  pantoprazole  Injectable 40 milliGRAM(s) IV Push two times a day    MEDICATIONS  (PRN):  acetaminophen     Tablet .. 650 milliGRAM(s) Oral every 6 hours PRN Temp greater or equal to 38C (100.4F), Mild Pain (1 - 3)  melatonin 3 milliGRAM(s) Oral at bedtime PRN Insomnia  ondansetron Injectable 4 milliGRAM(s) IV Push every 8 hours PRN Nausea and/or Vomiting      PAST MEDICAL & SURGICAL HISTORY:  Anemia    IgA nephropathy determined by renal biopsy    DM (diabetes mellitus), type 2    Benign essential hypertension    History of endometriosis    H/O left breast biopsy    H/O laparoscopy          FAMILY HISTORY:  FH: diabetes mellitus    FH: colon cancer (Sibling)  dx at age 50        SOCIAL HISTORY: No alcohol, tobacco, or drug use history    REVIEW OF SYSTEMS:  CONSTITUTIONAL: no fever  EYES/ENT: No visual changes   NECK: No pain   RESPIRATORY: no SOB, improved from before   CARDIOVASCULAR: No chest pain   GASTROINTESTINAL: No abdominal pain. No N/V   GENITOURINARY: No dysuria, change in frequency, or hematuria  NEUROLOGICAL: No focal weakness  SKIN: + rashes since last outpatient transfusion a/w pruritis  Allergy: no urticaria    Height (cm): 162.6 (12-05 @ 13:58)  Weight (kg): 92.5 (12-05 @ 13:58)  BMI (kg/m2): 35 (12-05 @ 13:58)  BSA (m2): 1.97 (12-05 @ 13:58)    T(F): 97.8 (12-06-22 @ 05:00), Max: 100 (12-05-22 @ 13:58)  HR: 96 (12-06-22 @ 05:00)  BP: 132/82 (12-06-22 @ 05:00)  RR: 16 (12-06-22 @ 05:00)  SpO2: 100% (12-06-22 @ 05:00)  Wt(kg): --    GENERAL: NAD  HEENT: EOMI, neck supple  Pulm: no increased WOB, CTAB/L  CV: RRR, S1, S2, 1/6 systolic murmur R intercostal space, no g/r  ABDOMEN: soft, NT, ND, no masses felt   MSK: nl ROM  EXTREMITIES: no appreciable edema in b/l LE  Neuro: A&Ox3, no focal deficits  SKIN: warm and dry, small round macular rash measuring about 1cm in diameter in LE and UE. no associated erythema edema or drainage.                           5.6    14.92 )-----------( 231      ( 06 Dec 2022 07:33 )             20.4       12-06    141  |  108  |  64<H>  ----------------------------<  110<H>  4.5   |  22  |  4.80<H>    Ca    8.4      06 Dec 2022 07:33  Phos  4.0     12-05  Mg     2.4     12-06    TPro  6.6  /  Alb  3.3  /  TBili  0.1<L>  /  DBili  x   /  AST  12  /  ALT  8<L>  /  AlkPhos  101  12-06      Magnesium, Serum: 2.4 mg/dL (12-06 @ 07:33)  Magnesium, Serum: 2.3 mg/dL (12-05 @ 15:19)  Phosphorus Level, Serum: 4.0 mg/dL (12-05 @ 15:19)  Lactate Dehydrogenase, Serum: 492 U/L (12-05 @ 15:19)       HPI:  52F w/ PMHx of HTN, DM (diet controlled), IgA nephropathy, chronic anemia 2/2 CKD and jejunal AVMs requiring multiple endoscopies w/ intervention p/w fatigue associated with anemia on outpatient labs. Came to ER for this reason. Denies any constipation or diarrhea. States stool is dark due to iron. Still formed but slightly different consistency recently. Also endorses travelling frequently for work and often missing medical appointments as well as treatment. Missed last nephrology appointment at [place] scheduled for November due to fatigue, now has one scheduled for December 20 something. Also missed last 3 procrit shots due to travel. Denies any chest pain, SOB or palpitations.    In ER: Ordered for [facility] (05 Dec 2022 23:39)    Hematology subjective: patient appearing comfortable. states she had sob and fatigue on presentation that have improved significantly. Denies melena, hematochezia, abdominal pain. Denies hematuria, hematemesis, hemoptysis. States she wishes to resume diet and/or return home.      Allergies    ACE inhibitors (Short breath)    Intolerances        MEDICATIONS  (STANDING):  buMETAnide 1 milliGRAM(s) Oral two times a day  ferrous sulfate 325 milliGRAM(s) Oral daily  pantoprazole  Injectable 40 milliGRAM(s) IV Push two times a day    MEDICATIONS  (PRN):  acetaminophen Tablet .. 650 milliGRAM(s) Oral every 6 hours PRN Temp greater or equal to 38C (100.4F), Mild Pain (1 - 3)  melatonin 3 milliGRAM(s) Oral at bedtime PRN Insomnia  ondansetron Injectable 4 milliGRAM(s) IV Push every 8 hours PRN Nausea and/or Vomiting      PAST MEDICAL & SURGICAL HISTORY:  Anemia    IgA nephropathy determined by renal biopsy    DM (diabetes mellitus), type 2    Benign essential hypertension    History of endometriosis    H/O left breast biopsy    H/O laparoscopy          FAMILY HISTORY:  FH: diabetes mellitus    FH: colon cancer (Sibling)  dx at age 50        SOCIAL HISTORY: No alcohol, tobacco, or drug use history    REVIEW OF SYSTEMS:  CONSTITUTIONAL: no fever  EYES/ENT: No visual changes   NECK: No pain   RESPIRATORY: no SOB, improved from before   CARDIOVASCULAR: No chest pain   GASTROINTESTINAL: No abdominal pain. No N/V   GENITOURINARY: No dysuria, change in frequency, or hematuria  NEUROLOGICAL: No focal weakness  SKIN: + rashes since last outpatient transfusion a/w pruritis  Allergy: no urticaria    Height (cm): 162.6 (12-05 @ 13:58)  Weight (kg): 92.5 (12-05 @ 13:58)  BMI (kg/m2): 35 (12-05 @ 13:58)  BSA (m2): 1.97 (12-05 @ 13:58)    T(F): 97.8 (12-06-22 @ 05:00), Max: 100 (12-05-22 @ 13:58)  HR: 96 (12-06-22 @ 05:00)  BP: 132/82 (12-06-22 @ 05:00)  RR: 16 (12-06-22 @ 05:00)  SpO2: 100% (12-06-22 @ 05:00)  Wt(kg): --    GENERAL: NAD  HEENT: EOMI, neck supple  Pulm: no increased WOB, CTAB/L  CV: RRR, S1, S2, 1/6 systolic murmur R intercostal space, no g/r  ABDOMEN: soft, NT, ND, no masses felt   MSK: nl ROM  EXTREMITIES: no appreciable edema in b/l LE  Neuro: A&Ox3, no focal deficits  SKIN: warm and dry, small round macular rash measuring about 1cm in diameter in LE and UE. no associated erythema edema or drainage.                           5.6    14.92 )-----------( 231      ( 06 Dec 2022 07:33 )             20.4       12-06    141  |  108  |  64<H>  ----------------------------<  110<H>  4.5   |  22  |  4.80<H>    Ca    8.4      06 Dec 2022 07:33  Phos  4.0     12-05  Mg     2.4     12-06    TPro  6.6  /  Alb  3.3  /  TBili  0.1<L>  /  DBili  x   /  AST  12  /  ALT  8<L>  /  AlkPhos  101  12-06      Magnesium, Serum: 2.4 mg/dL (12-06 @ 07:33)  Magnesium, Serum: 2.3 mg/dL (12-05 @ 15:19)  Phosphorus Level, Serum: 4.0 mg/dL (12-05 @ 15:19)  Lactate Dehydrogenase, Serum: 492 U/L (12-05 @ 15:19)

## 2022-12-06 NOTE — DISCHARGE NOTE PROVIDER - ATTENDING DISCHARGE PHYSICAL EXAMINATION:
Patient seen and examined. She had no complaints, other than not being able to rest while in the ER (too bright, noisy). Wishes to go home after she gets her IV iron and transfusions. Patient will get 4U PRBC to bring the hemoglobin >8. She is hemodynamically stable, there is no urgent indication for endoscopy or colonoscopy. Plan for discharge home later today.    CONSTITUTIONAL: Well-groomed, in no apparent distress  EYES: No conjunctival or scleral injection, non-icteric; PERRLA and symmetric  ENMT: No external nasal lesions; no pharyngeal injection or exudates, oral mucosa with moist membranes  NECK: Trachea midline without palpable neck mass; thyroid not enlarged and non-tender  RESPIRATORY: Breathing comfortably; lungs CTA without wheeze/rhonchi/rales  CARDIOVASCULAR: +S1S2, RRR, no M/G/R; pedal pulses full and symmetric; no lower extremity edema  GASTROINTESTINAL: No palpable masses or tenderness, +BS throughout, no rebound/guarding; no hepatosplenomegaly; no hernia palpated  MUSCULOSKELETAL: no digital clubbing or cyanosis; no paraspinal tenderness; normal strength and tone of extremities  SKIN: No rashes or ulcers noted; no subcutaneous nodules or induration palpable  NEUROLOGIC: CN II-XII intact; sensation intact in LEs b/l to light touch  PSYCHIATRIC: A+O x 3; mood and affect appropriate; appropriate insight and judgment

## 2022-12-06 NOTE — DISCHARGE NOTE PROVIDER - NSRESEARCHGRANT_OVERRIDEREC_GEN_A_CORE
History of bleeding in the three months prior to treatment History of bleeding in the three months prior to treatment/IMPROVE-DD Application Not Available

## 2022-12-06 NOTE — CONSULT NOTE ADULT - SUBJECTIVE AND OBJECTIVE BOX
Chief Complaint:  Patient is a 52y old  Female who presents with a chief complaint of Anemia (05 Dec 2022 23:39)      HPI:  Ms. Robert is a 51yo F with PMH of HTN, DM (diet controlled), IgA nephropathy, chronic anemia (presumed 2/2 CKD and jejunal AVMs requiring multiple endoscopies w/ intervention) who presents with anemia on outpatient labs. GI consulted for concerns for bleeding angioectasias.     Patient has severe chronic anemia presumed 2/2 CKD and angioectasias s/p multiple interventions (most recently jejunal angioectasias s/p ablation 10/22 per patient at [place]), requiring weekly Retacrit (with increasing requirements), octreotide, PO/IV iron, and q3-4 weeks blood transfusions. Reports missing her last 3 procrit shots due to travel. Denies any chest pain, SOB or palpitations. Patient denying n/v/d constipation, no bloody red/black BMs; says even with prior endoscopies never had any of the aforementioned and she really only came in because she was having sob and fatigue. She follows with GI in [place] and underwent multiple EGDs, enteroscopies and colonsocopies. Most recently 10/22 with push enteroscopy and cautery of AVMs.    In ER: VSS, Hb 4 --> 5.6 s/p 2uprbc    Allergies:  ACE inhibitors (Short breath)      Home Medications:  bumetanide 1 mg oral tablet: 1 tab(s) orally 2 times a day (05 Dec 2022 23:48)  ferrous sulfate 325 mg (65 mg elemental iron) oral delayed release tablet: 1 tab(s) orally once a day (05 Dec 2022 23:48)  losartan 50 mg oral tablet: 1 tab(s) orally once a day (05 Dec 2022 23:48)  Procrit 20,000 units/mL injectable solution: 1 implant(s) injectable every 7 days Friday (05 Dec 2022 23:48)    Hospital Medications:  acetaminophen     Tablet .. 650 milliGRAM(s) Oral every 6 hours PRN  buMETAnide 1 milliGRAM(s) Oral two times a day  ferrous    sulfate 325 milliGRAM(s) Oral daily  melatonin 3 milliGRAM(s) Oral at bedtime PRN  ondansetron Injectable 4 milliGRAM(s) IV Push every 8 hours PRN  pantoprazole  Injectable 40 milliGRAM(s) IV Push two times a day      PMHX/PSHX:  DM (diabetes mellitus)    Anemia    HTN (hypertension)    IgA nephropathy determined by renal biopsy    H/O endometritis    History of endometriosis    DM (diabetes mellitus), type 2    Benign essential hypertension    History of endometriosis    No significant past surgical history    History of endometriosis    H/O left breast biopsy    H/O laparoscopy        Family history:  No pertinent family history in first degree relatives    FH: diabetes mellitus    FH: colon cancer (Sibling)        Denies family history of colon cancer/polyps, stomach cancer/polyps, pancreatic cancer/masses, liver cancer/disease, ovarian cancer and endometrial cancer.    Social History:     Tob: Denies  EtOH: As above  Illicit Drugs: Denies    ROS:   General:  No wt loss, fevers, chills, night sweats, +fatigue  Eyes:  Good vision, no reported pain  ENT:  No sore throat, pain, runny nose, dysphagia  CV:  No pain, palpitations, hypo/hypertension  Pulm:  No dyspnea, cough, tachypnea, wheezing  GI:  As per HPI  :  No pain, bleeding, incontinence, nocturia  Muscle:  No pain, weakness  Neuro:  No weakness, tingling, memory problems  Psych:  No fatigue, insomnia, mood problems, depression  Endocrine:  No polyuria, polydipsia, cold/heat intolerance  Heme:  No petechiae, ecchymosis, easy bruisability  Skin:  No rash, tattoos, scars, edema    PHYSICAL EXAM:   GENERAL:  No acute distress  HEENT:  Normocephalic/atraumatic, no scleral icterus  CHEST:  No accessory muscle use  HEART:  Regular rate and rhythm  ABDOMEN:  Soft, non-tender, non-distended  EXTREMITIES: No cyanosis, clubbing, or edema  SKIN:  No rash  NEURO:  Alert and oriented x 3, no asterixis    Vital Signs:  Vital Signs Last 24 Hrs  T(C): 36.6 (06 Dec 2022 05:00), Max: 37.8 (05 Dec 2022 13:58)  T(F): 97.8 (06 Dec 2022 05:00), Max: 100 (05 Dec 2022 13:58)  HR: 96 (06 Dec 2022 05:00) (91 - 105)  BP: 132/82 (06 Dec 2022 05:00) (132/82 - 158/88)  BP(mean): 106 (06 Dec 2022 00:07) (105 - 110)  RR: 16 (06 Dec 2022 05:00) (16 - 24)  SpO2: 100% (06 Dec 2022 05:00) (99% - 100%)    Parameters below as of 06 Dec 2022 05:00  Patient On (Oxygen Delivery Method): room air      Daily Height in cm: 162.56 (05 Dec 2022 13:58)    Daily     LABS:                        5.6    14.92 )-----------( 231      ( 06 Dec 2022 07:33 )             20.4     Mean Cell Volume: 87.9 fl (- @ 07:33)    12-    141  |  108  |  64<H>  ----------------------------<  110<H>  4.5   |  22  |  4.80<H>    Ca    8.4      06 Dec 2022 07:33  Phos  4.0     12-  Mg     2.4         TPro  6.6  /  Alb  3.3  /  TBili  0.1<L>  /  DBili  x   /  AST  12  /  ALT  8<L>  /  AlkPhos  101  12-    LIVER FUNCTIONS - ( 06 Dec 2022 07:33 )  Alb: 3.3 g/dL / Pro: 6.6 g/dL / ALK PHOS: 101 U/L / ALT: 8 U/L / AST: 12 U/L / GGT: x           PT/INR - ( 05 Dec 2022 15:18 )   PT: 12.4 sec;   INR: 1.07 ratio         PTT - ( 05 Dec 2022 15:18 )  PTT:30.3 sec  Urinalysis Basic - ( 05 Dec 2022 20:35 )    Color: Yellow / Appearance: Clear / S.013 / pH: x  Gluc: x / Ketone: Negative  / Bili: Negative / Urobili: Negative   Blood: x / Protein: 300 mg/dL / Nitrite: Negative   Leuk Esterase: Negative / RBC: 3 /hpf / WBC 4 /HPF   Sq Epi: x / Non Sq Epi: 1 /hpf / Bacteria: Negative                              5.6    14.92 )-----------( 231      ( 06 Dec 2022 07:33 )             20.4                         4.1    14.66 )-----------( 294      ( 05 Dec 2022 15:18 )             16.0       Imaging:

## 2022-12-06 NOTE — CONSULT NOTE ADULT - ASSESSMENT
51yo F with PMH of HTN, DM (diet controlled), IgA nephropathy, chronic anemia (presumed 2/2 CKD and jejunal AVMs requiring multiple endoscopies w/ intervention) who presents with anemia on outpatient labs. GI consulted for concerns for bleeding angioectasias.     # Severe chronic anemia - presumed 2/2 CKD/IgA nephropathy and angioectasias s/p multiple interventions (most recently jejunal angioectasias s/p ablation at [place] [facility] 10/22). Suspect multifactorial and worsening in the setting of non adherence. Regardless, patient would benefit from repeat endoscopic evaluation. However, given longstanding anemia, as patient has no overt bleeding at this time (brown-green stool) and hemodynamically stable, it is reasonable to continue workup as outpatient with her established GI doctor as requested by the patient.     Recommendations:  - Patient would like to continue evaluation as outpatient with her established GI  - Transfusion goals per heme recs  - Diet as tolerated  - Avoid NSAIDs  - Rest of care per primary  - GI to sign off    Please note that the recommendations are not final until attested by an attending.    Thank you for involving us in the care of this patient. Please reach out if any further questions.    Grant Casas, PGY-6  Gastroenterology/Hepatology Fellow    Available on [employer] Teams  After 5PM/Weekends, please contact the on-call GI fellow: [phone]      
  52F w/ PMHx of HTN, DM (diet controlled), IgA nephropathy, chronic anemia 2/2 CKD and jejunal AVMs requiring multiple endoscopies w/ intervention p/w fatigue associated with anemia on outpatient labs  IGA nephropathy with chronicity and fibrosis - No crescents   CKD stage 4 and now EVELYN from the hemodynamic effects from the anemia    1 Renal-Limited options for her.  Farxiga based on DAPA-CKD had good data for IGA BUT her GFR is less then 25cc/min   No need for a renal sono  As outpt will engage in renal transplant and AVF.  No blood draws in left arm  2 Heme-Once Hgb is over 8 then dc planning.  She has outpt Heme established who can provide blood as outpt  3 GI-+ AVM     No renal objection to dc     Sayed [facility]   [phone] 
51 y/o female with h/o HTN, DM, IgA nephropathy, chronic anemia from CKD stage 4 and jejunal AVMs s/p endoscopic intervention who presented with fatigue, found to have Hb of 4.1, GI consulted with no inpatient intervention. Hematology consulted for management of her anemia.

## 2022-12-07 ENCOUNTER — TRANSCRIPTION ENCOUNTER (OUTPATIENT)
Age: 52
End: 2022-12-07

## 2022-12-07 VITALS
TEMPERATURE: 98 F | SYSTOLIC BLOOD PRESSURE: 159 MMHG | OXYGEN SATURATION: 100 % | RESPIRATION RATE: 18 BRPM | DIASTOLIC BLOOD PRESSURE: 97 MMHG | HEART RATE: 93 BPM

## 2022-12-07 LAB
FERRITIN SERPL-MCNC: 73 NG/ML — SIGNIFICANT CHANGE UP (ref 15–150)
HAPTOGLOB SERPL-MCNC: 210 MG/DL — HIGH (ref 34–200)
HCT VFR BLD CALC: 26.8 % — LOW (ref 34.5–45)
HGB BLD-MCNC: 8 G/DL — LOW (ref 11.5–15.5)
IRON SATN MFR SERPL: 10 % — LOW (ref 14–50)
IRON SATN MFR SERPL: 241 UG/DL — HIGH (ref 30–160)
IRON SATN MFR SERPL: 26 UG/DL — LOW (ref 30–160)
IRON SATN MFR SERPL: 83 % — HIGH (ref 14–50)
LDH SERPL L TO P-CCNC: 187 U/L — SIGNIFICANT CHANGE UP (ref 50–242)
MCHC RBC-ENTMCNC: 25.7 PG — LOW (ref 27–34)
MCHC RBC-ENTMCNC: 29.9 GM/DL — LOW (ref 32–36)
MCV RBC AUTO: 86.2 FL — SIGNIFICANT CHANGE UP (ref 80–100)
NRBC # BLD: 0 /100 WBCS — SIGNIFICANT CHANGE UP (ref 0–0)
PLATELET # BLD AUTO: 240 K/UL — SIGNIFICANT CHANGE UP (ref 150–400)
RBC # BLD: 3.11 M/UL — LOW (ref 3.8–5.2)
RBC # FLD: 17.5 % — HIGH (ref 10.3–14.5)
TIBC SERPL-MCNC: 265 UG/DL — SIGNIFICANT CHANGE UP (ref 220–430)
TIBC SERPL-MCNC: 290 UG/DL — SIGNIFICANT CHANGE UP (ref 220–430)
UIBC SERPL-MCNC: 239 UG/DL — SIGNIFICANT CHANGE UP (ref 110–370)
UIBC SERPL-MCNC: 49 UG/DL — LOW (ref 110–370)
WBC # BLD: 15.38 K/UL — HIGH (ref 3.8–10.5)
WBC # FLD AUTO: 15.38 K/UL — HIGH (ref 3.8–10.5)

## 2022-12-07 PROCEDURE — 83540 ASSAY OF IRON: CPT

## 2022-12-07 PROCEDURE — 84702 CHORIONIC GONADOTROPIN TEST: CPT

## 2022-12-07 PROCEDURE — 84100 ASSAY OF PHOSPHORUS: CPT

## 2022-12-07 PROCEDURE — 83615 LACTATE (LD) (LDH) ENZYME: CPT

## 2022-12-07 PROCEDURE — 86901 BLOOD TYPING SEROLOGIC RH(D): CPT

## 2022-12-07 PROCEDURE — 87635 SARS-COV-2 COVID-19 AMP PRB: CPT

## 2022-12-07 PROCEDURE — 71046 X-RAY EXAM CHEST 2 VIEWS: CPT

## 2022-12-07 PROCEDURE — 81001 URINALYSIS AUTO W/SCOPE: CPT

## 2022-12-07 PROCEDURE — 83735 ASSAY OF MAGNESIUM: CPT

## 2022-12-07 PROCEDURE — 86900 BLOOD TYPING SEROLOGIC ABO: CPT

## 2022-12-07 PROCEDURE — 86923 COMPATIBILITY TEST ELECTRIC: CPT

## 2022-12-07 PROCEDURE — 83550 IRON BINDING TEST: CPT

## 2022-12-07 PROCEDURE — 85025 COMPLETE CBC W/AUTO DIFF WBC: CPT

## 2022-12-07 PROCEDURE — 85027 COMPLETE CBC AUTOMATED: CPT

## 2022-12-07 PROCEDURE — 99285 EMERGENCY DEPT VISIT HI MDM: CPT

## 2022-12-07 PROCEDURE — 82728 ASSAY OF FERRITIN: CPT

## 2022-12-07 PROCEDURE — 80053 COMPREHEN METABOLIC PANEL: CPT

## 2022-12-07 PROCEDURE — 93005 ELECTROCARDIOGRAM TRACING: CPT

## 2022-12-07 PROCEDURE — 36415 COLL VENOUS BLD VENIPUNCTURE: CPT

## 2022-12-07 PROCEDURE — 85610 PROTHROMBIN TIME: CPT

## 2022-12-07 PROCEDURE — 86850 RBC ANTIBODY SCREEN: CPT

## 2022-12-07 PROCEDURE — 85730 THROMBOPLASTIN TIME PARTIAL: CPT

## 2022-12-07 PROCEDURE — 85045 AUTOMATED RETICULOCYTE COUNT: CPT

## 2022-12-07 PROCEDURE — 36430 TRANSFUSION BLD/BLD COMPNT: CPT

## 2022-12-07 PROCEDURE — 99232 SBSQ HOSP IP/OBS MODERATE 35: CPT

## 2022-12-07 PROCEDURE — 83010 ASSAY OF HAPTOGLOBIN QUANT: CPT

## 2022-12-07 PROCEDURE — P9040: CPT

## 2022-12-07 RX ORDER — LOSARTAN POTASSIUM 100 MG/1
1 TABLET, FILM COATED ORAL
Qty: 0 | Refills: 0 | DISCHARGE

## 2022-12-07 RX ORDER — ERYTHROPOIETIN 10000 [IU]/ML
10000 INJECTION, SOLUTION INTRAVENOUS; SUBCUTANEOUS ONCE
Refills: 0 | Status: COMPLETED | OUTPATIENT
Start: 2022-12-07 | End: 2022-12-07

## 2022-12-07 RX ADMIN — ERYTHROPOIETIN 10000 UNIT(S): 10000 INJECTION, SOLUTION INTRAVENOUS; SUBCUTANEOUS at 10:54

## 2022-12-07 RX ADMIN — BUMETANIDE 1 MILLIGRAM(S): 0.25 INJECTION INTRAMUSCULAR; INTRAVENOUS at 05:36

## 2022-12-07 RX ADMIN — IRON SUCROSE 110 MILLIGRAM(S): 20 INJECTION, SOLUTION INTRAVENOUS at 10:53

## 2022-12-07 NOTE — PROGRESS NOTE ADULT - PROBLEM SELECTOR PLAN 2
Hx of AVMs in past s/p multiple push endoscopies and capsule studies. Reportedly Jejunal AVMs. Concern for recurrent bleeds.  -GI recs appreciated  -tolerating regular diet

## 2022-12-07 NOTE — DISCHARGE NOTE NURSING/CASE MANAGEMENT/SOCIAL WORK - NSDCPEFALRISK_GEN_ALL_CORE
For information on Fall & Injury Prevention, visit: [email or web address] OR  [email or web address] OR  https://www.cdc.gov/steadi/patient.html

## 2022-12-07 NOTE — DISCHARGE NOTE NURSING/CASE MANAGEMENT/SOCIAL WORK - PATIENT PORTAL LINK FT
You can access the FollowMyHealth Patient Portal offered by [facility] by registering at the following website: [email or web address]. By joining [employer]’s FollowMyHealth portal, you will also be able to view your health information using other applications (apps) compatible with our system.

## 2022-12-07 NOTE — PROGRESS NOTE ADULT - PROBLEM SELECTOR PLAN 1
Multifactorial given CKD w/ missed procrit injections as well as extensive hx of prior GIB 2/2 AVMs. Has not been taking subq octreotide since November.  - On outpt Procrit 20,000U qweekly (has missed last 3 doses, got 1 dose today)  - C/w PO Fe supplementation for now  - Transfuse for goal hgb > 8-9, T&S  - regular diet   - hematology, gastroenterology recs appreciated  - hgb increased appropriately from 4.1 to 8 after 4U PRBC

## 2022-12-07 NOTE — PROGRESS NOTE ADULT - PROBLEM SELECTOR PLAN 3
Reportedly following at [facility] for transplant. Pt denies getting any active management on my discussion. Missed previously scheduled November appointment due to fatigue. Has another appointment scheduled December 20 something  -Cont. Bumex dose for now  -Needs to f/u with renal

## 2022-12-07 NOTE — PROGRESS NOTE ADULT - PROBLEM SELECTOR PLAN 4
CKD stable compared to November, however seems to be worsening compared to earlier in year.  -Renal dose medications  -cont losartan   -Unclear when bumex increased to 1mg BID, will cont. current dose for now  -Possibly component of EVELYN from anemia

## 2022-12-07 NOTE — PROGRESS NOTE ADULT - SUBJECTIVE AND OBJECTIVE BOX
NEPHROLOGY-[facility] [phone]        Patient seen and examined in bed.  SHe felt better         MEDICATIONS  (STANDING):  buMETAnide 1 milliGRAM(s) Oral two times a day  iron sucrose IVPB 200 milliGRAM(s) IV Intermittent every 24 hours      VITAL:  T(C): , Max: 36.9 (22 @ 10:00)  T(F): , Max: 98.4 (22 @ 10:00)  HR: 92 (22 @ 05:36)  BP: 146/95 (22 @ 05:36)  BP(mean): --  RR: 17 (22 @ 05:36)  SpO2: 100% (22 @ 05:36)  Wt(kg): --    I and O's:     @ 07:01  -   @ 07:00  --------------------------------------------------------  IN: 240 mL / OUT: 0 mL / NET: 240 mL          PHYSICAL EXAM:    Constitutional: NAD  Neck:  No JVD  Respiratory: CTAB/L  Cardiovascular: S1 and S2  Gastrointestinal: BS+, soft, NT/ND  Extremities: No peripheral edema  Neurological: A/O x 3, no focal deficits  Psychiatric: Normal mood, normal affect  : No Hernández  Skin: No rashes  Access: Not applicable    LABS:                        8.0    15.38 )-----------( 240      ( 07 Dec 2022 01:44 )             26.8         141  |  108  |  64<H>  ----------------------------<  110<H>  4.5   |  22  |  4.80<H>    Ca    8.4      06 Dec 2022 07:33  Phos  4.0       Mg     2.4         TPro  6.6  /  Alb  3.3  /  TBili  0.1<L>  /  DBili  x   /  AST  12  /  ALT  8<L>  /  AlkPhos  101            Urine Studies:  Urinalysis Basic - ( 05 Dec 2022 20:35 )    Color: Yellow / Appearance: Clear / S.013 / pH: x  Gluc: x / Ketone: Negative  / Bili: Negative / Urobili: Negative   Blood: x / Protein: 300 mg/dL / Nitrite: Negative   Leuk Esterase: Negative / RBC: 3 /hpf / WBC 4 /HPF   Sq Epi: x / Non Sq Epi: 1 /hpf / Bacteria: Negative            RADIOLOGY & ADDITIONAL STUDIES:            
[facility] Division of Hospital Medicine  Aileen Baird DO  Available on Teams Coni    Patient is a 52y old  Female who presents with a chief complaint of Anemia (07 Dec 2022 08:14)      SUBJECTIVE / OVERNIGHT EVENTS: no events. Was supposed to be discharged tomorrow, however last unit of blood finished late and patient stayed overnight. She feels well today, willing to go home. No bleeding.  ADDITIONAL REVIEW OF SYSTEMS: negative    MEDICATIONS  (STANDING):  buMETAnide 1 milliGRAM(s) Oral two times a day  iron sucrose IVPB 200 milliGRAM(s) IV Intermittent every 24 hours    MEDICATIONS  (PRN):  acetaminophen     Tablet .. 650 milliGRAM(s) Oral every 6 hours PRN Temp greater or equal to 38C (100.4F), Mild Pain (1 - 3)  melatonin 3 milliGRAM(s) Oral at bedtime PRN Insomnia  ondansetron Injectable 4 milliGRAM(s) IV Push every 8 hours PRN Nausea and/or Vomiting      CAPILLARY BLOOD GLUCOSE        I&O's Summary    06 Dec 2022 07:01  -  07 Dec 2022 07:00  --------------------------------------------------------  IN: 240 mL / OUT: 0 mL / NET: 240 mL    07 Dec 2022 07:01  -  07 Dec 2022 13:56  --------------------------------------------------------  IN: 100 mL / OUT: 0 mL / NET: 100 mL        PHYSICAL EXAM:  Vital Signs Last 24 Hrs  T(C): 36.6 (07 Dec 2022 11:32), Max: 36.9 (06 Dec 2022 16:12)  T(F): 97.9 (07 Dec 2022 11:32), Max: 98.4 (06 Dec 2022 16:12)  HR: 93 (07 Dec 2022 11:32) (89 - 93)  BP: 159/97 (07 Dec 2022 11:32) (146/88 - 159/97)  BP(mean): --  RR: 18 (07 Dec 2022 11:32) (17 - 18)  SpO2: 100% (07 Dec 2022 11:32) (99% - 100%)    Parameters below as of 07 Dec 2022 05:36  Patient On (Oxygen Delivery Method): room air      CONSTITUTIONAL: Well-groomed, in no apparent distress  EYES: No conjunctival or scleral injection, non-icteric; PERRLA and symmetric  ENMT: No external nasal lesions; no pharyngeal injection or exudates, oral mucosa with moist membranes  NECK: Trachea midline without palpable neck mass; thyroid not enlarged and non-tender  RESPIRATORY: Breathing comfortably; lungs CTA without wheeze/rhonchi/rales  CARDIOVASCULAR: +S1S2, RRR, no M/G/R; pedal pulses full and symmetric; no lower extremity edema  GASTROINTESTINAL: No palpable masses or tenderness, +BS throughout, no rebound/guarding; no hepatosplenomegaly; no hernia palpated  LYMPHATIC: No cervical LAD or tenderness; no axillary LAD or tenderness  MUSCULOSKELETAL: no digital clubbing or cyanosis; no paraspinal tenderness; normal strength and tone of extremities  SKIN: No rashes or ulcers noted; no subcutaneous nodules or induration palpable  NEUROLOGIC: CN II-XII intact; sensation intact in LEs b/l to light touch  PSYCHIATRIC: A+O x 3; mood and affect appropriate; appropriate insight and judgment    LABS:                        8.0    15.38 )-----------( 240      ( 07 Dec 2022 01:44 )             26.8     12-06    141  |  108  |  64<H>  ----------------------------<  110<H>  4.5   |  22  |  4.80<H>    Ca    8.4      06 Dec 2022 07:33  Phos  4.0     12-05  Mg     2.4     12-06    TPro  6.6  /  Alb  3.3  /  TBili  0.1<L>  /  DBili  x   /  AST  12  /  ALT  8<L>  /  AlkPhos  101  12-06    PT/INR - ( 05 Dec 2022 15:18 )   PT: 12.4 sec;   INR: 1.07 ratio         PTT - ( 05 Dec 2022 15:18 )  PTT:30.3 sec      Urinalysis Basic - ( 05 Dec 2022 20:35 )    Color: Yellow / Appearance: Clear / S.013 / pH: x  Gluc: x / Ketone: Negative  / Bili: Negative / Urobili: Negative   Blood: x / Protein: 300 mg/dL / Nitrite: Negative   Leuk Esterase: Negative / RBC: 3 /hpf / WBC 4 /HPF   Sq Epi: x / Non Sq Epi: 1 /hpf / Bacteria: Negative

## 2022-12-07 NOTE — PROGRESS NOTE ADULT - ASSESSMENT
52F w/ PMHx of HTN, DM (diet controlled), IgA nephropathy, chronic anemia 2/2 CKD and jejunal AVMs requiring multiple endoscopies w/ intervention p/w fatigue associated with anemia on outpatient labs  IGA nephropathy with chronicity and fibrosis - No crescents   CKD stage 4 and now EVELYN from the hemodynamic effects from the anemia    1 Renal-Limited options for her.  Farxiga based on DAPA-CKD had good data for IGA BUT her GFR is less then 25cc/min   No need for a renal sono  As outpt will engage in renal transplant and AVF.  No blood draws in left arm  2 Heme-Once Hgb is over 8 then dc planning.  She has outpt Heme established who can provide blood as outpt  retacrit [id number] unit x 1(iron stores were appropriate)  DC IV iron ?  3 GI-+ AVM     No renal objection to dc   DW heme     Sayed [facility]   [phone] 
52F w/ PMHx of HTN, DM (diet controlled), IgA nephropathy, chronic anemia 2/2 CKD and jejunal AVMs requiring multiple endoscopies w/ intervention p/w fatigue associated with anemia on outpatient labs

## 2022-12-07 NOTE — PROGRESS NOTE ADULT - NSPROGADDITIONALINFOA_GEN_ALL_CORE
Plan discussed with Renetta Toro.    Aileen Baird, DO  Available on Teams herb    Discharge home today.

## 2022-12-08 NOTE — ED ADULT NURSE NOTE - PRIMARY CARE PROVIDER
Contacted admit team for plan of care after Pt mistakenly pulled her IV out. Was advised that the IV did not have to be put back in at this time pending CMP results she will be  reassess after results.    unk

## 2022-12-16 ENCOUNTER — APPOINTMENT (OUTPATIENT)
Dept: HEMATOLOGY ONCOLOGY | Facility: CLINIC | Age: 52
End: 2022-12-16

## 2022-12-19 ENCOUNTER — RESULT REVIEW (OUTPATIENT)
Age: 52
End: 2022-12-19

## 2022-12-19 ENCOUNTER — NON-APPOINTMENT (OUTPATIENT)
Age: 52
End: 2022-12-19

## 2022-12-19 ENCOUNTER — APPOINTMENT (OUTPATIENT)
Dept: HEMATOLOGY ONCOLOGY | Facility: CLINIC | Age: 52
End: 2022-12-19

## 2022-12-19 LAB
BASOPHILS # BLD AUTO: 0 K/UL — SIGNIFICANT CHANGE UP (ref 0–0.2)
BASOPHILS NFR BLD AUTO: 0 % — SIGNIFICANT CHANGE UP (ref 0–2)
EOSINOPHIL # BLD AUTO: 0.09 K/UL — SIGNIFICANT CHANGE UP (ref 0–0.5)
EOSINOPHIL NFR BLD AUTO: 0.9 % — SIGNIFICANT CHANGE UP (ref 0–6)
HCT VFR BLD CALC: 18.2 % — CRITICAL LOW (ref 34.5–45)
HGB BLD-MCNC: 5.1 G/DL — CRITICAL LOW (ref 11.5–15.5)
IMM GRANULOCYTES NFR BLD AUTO: 0.7 % — SIGNIFICANT CHANGE UP (ref 0–0.9)
LYMPHOCYTES # BLD AUTO: 1.27 K/UL — SIGNIFICANT CHANGE UP (ref 1–3.3)
LYMPHOCYTES # BLD AUTO: 13.1 % — SIGNIFICANT CHANGE UP (ref 13–44)
MCHC RBC-ENTMCNC: 23.8 PG — LOW (ref 27–34)
MCHC RBC-ENTMCNC: 28 G/DL — LOW (ref 32–36)
MCV RBC AUTO: 85 FL — SIGNIFICANT CHANGE UP (ref 80–100)
MONOCYTES # BLD AUTO: 0.59 K/UL — SIGNIFICANT CHANGE UP (ref 0–0.9)
MONOCYTES NFR BLD AUTO: 6.1 % — SIGNIFICANT CHANGE UP (ref 2–14)
NEUTROPHILS # BLD AUTO: 7.7 K/UL — HIGH (ref 1.8–7.4)
NEUTROPHILS NFR BLD AUTO: 79.2 % — HIGH (ref 43–77)
NRBC # BLD: 0 /100 WBCS — SIGNIFICANT CHANGE UP (ref 0–0)
PLATELET # BLD AUTO: 386 K/UL — SIGNIFICANT CHANGE UP (ref 150–400)
RBC # BLD: 2.14 M/UL — LOW (ref 3.8–5.2)
RBC # FLD: 18 % — HIGH (ref 10.3–14.5)
WBC # BLD: 9.72 K/UL — SIGNIFICANT CHANGE UP (ref 3.8–10.5)
WBC # FLD AUTO: 9.72 K/UL — SIGNIFICANT CHANGE UP (ref 3.8–10.5)

## 2022-12-19 PROCEDURE — 86850 RBC ANTIBODY SCREEN: CPT

## 2022-12-19 PROCEDURE — 86901 BLOOD TYPING SEROLOGIC RH(D): CPT

## 2022-12-19 PROCEDURE — 86900 BLOOD TYPING SEROLOGIC ABO: CPT

## 2023-01-10 ENCOUNTER — RESULT REVIEW (OUTPATIENT)
Age: 53
End: 2023-01-10

## 2023-01-10 ENCOUNTER — INPATIENT (INPATIENT)
Facility: HOSPITAL | Age: 53
LOS: 1 days | Discharge: HOME | DRG: 378 | End: 2023-01-12
Attending: INTERNAL MEDICINE | Admitting: INTERNAL MEDICINE
Payer: MEDICAID

## 2023-01-10 ENCOUNTER — APPOINTMENT (OUTPATIENT)
Dept: HEMATOLOGY ONCOLOGY | Facility: CLINIC | Age: 53
End: 2023-01-10
Payer: MEDICAID

## 2023-01-10 ENCOUNTER — OUTPATIENT (OUTPATIENT)
Dept: OUTPATIENT SERVICES | Facility: HOSPITAL | Age: 53
LOS: 1 days | End: 2023-01-10
Payer: MEDICAID

## 2023-01-10 VITALS
DIASTOLIC BLOOD PRESSURE: 85 MMHG | WEIGHT: 205.03 LBS | OXYGEN SATURATION: 100 % | HEART RATE: 102 BPM | RESPIRATION RATE: 18 BRPM | SYSTOLIC BLOOD PRESSURE: 110 MMHG | TEMPERATURE: 98 F | HEIGHT: 64 IN

## 2023-01-10 VITALS
SYSTOLIC BLOOD PRESSURE: 131 MMHG | WEIGHT: 200 LBS | OXYGEN SATURATION: 91 % | RESPIRATION RATE: 16 BRPM | DIASTOLIC BLOOD PRESSURE: 87 MMHG | BODY MASS INDEX: 34.7 KG/M2 | HEART RATE: 157 BPM | TEMPERATURE: 98.2 F

## 2023-01-10 DIAGNOSIS — N18.9 CHRONIC KIDNEY DISEASE, UNSPECIFIED: ICD-10-CM

## 2023-01-10 DIAGNOSIS — D64.9 ANEMIA, UNSPECIFIED: ICD-10-CM

## 2023-01-10 DIAGNOSIS — Z98.890 OTHER SPECIFIED POSTPROCEDURAL STATES: Chronic | ICD-10-CM

## 2023-01-10 DIAGNOSIS — Q27.30 ARTERIOVENOUS MALFORMATION, SITE UNSPECIFIED: ICD-10-CM

## 2023-01-10 DIAGNOSIS — N02.8 RECURRENT AND PERSISTENT HEMATURIA WITH OTHER MORPHOLOGIC CHANGES: ICD-10-CM

## 2023-01-10 DIAGNOSIS — E11.9 TYPE 2 DIABETES MELLITUS WITHOUT COMPLICATIONS: ICD-10-CM

## 2023-01-10 DIAGNOSIS — R07.9 CHEST PAIN, UNSPECIFIED: ICD-10-CM

## 2023-01-10 DIAGNOSIS — Z29.9 ENCOUNTER FOR PROPHYLACTIC MEASURES, UNSPECIFIED: ICD-10-CM

## 2023-01-10 LAB
ALBUMIN SERPL ELPH-MCNC: 3.2 G/DL — LOW (ref 3.3–5)
ALP SERPL-CCNC: 102 U/L — SIGNIFICANT CHANGE UP (ref 40–120)
ALT FLD-CCNC: 10 U/L — SIGNIFICANT CHANGE UP (ref 10–45)
ANION GAP SERPL CALC-SCNC: 12 MMOL/L — SIGNIFICANT CHANGE UP (ref 5–17)
APTT BLD: 32.2 SEC — SIGNIFICANT CHANGE UP (ref 27.5–35.5)
AST SERPL-CCNC: 9 U/L — LOW (ref 10–40)
BASE EXCESS BLDV CALC-SCNC: -2.3 MMOL/L — LOW (ref -2–3)
BASOPHILS # BLD AUTO: 0.02 K/UL — SIGNIFICANT CHANGE UP (ref 0–0.2)
BASOPHILS NFR BLD AUTO: 0.2 % — SIGNIFICANT CHANGE UP (ref 0–2)
BILIRUB SERPL-MCNC: <0.1 MG/DL — LOW (ref 0.2–1.2)
BLD GP AB SCN SERPL QL: NEGATIVE — SIGNIFICANT CHANGE UP
BUN SERPL-MCNC: 59 MG/DL — HIGH (ref 7–23)
CA-I SERPL-SCNC: 1.2 MMOL/L — SIGNIFICANT CHANGE UP (ref 1.15–1.33)
CALCIUM SERPL-MCNC: 8.6 MG/DL — SIGNIFICANT CHANGE UP (ref 8.4–10.5)
CHLORIDE BLDV-SCNC: 111 MMOL/L — HIGH (ref 96–108)
CHLORIDE SERPL-SCNC: 106 MMOL/L — SIGNIFICANT CHANGE UP (ref 96–108)
CO2 BLDV-SCNC: 25 MMOL/L — SIGNIFICANT CHANGE UP (ref 22–26)
CO2 SERPL-SCNC: 21 MMOL/L — LOW (ref 22–31)
CREAT SERPL-MCNC: 4.79 MG/DL — HIGH (ref 0.5–1.3)
EGFR: 10 ML/MIN/1.73M2 — LOW
EOSINOPHIL # BLD AUTO: 0.27 K/UL — SIGNIFICANT CHANGE UP (ref 0–0.5)
EOSINOPHIL NFR BLD AUTO: 2 % — SIGNIFICANT CHANGE UP (ref 0–6)
FLUAV AG NPH QL: SIGNIFICANT CHANGE UP
FLUBV AG NPH QL: SIGNIFICANT CHANGE UP
GAS PNL BLDV: 142 MMOL/L — SIGNIFICANT CHANGE UP (ref 136–145)
GAS PNL BLDV: SIGNIFICANT CHANGE UP
GAS PNL BLDV: SIGNIFICANT CHANGE UP
GLUCOSE BLDV-MCNC: 93 MG/DL — SIGNIFICANT CHANGE UP (ref 70–99)
GLUCOSE SERPL-MCNC: 101 MG/DL — HIGH (ref 70–99)
HCG SERPL-ACNC: 2.7 MIU/ML — SIGNIFICANT CHANGE UP
HCO3 BLDV-SCNC: 24 MMOL/L — SIGNIFICANT CHANGE UP (ref 22–29)
HCT VFR BLD CALC: 12.9 % — CRITICAL LOW (ref 34.5–45)
HCT VFR BLD CALC: 13.5 % — CRITICAL LOW (ref 34.5–45)
HCT VFR BLDA CALC: 11 % — CRITICAL LOW (ref 34.5–46.5)
HGB BLD CALC-MCNC: <4 G/DL — CRITICAL LOW (ref 11.7–16.1)
HGB BLD-MCNC: 3.5 G/DL — CRITICAL LOW (ref 11.5–15.5)
HGB BLD-MCNC: 3.5 G/DL — CRITICAL LOW (ref 11.5–15.5)
IMM GRANULOCYTES NFR BLD AUTO: 0.6 % — SIGNIFICANT CHANGE UP (ref 0–0.9)
INR BLD: 1.11 RATIO — SIGNIFICANT CHANGE UP (ref 0.88–1.16)
LACTATE BLDV-MCNC: 0.9 MMOL/L — SIGNIFICANT CHANGE UP (ref 0.5–2)
LYMPHOCYTES # BLD AUTO: 1.15 K/UL — SIGNIFICANT CHANGE UP (ref 1–3.3)
LYMPHOCYTES # BLD AUTO: 8.7 % — LOW (ref 13–44)
MCHC RBC-ENTMCNC: 21.9 PG — LOW (ref 27–34)
MCHC RBC-ENTMCNC: 22.6 PG — LOW (ref 27–34)
MCHC RBC-ENTMCNC: 25.9 GM/DL — LOW (ref 32–36)
MCHC RBC-ENTMCNC: 27.1 G/DL — LOW (ref 32–36)
MCV RBC AUTO: 83.2 FL — SIGNIFICANT CHANGE UP (ref 80–100)
MCV RBC AUTO: 84.4 FL — SIGNIFICANT CHANGE UP (ref 80–100)
MONOCYTES # BLD AUTO: 0.93 K/UL — HIGH (ref 0–0.9)
MONOCYTES NFR BLD AUTO: 7 % — SIGNIFICANT CHANGE UP (ref 2–14)
NEUTROPHILS # BLD AUTO: 10.84 K/UL — HIGH (ref 1.8–7.4)
NEUTROPHILS NFR BLD AUTO: 81.5 % — HIGH (ref 43–77)
NRBC # BLD: 0 /100 WBCS — SIGNIFICANT CHANGE UP (ref 0–0)
NRBC # BLD: 0 /100 WBCS — SIGNIFICANT CHANGE UP (ref 0–0)
NT-PROBNP SERPL-SCNC: 3505 PG/ML — HIGH (ref 0–300)
PCO2 BLDV: 48 MMHG — HIGH (ref 39–42)
PH BLDV: 7.3 — LOW (ref 7.32–7.43)
PLATELET # BLD AUTO: 294 K/UL — SIGNIFICANT CHANGE UP (ref 150–400)
PLATELET # BLD AUTO: 302 K/UL — SIGNIFICANT CHANGE UP (ref 150–400)
PO2 BLDV: 33 MMHG — SIGNIFICANT CHANGE UP (ref 25–45)
POTASSIUM BLDV-SCNC: 5.1 MMOL/L — SIGNIFICANT CHANGE UP (ref 3.5–5.1)
POTASSIUM SERPL-MCNC: 4.7 MMOL/L — SIGNIFICANT CHANGE UP (ref 3.5–5.3)
POTASSIUM SERPL-SCNC: 4.7 MMOL/L — SIGNIFICANT CHANGE UP (ref 3.5–5.3)
PROT SERPL-MCNC: 6.7 G/DL — SIGNIFICANT CHANGE UP (ref 6–8.3)
PROTHROM AB SERPL-ACNC: 12.8 SEC — SIGNIFICANT CHANGE UP (ref 10.5–13.4)
RBC # BLD: 1.55 M/UL — LOW (ref 3.8–5.2)
RBC # BLD: 1.6 M/UL — LOW (ref 3.8–5.2)
RBC # FLD: 18.7 % — HIGH (ref 10.3–14.5)
RBC # FLD: 19.2 % — HIGH (ref 10.3–14.5)
RH IG SCN BLD-IMP: POSITIVE — SIGNIFICANT CHANGE UP
RSV RNA NPH QL NAA+NON-PROBE: SIGNIFICANT CHANGE UP
SAO2 % BLDV: 54.9 % — LOW (ref 67–88)
SARS-COV-2 RNA SPEC QL NAA+PROBE: SIGNIFICANT CHANGE UP
SODIUM SERPL-SCNC: 139 MMOL/L — SIGNIFICANT CHANGE UP (ref 135–145)
TROPONIN T, HIGH SENSITIVITY RESULT: 29 NG/L — SIGNIFICANT CHANGE UP (ref 0–51)
TROPONIN T, HIGH SENSITIVITY RESULT: 33 NG/L — SIGNIFICANT CHANGE UP (ref 0–51)
WBC # BLD: 13.29 K/UL — HIGH (ref 3.8–10.5)
WBC # BLD: 13.64 K/UL — HIGH (ref 3.8–10.5)
WBC # FLD AUTO: 13.29 K/UL — HIGH (ref 3.8–10.5)
WBC # FLD AUTO: 13.64 K/UL — HIGH (ref 3.8–10.5)

## 2023-01-10 PROCEDURE — 99213 OFFICE O/P EST LOW 20 MIN: CPT

## 2023-01-10 PROCEDURE — 99223 1ST HOSP IP/OBS HIGH 75: CPT

## 2023-01-10 PROCEDURE — 71045 X-RAY EXAM CHEST 1 VIEW: CPT | Mod: 26

## 2023-01-10 PROCEDURE — 99285 EMERGENCY DEPT VISIT HI MDM: CPT

## 2023-01-10 RX ORDER — FERROUS SULFATE 325(65) MG
325 TABLET ORAL DAILY
Refills: 0 | Status: DISCONTINUED | OUTPATIENT
Start: 2023-01-10 | End: 2023-01-12

## 2023-01-10 RX ORDER — PANTOPRAZOLE SODIUM 20 MG/1
40 TABLET, DELAYED RELEASE ORAL EVERY 12 HOURS
Refills: 0 | Status: DISCONTINUED | OUTPATIENT
Start: 2023-01-10 | End: 2023-01-12

## 2023-01-10 RX ORDER — BUMETANIDE 0.25 MG/ML
1 INJECTION INTRAMUSCULAR; INTRAVENOUS
Refills: 0 | Status: DISCONTINUED | OUTPATIENT
Start: 2023-01-10 | End: 2023-01-12

## 2023-01-10 RX ORDER — LANOLIN ALCOHOL/MO/W.PET/CERES
3 CREAM (GRAM) TOPICAL AT BEDTIME
Refills: 0 | Status: DISCONTINUED | OUTPATIENT
Start: 2023-01-10 | End: 2023-01-12

## 2023-01-10 NOTE — H&P ADULT - PROBLEM SELECTOR PLAN 3
Reportedly follows w/ Dr Musa Cedillo around baseline  - resume home bumex in AM w/ hold parameters Likely 2/2 anemia, currently resolved  - EKG not filed by ED but reportedly sinus tachycardia, , TWI V4-V6  - troponin 33->29  - Transfuse PRBC as above  - monitor for recurrent chest pain

## 2023-01-10 NOTE — H&P ADULT - HISTORY OF PRESENT ILLNESS
Patient is a 52y Female with PMH of HTN, diet-controlled DM2, CKD, IgA nephropathy, and chronic anemia 2/2 CKD and jejunal AVMs with frequent admissions requiring PRBC transfusions, presenting to the ED with fatigue and ARIAS x3-4 days. Patient states her symptoms started on Saturday including generalized weakness, fatigue, dizziness, and ARIAS. They have progressively gotten worse. She just returned from [place] on yesterday, had to take a wheelchair in the airport because she was so fatigued. She saw her hematologist Dr eCron today and was found to have a Hgb 3.5, sent to the ED. Her baseline Hgb is around 8. She has also noticed some dark stools which she attributes to iron pills. She also complains of some mild chest "heaviness." Patient denies zeeshan bleeding, fever, chills, abdominal pain, nausea, vomiting, changes in bowel habits, or urinary symptoms.     In the ED, afebrile, , /72, SpO2 100% 4L NC. Labs significant for Hgb 3.5, trop 33->29. COVID/RSV/Flu negative. CXR demonstrates clear lungs. 2U PRBC ordered in the ED.

## 2023-01-10 NOTE — H&P ADULT - NSHPLABSRESULTS_GEN_ALL_CORE
Imaging personally reviewed by me including:      - CXR: clear lungs    Records from prior hospitalization reviewed.     Outpatient records reviewed in HIE.

## 2023-01-10 NOTE — H&P ADULT - NSHPREVIEWOFSYSTEMS_GEN_ALL_CORE
General: no weight change, no fever, no chills, no night sweats, + fatigue  Skin: no rash, no itching, no dryness, no hair loss  Opthalmologic: no eye pain, no eye redness, no eye swelling, no vision changes  ENMT: no hearing changes, no ear pain, no tinnitus, no vertigo, no nasal congestion, no sore throat, no dysphagia  Respiratory and thorax: + shortness of breath, no cough, no wheezing, no hemoptysis, no pleuritic chest pain  Cardiovascular: + chest pain, + dyspnea on exertion, no orthopnea, no palpitations, no peripheral edema  Gastrointestinal: no abdominal pain, no nausea, no vomiting, no diarrhea, no constipation  Genitourinary: no dysuria, no urinary frequency or hesitancy, no hematuria  Musculoskeletal: no pain, no lower extremity edema, no traumatic injury  Neurological: no weakness, no numbness, no loss of consciousness, no syncope, + dizziness, no headache  Psychiatric: no depression, no anxiety, no mood swings  Endo: no heat or cold intolerance, no hirsutism, no polyuria, no polydipsia

## 2023-01-10 NOTE — H&P ADULT - PROBLEM SELECTOR PLAN 5
Diet: clear liquid  DVT Prophylaxis: SCDs  Full code - diet, controlled, glucose currently in acceptable range  - continue to monitor glucose, can start ISS if goes out of range  - f/u A1c

## 2023-01-10 NOTE — ED PROVIDER NOTE - NS ED ATTENDING STATEMENT MOD
This was a shared visit with the AYLEEN. I reviewed and verified the documentation and independently performed the documented:

## 2023-01-10 NOTE — H&P ADULT - NSHPPHYSICALEXAM_GEN_ALL_CORE
T(C): 36.7 (01-10-23 @ 20:29), Max: 37.3 (01-10-23 @ 20:10)  HR: 105 (01-10-23 @ 20:29) (100 - 105)  BP: 100/72 (01-10-23 @ 20:29) (100/72 - 149/93)  RR: 20 (01-10-23 @ 20:29) (18 - 20)  SpO2: 100% (01-10-23 @ 20:29) (100% - 100%)    Constitutional: no acute distress, laying in bed comfortably  ENMT: atraumatic, normocephalic, no lymphadenopathy  Eyes: pupils equally round and reactive to light, extraocular muscles intact, no conjunctival injection  Cardio: regular rate and rhythm, normal S1/S2, no murmurs, rubs, or gallops  Respiratory: lungs clear to auscultation bilaterally, no rales, rhonchi, or wheezes  GI: soft, nontender, nondistended, BSx4  MSK: extremities atraumatic, no cyanosis or clubbing  Skin: warm, dry, no rashes or lesions  Neuro: no focal deficits, sensation grossly intact  Psych: alert and oriented x3, normal behavior, appropriate mood and affect

## 2023-01-10 NOTE — H&P ADULT - PROBLEM SELECTOR PLAN 1
Multiple presentations this year with Hb 3.9 on 11/4/22, 4.8 on 9/20/22, 4.7 on 7/19/22, and 3.7 on 2/2/22, and 4.1 on 12/5. Current admission 3.5. Patient has known AVM and underwent multiple endoscopic interventions, most recently ablation of jejunal angiectasias at [place] 10/22. likely multifactorial as patient also has CKD from IgA nephropathy and on erythropoietin injections weekly outpatient (though has missed multiple recent doses)  - 2U PRBC ordered by the ED  - f/u posttransfusion CBC  - transfuse for Hgb >8  - clear liquid diet  - IV PPI BID  - keep type and screen active  - maintain 2 large bore IVs  - SCDs for DVT Ppx  - GI consult emailed  - Hematology consult emailed by the ED

## 2023-01-10 NOTE — ED PROVIDER NOTE - ATTENDING APP SHARED VISIT CONTRIBUTION OF CARE
RGUJRAL 52-year-old female with history listed presents with anemia.  Patient states that since Saturday she has been feeling generalized weakness and dyspnea on exertion. Pt seen at [facility] noted to have low hemoglobin and sent in for admission.  Patient denies any chest pain. Endorses dizziness and palpitations.  Denies any fever chills nausea vomiting diarrhea. Pt has dark stools at baseline no increased melena or bright red blood per rectum.  Patient had last blood transfusion end of December.  On exam, Patient is awake,alert,oriented x 3. Patient is well appearing and in no acute distress. Pale conjunctiva. Patient's chest is clear to ausculation, +s1s2. Abdomen is soft nd/nt +BS. Extremity with no swelling or calf tenderness.   Check labs, type and screen to eval for anemia. Transfuse PRBC and admit.   Pt has history of CKD on Bumex at home, took her medication this morning.

## 2023-01-10 NOTE — ED PROVIDER NOTE - PHYSICAL EXAMINATION
CONSTITUTIONAL: Patient is awake, alert and oriented x 3. Patient is well appearing and in no acute distress  HEAD: NCAT  EYES: PERRL b/l, EOMI  ENT: Airway patent, Nasal mucosa clear. Mouth with normal mucosa. Throat has no vesicles, no oropharyngeal exudates and uvula is midline  NECK: supple, FROM  LUNGS: CTA b/l, no wheezing or rales   HEART: RRR.+S1S2 no murmurs  ABDOMEN: Soft, non-distended, nttp,  no rebound or guarding  EXTREMITY: no edema or calf tenderness b/l, FROM upper and lower ext b/l  SKIN: with no rash or lesions  NEURO: Cn3-12 grossly intact. Strength 5/5 UE/LE b/l .Nml gross sensation UE/LE b/l. Gait normal CONSTITUTIONAL: Patient is awake, alert and oriented x 3. Patient is well appearing and in no acute distress  HEAD: NCAT  EYES: PERRL b/l, EOMI, Pale mucosa  LUNGS: CTA b/l, no wheezing or rales   HEART: RRR.+S1S2 no murmurs  ABDOMEN: Soft, non-distended, nttp,  no rebound or guarding  EXTREMITY: no edema or calf tenderness b/l, FROM upper and lower ext b/l  SKIN: with no rash or lesions  NEURO: No focal deficits

## 2023-01-10 NOTE — ED ADULT NURSE REASSESSMENT NOTE - NS ED NURSE REASSESS COMMENT FT1
First encounter with the pt. Pt was made aware of the ordered plan of care by the MD. Pt was verbally abusive and upset that she was waiting for the ordered workup. Pt was explained that the covering nurse was dealing with an high acuity patient that required her attention. Pt did allow myself to start an IV but only in her right AC. Pt was told that her desired spot was not ideal due to feeling a lot of scar tissue. First IV attempted was unsuccessful. Pt then demanded to speak with the manager. The pt was made aware that the manager is aware and will be coming to speak with the pt. The pt demanded an ultrasound line to be started. Pt refused all repeat vitals and Covid swab until her IV was started. MD aware and the talked to the pt. The pt then allowed a second nurse to attempt her IV. IV was started and nasal swab collected.

## 2023-01-10 NOTE — H&P ADULT - ASSESSMENT
Patient is a 52y Female with PMH of HTN, diet-controlled DM2, CKD, IgA nephropathy, and chronic anemia 2/2 CKD and jejunal AVMs with frequent admissions requiring PRBC transfusions, presenting to the ED with fatigue and ARIAS x3-4 days, Hgb 3.5 on outpatient labs, admitted to medicine for acute on chronic anemia.

## 2023-01-10 NOTE — ED ADULT NURSE REASSESSMENT NOTE - NS ED NURSE REASSESS COMMENT FT1
Received patient from Sonali RN, patient at AxOx4 mental status, able to make needs known, NAD, VSS, patient agreeable to plan of care, 1 unit prbc given. Consent in chart. Risks and benefits explained to patient. Patient verbalized understanding of risks and benefits. Patient aware of possible side effects. Vital signs stable. Second RN Mitra at bedside for confirmation.

## 2023-01-10 NOTE — H&P ADULT - PROBLEM SELECTOR PLAN 4
- diet, controlled, glucose currently in acceptable range  - continue to monitor glucose, can start ISS if goes out of range  - f/u A1c Reportedly follows w/ Dr Musa Cedillo around baseline  - resume home bumex in AM w/ hold parameters

## 2023-01-10 NOTE — ED PROVIDER NOTE - PROGRESS NOTE DETAILS
Spoke to heme fellow Dr. Sen via teams messenger who advised to email heme service for full consult in am. Email sent. Spoke to pt PCP Dr. Cannon who accepted admission   Enid Dan PA-C Pt reassessed, states she feels better, HR . Will transfuse 1unit over 2 hours. Continue to monitor. UJRAL

## 2023-01-10 NOTE — ED ADULT NURSE NOTE - OBJECTIVE STATEMENT
First encounter with the pt, pt received from triage with complaints of being by pcp for low HGB.. Pt is a/ox4 and verbal. Speech is clear and able to speak in full sentences without distress. Airway is patent with no obstruction nor blocking secretions. Breathing is even and unlabored on room air. Pt has strong pulses palpated bilaterally.  Neuro check is wnl. Full rom. Pt denies chest pain, n/v and sob. No acute distress noted. Pt has call light within the reach of the pt at the bedside. Will continue to monitor the pt.

## 2023-01-10 NOTE — H&P ADULT - PROBLEM SELECTOR PLAN 2
Hx of AVMs in past s/p multiple push endoscopies and capsule studies. Reportedly Jejunal AVMs. Concern for recurrent bleeds.  - Will start PPI IV BID for now  - Trend cbc s/p transfusion  - GI consult e-mailed  - Clear liquid diet for now.

## 2023-01-10 NOTE — ED PROVIDER NOTE - OBJECTIVE STATEMENT
52 year old female with PMHx of HTN, DM (diet controlled), IgA nephropathy, chronic anemia 2/2 CKD and jejunal AVMs requiring multiple endoscopies w/ intervention presents to ED from hematology office complaining of fatigue and hemoglobin of 3.5.  Patient reports that she recently was on a vacation in [place] and upon returning noticed that she was feeling more fatigued with dyspnea on exertion and generalized weakness.  She had a visit with her hematologist Dr. Ceron today and had blood work done which revealed a hemoglobin of 3.5.  Patient states that her baseline hemoglobin is typically around 8 and she has had multiple transfusions in the past.  She notes chronic dark stool which she attributes to iron supplementation. She reports that she had an endoscopy approximately 4 weeks ago without concerning findings.  She reports that today she noticed mild chest pressure as well.  She states that she had a conversation with her hematologist who recommended admission to the hospital for multiple transfusions. She denies HA, dizziness, abd pain, n/v

## 2023-01-11 ENCOUNTER — TRANSCRIPTION ENCOUNTER (OUTPATIENT)
Age: 53
End: 2023-01-11

## 2023-01-11 LAB
A1C WITH ESTIMATED AVERAGE GLUCOSE RESULT: 5 % — SIGNIFICANT CHANGE UP (ref 4–5.6)
ANION GAP SERPL CALC-SCNC: 13 MMOL/L — SIGNIFICANT CHANGE UP (ref 5–17)
BUN SERPL-MCNC: 60 MG/DL — HIGH (ref 7–23)
CALCIUM SERPL-MCNC: 8.4 MG/DL — SIGNIFICANT CHANGE UP (ref 8.4–10.5)
CHLORIDE SERPL-SCNC: 109 MMOL/L — HIGH (ref 96–108)
CO2 SERPL-SCNC: 19 MMOL/L — LOW (ref 22–31)
CREAT SERPL-MCNC: 5.06 MG/DL — HIGH (ref 0.5–1.3)
EGFR: 10 ML/MIN/1.73M2 — LOW
ESTIMATED AVERAGE GLUCOSE: 97 MG/DL — SIGNIFICANT CHANGE UP (ref 68–114)
GLUCOSE SERPL-MCNC: 134 MG/DL — HIGH (ref 70–99)
HAPTOGLOB SERPL-MCNC: 198 MG/DL — SIGNIFICANT CHANGE UP (ref 34–200)
HCT VFR BLD CALC: 19.5 % — CRITICAL LOW (ref 34.5–45)
HCT VFR BLD CALC: 21.5 % — LOW (ref 34.5–45)
HCT VFR BLD CALC: 24.4 % — LOW (ref 34.5–45)
HGB BLD-MCNC: 5.7 G/DL — CRITICAL LOW (ref 11.5–15.5)
HGB BLD-MCNC: 6.5 G/DL — CRITICAL LOW (ref 11.5–15.5)
HGB BLD-MCNC: 7.6 G/DL — LOW (ref 11.5–15.5)
LDH SERPL L TO P-CCNC: 195 U/L — SIGNIFICANT CHANGE UP (ref 50–242)
MAGNESIUM SERPL-MCNC: 2.2 MG/DL — SIGNIFICANT CHANGE UP (ref 1.6–2.6)
MCHC RBC-ENTMCNC: 24.8 PG — LOW (ref 27–34)
MCHC RBC-ENTMCNC: 25.4 PG — LOW (ref 27–34)
MCHC RBC-ENTMCNC: 26.3 PG — LOW (ref 27–34)
MCHC RBC-ENTMCNC: 29.2 GM/DL — LOW (ref 32–36)
MCHC RBC-ENTMCNC: 30.2 GM/DL — LOW (ref 32–36)
MCHC RBC-ENTMCNC: 31.1 GM/DL — LOW (ref 32–36)
MCV RBC AUTO: 84 FL — SIGNIFICANT CHANGE UP (ref 80–100)
MCV RBC AUTO: 84.4 FL — SIGNIFICANT CHANGE UP (ref 80–100)
MCV RBC AUTO: 84.8 FL — SIGNIFICANT CHANGE UP (ref 80–100)
NRBC # BLD: 0 /100 WBCS — SIGNIFICANT CHANGE UP (ref 0–0)
PHOSPHATE SERPL-MCNC: 5.7 MG/DL — HIGH (ref 2.5–4.5)
PLATELET # BLD AUTO: 288 K/UL — SIGNIFICANT CHANGE UP (ref 150–400)
PLATELET # BLD AUTO: 296 K/UL — SIGNIFICANT CHANGE UP (ref 150–400)
PLATELET # BLD AUTO: 296 K/UL — SIGNIFICANT CHANGE UP (ref 150–400)
POTASSIUM SERPL-MCNC: 5.4 MMOL/L — HIGH (ref 3.5–5.3)
POTASSIUM SERPL-SCNC: 5.4 MMOL/L — HIGH (ref 3.5–5.3)
RBC # BLD: 2.3 M/UL — LOW (ref 3.8–5.2)
RBC # BLD: 2.3 M/UL — LOW (ref 3.8–5.2)
RBC # BLD: 2.56 M/UL — LOW (ref 3.8–5.2)
RBC # BLD: 2.89 M/UL — LOW (ref 3.8–5.2)
RBC # FLD: 15.1 % — HIGH (ref 10.3–14.5)
RBC # FLD: 15.5 % — HIGH (ref 10.3–14.5)
RBC # FLD: 16.3 % — HIGH (ref 10.3–14.5)
RETICS #: 116.4 K/UL — SIGNIFICANT CHANGE UP (ref 25–125)
RETICS/RBC NFR: 5.1 % — HIGH (ref 0.5–2.5)
SODIUM SERPL-SCNC: 141 MMOL/L — SIGNIFICANT CHANGE UP (ref 135–145)
WBC # BLD: 14.26 K/UL — HIGH (ref 3.8–10.5)
WBC # BLD: 14.46 K/UL — HIGH (ref 3.8–10.5)
WBC # BLD: 15.57 K/UL — HIGH (ref 3.8–10.5)
WBC # FLD AUTO: 14.26 K/UL — HIGH (ref 3.8–10.5)
WBC # FLD AUTO: 14.46 K/UL — HIGH (ref 3.8–10.5)
WBC # FLD AUTO: 15.57 K/UL — HIGH (ref 3.8–10.5)

## 2023-01-11 PROCEDURE — 99223 1ST HOSP IP/OBS HIGH 75: CPT | Mod: GC

## 2023-01-11 PROCEDURE — 99223 1ST HOSP IP/OBS HIGH 75: CPT

## 2023-01-11 RX ORDER — ACETAMINOPHEN 500 MG
325 TABLET ORAL EVERY 4 HOURS
Refills: 0 | Status: DISCONTINUED | OUTPATIENT
Start: 2023-01-11 | End: 2023-01-12

## 2023-01-11 RX ORDER — IRON SUCROSE 20 MG/ML
200 INJECTION, SOLUTION INTRAVENOUS ONCE
Refills: 0 | Status: COMPLETED | OUTPATIENT
Start: 2023-01-11 | End: 2023-01-11

## 2023-01-11 RX ORDER — FUROSEMIDE 40 MG
20 TABLET ORAL ONCE
Refills: 0 | Status: DISCONTINUED | OUTPATIENT
Start: 2023-01-11 | End: 2023-01-11

## 2023-01-11 RX ORDER — BUMETANIDE 0.25 MG/ML
1 INJECTION INTRAMUSCULAR; INTRAVENOUS ONCE
Refills: 0 | Status: COMPLETED | OUTPATIENT
Start: 2023-01-11 | End: 2023-01-11

## 2023-01-11 RX ORDER — INFLUENZA VIRUS VACCINE 15; 15; 15; 15 UG/.5ML; UG/.5ML; UG/.5ML; UG/.5ML
0.5 SUSPENSION INTRAMUSCULAR ONCE
Refills: 0 | Status: DISCONTINUED | OUTPATIENT
Start: 2023-01-11 | End: 2023-01-12

## 2023-01-11 RX ADMIN — IRON SUCROSE 110 MILLIGRAM(S): 20 INJECTION, SOLUTION INTRAVENOUS at 17:56

## 2023-01-11 RX ADMIN — BUMETANIDE 1 MILLIGRAM(S): 0.25 INJECTION INTRAMUSCULAR; INTRAVENOUS at 06:52

## 2023-01-11 RX ADMIN — PANTOPRAZOLE SODIUM 40 MILLIGRAM(S): 20 TABLET, DELAYED RELEASE ORAL at 17:56

## 2023-01-11 RX ADMIN — BUMETANIDE 1 MILLIGRAM(S): 0.25 INJECTION INTRAMUSCULAR; INTRAVENOUS at 17:57

## 2023-01-11 RX ADMIN — BUMETANIDE 1 MILLIGRAM(S): 0.25 INJECTION INTRAMUSCULAR; INTRAVENOUS at 11:51

## 2023-01-11 RX ADMIN — PANTOPRAZOLE SODIUM 40 MILLIGRAM(S): 20 TABLET, DELAYED RELEASE ORAL at 05:27

## 2023-01-11 NOTE — CONSULT NOTE ADULT - SUBJECTIVE AND OBJECTIVE BOX
NEPHROLOGY - NSN    Patient seen and examined.    HPI:  Patient is a 52y Female with PMH of HTN, diet-controlled DM2, CKD, IgA nephropathy, and chronic anemia 2/2 CKD and jejunal AVMs with frequent admissions requiring PRBC transfusions, presenting to the ED with fatigue and ARIAS x3-4 days. Patient states her symptoms started on Saturday including generalized weakness, fatigue, dizziness, and ARIAS. They have progressively gotten worse. She just returned from [place] on yesterday, had to take a wheelchair in the airport because she was so fatigued. She saw her hematologist Dr Ceron today and was found to have a Hgb 3.5, sent to the ED. Her baseline Hgb is around 8. She has also noticed some dark stools which she attributes to iron pills. She also complains of some mild chest "heaviness." Patient denies zeeshan bleeding, fever, chills, abdominal pain, nausea, vomiting, changes in bowel habits, or urinary symptoms.     In the ED, afebrile, , /72, SpO2 100% 4L NC. Labs significant for Hgb 3.5, trop 33->29. COVID/RSV/Flu negative. CXR demonstrates clear lungs. 2U PRBC ordered in the ED.  (10 Deniz 2023 21:14)  Renal bx was 3/2020 and she has advanced IGA and no cresents seen.  Baseline cre about 3.5  As outpt was getting blood xfusion every 3-4 weeks       PAST MEDICAL & SURGICAL HISTORY:  Anemia      IgA nephropathy determined by renal biopsy      DM (diabetes mellitus), type 2      Benign essential hypertension      History of endometriosis      H/O left breast biopsy      H/O laparoscopy          MEDICATIONS  (STANDING):  buMETAnide 1 milliGRAM(s) Oral two times a day  ferrous    sulfate 325 milliGRAM(s) Oral daily  pantoprazole  Injectable 40 milliGRAM(s) IV Push every 12 hours      Allergies    ACE inhibitors (Short breath)    Intolerances        SOCIAL HISTORY:  Denies alcohol abuse, drug abuse or tobacco usage.     FAMILY HISTORY:  FH: diabetes mellitus    FH: colon cancer (Sibling)  dx at age 50        VITALS:  T(C): 36.7 (01-11-23 @ 05:50), Max: 37.3 (01-10-23 @ 20:10)  HR: 91 (01-11-23 @ 05:50) (91 - 105)  BP: 127/77 (01-11-23 @ 05:50) (100/72 - 149/93)  RR: 18 (01-11-23 @ 05:50) (18 - 20)  SpO2: 100% (01-11-23 @ 05:50) (100% - 100%)    REVIEW OF SYSTEMS:  Denies any nausea, vomiting, diarrhea, fever or chills. Denies chest pain, SOB, focal weakness, hematuria or dysuria. Good oral intake and denies fatigue or weakness. All other pertinent systems are reviewed and are negative.    PHYSICAL EXAM:  Constitutional: NAD  HEENT: EOMI  Neck:  No JVD, supple   Respiratory: CTA B/L  Cardiovascular: S1 and S2, RRR  Gastrointestinal: + BS, soft, NT, ND  Extremities: No peripheral edema, + peripheral pulses  Neurological: A/O x 3, CN2-12 intact  Psychiatric: Normal mood, normal affect  : No Hernández  Skin: No rashes, C/D/I  Access: Not applicable    I and O's:    Height (cm): 162.6 (01-10 @ 15:57)  Weight (kg): 93 (01-10 @ 15:57)  BMI (kg/m2): 35.2 (01-10 @ 15:57)  BSA (m2): 1.98 (01-10 @ 15:57)    LABS:                        5.7    14.26 )-----------( 296      ( 11 Jan 2023 07:16 )             19.5     01-11    141  |  109<H>  |  60<H>  ----------------------------<  134<H>  5.4<H>   |  19<L>  |  5.06<H>    Ca    8.4      11 Jan 2023 07:15  Phos  5.7     01-11  Mg     2.2     01-11    TPro  6.7  /  Alb  3.2<L>  /  TBili  <0.1<L>  /  DBili  x   /  AST  9<L>  /  ALT  10  /  AlkPhos  102  01-10      URINE:      RADIOLOGY & ADDITIONAL STUDIES:    < from: Xray Chest 1 View AP/PA (01.10.23 @ 17:40) >    ******PRELIMINARY REPORT******      ******PRELIMINARY REPORT******       ACC: [id number] EXAM:  XR CHEST AP OR PA 1V                          PROCEDURE DATE:  01/10/2023    ******PRELIMINARY REPORT******      ******PRELIMINARY REPORT******           INTERPRETATION:  The heart is mildly enlarged.  No focal consolidation, pneumothorax, or pleural effusion.        ******PRELIMINARY REPORT******      ******PRELIMINARY REPORT******        DIRK GARCIA MD; Resident Radiologist  This document is a PRELIMINARY interpretation and is pending final   attending approval. Deniz 10 2023  5:43PM    < end of copied text >   NEPHROLOGY - NSN    Patient seen and examined.    HPI:  Patient is a 52y Female with PMH of HTN, diet-controlled DM2, CKD, IgA nephropathy, and chronic anemia 2/2 CKD and jejunal AVMs with frequent admissions requiring PRBC transfusions, presenting to the ED with fatigue and ARIAS x3-4 days. Patient states her symptoms started on Saturday including generalized weakness, fatigue, dizziness, and ARIAS. They have progressively gotten worse. She just returned from [place] on yesterday, had to take a wheelchair in the airport because she was so fatigued. She saw her hematologist Dr Ceron today and was found to have a Hgb 3.5, sent to the ED. Her baseline Hgb is around 8. She has also noticed some dark stools which she attributes to iron pills. She also complains of some mild chest "heaviness." Patient denies zeeshan bleeding, fever, chills, abdominal pain, nausea, vomiting, changes in bowel habits, or urinary symptoms.     In the ED, afebrile, , /72, SpO2 100% 4L NC. Labs significant for Hgb 3.5, trop 33->29. COVID/RSV/Flu negative. CXR demonstrates clear lungs. 2U PRBC ordered in the ED.  (10 Deniz 2023 21:14)  Renal bx was 3/2020 and she has advanced IGA and no cresents seen.  Baseline cre about 3.5  As outpt was getting blood xfusion every 3-4 weeks       PAST MEDICAL & SURGICAL HISTORY:  Anemia      IgA nephropathy determined by renal biopsy      DM (diabetes mellitus), type 2      Benign essential hypertension      History of endometriosis      H/O left breast biopsy      H/O laparoscopy          MEDICATIONS  (STANDING):  buMETAnide 1 milliGRAM(s) Oral two times a day  ferrous    sulfate 325 milliGRAM(s) Oral daily  pantoprazole  Injectable 40 milliGRAM(s) IV Push every 12 hours      Allergies    ACE inhibitors (Short breath)    Intolerances        SOCIAL HISTORY:  Denies alcohol abuse, drug abuse or tobacco usage.     FAMILY HISTORY:  FH: diabetes mellitus    FH: colon cancer (Sibling)  dx at age 50        VITALS:  T(C): 36.7 (01-11-23 @ 05:50), Max: 37.3 (01-10-23 @ 20:10)  HR: 91 (01-11-23 @ 05:50) (91 - 105)  BP: 127/77 (01-11-23 @ 05:50) (100/72 - 149/93)  RR: 18 (01-11-23 @ 05:50) (18 - 20)  SpO2: 100% (01-11-23 @ 05:50) (100% - 100%)    REVIEW OF SYSTEMS:  Denies any nausea, vomiting, diarrhea, fever or chills. +  fatigue or weakness. All other pertinent systems are reviewed and are negative.    PHYSICAL EXAM:  Constitutional: NAD; obese   HEENT: EOMI  Neck:  No JVD, supple   Respiratory: CTA B/L  Cardiovascular: S1 and S2, RRR  Gastrointestinal: + BS, soft, NT, ND  Extremities: No peripheral edema, + peripheral pulses  Neurological: A/O x 3, CN2-12 intact  Psychiatric: Normal mood, normal affect  : No Hernández  Skin: No rashes, C/D/I  Access: Not applicable    I and O's:    Height (cm): 162.6 (01-10 @ 15:57)  Weight (kg): 93 (01-10 @ 15:57)  BMI (kg/m2): 35.2 (01-10 @ 15:57)  BSA (m2): 1.98 (01-10 @ 15:57)    LABS:                        5.7    14.26 )-----------( 296      ( 11 Jan 2023 07:16 )             19.5     01-11    141  |  109<H>  |  60<H>  ----------------------------<  134<H>  5.4<H>   |  19<L>  |  5.06<H>    Ca    8.4      11 Jan 2023 07:15  Phos  5.7     01-11  Mg     2.2     01-11    TPro  6.7  /  Alb  3.2<L>  /  TBili  <0.1<L>  /  DBili  x   /  AST  9<L>  /  ALT  10  /  AlkPhos  102  01-10      URINE:      RADIOLOGY & ADDITIONAL STUDIES:    < from: Xray Chest 1 View AP/PA (01.10.23 @ 17:40) >    ******PRELIMINARY REPORT******      ******PRELIMINARY REPORT******       ACC: [id number] EXAM:  XR CHEST AP OR PA 1V                          PROCEDURE DATE:  01/10/2023    ******PRELIMINARY REPORT******      ******PRELIMINARY REPORT******           INTERPRETATION:  The heart is mildly enlarged.  No focal consolidation, pneumothorax, or pleural effusion.        ******PRELIMINARY REPORT******      ******PRELIMINARY REPORT******        DIRK GARCIA MD; Resident Radiologist  This document is a PRELIMINARY interpretation and is pending final   attending approval. Deniz 10 2023  5:43PM    < end of copied text >

## 2023-01-11 NOTE — CONSULT NOTE ADULT - SUBJECTIVE AND OBJECTIVE BOX
HEMATOLOGY ONCOLOGY CONSULT     Patient is a 52y old  Female who presents with a chief complaint of anemia (11 Jan 2023 07:54)      HPI:  Patient is a 52y Female with PMH of HTN, diet-controlled DM2, CKD, IgA nephropathy, and chronic anemia 2/2 CKD and jejunal AVMs with frequent admissions requiring PRBC transfusions, presenting to the ED with fatigue and ARIAS x3-4 days. Patient states her symptoms started on Saturday including generalized weakness, fatigue, dizziness, and ARIAS. They have progressively gotten worse. She just returned from [place] on yesterday, had to take a wheelchair in the airport because she was so fatigued. She saw her hematologist Dr Ceron today and was found to have a Hgb 3.5, sent to the ED. Her baseline Hgb is around 8. She has also noticed some dark stools which she attributes to iron pills. She also complains of some mild chest "heaviness." Patient denies zeeshan bleeding, fever, chills, abdominal pain, nausea, vomiting, changes in bowel habits, or urinary symptoms.     In the ED, afebrile, , /72, SpO2 100% 4L NC. Labs significant for Hgb 3.5, trop 33->29. COVID/RSV/Flu negative. CXR demonstrates clear lungs. 2U PRBC ordered in the ED.  (10 Deniz 2023 21:14)       ROS negative except as indicated in the HPI.    PAST MEDICAL & SURGICAL HISTORY:  Anemia      IgA nephropathy determined by renal biopsy      DM (diabetes mellitus), type 2      Benign essential hypertension      History of endometriosis      H/O left breast biopsy      H/O laparoscopy          SOCIAL HISTORY:    FAMILY HISTORY:  FH: diabetes mellitus    FH: colon cancer (Sibling)  dx at age 50        MEDICATIONS  (STANDING):  buMETAnide 1 milliGRAM(s) Oral two times a day  ferrous    sulfate 325 milliGRAM(s) Oral daily  furosemide   Injectable 20 milliGRAM(s) IV Push once  pantoprazole  Injectable 40 milliGRAM(s) IV Push every 12 hours    MEDICATIONS  (PRN):  melatonin 3 milliGRAM(s) Oral at bedtime PRN Insomnia      Allergies    ACE inhibitors (Short breath)    Intolerances        Vital Signs Last 24 Hrs  T(C): 36.7 (11 Jan 2023 05:50), Max: 37.3 (10 Deniz 2023 20:10)  T(F): 98.1 (11 Jan 2023 05:50), Max: 99.1 (10 Deniz 2023 20:10)  HR: 91 (11 Jan 2023 05:50) (91 - 105)  BP: 127/77 (11 Jan 2023 05:50) (100/72 - 149/93)  BP(mean): --  RR: 18 (11 Jan 2023 05:50) (18 - 20)  SpO2: 100% (11 Jan 2023 05:50) (100% - 100%)    Parameters below as of 11 Jan 2023 05:50  Patient On (Oxygen Delivery Method): nasal cannula  O2 Flow (L/min): 4      PHYSICAL EXAM  General: adult in NAD  HEENT: clear oropharynx, anicteric sclera, pink conjunctiva  Neck: supple  CV: normal S1/S2 with no murmur rubs or gallops  Lungs: positive air movement b/l ant lungs, clear to auscultation, no wheezes, no rales  Abdomen: soft non-tender non-distended, no hepatosplenomegaly  Ext: no clubbing cyanosis or edema  Skin: no rashes and no petechiae  Neuro: alert and oriented X 4, no focal deficits      LABS:                          5.7    14.26 )-----------( 296      ( 11 Jan 2023 07:16 )             19.5         Mean Cell Volume : 84.8 fl  Mean Cell Hemoglobin : 24.8 pg  Mean Cell Hemoglobin Concentration : 29.2 gm/dL  Auto Neutrophil # : x  Auto Lymphocyte # : x  Auto Monocyte # : x  Auto Eosinophil # : x  Auto Basophil # : x  Auto Neutrophil % : x  Auto Lymphocyte % : x  Auto Monocyte % : x  Auto Eosinophil % : x  Auto Basophil % : x      01-11    141  |  109<H>  |  60<H>  ----------------------------<  134<H>  5.4<H>   |  19<L>  |  5.06<H>    Ca    8.4      11 Jan 2023 07:15  Phos  5.7     01-11  Mg     2.2     01-11    TPro  6.7  /  Alb  3.2<L>  /  TBili  <0.1<L>  /  DBili  x   /  AST  9<L>  /  ALT  10  /  AlkPhos  102  01-10          PT/INR - ( 10 Deniz 2023 18:37 )   PT: 12.8 sec;   INR: 1.11 ratio         PTT - ( 10 Deniz 2023 18:37 )  PTT:32.2 sec    Reticulocyte Percent: 5.1 % (01-11 @ 07:16)

## 2023-01-11 NOTE — DISCHARGE NOTE PROVIDER - NSDCMRMEDTOKEN_GEN_ALL_CORE_FT
bumetanide 1 mg oral tablet: 1 tab(s) orally 2 times a day  ferrous sulfate 325 mg (65 mg elemental iron) oral delayed release tablet: 1 tab(s) orally once a day  pantoprazole 40 mg oral delayed release tablet: 1 tab(s) orally 2 times a day   Procrit 20,000 units/mL injectable solution: 1 implant(s) injectable every 7 days Friday  SandoSTATIN: injectable every 4 weeks

## 2023-01-11 NOTE — PROGRESS NOTE ADULT - PROBLEM SELECTOR PLAN 3
Reportedly following at [facility] for transplant. Pt denies getting any active management on my discussion. Missed previously scheduled November appointment due to fatigue. Has another appointment scheduled December 20 something  -Cont. Bumex dose for now  -Needs to f/u with renal

## 2023-01-11 NOTE — DISCHARGE NOTE NURSING/CASE MANAGEMENT/SOCIAL WORK - PATIENT PORTAL LINK FT
You can access the FollowMyHealth Patient Portal offered by [facility] by registering at the following website: [email or web address]. By joining [employer]’s FollowMyHealth portal, you will also be able to view your health information using other applications (apps) compatible with our system.

## 2023-01-11 NOTE — PROGRESS NOTE ADULT - PROBLEM SELECTOR PLAN 4
CKD stable compared to November, however seems to be worsening compared to earlier in year.  -Renal dose medications  - EVELYN noted

## 2023-01-11 NOTE — PROGRESS NOTE ADULT - PROBLEM SELECTOR PLAN 1
Multifactorial given CKD  as well as extensive hx of prior GIB 2/2 AVMs. Has not been taking subq octreotide since November.  - On outpt Procrit 20,000U qweekly (has missed last 3 doses, got 1 dose today)  - C/w PO Fe supplementation for now  - Transfuse for goal hgb > 8-9, T&S  - regular diet   - hematology, gastroenterology recs appreciated

## 2023-01-11 NOTE — DISCHARGE NOTE PROVIDER - NSDCCPCAREPLAN_GEN_ALL_CORE_FT
PRINCIPAL DISCHARGE DIAGNOSIS  Diagnosis: Anemia  Assessment and Plan of Treatment: Please call your Dr. or report to the ER if you notice bleeding bright red from rectum or dark tarry stool. Please call your Dr. or report to the ER for persistent weakness, headache, nausea, vomiting, diarrhea, chest pain, shortness of breath. Please be sure to follow up with the Kidney and Hematology drs as directed.

## 2023-01-11 NOTE — CONSULT NOTE ADULT - ASSESSMENT
Patient is a 52y Female with PMH of HTN, diet-controlled DM2, CKD, IgA nephropathy, and chronic anemia 2/2 CKD and jejunal AVMs pw significant anemia   CKD stage 4 and now EVELYN;  IGA without cresents but significant damage     1 Renal DAPA-CKD has shown that SGLT-2 agents may help in this pt population but her GFR is too low.  At present her EVELYN is hemodynamic is nature  Due to advance renal failure this will take 48 hours before the creatinine starts to come down  Keep an eye on the potassium  Lokelma 10gm po x 1   2 Heme-Another 2-3 units of PRBC  3GI - Unfortunately acute blood loss anemia and significant drop in HH;  GI eval is pending     Sayed [facility]   [phone]

## 2023-01-11 NOTE — PROGRESS NOTE ADULT - SUBJECTIVE AND OBJECTIVE BOX
223    SUBJECTIVE / OVERNIGHT EVENTS: no events. Transfused overnight. Now on IV iron    MEDICATIONS  (STANDING):  buMETAnide 1 milliGRAM(s) Oral two times a day  iron sucrose IVPB 200 milliGRAM(s) IV Intermittent every 24 hours    MEDICATIONS  (PRN):  acetaminophen     Tablet .. 650 milliGRAM(s) Oral every 6 hours PRN Temp greater or equal to 38C (100.4F), Mild Pain (1 - 3)  melatonin 3 milliGRAM(s) Oral at bedtime PRN Insomnia  ondansetron Injectable 4 milliGRAM(s) IV Push every 8 hours PRN Nausea and/or Vomiting      CAPILLARY BLOOD GLUCOSE        I&O's Summary    06 Dec 2022 07:01  -  07 Dec 2022 07:00  --------------------------------------------------------  IN: 240 mL / OUT: 0 mL / NET: 240 mL    07 Dec 2022 07:01  -  07 Dec 2022 13:56  --------------------------------------------------------  IN: 100 mL / OUT: 0 mL / NET: 100 mL        PHYSICAL EXAM:  Vital Signs Last 24 Hrs  T(C): 36.6 (07 Dec 2022 11:32), Max: 36.9 (06 Dec 2022 16:12)  T(F): 97.9 (07 Dec 2022 11:32), Max: 98.4 (06 Dec 2022 16:12)  HR: 93 (07 Dec 2022 11:32) (89 - 93)  BP: 159/97 (07 Dec 2022 11:32) (146/88 - 159/97)  BP(mean): --  RR: 18 (07 Dec 2022 11:32) (17 - 18)  SpO2: 100% (07 Dec 2022 11:32) (99% - 100%)    Parameters below as of 07 Dec 2022 05:36  Patient On (Oxygen Delivery Method): room air      CONSTITUTIONAL: Well-groomed, in no apparent distress  EYES: No conjunctival or scleral injection, non-icteric; PERRLA and symmetric  ENMT: No external nasal lesions; no pharyngeal injection or exudates, oral mucosa with moist membranes  NECK: Trachea midline without palpable neck mass; thyroid not enlarged and non-tender  RESPIRATORY: Breathing comfortably; lungs CTA without wheeze/rhonchi/rales  CARDIOVASCULAR: +S1S2, RRR, no M/G/R; pedal pulses full and symmetric; no lower extremity edema  GASTROINTESTINAL: No palpable masses or tenderness, +BS throughout, no rebound/guarding; no hepatosplenomegaly; no hernia palpated  LYMPHATIC: No cervical LAD or tenderness; no axillary LAD or tenderness  MUSCULOSKELETAL: no digital clubbing or cyanosis; no paraspinal tenderness; normal strength and tone of extremities  SKIN: No rashes or ulcers noted; no subcutaneous nodules or induration palpable  NEUROLOGIC: CN II-XII intact; sensation intact in LEs b/l to light touch  PSYCHIATRIC: A+O x 3; mood and affect appropriate; appropriate insight and judgment    LABS:                        8.0    15.38 )-----------( 240      ( 07 Dec 2022 01:44 )             26.8     12-    141  |  108  |  64<H>  ----------------------------<  110<H>  4.5   |  22  |  4.80<H>    Ca    8.4      06 Dec 2022 07:33  Phos  4.0     12-05  Mg     2.4     12-    TPro  6.6  /  Alb  3.3  /  TBili  0.1<L>  /  DBili  x   /  AST  12  /  ALT  8<L>  /  AlkPhos  101  12-06    PT/INR - ( 05 Dec 2022 15:18 )   PT: 12.4 sec;   INR: 1.07 ratio         PTT - ( 05 Dec 2022 15:18 )  PTT:30.3 sec      Urinalysis Basic - ( 05 Dec 2022 20:35 )    Color: Yellow / Appearance: Clear / S.013 / pH: x  Gluc: x / Ketone: Negative  / Bili: Negative / Urobili: Negative   Blood: x / Protein: 300 mg/dL / Nitrite: Negative   Leuk Esterase: Negative / RBC: 3 /hpf / WBC 4 /HPF   Sq Epi: x / Non Sq Epi: 1 /hpf / Bacteria: Negative

## 2023-01-11 NOTE — CONSULT NOTE ADULT - ASSESSMENT
ROBERTO YOUNG is a 52y Female with a past medical history as described above who presented for evaluation of shortness of breath and fatigue. She was found to have a hemoglobin of 3.5 in the ED and received 2 units of packed red cells. Hematology consulted for anemia recommendations.       # Iron-deficiency Anemia, acute on chronic   - Has a history of multiple admissions and transfusions for similar presentations   - Check iron studies and soluble transferrin receptor  - May need IV iron (Venofer) depending on iron studies. Can check B12 and folate as well   - Hb 3.5 > 5.7 appropriate response  - Reticulocyte count inappropriately low, likely reflecting iron deficiency   - Procrit per nephrology   - Send FOBT. GI outpt for AVM management.   - Goals: Hb > 7.0, plt > 10K     Neil Simpson MD, PGY-4  Hematology/Medical Oncology Fellow  Pager: [phone]  Available on [employer] Teams  After 5pm or on weekends please contact  to page on-call fellow  ROBERTO YOUNG is a 52y Female with a past medical history as described above who presented for evaluation of shortness of breath and fatigue. She was found to have a hemoglobin of 3.5 in the ED and received 2 units of packed red cells. Hematology consulted for anemia recommendations.       # Iron-deficiency Anemia, acute on chronic  # Blood loss from GI AVM    - Has a history of multiple admissions and transfusions for similar presentations   - Check iron studies and soluble transferrin receptor  - Infuse IV iron (Venofer 200mg)  - Can check B12 and folate as well   - Hb 3.5 > 5.7 appropriate response. Ordered for 3rd unit.   - Reticulocyte count inappropriately low, likely reflecting iron deficiency   - Would rec Procrit inj at least 20,000 units weekly   - Send FOBT. GI outpt for AVM management.   - Goals: Hb > 7.0, plt > 10K     Neil Simpson MD, PGY-4  Hematology/Medical Oncology Fellow  Pager: [phone]  Available on Microsoft Teams  After 5pm or on weekends please contact  to page on-call fellow

## 2023-01-11 NOTE — CONSULT NOTE ADULT - ATTENDING COMMENTS
Agree with above. Pt with known multiple small bowel and colonic angioectasia, has been on chronic transfusions and appears to have failed endoscopic intervention after many enterosopies at [facility] (some of the reports in [employer]). She has no overt melena/hematochezia - suspect anemia is due to chronic slow oozing/bleeding from known bowel angioectasia. Since she has not had any sustained response to endoscopic intervention, would continue octreotide and also consider anti-angiogenic medications as outpatient at [facility] where she is established.

## 2023-01-11 NOTE — DISCHARGE NOTE NURSING/CASE MANAGEMENT/SOCIAL WORK - NSDCPEFALRISK_GEN_ALL_CORE
For information on Fall & Injury Prevention, visit: [email or web address] OR  [email or web address] OR  https://www.cdc.gov/steadi/patient.html

## 2023-01-11 NOTE — CHART NOTE - NSCHARTNOTEFT_GEN_A_CORE
Repeated  H&H 7.6/24.4, will transfuse one more unit of PRBC. Plan for discharge after the transfusion and CBC check, but the pt wants to be discharged right after the transfusion. D/w Dr. Cannon and it is ok for pt to go home just after the transfusion tonight, but pt needs to t/u with the attending's office of Friday 1/13/23. Pt verbalizing understanding and states "I will call my doctor"    Gunjan Colmenares NP, #[id number] Repeated  H&H 7.6/24.4, will transfuse one more unit of PRBC. Plan for discharge after the transfusion and CBC check, but the pt wants to be discharged right after the transfusion. D/w Dr. Cannon and it is ok for pt to go home just after the transfusion tonight, but pt needs to t/u with the attending's office of Friday 1/13/23. Pt verbalizing understanding and states "I will call my doctor"    Gunjan Colmenares NP, #[id number]    Blood transfusion completed without adverse reaction. As above pt will be discharge now home (as per pt her sister is picking her up)    Gunjan Colmenares NP, #[id number]

## 2023-01-11 NOTE — PATIENT PROFILE ADULT - FALL HARM RISK - HARM RISK INTERVENTIONS

## 2023-01-11 NOTE — CONSULT NOTE ADULT - ASSESSMENT
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    #anemia- 2/2 jejunal AVMs  -last enteroscopy approximately 12/20/22 with ablation for AVMs.   - follows with Dr. Jeremiah Bentley ([facility])   - recently started on octreotide (unknown dosage) for the past month receiving 1/month  #constipation- BM q4d once started on octreotide     Recommendations:   - patient declined endoscopy given court date tomorrow, repeat endoscopy also not helpful given patient has had repeat multiple endoscopies with cautery, but continues to bleed despite endoscopic management   - will follow up with outpatient GI at [facility], likely with titration of medical management (octreotide vs avastatin)   - resuscitation per primary team   - uptitrate miralax to TID for regular BM    - rest of care per primary team     All recommendations are tentative until note is attested by attending.     Ksenia Stephen, PGY-4   Gastroenterology/Hepatology Fellow  Available on [employer] Teams  [phone] ([facility] Short Range Pager)  [phone] ([facility] Long Range Pager)    After 5pm, please contact the on-call GI fellow. 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    #anemia- 2/2 jejunal AVMs  -last enteroscopy approximately 12/20/22 with ablation for AVMs.   - follows with Dr. Jeremiah Bentley ([facility])   - recently started on octreotide (unknown dosage) for the past month receiving 1/month  #constipation- BM q4d once started on octreotide     Recommendations:   - patient declined endoscopy given court date tomorrow, repeat endoscopy also not helpful given patient has had repeat multiple endoscopies with cautery, but continues to bleed despite endoscopic management   - will follow up with outpatient GI at [facility], likely with titration of medical management (octreotide vs avastatin)   - resuscitation per primary team   - Monitor H/H, transfuse as needed for Hgb <8  - uptitrate miralax to TID for regular BM    - rest of care per primary team     All recommendations are tentative until note is attested by attending.     Ksenia Stephen, PGY-4   Gastroenterology/Hepatology Fellow  Available on Microsoft Teams  [phone] ([facility] Short Range Pager)  [phone] ([facility] Long Range Pager)    After 5pm, please contact the on-call GI fellow. [phone]

## 2023-01-11 NOTE — ED CDU PROVIDER INITIAL DAY NOTE - OBJECTIVE STATEMENT
Addended by: LEVY KINGSTON on: 1/11/2023 07:58 AM     Modules accepted: Orders    
49 yo female with PMHx of IgA nephropathy, DM, HTN, anemia p/w low hgb.  Patient report that she has chronic anemia 2/2 IgA nephropathy requiring transfusions in the past.  Typically feels fatigued, but fatigue has worsened over the past month.  Went to her PMD, Dr. Cannon who sent labs.  Called her today and told her her hgb was 6.1.  Patient denies fevers, cough, cp, sob, abd pain, nvd, dysuria, vaginal bleeding, melena, BRBPR.

## 2023-01-11 NOTE — DISCHARGE NOTE PROVIDER - CARE PROVIDER_API CALL
Nando Ceron)  Hematology; Internal Medicine; Medical Oncology  [address] [place] [address]  Phone: [phone]  Fax: [phone]  Follow Up Time:     JASEN HERRERA  Internal Medicine  Phone: [phone]  Follow Up Time:

## 2023-01-11 NOTE — CONSULT NOTE ADULT - SUBJECTIVE AND OBJECTIVE BOX
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 Patient recently returned from [place] and was ARIAS with fatigue, found to have Hgb 3.5. Patient reports that she started to have symptoms on Saturday.    Patient reports last enteroscopy approximately 12/20/22 with ablation for AVMs. She follows with Dr. Jeremiah Bentley and was recently started on octreotide (unknown dosage) for the past month receiving 1/month). Patient reports that she has discussed with Dr. Bentley about starting Avastatin. Patient has also been on epo every week since Sept 2021 but has not taken in the last 3 weeks.     Hgb 3.5, now s/p 2U pRBC, repeat Hgb 5.7--> 6.5. Patient reports that she has to be discharged from the hospital today since she has a court date tomorrow. She has an appointment with Dr. Bentley on 1/19.     Allergies:  ACE inhibitors (Short breath)      Home Medications:    Hospital Medications:  buMETAnide 1 milliGRAM(s) Oral two times a day  ferrous    sulfate 325 milliGRAM(s) Oral daily  melatonin 3 milliGRAM(s) Oral at bedtime PRN  pantoprazole  Injectable 40 milliGRAM(s) IV Push every 12 hours      PMHX/PSHX:  DM (diabetes mellitus)    Anemia    HTN (hypertension)    IgA nephropathy determined by renal biopsy    H/O endometritis    History of endometriosis    DM (diabetes mellitus), type 2    Benign essential hypertension    History of endometriosis    No significant past surgical history    History of endometriosis    H/O left breast biopsy    H/O laparoscopy        Family history:  No pertinent family history in first degree relatives    FH: diabetes mellitus    FH: colon cancer (Sibling)        Denies family history of colon cancer/polyps, stomach cancer/polyps, pancreatic cancer/masses, liver cancer/disease, ovarian cancer and endometrial cancer.    Social History:   Tob: Denies  EtOH: Denies  Illicit Drugs: Denies    ROS:     General:  No wt loss, fevers, chills, night sweats, fatigue  Eyes:  Good vision, no reported pain  ENT:  No sore throat, pain, runny nose, dysphagia  CV:  No pain, palpitations, hypo/hypertension  Pulm:  No dyspnea, cough, tachypnea, wheezing  GI:  see HPI  :  No pain, bleeding, incontinence, nocturia  Muscle:  No pain, weakness  Neuro:  No weakness, tingling, memory problems  Psych:  No fatigue, insomnia, mood problems, depression  Endocrine:  No polyuria, polydipsia, cold/heat intolerance  Heme:  No petechiae, ecchymosis, easy bruisability  Skin:  No rash, tattoos, scars, edema    PHYSICAL EXAM:     GENERAL:  No acute distress  HEENT:  NCAT, no scleral icterus   CHEST:  no respiratory distress  HEART:  Regular rate and rhythm  ABDOMEN:  Soft, non-tender, non-distended, normoactive bowel sounds,  no masses  EXTREMITIES: No edema  DONTE: brown (chaperoned by nurse manager)  SKIN:  No rash/erythema/ecchymoses/petechiae/wounds/abscess/warm/dry  NEURO:  Alert and oriented x 3, no asterixis    Vital Signs:  Vital Signs Last 24 Hrs  T(C): 36.7 (11 Jan 2023 13:45), Max: 37.3 (10 Deniz 2023 20:10)  T(F): 98.1 (11 Jan 2023 13:45), Max: 99.1 (10 Deniz 2023 20:10)  HR: 100 (11 Jan 2023 13:45) (91 - 105)  BP: 118/71 (11 Jan 2023 13:45) (100/72 - 149/93)  BP(mean): --  RR: 18 (11 Jan 2023 13:45) (18 - 20)  SpO2: 99% (11 Jan 2023 13:45) (99% - 100%)    Parameters below as of 11 Jan 2023 13:45  Patient On (Oxygen Delivery Method): room air      Daily Height in cm: 162.56 (10 Deniz 2023 15:57)    Daily     LABS:                        6.5    14.46 )-----------( 296      ( 11 Jan 2023 13:55 )             21.5     Mean Cell Volume: 84.0 fl (01-11-23 @ 13:55)    01-11    141  |  109<H>  |  60<H>  ----------------------------<  134<H>  5.4<H>   |  19<L>  |  5.06<H>    Ca    8.4      11 Jan 2023 07:15  Phos  5.7     01-11  Mg     2.2     01-11    TPro  6.7  /  Alb  3.2<L>  /  TBili  <0.1<L>  /  DBili  x   /  AST  9<L>  /  ALT  10  /  AlkPhos  102  01-10    LIVER FUNCTIONS - ( 10 Deniz 2023 18:37 )  Alb: 3.2 g/dL / Pro: 6.7 g/dL / ALK PHOS: 102 U/L / ALT: 10 U/L / AST: 9 U/L / GGT: x           PT/INR - ( 10 Deniz 2023 18:37 )   PT: 12.8 sec;   INR: 1.11 ratio         PTT - ( 10 Deniz 2023 18:37 )  PTT:32.2 sec                            6.5    14.46 )-----------( 296      ( 11 Jan 2023 13:55 )             21.5                         5.7    14.26 )-----------( 296      ( 11 Jan 2023 07:16 )             19.5                         3.5    13.64 )-----------( 302      ( 10 Deniz 2023 18:37 )             13.5                         3.5    13.29 )-----------( 294      ( 10 Deniz 2023 13:23 )             12.9       Imaging:             HPI: 52y Female with PMH of HTN, diet-controlled DM2, 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 Patient recently returned from [place] and was ARIAS with fatigue, found to have Hgb 3.5. Patient reports that she started to have symptoms on Saturday.    Patient reports last enteroscopy approximately 12/20/22 with ablation for AVMs. She follows with Dr. Jeremiah Bentley and was recently started on octreotide (unknown dosage) for the past month receiving 1/month). Patient reports that she has discussed with Dr. Bentley about starting Avastatin. Patient has also been on epo every week since Sept 2021 but has not taken in the last 3 weeks. She has had many enteroscopies at [facility], where almost every time multiple jejunal angioectasia were found and treated, last was in late 2022. She has no abdominal pain. She has no nausea/vomiting. No melena/hematochezia, stools have been brown.    Hgb 3.5, now s/p 2U pRBC, repeat Hgb 5.7--> 6.5. Patient reports that she has to be discharged from the hospital today since she has a court date tomorrow. She has an appointment with Dr. Bentley on 1/19.     Allergies:  ACE inhibitors (Short breath)      Home Medications:    Hospital Medications:  buMETAnide 1 milliGRAM(s) Oral two times a day  ferrous    sulfate 325 milliGRAM(s) Oral daily  melatonin 3 milliGRAM(s) Oral at bedtime PRN  pantoprazole  Injectable 40 milliGRAM(s) IV Push every 12 hours      PMHX/PSHX:  DM (diabetes mellitus)    Anemia    HTN (hypertension)    IgA nephropathy determined by renal biopsy    H/O endometritis    History of endometriosis    DM (diabetes mellitus), type 2    Benign essential hypertension    History of endometriosis    No significant past surgical history    History of endometriosis    H/O left breast biopsy    H/O laparoscopy        Family history:  No pertinent family history in first degree relatives    FH: diabetes mellitus    FH: colon cancer (Sibling)        Denies family history of colon cancer/polyps, stomach cancer/polyps, pancreatic cancer/masses, liver cancer/disease, ovarian cancer and endometrial cancer.    Social History:   Tob: Denies  EtOH: Denies  Illicit Drugs: Denies    ROS:     General:  No wt loss, fevers, chills, night sweats, fatigue  Eyes:  Good vision, no reported pain  ENT:  No sore throat, pain, runny nose, dysphagia  CV:  No pain, palpitations, hypo/hypertension  Pulm:  No dyspnea, cough, tachypnea, wheezing  GI:  see HPI  :  No pain, bleeding, incontinence, nocturia  Muscle:  No pain, weakness  Neuro:  No weakness, tingling, memory problems  Psych:  No fatigue, insomnia, mood problems, depression  Endocrine:  No polyuria, polydipsia, cold/heat intolerance  Heme:  No petechiae, ecchymosis, easy bruisability  Skin:  No rash, tattoos, scars, edema    PHYSICAL EXAM:     GENERAL:  No acute distress  HEENT:  NCAT, no scleral icterus   CHEST:  no respiratory distress  HEART:  Regular rate and rhythm  ABDOMEN:  Soft, non-tender, non-distended, normoactive bowel sounds,  no masses  EXTREMITIES: No edema  DONTE: brown (chaperoned by nurse manager)  SKIN:  No rash/erythema/ecchymoses/petechiae/wounds/abscess/warm/dry  NEURO:  Alert and oriented x 3, no asterixis    Vital Signs:  Vital Signs Last 24 Hrs  T(C): 36.7 (11 Jan 2023 13:45), Max: 37.3 (10 Deniz 2023 20:10)  T(F): 98.1 (11 Jan 2023 13:45), Max: 99.1 (10 Deniz 2023 20:10)  HR: 100 (11 Jan 2023 13:45) (91 - 105)  BP: 118/71 (11 Jan 2023 13:45) (100/72 - 149/93)  BP(mean): --  RR: 18 (11 Jan 2023 13:45) (18 - 20)  SpO2: 99% (11 Jan 2023 13:45) (99% - 100%)    Parameters below as of 11 Jan 2023 13:45  Patient On (Oxygen Delivery Method): room air      Daily Height in cm: 162.56 (10 Deniz 2023 15:57)    Daily     LABS:                        6.5    14.46 )-----------( 296      ( 11 Jan 2023 13:55 )             21.5     Mean Cell Volume: 84.0 fl (01-11-23 @ 13:55)    01-11    141  |  109<H>  |  60<H>  ----------------------------<  134<H>  5.4<H>   |  19<L>  |  5.06<H>    Ca    8.4      11 Jan 2023 07:15  Phos  5.7     01-11  Mg     2.2     01-11    TPro  6.7  /  Alb  3.2<L>  /  TBili  <0.1<L>  /  DBili  x   /  AST  9<L>  /  ALT  10  /  AlkPhos  102  01-10    LIVER FUNCTIONS - ( 10 Deniz 2023 18:37 )  Alb: 3.2 g/dL / Pro: 6.7 g/dL / ALK PHOS: 102 U/L / ALT: 10 U/L / AST: 9 U/L / GGT: x           PT/INR - ( 10 Deniz 2023 18:37 )   PT: 12.8 sec;   INR: 1.11 ratio         PTT - ( 10 Deniz 2023 18:37 )  PTT:32.2 sec                            6.5    14.46 )-----------( 296      ( 11 Jan 2023 13:55 )             21.5                         5.7    14.26 )-----------( 296      ( 11 Jan 2023 07:16 )             19.5                         3.5    13.64 )-----------( 302      ( 10 Edniz 2023 18:37 )             13.5                         3.5    13.29 )-----------( 294      ( 10 Deniz 2023 13:23 )             12.9

## 2023-01-11 NOTE — DISCHARGE NOTE PROVIDER - HOSPITAL COURSE
Patient is a 52y Female with PMH of HTN, diet-controlled DM2, CKD, IgA nephropathy, and chronic anemia 2/2 CKD and jejunal AVMs with frequent admissions requiring PRBC transfusions, presenting to the ED with fatigue and ARIAS x3-4 days. Patient states her symptoms started on 1/7 including generalized weakness, fatigue, dizziness, and ARIAS. They have progressively gotten worse. She just returned from [place] on yesterday, had to take a wheelchair in the airport because she was so fatigued. She saw her hematologist Dr Ceron and was found to have a Hgb 3.5, sent to the ED. Her baseline Hgb is around 8. She has also noticed some dark stools which she attributes to iron pills. She also complains of some mild chest "heaviness." Patient denies zeeshan bleeding, fever, chills, abdominal pain, nausea, vomiting, changes in bowel habits, or urinary symptoms. Transfuse for Hgb >8  Seen by GI. No plans for intervention. Ok to advance diet to regular. clear liquid , IV PPI BID, keep type and screen active, maintained 2 large bore IVs, SCDs for DVT Ppx, GI consult emailed  Hematology evaluation-Infuse IV iron (Venofer 200mg)  Reticulocyte count inappropriately low, likely reflecting iron deficiency, Would rec Procrit inj at least 20,000 units weekly. Patient was transfused until > 8 and given IV bumex,. Seen by Renal and she will follow up outpatient later this week. She was medically cleared for discharge once hbg >8.

## 2023-01-12 VITALS
SYSTOLIC BLOOD PRESSURE: 164 MMHG | RESPIRATION RATE: 18 BRPM | TEMPERATURE: 98 F | HEART RATE: 102 BPM | OXYGEN SATURATION: 100 % | DIASTOLIC BLOOD PRESSURE: 92 MMHG

## 2023-01-12 PROCEDURE — 83010 ASSAY OF HAPTOGLOBIN QUANT: CPT

## 2023-01-12 PROCEDURE — 80053 COMPREHEN METABOLIC PANEL: CPT

## 2023-01-12 PROCEDURE — 86850 RBC ANTIBODY SCREEN: CPT

## 2023-01-12 PROCEDURE — 83036 HEMOGLOBIN GLYCOSYLATED A1C: CPT

## 2023-01-12 PROCEDURE — 83735 ASSAY OF MAGNESIUM: CPT

## 2023-01-12 PROCEDURE — 36430 TRANSFUSION BLD/BLD COMPNT: CPT

## 2023-01-12 PROCEDURE — 85027 COMPLETE CBC AUTOMATED: CPT

## 2023-01-12 PROCEDURE — 71045 X-RAY EXAM CHEST 1 VIEW: CPT

## 2023-01-12 PROCEDURE — 87637 SARSCOV2&INF A&B&RSV AMP PRB: CPT

## 2023-01-12 PROCEDURE — P9040: CPT

## 2023-01-12 PROCEDURE — 84295 ASSAY OF SERUM SODIUM: CPT

## 2023-01-12 PROCEDURE — 84100 ASSAY OF PHOSPHORUS: CPT

## 2023-01-12 PROCEDURE — 93005 ELECTROCARDIOGRAM TRACING: CPT

## 2023-01-12 PROCEDURE — 82565 ASSAY OF CREATININE: CPT

## 2023-01-12 PROCEDURE — 99285 EMERGENCY DEPT VISIT HI MDM: CPT

## 2023-01-12 PROCEDURE — 84484 ASSAY OF TROPONIN QUANT: CPT

## 2023-01-12 PROCEDURE — 86923 COMPATIBILITY TEST ELECTRIC: CPT

## 2023-01-12 PROCEDURE — 85045 AUTOMATED RETICULOCYTE COUNT: CPT

## 2023-01-12 PROCEDURE — 83615 LACTATE (LD) (LDH) ENZYME: CPT

## 2023-01-12 PROCEDURE — 86900 BLOOD TYPING SEROLOGIC ABO: CPT

## 2023-01-12 PROCEDURE — 36415 COLL VENOUS BLD VENIPUNCTURE: CPT

## 2023-01-12 PROCEDURE — 82330 ASSAY OF CALCIUM: CPT

## 2023-01-12 PROCEDURE — 83605 ASSAY OF LACTIC ACID: CPT

## 2023-01-12 PROCEDURE — 82803 BLOOD GASES ANY COMBINATION: CPT

## 2023-01-12 PROCEDURE — 83880 ASSAY OF NATRIURETIC PEPTIDE: CPT

## 2023-01-12 PROCEDURE — 80048 BASIC METABOLIC PNL TOTAL CA: CPT

## 2023-01-12 PROCEDURE — 85730 THROMBOPLASTIN TIME PARTIAL: CPT

## 2023-01-12 PROCEDURE — 84132 ASSAY OF SERUM POTASSIUM: CPT

## 2023-01-12 PROCEDURE — 85014 HEMATOCRIT: CPT

## 2023-01-12 PROCEDURE — 85025 COMPLETE CBC W/AUTO DIFF WBC: CPT

## 2023-01-12 PROCEDURE — 82435 ASSAY OF BLOOD CHLORIDE: CPT

## 2023-01-12 PROCEDURE — 84702 CHORIONIC GONADOTROPIN TEST: CPT

## 2023-01-12 PROCEDURE — 85610 PROTHROMBIN TIME: CPT

## 2023-01-12 PROCEDURE — 85018 HEMOGLOBIN: CPT

## 2023-01-12 PROCEDURE — 82947 ASSAY GLUCOSE BLOOD QUANT: CPT

## 2023-01-12 PROCEDURE — 86901 BLOOD TYPING SEROLOGIC RH(D): CPT

## 2023-01-13 ENCOUNTER — INPATIENT (INPATIENT)
Facility: HOSPITAL | Age: 53
LOS: 1 days | Discharge: LEFT BEFORE DISCHARGE | DRG: 698 | End: 2023-01-15
Attending: INTERNAL MEDICINE | Admitting: INTERNAL MEDICINE
Payer: MEDICAID

## 2023-01-13 VITALS
HEIGHT: 64 IN | WEIGHT: 205.03 LBS | RESPIRATION RATE: 17 BRPM | SYSTOLIC BLOOD PRESSURE: 136 MMHG | DIASTOLIC BLOOD PRESSURE: 89 MMHG | TEMPERATURE: 101 F | OXYGEN SATURATION: 100 % | HEART RATE: 110 BPM

## 2023-01-13 DIAGNOSIS — Z98.890 OTHER SPECIFIED POSTPROCEDURAL STATES: Chronic | ICD-10-CM

## 2023-01-13 DIAGNOSIS — Z29.9 ENCOUNTER FOR PROPHYLACTIC MEASURES, UNSPECIFIED: ICD-10-CM

## 2023-01-13 DIAGNOSIS — R06.00 DYSPNEA, UNSPECIFIED: ICD-10-CM

## 2023-01-13 DIAGNOSIS — D63.8 ANEMIA IN OTHER CHRONIC DISEASES CLASSIFIED ELSEWHERE: ICD-10-CM

## 2023-01-13 DIAGNOSIS — N18.5 CHRONIC KIDNEY DISEASE, STAGE 5: ICD-10-CM

## 2023-01-13 DIAGNOSIS — R09.02 HYPOXEMIA: ICD-10-CM

## 2023-01-13 DIAGNOSIS — D72.829 ELEVATED WHITE BLOOD CELL COUNT, UNSPECIFIED: ICD-10-CM

## 2023-01-13 DIAGNOSIS — Q27.30 ARTERIOVENOUS MALFORMATION, SITE UNSPECIFIED: ICD-10-CM

## 2023-01-13 LAB
ALBUMIN SERPL ELPH-MCNC: 3.2 G/DL — LOW (ref 3.3–5)
ALP SERPL-CCNC: 111 U/L — SIGNIFICANT CHANGE UP (ref 40–120)
ALT FLD-CCNC: 10 U/L — SIGNIFICANT CHANGE UP (ref 10–45)
ANION GAP SERPL CALC-SCNC: 16 MMOL/L — SIGNIFICANT CHANGE UP (ref 5–17)
ANISOCYTOSIS BLD QL: SLIGHT — SIGNIFICANT CHANGE UP
APPEARANCE UR: CLEAR — SIGNIFICANT CHANGE UP
APTT BLD: 18.6 SEC — LOW (ref 27.5–35.5)
AST SERPL-CCNC: 29 U/L — SIGNIFICANT CHANGE UP (ref 10–40)
BACTERIA # UR AUTO: NEGATIVE — SIGNIFICANT CHANGE UP
BASE EXCESS BLDV CALC-SCNC: -7 MMOL/L — LOW (ref -2–3)
BASOPHILS # BLD AUTO: 0 K/UL — SIGNIFICANT CHANGE UP (ref 0–0.2)
BASOPHILS NFR BLD AUTO: 0 % — SIGNIFICANT CHANGE UP (ref 0–2)
BILIRUB SERPL-MCNC: 0.4 MG/DL — SIGNIFICANT CHANGE UP (ref 0.2–1.2)
BILIRUB UR-MCNC: NEGATIVE — SIGNIFICANT CHANGE UP
BLD GP AB SCN SERPL QL: NEGATIVE — SIGNIFICANT CHANGE UP
BUN SERPL-MCNC: 64 MG/DL — HIGH (ref 7–23)
CA-I SERPL-SCNC: 1.2 MMOL/L — SIGNIFICANT CHANGE UP (ref 1.15–1.33)
CALCIUM SERPL-MCNC: 8.6 MG/DL — SIGNIFICANT CHANGE UP (ref 8.4–10.5)
CHLORIDE BLDV-SCNC: 108 MMOL/L — SIGNIFICANT CHANGE UP (ref 96–108)
CHLORIDE SERPL-SCNC: 108 MMOL/L — SIGNIFICANT CHANGE UP (ref 96–108)
CO2 BLDV-SCNC: 20 MMOL/L — LOW (ref 22–26)
CO2 SERPL-SCNC: 16 MMOL/L — LOW (ref 22–31)
COLOR SPEC: YELLOW — SIGNIFICANT CHANGE UP
CREAT SERPL-MCNC: 5.16 MG/DL — HIGH (ref 0.5–1.3)
DACRYOCYTES BLD QL SMEAR: SLIGHT — SIGNIFICANT CHANGE UP
DIFF PNL FLD: NEGATIVE — SIGNIFICANT CHANGE UP
EGFR: 10 ML/MIN/1.73M2 — LOW
EOSINOPHIL # BLD AUTO: 0.17 K/UL — SIGNIFICANT CHANGE UP (ref 0–0.5)
EOSINOPHIL NFR BLD AUTO: 0.9 % — SIGNIFICANT CHANGE UP (ref 0–6)
EPI CELLS # UR: 2 /HPF — SIGNIFICANT CHANGE UP
GAS PNL BLDV: 139 MMOL/L — SIGNIFICANT CHANGE UP (ref 136–145)
GAS PNL BLDV: SIGNIFICANT CHANGE UP
GLUCOSE BLDV-MCNC: 101 MG/DL — HIGH (ref 70–99)
GLUCOSE SERPL-MCNC: 101 MG/DL — HIGH (ref 70–99)
GLUCOSE UR QL: NEGATIVE — SIGNIFICANT CHANGE UP
HCG SERPL-ACNC: 3.2 MIU/ML — SIGNIFICANT CHANGE UP
HCO3 BLDV-SCNC: 19 MMOL/L — LOW (ref 22–29)
HCT VFR BLD CALC: 28.3 % — LOW (ref 34.5–45)
HCT VFR BLDA CALC: 27 % — LOW (ref 34.5–46.5)
HGB BLD CALC-MCNC: 9 G/DL — LOW (ref 11.7–16.1)
HGB BLD-MCNC: 8.5 G/DL — LOW (ref 11.5–15.5)
HYALINE CASTS # UR AUTO: 1 /LPF — SIGNIFICANT CHANGE UP (ref 0–2)
INR BLD: 1.04 RATIO — SIGNIFICANT CHANGE UP (ref 0.88–1.16)
KETONES UR-MCNC: NEGATIVE — SIGNIFICANT CHANGE UP
LACTATE BLDV-MCNC: 1.4 MMOL/L — SIGNIFICANT CHANGE UP (ref 0.5–2)
LDH SERPL L TO P-CCNC: 661 U/L — HIGH (ref 50–242)
LEUKOCYTE ESTERASE UR-ACNC: NEGATIVE — SIGNIFICANT CHANGE UP
LYMPHOCYTES # BLD AUTO: 0.99 K/UL — LOW (ref 1–3.3)
LYMPHOCYTES # BLD AUTO: 5.2 % — LOW (ref 13–44)
MANUAL SMEAR VERIFICATION: SIGNIFICANT CHANGE UP
MCHC RBC-ENTMCNC: 26 PG — LOW (ref 27–34)
MCHC RBC-ENTMCNC: 30 GM/DL — LOW (ref 32–36)
MCV RBC AUTO: 86.5 FL — SIGNIFICANT CHANGE UP (ref 80–100)
MICROCYTES BLD QL: SLIGHT — SIGNIFICANT CHANGE UP
MONOCYTES # BLD AUTO: 0.66 K/UL — SIGNIFICANT CHANGE UP (ref 0–0.9)
MONOCYTES NFR BLD AUTO: 3.5 % — SIGNIFICANT CHANGE UP (ref 2–14)
NEUTROPHILS # BLD AUTO: 17.14 K/UL — HIGH (ref 1.8–7.4)
NEUTROPHILS NFR BLD AUTO: 90.4 % — HIGH (ref 43–77)
NITRITE UR-MCNC: NEGATIVE — SIGNIFICANT CHANGE UP
NT-PROBNP SERPL-SCNC: HIGH PG/ML (ref 0–300)
OVALOCYTES BLD QL SMEAR: SLIGHT — SIGNIFICANT CHANGE UP
PCO2 BLDV: 37 MMHG — LOW (ref 39–42)
PH BLDV: 7.31 — LOW (ref 7.32–7.43)
PH UR: 6 — SIGNIFICANT CHANGE UP (ref 5–8)
PLAT MORPH BLD: NORMAL — SIGNIFICANT CHANGE UP
PLATELET # BLD AUTO: 265 K/UL — SIGNIFICANT CHANGE UP (ref 150–400)
PO2 BLDV: 60 MMHG — HIGH (ref 25–45)
POIKILOCYTOSIS BLD QL AUTO: SLIGHT — SIGNIFICANT CHANGE UP
POLYCHROMASIA BLD QL SMEAR: SLIGHT — SIGNIFICANT CHANGE UP
POTASSIUM BLDV-SCNC: 4.5 MMOL/L — SIGNIFICANT CHANGE UP (ref 3.5–5.1)
POTASSIUM SERPL-MCNC: 5.1 MMOL/L — SIGNIFICANT CHANGE UP (ref 3.5–5.3)
POTASSIUM SERPL-SCNC: 5.1 MMOL/L — SIGNIFICANT CHANGE UP (ref 3.5–5.3)
PROT SERPL-MCNC: 7.3 G/DL — SIGNIFICANT CHANGE UP (ref 6–8.3)
PROT UR-MCNC: ABNORMAL
PROTHROM AB SERPL-ACNC: 12.1 SEC — SIGNIFICANT CHANGE UP (ref 10.5–13.4)
RAPID RVP RESULT: SIGNIFICANT CHANGE UP
RBC # BLD: 3.27 M/UL — LOW (ref 3.8–5.2)
RBC # FLD: 17.2 % — HIGH (ref 10.3–14.5)
RBC BLD AUTO: ABNORMAL
RBC CASTS # UR COMP ASSIST: 2 /HPF — SIGNIFICANT CHANGE UP (ref 0–4)
RH IG SCN BLD-IMP: POSITIVE — SIGNIFICANT CHANGE UP
SAO2 % BLDV: 91.6 % — HIGH (ref 67–88)
SARS-COV-2 RNA SPEC QL NAA+PROBE: SIGNIFICANT CHANGE UP
SCHISTOCYTES BLD QL AUTO: SLIGHT — SIGNIFICANT CHANGE UP
SODIUM SERPL-SCNC: 140 MMOL/L — SIGNIFICANT CHANGE UP (ref 135–145)
SP GR SPEC: 1.01 — LOW (ref 1.01–1.02)
TARGETS BLD QL SMEAR: SLIGHT — SIGNIFICANT CHANGE UP
TROPONIN T, HIGH SENSITIVITY RESULT: 44 NG/L — SIGNIFICANT CHANGE UP (ref 0–51)
TROPONIN T, HIGH SENSITIVITY RESULT: 45 NG/L — SIGNIFICANT CHANGE UP (ref 0–51)
UROBILINOGEN FLD QL: NEGATIVE — SIGNIFICANT CHANGE UP
WBC # BLD: 18.96 K/UL — HIGH (ref 3.8–10.5)
WBC # FLD AUTO: 18.96 K/UL — HIGH (ref 3.8–10.5)
WBC UR QL: 3 /HPF — SIGNIFICANT CHANGE UP (ref 0–5)

## 2023-01-13 PROCEDURE — 71046 X-RAY EXAM CHEST 2 VIEWS: CPT | Mod: 26

## 2023-01-13 PROCEDURE — 99223 1ST HOSP IP/OBS HIGH 75: CPT

## 2023-01-13 PROCEDURE — 99285 EMERGENCY DEPT VISIT HI MDM: CPT

## 2023-01-13 RX ORDER — FERROUS SULFATE 325(65) MG
325 TABLET ORAL DAILY
Refills: 0 | Status: DISCONTINUED | OUTPATIENT
Start: 2023-01-13 | End: 2023-01-15

## 2023-01-13 RX ORDER — ACETAMINOPHEN 500 MG
650 TABLET ORAL EVERY 6 HOURS
Refills: 0 | Status: DISCONTINUED | OUTPATIENT
Start: 2023-01-13 | End: 2023-01-15

## 2023-01-13 RX ORDER — ACETAMINOPHEN 500 MG
975 TABLET ORAL ONCE
Refills: 0 | Status: COMPLETED | OUTPATIENT
Start: 2023-01-13 | End: 2023-01-13

## 2023-01-13 RX ORDER — FUROSEMIDE 40 MG
40 TABLET ORAL ONCE
Refills: 0 | Status: DISCONTINUED | OUTPATIENT
Start: 2023-01-13 | End: 2023-01-13

## 2023-01-13 RX ORDER — ONDANSETRON 8 MG/1
4 TABLET, FILM COATED ORAL EVERY 8 HOURS
Refills: 0 | Status: DISCONTINUED | OUTPATIENT
Start: 2023-01-13 | End: 2023-01-15

## 2023-01-13 RX ORDER — LANOLIN ALCOHOL/MO/W.PET/CERES
3 CREAM (GRAM) TOPICAL AT BEDTIME
Refills: 0 | Status: DISCONTINUED | OUTPATIENT
Start: 2023-01-13 | End: 2023-01-15

## 2023-01-13 RX ORDER — FUROSEMIDE 40 MG
80 TABLET ORAL ONCE
Refills: 0 | Status: COMPLETED | OUTPATIENT
Start: 2023-01-13 | End: 2023-01-13

## 2023-01-13 RX ORDER — BUMETANIDE 0.25 MG/ML
1 INJECTION INTRAMUSCULAR; INTRAVENOUS
Refills: 0 | Status: DISCONTINUED | OUTPATIENT
Start: 2023-01-14 | End: 2023-01-15

## 2023-01-13 RX ADMIN — Medication 975 MILLIGRAM(S): at 17:38

## 2023-01-13 RX ADMIN — Medication 80 MILLIGRAM(S): at 19:45

## 2023-01-13 NOTE — ED ADULT NURSE REASSESSMENT NOTE - NS ED NURSE REASSESS COMMENT FT1
Report received from Morgan Colletta RN. Pt resting comfortably in stretcher. A&Ox4. Pt pending inpatient bed assignment. Patient given lasix and provided with female urinal. Patient repositioned in stretcher for comfort. SPO2 currently 100% on 2 L NC. Patient still complaining of feeling short of breath but is comfortable at this time. Family members at bedside. Plan of care discussed. Safety and comfort measures maintained.

## 2023-01-13 NOTE — H&P ADULT - NSHPPHYSICALEXAM_GEN_ALL_CORE
Vital Signs Last 24 Hrs  T(C): 37.3 (13 Jan 2023 19:40), Max: 38.2 (13 Jan 2023 16:51)  T(F): 99.1 (13 Jan 2023 19:40), Max: 100.8 (13 Jan 2023 16:51)  HR: 102 (13 Jan 2023 19:40) (102 - 121)  BP: 140/89 (13 Jan 2023 19:40) (136/89 - 148/88)  BP(mean): 103 (13 Jan 2023 19:40) (103 - 103)  RR: 22 (13 Jan 2023 19:40) (17 - 26)  SpO2: 100% (13 Jan 2023 19:40) (100% - 100%)    Parameters below as of 13 Jan 2023 19:40  Patient On (Oxygen Delivery Method): nasal cannula  O2 Flow (L/min): 2

## 2023-01-13 NOTE — H&P ADULT - ASSESSMENT
52 year-old-female with a PMHx of HTN, DMII, CKD stage IV, IgA nephropathy, chronic anemia 2/2 CKD and jejunal AVMs (last enteroscopy in 12/2022 s/p ablation of AVMs), hx of frequent admission for pRBC transfusions who presents with dyspnea. She was found to have low grade fevers, hypoxia to 93% on room air, and elevated proBNP. Ddx includes dyspnea secondary to volume overloaded state, after receiving 5 units of pRBC during prior hospitalization (last transfusion on 1/11/23). Others on the differential includes a delayed transfusion reaction, though lower on the differential given normal bilirubin. The patient received lasix in the ED with improvement in symptoms. Will continue with IV bumex 1mg BID, monitor I/O and down-titrate supplemental O2 as tolerated. In regards to possible delayed-transfusion reaction, the patient has no elevated bilirubin or anemia however presents with low grade fever without an obvious source of infection. Will obtain Wilfred test and haptoglobin levels to further evaluate.

## 2023-01-13 NOTE — H&P ADULT - NSHPLABSRESULTS_GEN_ALL_CORE
LABS:                         8.5    18.96 )-----------( 265      ( 13 Jan 2023 17:39 )             28.3     01-13    140  |  108  |  64<H>  ----------------------------<  101<H>  5.1   |  16<L>  |  5.16<H>    Ca    8.6      13 Jan 2023 17:39    TPro  7.3  /  Alb  3.2<L>  /  TBili  0.4  /  DBili  x   /  AST  29  /  ALT  10  /  AlkPhos  111  01-13    PT/INR - ( 13 Jan 2023 17:39 )   PT: See Note;   INR: See Note ratio         PTT - ( 13 Jan 2023 17:39 )  PTT:see note sec        Serum Pro-Brain Natriuretic Peptide: 19011 pg/mL (01-13 @ 17:39)      Records reviewed from prior hospitalization.  Labs reviewed remarkable for - leukocytosis 18 wutg increased neutrophils (baseline WBC count 13-15). Hgb 8.5, stable. BUN/Cr 64/5.16 (stable). ProBNP elevated to 30,000+ (previously 3505 just 10 days ago). RVP negative.  EKG personally reviewed   CXR personally reviewed - clear lungs  --------------------  TTE 11/5/22  EF 39%  Conclusions:  1. Tethered mitral valve leaflets with normal opening.  Severe mitral regurgitation.  2. Moderate left ventricular enlargement.  3. Moderate global left ventricular systolic dysfunction.  Endocardial visualization enhanced with intravenous  injection of Ultrasonic Enhancing Agent (Lumason).  4. Moderate diastolic dysfunction (Stage II).  *** No previous Echo exam. LABS:                         8.5    18.96 )-----------( 265      ( 13 Jan 2023 17:39 )             28.3     01-13    140  |  108  |  64<H>  ----------------------------<  101<H>  5.1   |  16<L>  |  5.16<H>    Ca    8.6      13 Jan 2023 17:39    TPro  7.3  /  Alb  3.2<L>  /  TBili  0.4  /  DBili  x   /  AST  29  /  ALT  10  /  AlkPhos  111  01-13    PT/INR - ( 13 Jan 2023 17:39 )   PT: See Note;   INR: See Note ratio         PTT - ( 13 Jan 2023 17:39 )  PTT:see note sec        Serum Pro-Brain Natriuretic Peptide: 35399 pg/mL (01-13 @ 17:39)      Records reviewed from prior hospitalization.  Labs reviewed remarkable for - leukocytosis 18 wutg increased neutrophils (baseline WBC count 13-15). Hgb 8.5, stable. BUN/Cr 64/5.16 (stable). ProBNP elevated to 30,000+ (previously 3505 just 10 days ago). RVP negative.  EKG personally reviewed - NSR with QTc prolonged to 532  CXR personally reviewed - clear lungs  --------------------  TTE 11/5/22  EF 39%  Conclusions:  1. Tethered mitral valve leaflets with normal opening.  Severe mitral regurgitation.  2. Moderate left ventricular enlargement.  3. Moderate global left ventricular systolic dysfunction.  Endocardial visualization enhanced with intravenous  injection of Ultrasonic Enhancing Agent (Lumason).  4. Moderate diastolic dysfunction (Stage II).  *** No previous Echo exam.

## 2023-01-13 NOTE — H&P ADULT - HISTORY OF PRESENT ILLNESS
52 year-old-female with a PMHx of HTN, DMII, CKD stage IV, IgA nephropathy, chronic anemia 2/2 CKD and jejunal AVMs (last enteroscopy in 12/2022 s/p ablation of AVMs), hx of frequent admission for pRBC transfusions who presents with    The patient was recently seen at [facility] 1/10/23-1/11/23 for symptomatic anemia, likely multifactorial from possible slow oozing/bleeding from bowel angiectasia (s/p prior ablation of AVMs in 12/2022 v anemia of CKD). She was found to have a hgb down to 3.5. She was found to have severe iron deficiency anemia, also received venofer 200 mg x1 as per hematology recommendations. She was discharged with instruction for outpatient GI follow up. 52 year-old-female with a PMHx of HTN, DMII, CKD stage IV, IgA nephropathy, chronic anemia 2/2 CKD and jejunal AVMs (last enteroscopy in 12/2022 s/p ablation of AVMs), hx of frequent admission for pRBC transfusions who presents with    The patient was recently seen at [facility] 1/10/23-1/11/23 for symptomatic anemia, likely multifactorial from possible slow oozing/bleeding from bowel angiectasia (s/p prior ablation of AVMs in 12/2022 v anemia of CKD). She was found to have a hgb down to 3.5 and transfused a total of 5 units of pRBC and venofer 200 mg x1. She tolerated the interventions well and was discharged with instruction for outpatient GI follow up. 52 year-old-female with a PMHx of HTN, DMII, CKD stage IV, IgA nephropathy, chronic anemia 2/2 CKD and jejunal AVMs (last enteroscopy in 12/2022 s/p ablation of AVMs), hx of frequent admission for pRBC transfusions who presents with dyspnea.     The patient was recently discharged from  [facility] where she was admitted 1/10/23-1/11/23 for symptomatic anemia, likely multifactorial from possible slow oozing/bleeding from bowel angiectasia (s/p prior ablation of AVMs in 12/2022 v anemia of CKD). She was found to have a hgb down to 3.5 and transfused a total of 5 units of pRBC and venofer 200 mg x1. Her last pRBC transfusion was on 12/11 evening. She tolerated the interventions well and was discharged with instruction for outpatient GI follow up.    Pt states she felt well immediately after the blood transfusion; however, since at home she felt increasingly dyspneic, especially on exertion. She endorses cough x months since yasmin COVID but now also with white sputum. She felt 'warm' but no chills or rigors. She endorses a discomfort in the upper abdomen and a generalized bloating sensation but no abdominal pain. She states her urine output is as normal; no significant changes and no dysuria. The patient initially went to her PMD's office for follow up appointment but was sent to the ED when vital signs were abnormal.

## 2023-01-13 NOTE — H&P ADULT - PROBLEM SELECTOR PLAN 5
- s/p prior ablation of AVMs in 12/2022  - hgb at baseline  - started on protonix during inpatient admission; however, pt c/o abdominal discomfort since starting this medication. She states her outpatient GI never prescribed this to her  - hold off on protonix

## 2023-01-13 NOTE — ED PROVIDER NOTE - PROGRESS NOTE DETAILS
Colby PGY2  Attempted to reach Dr. Cannon at his cell number with no answer. Left a message at his office. Colby PGY2  Discussed with Dr. Cannon - will admit under tele for further management,.

## 2023-01-13 NOTE — H&P ADULT - PROBLEM SELECTOR PLAN 2
Leukocytosis to 19 with increased neutrophils on differential.   - unclear etiology. The patient previously had leukocytosis ranging from 13-16 going back to November 2022  - although the patient also had a low grade fever today, unclear if this is from infection. Fever may be d/t transfusion reaction  - obtain procalcitonin level  - f/u blood cultures  - obtain U/A  - hold off on abx for now  - consider hematology consult

## 2023-01-13 NOTE — ED PROVIDER NOTE - ATTENDING CONTRIBUTION TO CARE
Attending MD Galvez:  I performed a history and physical exam of the patient and discussed their management with the resident. I reviewed the resident's note and agree with the documented findings and plan of care. My medical decision making and observations are found above.

## 2023-01-13 NOTE — ED PROVIDER NOTE - PHYSICAL EXAMINATION
Vitals: I have reviewed the patients vital signs  General: Well dressed, well appearing, but short of breath when speaking   HEENT: Atraumatic, normocephalic, airway patent  Eyes: EOMI, tracking appropriately  Neck: no tracheal deviation, no JVD  Chest/Lungs: no trauma, symmetric chest rise, moderate WOB with movement/speaking, crackles in R mid lung   Heart: skin and extremities well perfused, regular rate and rhythm  Abdomen: soft and nontender  Neuro: A+Ox3, ambulating without difficulty, CN grossly intact  MSK: strength at baseline in all extremities, no muscle wasting or atrophy  Skin: no cyanosis, no jaundice, no new emergent lesions

## 2023-01-13 NOTE — ED PROVIDER NOTE - CLINICAL SUMMARY MEDICAL DECISION MAKING FREE TEXT BOX
Patient is a 52 year-old-female with history of HTN, DMII, CKD, IgA nephropathy, chronic anemia 2/2 CKD and jejunal AVMs presents with shortness of breath. Vitals showed T of 100.8F and tachycardia. Meeting SIRS criteria. Likely pneumonia or viral infection given recent infection. Low suspicion for PE given normal O2 sat on RA. Will obtain sepsis workup including blood culture, UA/UC and CXR. Will not give fluids given concerns about fluid overload due to blood transfusion. Will give tylenol for fever control. Will obtain more information from Dr. Cannon. Patient is a 52 year-old-female with history of HTN, DMII, CKD, IgA nephropathy, chronic anemia 2/2 CKD and jejunal AVMs presents with shortness of breath. Vitals showed T of 100.8F and tachycardia. Meeting SIRS criteria. Likely pneumonia or viral infection given recent infection. Low suspicion for PE given normal O2 sat on RA. Will obtain sepsis workup including blood culture, UA/UC and CXR. Will not give fluids given concerns about fluid overload due to blood transfusion. Will give tylenol for fever control. Will obtain more information from Dr. Cannon.    Attending MD Galvez.  Agree with above by resident.  Pt with subjective SOB at rest but worsened SOB with ambulation.  Pt febrile with probable viral syndrome 2/2 non-actionable CXR/exam.  LE edema mild bilaterally.  Pt's SOB most likely 2/2 fluid overload in setting of renal failure with continued urine production but poor fluid management.  PT warrants readmission to medicine for supportive care, poss repeat tx, fluid management. Abxs held in setting of lack of clear infectious source but with generalized malaise c/w viral syndrome NOS. Hold sepsis fluids 2/2 fluid overload.  Labs inconsistent with hemolysis.  Pt ~48 hrs since last transfusion.  Current fever, malaise could be 2/2 transfusion reaction however planned tylenol/supportive measures warranted alone at this time if that is the etiology.  Breath sounds clear, CXR non-actionable.

## 2023-01-13 NOTE — H&P ADULT - PROBLEM SELECTOR PLAN 1
Ddx as above  - Continue supplemental O2 to maintain SpO2 > 94%  - continue bumex 1mg IV BID  - f/u gracie test and haptoglobin level to evaluate for possible delayed transfusion reaction. If abnormal, please contact blood bank  - TTE  - Strict I/O

## 2023-01-13 NOTE — ED PROVIDER NOTE - OBJECTIVE STATEMENT
Patient is a 52 year-old-female with history of HTN, DMII, CKD, IgA nephropathy, chronic anemia 2/2 CKD and jejunal AVMs presents with shortness of breath. Patient reports that she was recently hospitalized for low H/H requiring blood transfusion. Reports that she went to see Dr. Cannon today for further management and was sent in for concerns of dyspnea. Reports that she feels short of breath, worsen with exertion, with some chest tightness. Also felt that her abdomen is distended.

## 2023-01-13 NOTE — ED ADULT NURSE NOTE - OBJECTIVE STATEMENT
52 year old female with PMHx of HTN, DM (diet controlled), IgA nephropathy, chronic anemia 2/2 CKD and jejunal AVMs requiring multiple endoscopies w/ intervention presents to ED 52 year old female with PMHx of HTN, DM (diet controlled), IgA nephropathy, chronic anemia 2/2 CKD and jejunal AVMs requiring multiple endoscopies w/ intervention presents to ED for SOB. Pt just dc on Wednesday from hospital for low H&H requiring multiple transfusions. Last transfusion Wednesday. Feeling abdominal distention, SOB, and mild chest pain. No cough, no lower extremity swelling or pain or hx of DVT. Arrives tachycardic, febrile and tachypneic, sepsis order set initiated. Denies n/v/d, fevers, chills,  urinary symptoms, numbness, tingling in upper and lower extremities, HA, blurry vision. Updated on plan of care.

## 2023-01-13 NOTE — H&P ADULT - PROBLEM SELECTOR PLAN 3
D/t anemia from CKD and anemia secondary to oozing AVMs  - hgb today is at baseline 8.5  - transfuse for hgb < 7

## 2023-01-13 NOTE — H&P ADULT - PROBLEM SELECTOR PLAN 6
- DVT ppx: ambulate as tolerated. hold SCDs given hx of bleeding and oozing AVMs  - GI ppx: none  - Diet: renal, fluid restricted < 1.5 L/ day

## 2023-01-13 NOTE — ED ADULT NURSE NOTE - NS ED NURSE REPORT GIVEN DT
13-Jan-2023 22:27 Topical Retinoid counseling:  Patient advised to apply a pea-sized amount only at bedtime and wait 30 minutes after washing their face before applying.  If too drying, patient may add a non-comedogenic moisturizer. The patient verbalized understanding of the proper use and possible adverse effects of retinoids.  All of the patient's questions and concerns were addressed.

## 2023-01-14 LAB
BASOPHILS # BLD AUTO: 0.02 K/UL — SIGNIFICANT CHANGE UP (ref 0–0.2)
BASOPHILS NFR BLD AUTO: 0.1 % — SIGNIFICANT CHANGE UP (ref 0–2)
EOSINOPHIL # BLD AUTO: 0.39 K/UL — SIGNIFICANT CHANGE UP (ref 0–0.5)
EOSINOPHIL NFR BLD AUTO: 2.7 % — SIGNIFICANT CHANGE UP (ref 0–6)
HCT VFR BLD CALC: 25.9 % — LOW (ref 34.5–45)
HGB BLD-MCNC: 7.8 G/DL — LOW (ref 11.5–15.5)
IMM GRANULOCYTES NFR BLD AUTO: 0.6 % — SIGNIFICANT CHANGE UP (ref 0–0.9)
LYMPHOCYTES # BLD AUTO: 0.94 K/UL — LOW (ref 1–3.3)
LYMPHOCYTES # BLD AUTO: 6.5 % — LOW (ref 13–44)
MCHC RBC-ENTMCNC: 26.2 PG — LOW (ref 27–34)
MCHC RBC-ENTMCNC: 30.1 GM/DL — LOW (ref 32–36)
MCV RBC AUTO: 86.9 FL — SIGNIFICANT CHANGE UP (ref 80–100)
MONOCYTES # BLD AUTO: 0.76 K/UL — SIGNIFICANT CHANGE UP (ref 0–0.9)
MONOCYTES NFR BLD AUTO: 5.3 % — SIGNIFICANT CHANGE UP (ref 2–14)
NEUTROPHILS # BLD AUTO: 12.23 K/UL — HIGH (ref 1.8–7.4)
NEUTROPHILS NFR BLD AUTO: 84.8 % — HIGH (ref 43–77)
NRBC # BLD: 0 /100 WBCS — SIGNIFICANT CHANGE UP (ref 0–0)
PLATELET # BLD AUTO: 305 K/UL — SIGNIFICANT CHANGE UP (ref 150–400)
RBC # BLD: 2.98 M/UL — LOW (ref 3.8–5.2)
RBC # FLD: 17.4 % — HIGH (ref 10.3–14.5)
WBC # BLD: 14.42 K/UL — HIGH (ref 3.8–10.5)
WBC # FLD AUTO: 14.42 K/UL — HIGH (ref 3.8–10.5)

## 2023-01-14 RX ORDER — POLYETHYLENE GLYCOL 3350 17 G/17G
17 POWDER, FOR SOLUTION ORAL DAILY
Refills: 0 | Status: DISCONTINUED | OUTPATIENT
Start: 2023-01-14 | End: 2023-01-15

## 2023-01-14 RX ORDER — SODIUM BICARBONATE 1 MEQ/ML
650 SYRINGE (ML) INTRAVENOUS
Refills: 0 | Status: DISCONTINUED | OUTPATIENT
Start: 2023-01-14 | End: 2023-01-15

## 2023-01-14 RX ORDER — SENNA PLUS 8.6 MG/1
2 TABLET ORAL AT BEDTIME
Refills: 0 | Status: DISCONTINUED | OUTPATIENT
Start: 2023-01-14 | End: 2023-01-15

## 2023-01-14 RX ORDER — FAMOTIDINE 10 MG/ML
20 INJECTION INTRAVENOUS DAILY
Refills: 0 | Status: DISCONTINUED | OUTPATIENT
Start: 2023-01-14 | End: 2023-01-15

## 2023-01-14 RX ADMIN — Medication 650 MILLIGRAM(S): at 18:19

## 2023-01-14 RX ADMIN — BUMETANIDE 1 MILLIGRAM(S): 0.25 INJECTION INTRAMUSCULAR; INTRAVENOUS at 07:11

## 2023-01-14 RX ADMIN — BUMETANIDE 1 MILLIGRAM(S): 0.25 INJECTION INTRAMUSCULAR; INTRAVENOUS at 13:50

## 2023-01-14 NOTE — PROGRESS NOTE ADULT - SUBJECTIVE AND OBJECTIVE BOX
Patient is a 52y old  Female who presents with a chief complaint of dyspnea (2023 16:45)      HPI:  SOB has responded to diuresis      PAST MEDICAL & SURGICAL HISTORY:  Anemia      IgA nephropathy determined by renal biopsy      DM (diabetes mellitus), type 2      Benign essential hypertension      History of endometriosis      H/O left breast biopsy      H/O laparoscopy          Review of Systems:   CONSTITUTIONAL: No fever, weight loss, or fatigue  EYES: No eye pain, visual disturbances, or discharge  ENMT:  No difficulty hearing, tinnitus, vertigo; No sinus or throat pain  NECK: No pain or stiffness  BREASTS: No pain, masses, or nipple discharge  RESPIRATORY: No cough, wheezing, chills or hemoptysis; No shortness of breath  CARDIOVASCULAR: No chest pain, palpitations, dizziness, or leg swelling  GASTROINTESTINAL: No abdominal or epigastric pain. No nausea, vomiting, or hematemesis; No diarrhea or constipation. No melena or hematochezia.  GENITOURINARY: No dysuria, frequency, hematuria, or incontinence  NEUROLOGICAL: No headaches, memory loss, loss of strength, numbness, or tremors  SKIN: No itching, burning, rashes, or lesions   LYMPH NODES: No enlarged glands  ENDOCRINE: No heat or cold intolerance; No hair loss  MUSCULOSKELETAL: No joint pain or swelling; No muscle, back, or extremity pain  PSYCHIATRIC: No depression, anxiety, mood swings, or difficulty sleeping  HEME/LYMPH: No easy bruising, or bleeding gums  ALLERY AND IMMUNOLOGIC: No hives or eczema    Allergies    ACE inhibitors (Short breath)    Intolerances        Social History:     FAMILY HISTORY:  FH: diabetes mellitus    FH: colon cancer (Sibling)  dx at age 50        MEDICATIONS  (STANDING):  buMETAnide Injectable 1 milliGRAM(s) IV Push two times a day  ferrous    sulfate 325 milliGRAM(s) Oral daily  polyethylene glycol 3350 17 Gram(s) Oral daily  senna 2 Tablet(s) Oral at bedtime  sodium bicarbonate 650 milliGRAM(s) Oral two times a day    MEDICATIONS  (PRN):  acetaminophen     Tablet .. 650 milliGRAM(s) Oral every 6 hours PRN Temp greater or equal to 38C (100.4F), Mild Pain (1 - 3)  aluminum hydroxide/magnesium hydroxide/simethicone Suspension 30 milliLiter(s) Oral every 4 hours PRN Dyspepsia  melatonin 3 milliGRAM(s) Oral at bedtime PRN Insomnia  ondansetron Injectable 4 milliGRAM(s) IV Push every 8 hours PRN Nausea and/or Vomiting        CAPILLARY BLOOD GLUCOSE        I&O's Summary    2023 07:01  -  2023 07:00  --------------------------------------------------------  IN: 0 mL / OUT: 550 mL / NET: -550 mL    2023 07:01  -  2023 18:29  --------------------------------------------------------  IN: 720 mL / OUT: 450 mL / NET: 270 mL        PHYSICAL EXAM:  Vital Signs Last 24 Hrs  T(C): 36.7 (2023 13:20), Max: 37.3 (2023 19:40)  T(F): 98 (2023 13:20), Max: 99.1 (2023 19:40)  HR: 101 (2023 13:20) (95 - 102)  BP: 121/71 (2023 13:20) (121/71 - 146/97)  BP(mean): 103 (2023 19:40) (103 - 103)  RR: 18 (2023 13:20) (18 - 22)  SpO2: 100% (2023 13:20) (98% - 100%)    Parameters below as of 2023 13:20  Patient On (Oxygen Delivery Method): nasal cannula  O2 Flow (L/min): 2      GENERAL: NAD, well-developed  HEAD:  Atraumatic, Normocephalic  EYES: EOMI, PERRLA, conjunctiva and sclera clear  NECK: Supple, No JVD  CHEST/LUNG: Clear to auscultation bilaterally; No wheeze  HEART: Regular rate and rhythm; No murmurs, rubs, or gallops  ABDOMEN: Soft, Nontender, Nondistended; Bowel sounds present  EXTREMITIES:  2+ Peripheral Pulses, No clubbing, cyanosis, or edema  PSYCH: AAOx3  NEUROLOGY: non-focal  SKIN: No rashes or lesions    LABS:                        8.5    18.96 )-----------( 265      ( 2023 17:39 )             28.3         140  |  108  |  64<H>  ----------------------------<  101<H>  5.1   |  16<L>  |  5.16<H>    Ca    8.6      2023 17:39    TPro  7.3  /  Alb  3.2<L>  /  TBili  0.4  /  DBili  x   /  AST  29  /  ALT  10  /  AlkPhos  111      PT/INR - ( 2023 17:39 )   PT: See Note;   INR: See Note ratio         PTT - ( 2023 17:39 )  PTT:see note sec      Urinalysis Basic - ( 2023 22:52 )    Color: Yellow / Appearance: Clear / S.009 / pH: x  Gluc: x / Ketone: Negative  / Bili: Negative / Urobili: Negative   Blood: x / Protein: 300 mg/dL / Nitrite: Negative   Leuk Esterase: Negative / RBC: 2 /hpf / WBC 3 /HPF   Sq Epi: x / Non Sq Epi: 2 /hpf / Bacteria: Negative        RADIOLOGY & ADDITIONAL TESTS:    Imaging Personally Reviewed:    Consultant(s) Notes Reviewed:      Care Discussed with Consultants/Other Providers:

## 2023-01-14 NOTE — PROGRESS NOTE ADULT - PROBLEM SELECTOR PLAN 3
D/t anemia from CKD and anemia secondary to oozing AVMs  - hgb is at baseline 8.5  - transfuse for hgb < 7

## 2023-01-14 NOTE — CONSULT NOTE ADULT - SUBJECTIVE AND OBJECTIVE BOX
HPI:   51 yo BF with DM, HTN, CKD and chronic anemia (+ jejunal AVMs) p/w dyspnea s/p recent admission for acute blood loss anemia at which time she rec'd 5 U PRBC. She was readmitted yesterday and is being diuresed intravenously. She is also noted to have an elevated serum creatinine, and so a renal evaluation was requested. She is well-known to my partner Dr. Martel (follows both in-house and in office; pt had an appt yest). She reports no recent change to her medications, no NSAID use and no recent Abx. She is passing urine well.    PAST MEDICAL & SURGICAL HISTORY:  Anemia  IgA nephropathy determined by renal biopsy  DM (abetes mellitus), type 2  Benign essential hypertension  History of endometriosis  H/O left breast biopsy  H/O laparoscopy    MEDICATIONS  (STANDING):  buMETAnide Injectable 1 milliGRAM(s) IV Push two times a day  ferrous    sulfate 325 milliGRAM(s) Oral daily  polyethylene glycol 3350 17 Gram(s) Oral daily  senna 2 Tablet(s) Oral at bedtime    Allergies  ACE inhibitors (Short breath)    SOCIAL HISTORY:  Denies EtOH, Smoking.    FAMILY HISTORY:  FH: diabetes mellitus  FH: colon cancer (Sibling)  dx at age 50    REVIEW OF SYSTEMS:  Denies any nausea, vomiting, diarrhea, fevers or chills. Denies chest pain, SOB, focal weakness, hematuria or dysuria.  Good oral intake and denies fatigue or weakness.  All other pertinent systems are reviewed and are negative.    VITALS:  T(F): 98 (23 @ 13:20), Max: 100.8 (23 @ 16:51)  HR: 101 (23 @ 13:20) (95 - 121)  BP: 121/71 (23 @ 13:20) (121/71 - 148/88)  SpO2: 100% (23 @ 13:20) (98% - 100%)  Wt(kg): --  Weight (kg): 93 ( @ 16:51)     @ 07:01  -   @ 07:00  --------------------------------------------------------  IN: 0 mL / OUT: 550 mL / NET: -550 mL     @ 07:01  -   @ 16:46  --------------------------------------------------------  IN: 720 mL / OUT: 450 mL / NET: 270 mL    PHYSICAL EXAM:  General:  alert, cooperative and no distress  HEENT: no trauma  Lungs: clear to auscultation bilaterally  Heart: normal S1/S2  Abdomen: soft, non-tender not distended, + BS  Extremities: no clubbing, cyanosis or edema  Urologic: no puckett  Neurologic: Grossly normal  Skin: no rashes    LABS:                        8.5    18.96 )-----------( 265      ( 2023 17:39 )             28.3         140  |  108  |  64<H>  ----------------------------<  101<H>  5.1   |  16<L>  |  5.16<H>    Ca    8.6      2023 17:39    TPro  7.3  /  Alb  3.2<L>  /  TBili  0.4  /  DBili  x   /  AST  29  /  ALT  10  /  AlkPhos  111        Urine Studies:  Urinalysis Basic - ( 2023 22:52 )  Color: Yellow / Appearance: Clear / S.009 / pH: x  Blood: x / Protein: 300 mg/dL / Nitrite: Negative   Leuk Esterase: Negative / RBC: 2 /hpf / WBC 3 /HPF     RADIOLOGY & ADDITIONAL STUDIES:  CXR: minimal congestion    ASSESSMENT:  Patient is a 52y Female with DM, HTN, CKD and chronic anemia (+ jejunal AVMs) p/w dyspnea and acute on chronic kidney injury.  - CKD: stage 4/5 with baseline creatinine mid 4s. + IgA nephropathy  - EVELYN: nonoliguric. Likely prerenal due to obligate diuresis vs POD  - metabolic acidosis (+AG) due to CKD  - dyspnea: volume overload (s/p recent mult PRBC) improving  - anemia in CKD: exacerbated by GIB    RECOMMENDATIONS:  - continue bumex 1 mg IV bid  - start sodium bicarbonate 650 mg po bid  - defer SYED (e.g. Procrit) until GIB controlled  - please dose any new medications for a CrCl of ~15-20  - avoid NSAIDs/nephrotoxins as able    Thank you for the courtesy of this consultation.    Jason Belle M.D.  [facility], [address]  [place] [address] [phone]

## 2023-01-15 ENCOUNTER — TRANSCRIPTION ENCOUNTER (OUTPATIENT)
Age: 53
End: 2023-01-15

## 2023-01-15 VITALS
OXYGEN SATURATION: 95 % | RESPIRATION RATE: 18 BRPM | HEART RATE: 96 BPM | DIASTOLIC BLOOD PRESSURE: 91 MMHG | SYSTOLIC BLOOD PRESSURE: 141 MMHG | TEMPERATURE: 98 F

## 2023-01-15 LAB
ALBUMIN SERPL ELPH-MCNC: 2.8 G/DL — LOW (ref 3.3–5)
ALP SERPL-CCNC: 91 U/L — SIGNIFICANT CHANGE UP (ref 40–120)
ALT FLD-CCNC: 12 U/L — SIGNIFICANT CHANGE UP (ref 10–45)
ANION GAP SERPL CALC-SCNC: 16 MMOL/L — SIGNIFICANT CHANGE UP (ref 5–17)
AST SERPL-CCNC: 12 U/L — SIGNIFICANT CHANGE UP (ref 10–40)
BILIRUB SERPL-MCNC: 0.2 MG/DL — SIGNIFICANT CHANGE UP (ref 0.2–1.2)
BUN SERPL-MCNC: 74 MG/DL — HIGH (ref 7–23)
CALCIUM SERPL-MCNC: 8.3 MG/DL — LOW (ref 8.4–10.5)
CHLORIDE SERPL-SCNC: 110 MMOL/L — HIGH (ref 96–108)
CO2 SERPL-SCNC: 17 MMOL/L — LOW (ref 22–31)
CREAT SERPL-MCNC: 5.56 MG/DL — HIGH (ref 0.5–1.3)
CULTURE RESULTS: SIGNIFICANT CHANGE UP
EGFR: 9 ML/MIN/1.73M2 — LOW
GLUCOSE SERPL-MCNC: 93 MG/DL — SIGNIFICANT CHANGE UP (ref 70–99)
HCT VFR BLD CALC: 25.8 % — LOW (ref 34.5–45)
HGB BLD-MCNC: 7.5 G/DL — LOW (ref 11.5–15.5)
MCHC RBC-ENTMCNC: 25.8 PG — LOW (ref 27–34)
MCHC RBC-ENTMCNC: 29.1 GM/DL — LOW (ref 32–36)
MCV RBC AUTO: 88.7 FL — SIGNIFICANT CHANGE UP (ref 80–100)
NRBC # BLD: 0 /100 WBCS — SIGNIFICANT CHANGE UP (ref 0–0)
PHOSPHATE SERPL-MCNC: 5.6 MG/DL — HIGH (ref 2.5–4.5)
PLATELET # BLD AUTO: 311 K/UL — SIGNIFICANT CHANGE UP (ref 150–400)
POTASSIUM SERPL-MCNC: 4.7 MMOL/L — SIGNIFICANT CHANGE UP (ref 3.5–5.3)
POTASSIUM SERPL-SCNC: 4.7 MMOL/L — SIGNIFICANT CHANGE UP (ref 3.5–5.3)
PROT SERPL-MCNC: 6.7 G/DL — SIGNIFICANT CHANGE UP (ref 6–8.3)
RBC # BLD: 2.91 M/UL — LOW (ref 3.8–5.2)
RBC # FLD: 17.6 % — HIGH (ref 10.3–14.5)
SODIUM SERPL-SCNC: 143 MMOL/L — SIGNIFICANT CHANGE UP (ref 135–145)
SPECIMEN SOURCE: SIGNIFICANT CHANGE UP
WBC # BLD: 14.15 K/UL — HIGH (ref 3.8–10.5)
WBC # FLD AUTO: 14.15 K/UL — HIGH (ref 3.8–10.5)

## 2023-01-15 PROCEDURE — 82947 ASSAY GLUCOSE BLOOD QUANT: CPT

## 2023-01-15 PROCEDURE — 99285 EMERGENCY DEPT VISIT HI MDM: CPT

## 2023-01-15 PROCEDURE — 84132 ASSAY OF SERUM POTASSIUM: CPT

## 2023-01-15 PROCEDURE — 81001 URINALYSIS AUTO W/SCOPE: CPT

## 2023-01-15 PROCEDURE — 87040 BLOOD CULTURE FOR BACTERIA: CPT

## 2023-01-15 PROCEDURE — 85027 COMPLETE CBC AUTOMATED: CPT

## 2023-01-15 PROCEDURE — 83880 ASSAY OF NATRIURETIC PEPTIDE: CPT

## 2023-01-15 PROCEDURE — 84702 CHORIONIC GONADOTROPIN TEST: CPT

## 2023-01-15 PROCEDURE — 83615 LACTATE (LD) (LDH) ENZYME: CPT

## 2023-01-15 PROCEDURE — 84484 ASSAY OF TROPONIN QUANT: CPT

## 2023-01-15 PROCEDURE — 84295 ASSAY OF SERUM SODIUM: CPT

## 2023-01-15 PROCEDURE — 82435 ASSAY OF BLOOD CHLORIDE: CPT

## 2023-01-15 PROCEDURE — 85610 PROTHROMBIN TIME: CPT

## 2023-01-15 PROCEDURE — 36430 TRANSFUSION BLD/BLD COMPNT: CPT

## 2023-01-15 PROCEDURE — 86850 RBC ANTIBODY SCREEN: CPT

## 2023-01-15 PROCEDURE — 82803 BLOOD GASES ANY COMBINATION: CPT

## 2023-01-15 PROCEDURE — 85014 HEMATOCRIT: CPT

## 2023-01-15 PROCEDURE — 85730 THROMBOPLASTIN TIME PARTIAL: CPT

## 2023-01-15 PROCEDURE — 76700 US EXAM ABDOM COMPLETE: CPT | Mod: 26

## 2023-01-15 PROCEDURE — 85025 COMPLETE CBC W/AUTO DIFF WBC: CPT

## 2023-01-15 PROCEDURE — 85018 HEMOGLOBIN: CPT

## 2023-01-15 PROCEDURE — 71046 X-RAY EXAM CHEST 2 VIEWS: CPT

## 2023-01-15 PROCEDURE — 80053 COMPREHEN METABOLIC PANEL: CPT

## 2023-01-15 PROCEDURE — 0225U NFCT DS DNA&RNA 21 SARSCOV2: CPT

## 2023-01-15 PROCEDURE — 36415 COLL VENOUS BLD VENIPUNCTURE: CPT

## 2023-01-15 PROCEDURE — 86900 BLOOD TYPING SEROLOGIC ABO: CPT

## 2023-01-15 PROCEDURE — 87086 URINE CULTURE/COLONY COUNT: CPT

## 2023-01-15 PROCEDURE — 76700 US EXAM ABDOM COMPLETE: CPT

## 2023-01-15 PROCEDURE — 86901 BLOOD TYPING SEROLOGIC RH(D): CPT

## 2023-01-15 PROCEDURE — 82330 ASSAY OF CALCIUM: CPT

## 2023-01-15 PROCEDURE — 84100 ASSAY OF PHOSPHORUS: CPT

## 2023-01-15 PROCEDURE — 83605 ASSAY OF LACTIC ACID: CPT

## 2023-01-15 RX ORDER — SODIUM BICARBONATE 1 MEQ/ML
1 SYRINGE (ML) INTRAVENOUS
Qty: 10 | Refills: 0
Start: 2023-01-15 | End: 2023-01-19

## 2023-01-15 RX ORDER — SODIUM BICARBONATE 1 MEQ/ML
1 SYRINGE (ML) INTRAVENOUS
Qty: 60 | Refills: 0
Start: 2023-01-15 | End: 2023-02-13

## 2023-01-15 RX ADMIN — Medication 650 MILLIGRAM(S): at 11:53

## 2023-01-15 RX ADMIN — BUMETANIDE 1 MILLIGRAM(S): 0.25 INJECTION INTRAMUSCULAR; INTRAVENOUS at 05:50

## 2023-01-15 NOTE — DISCHARGE NOTE PROVIDER - NSDCFUADDAPPT_GEN_ALL_CORE_FT
Hold bumex x 1-2 days. Must have blood drawn by Dr. Cannon or Dr. Martel to check   kidney function and CO2.  Continue to take sodium bicarbonate twice a day.

## 2023-01-15 NOTE — DISCHARGE NOTE PROVIDER - CARE PROVIDER_API CALL
Seb Cannon)  Internal Medicine  [address] [place] [address]  Phone: [phone]  Fax: [phone]  Established Patient  Follow Up Time: 1-3 days    JAVIER AGGARWAL  Internal Medicine  [address] [place] [address]  Phone: ()-  Fax: ()-  Established Patient  Follow Up Time: 1-3 days

## 2023-01-15 NOTE — DISCHARGE NOTE NURSING/CASE MANAGEMENT/SOCIAL WORK - NSDCPEFALRISK_GEN_ALL_CORE
For information on Fall & Injury Prevention, visit: [email or web address] OR  [email or web address] OR  https://www.cdc.gov/steadi/patient.html

## 2023-01-15 NOTE — DISCHARGE NOTE PROVIDER - NSDCMRMEDTOKEN_GEN_ALL_CORE_FT
bumetanide 1 mg oral tablet: 1 tab(s) orally 2 times a day  ferrous sulfate 325 mg (65 mg elemental iron) oral delayed release tablet: 1 tab(s) orally once a day  Procrit 20,000 units/mL injectable solution: 1 implant(s) injectable every 7 days Friday  SandoSTATIN: injectable every 4 weeks   bumetanide 1 mg oral tablet: 1 tab(s) orally 2 times a day  ferrous sulfate 325 mg (65 mg elemental iron) oral delayed release tablet: 1 tab(s) orally once a day  Procrit 20,000 units/mL injectable solution: 1 implant(s) injectable every 7 days Friday  SandoSTATIN: injectable every 4 weeks  sodium bicarbonate 650 mg oral tablet: 1 tab(s) orally 2 times a day MDD:2

## 2023-01-15 NOTE — DISCHARGE NOTE NURSING/CASE MANAGEMENT/SOCIAL WORK - PATIENT PORTAL LINK FT
You can access the FollowMyHealth Patient Portal offered by [facility] by registering at the following website: [email or web address]. By joining [employer]’s FollowMyHealth portal, you will also be able to view your health information using other applications (apps) compatible with our system.

## 2023-01-15 NOTE — CHART NOTE - NSCHARTNOTEFT_GEN_A_CORE
Patient is a 52y old  Female who presents with a chief complaint of dyspnea (15 Deniz 2023 13:04)  Spoke with pt this morning because she was inquiring why did she need to remain in the hospital.  Pt has been seen   by Nephrology.  Pt's serum creatinine continues to increase.  Pt has also been refusing her sodium bicarb tabs   because she did not understand why she was taking the medication.       Vital Signs Last 24 Hrs  T(C): 36.8 (15 Deniz 2023 11:52), Max: 36.8 (15 Deniz 2023 11:52)  T(F): 98.3 (15 Deniz 2023 11:52), Max: 98.3 (15 Deniz 2023 11:52)  HR: 96 (15 Deniz 2023 11:52) (96 - 104)  BP: 141/91 (15 Deniz 2023 11:52) (141/91 - 144/85)  BP(mean): --  RR: 18 (15 Deniz 2023 11:52) (18 - 18)  SpO2: 95% (15 Deniz 2023 11:52) (95% - 98%)    Parameters below as of 15 Deniz 2023 11:52  Patient On (Oxygen Delivery Method): room air    Gen:: 53y/o female wd/wn in nad.   Skin: Acyanotic, cool dry and intact.   Resp: CTA without adventitious sounds   CV: S1S2   Chest: Symmetrical, equal lung expansion.   Abdomen: Soft, and round.   Extremities: (+) PPP no edema.          Labs:                          7.5    14.15 )-----------( 311      ( 15 Deniz 2023 06:30 )             25.8     01-15    143  |  110<H>  |  74<H>  ----------------------------<  93  4.7   |  17<L>  |  5.56<H>    Ca    8.3<L>      15 Deniz 2023 06:30  Phos  5.6     01-15    TPro  6.7  /  Alb  2.8<L>  /  TBili  0.2  /  DBili  x   /  AST  12  /  ALT  12  /  AlkPhos  91  01-15      Impression:   EVELYN on CKD stage 4/5   Anemia    Plan:  Pt insists protonix has worsened her kidney functions. Spoke with Nephrology and her Medical Attending.   Because of her risinng creatinine, pt has been advised we are going to hold her bumex and follow her kidney   function in the morning.  Explained to pt 3 times the risk of leaving AMA especially at the risk of her further   worsening kidney function.  As per pt she does not want to be in the   hospital no longer. She want to go home.  As per pt she will follow up with Dr. Martel or Dr Cannon to have her lab   work taken.    Pt refuse to sign the AMA document, but verbalized she will go to Dr. Mratel or Dr. Cannon to have her blood   work done.  Her IV was removed by the RN.  Pt shortly walked out on her own to the hospial lobby.            Time patient left AMA: 1325 pm.       Bhargavi Parker ANP-BC  Spectralink #[id number]

## 2023-01-15 NOTE — DISCHARGE NOTE PROVIDER - NSDCFUSCHEDAPPT_GEN_ALL_CORE_FT
Nando Ceron Physician [facility]  [facility]  Scheduled Appointment: 01/18/2023    Nando Ceron Physician [facility]  [facility]  Scheduled Appointment: 02/10/2023

## 2023-01-15 NOTE — DISCHARGE NOTE PROVIDER - NSDCCPCAREPLAN_GEN_ALL_CORE_FT
PRINCIPAL DISCHARGE DIAGNOSIS  Diagnosis: Stage 5 chronic kidney disease not on chronic dialysis  Assessment and Plan of Treatment: Avoid taking (NSAIDs) - (ex: Ibuprofen, Advil, Celebrex, Naprosyn)  Avoid taking any nephrotoxic agents (can harm kidneys) - Intravenous contrast for diagnostic testing, combination cold medications.  Have all medications adjusted for your renal function by your Health Care Provider.  Blood pressure control is important.  Take all medication as prescribed.        SECONDARY DISCHARGE DIAGNOSES  Diagnosis: Anemia of chronic disease  Assessment and Plan of Treatment: Pt s/p prbc.   Must follow up with Dr. Martel and Dr. Cannon to repeat blood work on tuesday.

## 2023-01-15 NOTE — DISCHARGE NOTE PROVIDER - PROVIDER TOKENS
PROVIDER:[TOKEN:[[id number]],FOLLOWUP:[1-3 days],ESTABLISHEDPATIENT:[T]],PROVIDER:[TOKEN:[[id number]],FOLLOWUP:[1-3 days],ESTABLISHEDPATIENT:[T]]

## 2023-01-18 ENCOUNTER — RESULT REVIEW (OUTPATIENT)
Age: 53
End: 2023-01-18

## 2023-01-18 ENCOUNTER — APPOINTMENT (OUTPATIENT)
Dept: HEMATOLOGY ONCOLOGY | Facility: CLINIC | Age: 53
End: 2023-01-18

## 2023-01-18 LAB
BASOPHILS # BLD AUTO: 0.04 K/UL — SIGNIFICANT CHANGE UP (ref 0–0.2)
BASOPHILS NFR BLD AUTO: 0.3 % — SIGNIFICANT CHANGE UP (ref 0–2)
CULTURE RESULTS: SIGNIFICANT CHANGE UP
CULTURE RESULTS: SIGNIFICANT CHANGE UP
EOSINOPHIL # BLD AUTO: 0.26 K/UL — SIGNIFICANT CHANGE UP (ref 0–0.5)
EOSINOPHIL NFR BLD AUTO: 2 % — SIGNIFICANT CHANGE UP (ref 0–6)
HCT VFR BLD CALC: 23 % — LOW (ref 34.5–45)
HGB BLD-MCNC: 6.9 G/DL — CRITICAL LOW (ref 11.5–15.5)
IMM GRANULOCYTES NFR BLD AUTO: 0.5 % — SIGNIFICANT CHANGE UP (ref 0–0.9)
LYMPHOCYTES # BLD AUTO: 0.91 K/UL — LOW (ref 1–3.3)
LYMPHOCYTES # BLD AUTO: 7 % — LOW (ref 13–44)
MCHC RBC-ENTMCNC: 25.4 PG — LOW (ref 27–34)
MCHC RBC-ENTMCNC: 30 G/DL — LOW (ref 32–36)
MCV RBC AUTO: 84.6 FL — SIGNIFICANT CHANGE UP (ref 80–100)
MONOCYTES # BLD AUTO: 0.77 K/UL — SIGNIFICANT CHANGE UP (ref 0–0.9)
MONOCYTES NFR BLD AUTO: 5.9 % — SIGNIFICANT CHANGE UP (ref 2–14)
NEUTROPHILS # BLD AUTO: 11.02 K/UL — HIGH (ref 1.8–7.4)
NEUTROPHILS NFR BLD AUTO: 84.3 % — HIGH (ref 43–77)
NRBC # BLD: 0 /100 WBCS — SIGNIFICANT CHANGE UP (ref 0–0)
PLATELET # BLD AUTO: 302 K/UL — SIGNIFICANT CHANGE UP (ref 150–400)
RBC # BLD: 2.72 M/UL — LOW (ref 3.8–5.2)
RBC # FLD: 17 % — HIGH (ref 10.3–14.5)
SPECIMEN SOURCE: SIGNIFICANT CHANGE UP
SPECIMEN SOURCE: SIGNIFICANT CHANGE UP
WBC # BLD: 13.07 K/UL — HIGH (ref 3.8–10.5)
WBC # FLD AUTO: 13.07 K/UL — HIGH (ref 3.8–10.5)

## 2023-01-18 NOTE — ASSESSMENT
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  She receives her medical care at [facility], but lives in [place], and desires to establish care here to enable transfusional support.  Has been taking Retacrit 20,000 U weekly.  Hgb 3.5 today.  Will send to [facility] today.   Dr Aguirre recommends Avastin for treatment of AVM.  \par \par Plan:\par Sent to [facility] ED via ambulance. \par Octreotide therapy\par Continue iron supplementation\par Increase Retacrit to 20,000 U weekly.\par Dialysis as per Rogosin\par BW in one week \par \par

## 2023-01-18 NOTE — PHYSICAL EXAM
[Restricted in physically strenuous activity but ambulatory and able to carry out work of a light or sedentary nature] : Status 1- Restricted in physically strenuous activity but ambulatory and able to carry out work of a light or sedentary nature, e.g., light house work, office work [Normal] : affect appropriate [de-identified] : Moon facies [de-identified] : Pale conjunctivae

## 2023-01-18 NOTE — REVIEW OF SYSTEMS
[Fatigue] : fatigue [Lower Ext Edema] : lower extremity edema [Muscle Pain] : muscle pain [Negative] : Allergic/Immunologic [de-identified] : feels shakey, lightheaded.

## 2023-01-18 NOTE — CONSULT LETTER
[Dear  ___] : Dear ~JOSÉ, [Consult Letter:] : I had the pleasure of evaluating your patient, [unfilled]. [Please see my note below.] : Please see my note below. [Consult Closing:] : Thank you very much for allowing me to participate in the care of this patient.  If you have any questions, please do not hesitate to contact me. [Sincerely,] : Sincerely, [DrJohnathan  ___] : Dr. PINTO [DrJohnathan ___] : Dr. PINTO [FreeTextEntry2] : Tip Waters MD [FreeTextEntry3] : Juan\par Nando Ceron M.D., FACP\par Professor of Medicine\par [employer] [facility]\par Associate Chief, Division of Hematology\par [facility]\par [facility]\par [address] [place] [address]\[place] [address]\par [phone]\par \par \par \par \par

## 2023-01-18 NOTE — HISTORY OF PRESENT ILLNESS
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  She has been receiving packed red blood cell transfusions every 3 to 4 weeks, oral iron, intravenous iron (last dose 2022), and Retacrit 10,000 units subq weekly.  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  Serum protein electrophoresis has been normal.  \par \par Saw GI, Dr Armen Aguirre, from [place],  who recommends Avastin.  Has been taking Procrit weekly.  Will see Dr Musa Martel, nephrology, this Friday.  Pt just returned from trip to [place] for  of family member.  Has been feeling shaky, lightheaded.   Hgb 3.5 today-will send to [facility]

## 2023-01-20 ENCOUNTER — APPOINTMENT (OUTPATIENT)
Dept: HEMATOLOGY ONCOLOGY | Facility: CLINIC | Age: 53
End: 2023-01-20

## 2023-01-20 ENCOUNTER — RESULT REVIEW (OUTPATIENT)
Age: 53
End: 2023-01-20

## 2023-01-20 LAB
BASOPHILS # BLD AUTO: 0.03 K/UL — SIGNIFICANT CHANGE UP (ref 0–0.2)
BASOPHILS NFR BLD AUTO: 0.1 % — SIGNIFICANT CHANGE UP (ref 0–2)
EOSINOPHIL # BLD AUTO: 0.01 K/UL — SIGNIFICANT CHANGE UP (ref 0–0.5)
EOSINOPHIL NFR BLD AUTO: 0 % — SIGNIFICANT CHANGE UP (ref 0–6)
HCT VFR BLD CALC: 20.8 % — CRITICAL LOW (ref 34.5–45)
HGB BLD-MCNC: 6 G/DL — CRITICAL LOW (ref 11.5–15.5)
IMM GRANULOCYTES NFR BLD AUTO: 0.5 % — SIGNIFICANT CHANGE UP (ref 0–0.9)
LYMPHOCYTES # BLD AUTO: 1 K/UL — SIGNIFICANT CHANGE UP (ref 1–3.3)
LYMPHOCYTES # BLD AUTO: 4.9 % — LOW (ref 13–44)
MCHC RBC-ENTMCNC: 24.9 PG — LOW (ref 27–34)
MCHC RBC-ENTMCNC: 28.8 G/DL — LOW (ref 32–36)
MCV RBC AUTO: 86.3 FL — SIGNIFICANT CHANGE UP (ref 80–100)
MONOCYTES # BLD AUTO: 0.83 K/UL — SIGNIFICANT CHANGE UP (ref 0–0.9)
MONOCYTES NFR BLD AUTO: 4.1 % — SIGNIFICANT CHANGE UP (ref 2–14)
NEUTROPHILS # BLD AUTO: 18.37 K/UL — HIGH (ref 1.8–7.4)
NEUTROPHILS NFR BLD AUTO: 90.4 % — HIGH (ref 43–77)
NRBC # BLD: 0 /100 WBCS — SIGNIFICANT CHANGE UP (ref 0–0)
PLATELET # BLD AUTO: 302 K/UL — SIGNIFICANT CHANGE UP (ref 150–400)
RBC # BLD: 2.41 M/UL — LOW (ref 3.8–5.2)
RBC # FLD: 17.2 % — HIGH (ref 10.3–14.5)
WBC # BLD: 20.35 K/UL — HIGH (ref 3.8–10.5)
WBC # FLD AUTO: 20.35 K/UL — HIGH (ref 3.8–10.5)

## 2023-01-21 ENCOUNTER — APPOINTMENT (OUTPATIENT)
Dept: INFUSION THERAPY | Facility: HOSPITAL | Age: 53
End: 2023-01-21

## 2023-01-23 DIAGNOSIS — Z51.89 ENCOUNTER FOR OTHER SPECIFIED AFTERCARE: ICD-10-CM

## 2023-01-26 NOTE — H&P ADULT - EXTREMITIES
PHYSICIANS [facility] Internal Medicine and Pediatrics  [facility] [address] [facility]  Phone: [phone]  Fax: [phone]    Omar Ryan MD        January 26, 2023     Patient: Shanthi Dickerson   YOB: 2012   Date of Visit: 1/26/2023       To Whom it May Concern:    Shanthi Dickerson was seen in my clinic on 1/26/2023. He may return to school on Monday, 1/30/23. If you have any questions or concerns, please don't hesitate to call.     Sincerely,         Omar Ryan MD
No cyanosis, clubbing or edema

## 2023-02-02 NOTE — ED PROVIDER NOTE - AXIS
CALLED PATIENT BACK RETURNING HIS VOICEMAIL- he left no message as to what he needs.    THERE WAS NO ANSWER, I LEFT HIM A VOICEMAIL TO call us back if he needs to schedule.   
Normal

## 2023-02-04 ENCOUNTER — OUTPATIENT (OUTPATIENT)
Dept: OUTPATIENT SERVICES | Facility: HOSPITAL | Age: 53
LOS: 1 days | Discharge: HOME | End: 2023-02-04

## 2023-02-04 DIAGNOSIS — D64.9 ANEMIA, UNSPECIFIED: ICD-10-CM

## 2023-02-04 DIAGNOSIS — Z98.890 OTHER SPECIFIED POSTPROCEDURAL STATES: Chronic | ICD-10-CM

## 2023-02-07 NOTE — ED ADULT NURSE NOTE - NURSING MUSC JOINTS
Subjective   Kane Baldwin is a 76 y.o. male.       History of Present Illness     Patient is followed with chronic otitis externa and dilated ear canals.  Not having any otorrhea or bleeding.    The following portions of the patient's history were reviewed and updated as appropriate: allergies, current medications, past family history, past medical history, past social history, past surgical history and problem list.     reports that he has quit smoking. His smoking use included cigarettes. He has a 10.00 pack-year smoking history. His smokeless tobacco use includes chew. He reports that he does not drink alcohol and does not use drugs.   Patient is not a tobacco user and has not been counseled for use of tobacco products      Review of Systems        Objective   Physical Exam    Both ear canals show uninfected squamous debris much more on the left than the right.  This is cleaned under the microscope using instrumentation.  Vigorous cough is present.  Tympanic membranes are intact.       Assessment and Plan   Diagnoses and all orders for this visit:    1. Chronic non-infective otitis externa of both ears, unspecified type (Primary)             Plan: Ears cleaned as described above.  Return 4 months.  Call for problems.     no pain, swelling or deformity of joints

## 2023-02-08 ENCOUNTER — APPOINTMENT (OUTPATIENT)
Dept: HEMATOLOGY ONCOLOGY | Facility: CLINIC | Age: 53
End: 2023-02-08

## 2023-02-08 ENCOUNTER — RESULT REVIEW (OUTPATIENT)
Age: 53
End: 2023-02-08

## 2023-02-08 ENCOUNTER — NON-APPOINTMENT (OUTPATIENT)
Age: 53
End: 2023-02-08

## 2023-02-08 LAB
BASOPHILS # BLD AUTO: 0.03 K/UL — SIGNIFICANT CHANGE UP (ref 0–0.2)
BASOPHILS NFR BLD AUTO: 0.3 % — SIGNIFICANT CHANGE UP (ref 0–2)
EOSINOPHIL # BLD AUTO: 0.18 K/UL — SIGNIFICANT CHANGE UP (ref 0–0.5)
EOSINOPHIL NFR BLD AUTO: 1.6 % — SIGNIFICANT CHANGE UP (ref 0–6)
HCT VFR BLD CALC: 16.2 % — CRITICAL LOW (ref 34.5–45)
HGB BLD-MCNC: 4.5 G/DL — CRITICAL LOW (ref 11.5–15.5)
IMM GRANULOCYTES NFR BLD AUTO: 0.5 % — SIGNIFICANT CHANGE UP (ref 0–0.9)
LYMPHOCYTES # BLD AUTO: 1.03 K/UL — SIGNIFICANT CHANGE UP (ref 1–3.3)
LYMPHOCYTES # BLD AUTO: 9.2 % — LOW (ref 13–44)
MCHC RBC-ENTMCNC: 23.2 PG — LOW (ref 27–34)
MCHC RBC-ENTMCNC: 27.8 G/DL — LOW (ref 32–36)
MCV RBC AUTO: 83.5 FL — SIGNIFICANT CHANGE UP (ref 80–100)
MONOCYTES # BLD AUTO: 0.82 K/UL — SIGNIFICANT CHANGE UP (ref 0–0.9)
MONOCYTES NFR BLD AUTO: 7.3 % — SIGNIFICANT CHANGE UP (ref 2–14)
NEUTROPHILS # BLD AUTO: 9.12 K/UL — HIGH (ref 1.8–7.4)
NEUTROPHILS NFR BLD AUTO: 81.1 % — HIGH (ref 43–77)
NRBC # BLD: 0 /100 WBCS — SIGNIFICANT CHANGE UP (ref 0–0)
PLATELET # BLD AUTO: 284 K/UL — SIGNIFICANT CHANGE UP (ref 150–400)
RBC # BLD: 1.94 M/UL — LOW (ref 3.8–5.2)
RBC # FLD: 17.7 % — HIGH (ref 10.3–14.5)
WBC # BLD: 11.24 K/UL — HIGH (ref 3.8–10.5)
WBC # FLD AUTO: 11.24 K/UL — HIGH (ref 3.8–10.5)

## 2023-02-08 PROCEDURE — 86923 COMPATIBILITY TEST ELECTRIC: CPT

## 2023-02-08 PROCEDURE — 86901 BLOOD TYPING SEROLOGIC RH(D): CPT

## 2023-02-08 PROCEDURE — 86900 BLOOD TYPING SEROLOGIC ABO: CPT

## 2023-02-08 PROCEDURE — 86850 RBC ANTIBODY SCREEN: CPT

## 2023-02-10 ENCOUNTER — APPOINTMENT (OUTPATIENT)
Dept: HEMATOLOGY ONCOLOGY | Facility: CLINIC | Age: 53
End: 2023-02-10

## 2023-02-10 ENCOUNTER — INPATIENT (INPATIENT)
Facility: HOSPITAL | Age: 53
LOS: 0 days | Discharge: LEFT BEFORE DISCHARGE | DRG: 683 | End: 2023-02-11
Attending: INTERNAL MEDICINE | Admitting: INTERNAL MEDICINE
Payer: MEDICAID

## 2023-02-10 VITALS
HEART RATE: 100 BPM | SYSTOLIC BLOOD PRESSURE: 122 MMHG | RESPIRATION RATE: 16 BRPM | WEIGHT: 195.11 LBS | DIASTOLIC BLOOD PRESSURE: 71 MMHG | OXYGEN SATURATION: 99 % | HEIGHT: 64 IN | TEMPERATURE: 99 F

## 2023-02-10 DIAGNOSIS — Z29.9 ENCOUNTER FOR PROPHYLACTIC MEASURES, UNSPECIFIED: ICD-10-CM

## 2023-02-10 DIAGNOSIS — Z98.890 OTHER SPECIFIED POSTPROCEDURAL STATES: Chronic | ICD-10-CM

## 2023-02-10 DIAGNOSIS — N18.5 CHRONIC KIDNEY DISEASE, STAGE 5: ICD-10-CM

## 2023-02-10 DIAGNOSIS — R71.0 PRECIPITOUS DROP IN HEMATOCRIT: ICD-10-CM

## 2023-02-10 DIAGNOSIS — I10 ESSENTIAL (PRIMARY) HYPERTENSION: ICD-10-CM

## 2023-02-10 DIAGNOSIS — D63.8 ANEMIA IN OTHER CHRONIC DISEASES CLASSIFIED ELSEWHERE: ICD-10-CM

## 2023-02-10 LAB
ALBUMIN SERPL ELPH-MCNC: 3.5 G/DL — SIGNIFICANT CHANGE UP (ref 3.3–5)
ALP SERPL-CCNC: 114 U/L — SIGNIFICANT CHANGE UP (ref 40–120)
ALT FLD-CCNC: 12 U/L — SIGNIFICANT CHANGE UP (ref 10–45)
ANION GAP SERPL CALC-SCNC: 10 MMOL/L — SIGNIFICANT CHANGE UP (ref 5–17)
ANION GAP SERPL CALC-SCNC: 14 MMOL/L — SIGNIFICANT CHANGE UP (ref 5–17)
ANISOCYTOSIS BLD QL: SLIGHT — SIGNIFICANT CHANGE UP
APTT BLD: 31.5 SEC — SIGNIFICANT CHANGE UP (ref 27.5–35.5)
AST SERPL-CCNC: 38 U/L — SIGNIFICANT CHANGE UP (ref 10–40)
BASE EXCESS BLDV CALC-SCNC: -3.5 MMOL/L — LOW (ref -2–3)
BASOPHILS # BLD AUTO: 0.11 K/UL — SIGNIFICANT CHANGE UP (ref 0–0.2)
BASOPHILS NFR BLD AUTO: 0.9 % — SIGNIFICANT CHANGE UP (ref 0–2)
BILIRUB SERPL-MCNC: 0.1 MG/DL — LOW (ref 0.2–1.2)
BLD GP AB SCN SERPL QL: NEGATIVE — SIGNIFICANT CHANGE UP
BUN SERPL-MCNC: 54 MG/DL — HIGH (ref 7–23)
BUN SERPL-MCNC: 56 MG/DL — HIGH (ref 7–23)
CA-I SERPL-SCNC: 1.14 MMOL/L — LOW (ref 1.15–1.33)
CALCIUM SERPL-MCNC: 8.3 MG/DL — LOW (ref 8.4–10.5)
CALCIUM SERPL-MCNC: 8.3 MG/DL — LOW (ref 8.4–10.5)
CHLORIDE BLDV-SCNC: 107 MMOL/L — SIGNIFICANT CHANGE UP (ref 96–108)
CHLORIDE SERPL-SCNC: 103 MMOL/L — SIGNIFICANT CHANGE UP (ref 96–108)
CHLORIDE SERPL-SCNC: 105 MMOL/L — SIGNIFICANT CHANGE UP (ref 96–108)
CO2 BLDV-SCNC: 25 MMOL/L — SIGNIFICANT CHANGE UP (ref 22–26)
CO2 SERPL-SCNC: 19 MMOL/L — LOW (ref 22–31)
CO2 SERPL-SCNC: 22 MMOL/L — SIGNIFICANT CHANGE UP (ref 22–31)
CREAT SERPL-MCNC: 5.31 MG/DL — HIGH (ref 0.5–1.3)
CREAT SERPL-MCNC: 5.52 MG/DL — HIGH (ref 0.5–1.3)
DACRYOCYTES BLD QL SMEAR: SLIGHT — SIGNIFICANT CHANGE UP
EGFR: 9 ML/MIN/1.73M2 — LOW
EGFR: 9 ML/MIN/1.73M2 — LOW
EOSINOPHIL # BLD AUTO: 0.21 K/UL — SIGNIFICANT CHANGE UP (ref 0–0.5)
EOSINOPHIL NFR BLD AUTO: 1.7 % — SIGNIFICANT CHANGE UP (ref 0–6)
GAS PNL BLDV: 137 MMOL/L — SIGNIFICANT CHANGE UP (ref 136–145)
GAS PNL BLDV: SIGNIFICANT CHANGE UP
GAS PNL BLDV: SIGNIFICANT CHANGE UP
GLUCOSE BLDV-MCNC: 91 MG/DL — SIGNIFICANT CHANGE UP (ref 70–99)
GLUCOSE SERPL-MCNC: 135 MG/DL — HIGH (ref 70–99)
GLUCOSE SERPL-MCNC: 99 MG/DL — SIGNIFICANT CHANGE UP (ref 70–99)
HCO3 BLDV-SCNC: 23 MMOL/L — SIGNIFICANT CHANGE UP (ref 22–29)
HCT VFR BLD CALC: 16.8 % — CRITICAL LOW (ref 34.5–45)
HCT VFR BLDA CALC: 14 % — CRITICAL LOW (ref 34.5–46.5)
HGB BLD CALC-MCNC: 4.6 G/DL — CRITICAL LOW (ref 11.7–16.1)
HGB BLD-MCNC: 4.4 G/DL — CRITICAL LOW (ref 11.5–15.5)
HYPOCHROMIA BLD QL: SIGNIFICANT CHANGE UP
INR BLD: 1.06 RATIO — SIGNIFICANT CHANGE UP (ref 0.88–1.16)
LACTATE BLDV-MCNC: 1.5 MMOL/L — SIGNIFICANT CHANGE UP (ref 0.5–2)
LYMPHOCYTES # BLD AUTO: 0.42 K/UL — LOW (ref 1–3.3)
LYMPHOCYTES # BLD AUTO: 3.5 % — LOW (ref 13–44)
MACROCYTES BLD QL: SLIGHT — SIGNIFICANT CHANGE UP
MANUAL SMEAR VERIFICATION: SIGNIFICANT CHANGE UP
MCHC RBC-ENTMCNC: 22.2 PG — LOW (ref 27–34)
MCHC RBC-ENTMCNC: 26.2 GM/DL — LOW (ref 32–36)
MCV RBC AUTO: 84.8 FL — SIGNIFICANT CHANGE UP (ref 80–100)
MONOCYTES # BLD AUTO: 0.52 K/UL — SIGNIFICANT CHANGE UP (ref 0–0.9)
MONOCYTES NFR BLD AUTO: 4.3 % — SIGNIFICANT CHANGE UP (ref 2–14)
NEUTROPHILS # BLD AUTO: 10.86 K/UL — HIGH (ref 1.8–7.4)
NEUTROPHILS NFR BLD AUTO: 89.6 % — HIGH (ref 43–77)
OVALOCYTES BLD QL SMEAR: SLIGHT — SIGNIFICANT CHANGE UP
PCO2 BLDV: 53 MMHG — HIGH (ref 39–42)
PH BLDV: 7.25 — LOW (ref 7.32–7.43)
PLAT MORPH BLD: NORMAL — SIGNIFICANT CHANGE UP
PLATELET # BLD AUTO: 324 K/UL — SIGNIFICANT CHANGE UP (ref 150–400)
PO2 BLDV: 31 MMHG — SIGNIFICANT CHANGE UP (ref 25–45)
POIKILOCYTOSIS BLD QL AUTO: SLIGHT — SIGNIFICANT CHANGE UP
POLYCHROMASIA BLD QL SMEAR: SLIGHT — SIGNIFICANT CHANGE UP
POTASSIUM BLDV-SCNC: 5.7 MMOL/L — HIGH (ref 3.5–5.1)
POTASSIUM SERPL-MCNC: 5.5 MMOL/L — HIGH (ref 3.5–5.3)
POTASSIUM SERPL-MCNC: 6.1 MMOL/L — HIGH (ref 3.5–5.3)
POTASSIUM SERPL-SCNC: 5.5 MMOL/L — HIGH (ref 3.5–5.3)
POTASSIUM SERPL-SCNC: 6.1 MMOL/L — HIGH (ref 3.5–5.3)
PROT SERPL-MCNC: 7.2 G/DL — SIGNIFICANT CHANGE UP (ref 6–8.3)
PROTHROM AB SERPL-ACNC: 12.2 SEC — SIGNIFICANT CHANGE UP (ref 10.5–13.4)
RBC # BLD: 1.98 M/UL — LOW (ref 3.8–5.2)
RBC # FLD: 18.6 % — HIGH (ref 10.3–14.5)
RBC BLD AUTO: ABNORMAL
RH IG SCN BLD-IMP: POSITIVE — SIGNIFICANT CHANGE UP
SAO2 % BLDV: 43.8 % — LOW (ref 67–88)
SODIUM SERPL-SCNC: 136 MMOL/L — SIGNIFICANT CHANGE UP (ref 135–145)
SODIUM SERPL-SCNC: 137 MMOL/L — SIGNIFICANT CHANGE UP (ref 135–145)
TARGETS BLD QL SMEAR: SLIGHT — SIGNIFICANT CHANGE UP
WBC # BLD: 12.12 K/UL — HIGH (ref 3.8–10.5)
WBC # FLD AUTO: 12.12 K/UL — HIGH (ref 3.8–10.5)

## 2023-02-10 PROCEDURE — 99285 EMERGENCY DEPT VISIT HI MDM: CPT

## 2023-02-10 PROCEDURE — 99223 1ST HOSP IP/OBS HIGH 75: CPT

## 2023-02-10 RX ORDER — BUMETANIDE 0.25 MG/ML
1 INJECTION INTRAMUSCULAR; INTRAVENOUS
Qty: 0 | Refills: 0 | DISCHARGE

## 2023-02-10 RX ORDER — BUMETANIDE 0.25 MG/ML
2 INJECTION INTRAMUSCULAR; INTRAVENOUS ONCE
Refills: 0 | Status: COMPLETED | OUTPATIENT
Start: 2023-02-10 | End: 2023-02-10

## 2023-02-10 RX ORDER — BUMETANIDE 0.25 MG/ML
2 INJECTION INTRAMUSCULAR; INTRAVENOUS
Qty: 0 | Refills: 0 | DISCHARGE

## 2023-02-10 RX ORDER — FERROUS SULFATE 325(65) MG
1 TABLET ORAL
Qty: 0 | Refills: 0 | DISCHARGE

## 2023-02-10 RX ORDER — ERYTHROPOIETIN 10000 [IU]/ML
1 INJECTION, SOLUTION INTRAVENOUS; SUBCUTANEOUS
Qty: 0 | Refills: 0 | DISCHARGE

## 2023-02-10 RX ORDER — ONDANSETRON 8 MG/1
4 TABLET, FILM COATED ORAL EVERY 8 HOURS
Refills: 0 | Status: DISCONTINUED | OUTPATIENT
Start: 2023-02-10 | End: 2023-02-11

## 2023-02-10 RX ORDER — LANOLIN ALCOHOL/MO/W.PET/CERES
3 CREAM (GRAM) TOPICAL AT BEDTIME
Refills: 0 | Status: DISCONTINUED | OUTPATIENT
Start: 2023-02-10 | End: 2023-02-11

## 2023-02-10 RX ORDER — SODIUM ZIRCONIUM CYCLOSILICATE 10 G/10G
10 POWDER, FOR SUSPENSION ORAL ONCE
Refills: 0 | Status: COMPLETED | OUTPATIENT
Start: 2023-02-10 | End: 2023-02-11

## 2023-02-10 RX ORDER — OCTREOTIDE ACETATE 200 UG/ML
0 INJECTION, SOLUTION INTRAVENOUS; SUBCUTANEOUS
Qty: 0 | Refills: 0 | DISCHARGE

## 2023-02-10 RX ORDER — FERROUS SULFATE 325(65) MG
325 TABLET ORAL DAILY
Refills: 0 | Status: DISCONTINUED | OUTPATIENT
Start: 2023-02-10 | End: 2023-02-11

## 2023-02-10 RX ORDER — BUMETANIDE 0.25 MG/ML
2 INJECTION INTRAMUSCULAR; INTRAVENOUS DAILY
Refills: 0 | Status: DISCONTINUED | OUTPATIENT
Start: 2023-02-10 | End: 2023-02-11

## 2023-02-10 RX ORDER — ACETAMINOPHEN 500 MG
650 TABLET ORAL EVERY 6 HOURS
Refills: 0 | Status: DISCONTINUED | OUTPATIENT
Start: 2023-02-10 | End: 2023-02-11

## 2023-02-10 RX ADMIN — BUMETANIDE 2 MILLIGRAM(S): 0.25 INJECTION INTRAMUSCULAR; INTRAVENOUS at 22:33

## 2023-02-10 NOTE — ED PROVIDER NOTE - OBJECTIVE STATEMENT
53 yo female with PMHx of HTN, diet-controlled DM2, CKD, IgA nephropathy, and chronic anemia 2/2 CKD and jejunal AVMs with frequent admissions requiring PRBC transfusions p/w anemia. Patient reports that she been feeling mild fatigue over the past few days, but otherwise feeling well.  Patient followed up with her doctor for routine follow-up.  Lab work at that time showed a hemoglobin of 4.5.  Patient instructed to come to the ER for blood transfusion.  Patient recently discharged from [facility] after seeing blood transfusions for anemia 3 weeks ago.  Denies fever/chills, chest pain, shortness of breath, abdominal pain, nausea, vomiting, diarrhea, dysuria.

## 2023-02-10 NOTE — ED ADULT NURSE NOTE - OBJECTIVE STATEMENT
52Y F AXO3 PMH of stage 5 CKD, anemia, hypertension, and DM and PSH of endometriosis presented to the ED c/o low H&H. Pt states she visited her outpatient provider on Wednesday and blood work showed she had a "hemoglobin of 4.5". Pt endorses she has an "intestinal bleed low in the intestinal tract". Pt states she is fatigued and has dark colored stools. Upon arrival to the ED, the pt is well appearing, has unlabored, even and bilateral chest rise, and is ambulatory with a steady gait. Upon assessment, pt has non-distended, non-tender, and soft abdomen, has even and bilateral peripheral pulses, and ROM. Pt denies blood thinners, chest pain, SOB, dizziness, lightheadedness, syncope, numbness, tingling, fevers, and n/v/d.

## 2023-02-10 NOTE — ED ADULT NURSE REASSESSMENT NOTE - NS ED NURSE REASSESS COMMENT FT1
Unable to obtain second IV access. MD Mathis aware and states second line is not necessary at this time.

## 2023-02-10 NOTE — H&P ADULT - ASSESSMENT
52 year old female with a PMHx of HTN, diet-controlled DM2, CKD stage 5 (baseline creatinine 4.5 - 5.2), IgA nephropathy, chronic anemia 2/2 CKD, and jejunal AVMs (last enteroscopy in 12/2022 s/p ablation of AVMs), hx of frequent admissions requiring PRBC transfusions who presents with anemia hgb 4.4, hemodynamically stable. Anemia is likely multifactorial and d/t hx of CKD and hx of GI bleed. She has required multiple transfusions over the past month, last one on 1/21/23. She states she has plan for outpatient GI procedure to evaluate for AVMs further down from the GI tract than what an upper endoscopy can evaluate (?capsule endoscopy) this week. Would continue pRBC transfusion and obtain routine GI consult for further evaluation.

## 2023-02-10 NOTE — H&P ADULT - PROBLEM SELECTOR PLAN 2
nephrology is following, appreciate recommendations  - Not on dialysis  - continue home bumex 2mg PO daily  - renal diet

## 2023-02-10 NOTE — H&P ADULT - NSHPLABSRESULTS_GEN_ALL_CORE
LABS:                         4.4    12.12 )-----------( 324      ( 10 Feb 2023 16:17 )             16.8     02-10    136  |  103  |  56<H>  ----------------------------<  99  6.1<H>   |  19<L>  |  5.31<H>    Ca    8.3<L>      10 Feb 2023 16:17    TPro  7.2  /  Alb  3.5  /  TBili  0.1<L>  /  DBili  x   /  AST  38  /  ALT  12  /  AlkPhos  114  02-10    PT/INR - ( 10 Feb 2023 16:17 )   PT: 12.2 sec;   INR: 1.06 ratio         PTT - ( 10 Feb 2023 16:17 )  PTT:31.5 sec            Records reviewed from prior hospitalization.  Labs reviewed remarkable for - hgb 4.4. Coags and platelets within normal range. Hyperkalemia to 6.1. BUN/Cr of 56/5.31.  EKG personally reviewed   CXR personally reviewed  --------------------  TTE 11/2022  EF 39%  Conclusions:  1. Tethered mitral valve leaflets with normal opening.  Severe mitral regurgitation.  2. Moderate left ventricular enlargement.  3. Moderate global left ventricular systolic dysfunction.  Endocardial visualization enhanced with intravenous  injection of Ultrasonic Enhancing Agent (Lumason).  4. Moderate diastolic dysfunction (Stage II).  *** No previous Echo exam. LABS:                         4.4    12.12 )-----------( 324      ( 10 Feb 2023 16:17 )             16.8     02-10    136  |  103  |  56<H>  ----------------------------<  99  6.1<H>   |  19<L>  |  5.31<H>    Ca    8.3<L>      10 Feb 2023 16:17    TPro  7.2  /  Alb  3.5  /  TBili  0.1<L>  /  DBili  x   /  AST  38  /  ALT  12  /  AlkPhos  114  02-10    PT/INR - ( 10 Feb 2023 16:17 )   PT: 12.2 sec;   INR: 1.06 ratio         PTT - ( 10 Feb 2023 16:17 )  PTT:31.5 sec            Records reviewed from prior hospitalization.  Labs reviewed remarkable for - hgb 4.4. Coags and platelets within normal range. Hyperkalemia to 6.1. BUN/Cr of 56/5.31.  EKG personally reviewed - ordered and pending  --------------------  TTE 11/2022  EF 39%  Conclusions:  1. Tethered mitral valve leaflets with normal opening.  Severe mitral regurgitation.  2. Moderate left ventricular enlargement.  3. Moderate global left ventricular systolic dysfunction.  Endocardial visualization enhanced with intravenous  injection of Ultrasonic Enhancing Agent (Lumason).  4. Moderate diastolic dysfunction (Stage II).  *** No previous Echo exam.

## 2023-02-10 NOTE — ED ADULT NURSE REASSESSMENT NOTE - NS ED NURSE REASSESS COMMENT FT1
pt requesting Bumex after PRBC infusion. Pt states "this is how they always do it". MD Mathis aware, states Bumex to be given after blood transfusion.

## 2023-02-10 NOTE — ED PROVIDER NOTE - CLINICAL SUMMARY MEDICAL DECISION MAKING FREE TEXT BOX
Adult female w hx of Anemia/ GI avm/CKD pw symptomatic anemia. Check labs transfuse probable admit  Flynn Marshall MD, Facep

## 2023-02-10 NOTE — H&P ADULT - HISTORY OF PRESENT ILLNESS
52 year old female with a PMHx of HTN, diet-controlled DM2, CKD stage 5 (baseline creatinine 4.5 - 5.2), IgA nephropathy, chronic anemia 2/2 CKD, and jejunal AVMs (last enteroscopy in 12/2022 s/p ablation of AVMs), hx of frequent admissions requiring PRBC transfusions who presents with outpatient labs showing anemia.     The patient had multiple recent admissions for symptomatic anemia, this year on 1/10/23 - 1/11/23 requiring 5 units of pRBC transfusion, then again 1/13/23 - 1/15/23 (patient left AMA). The patient last received pRBC transfusion 2 units on 1/21/23.      ED course: 1 unit of pRBC transfused for hgb down to 4.4. with bumex 2mg IV x1. Hyperkalemia treated with lokelma. 52 year old female with a PMHx of HTN, diet-controlled DM2, CKD stage 5 (baseline creatinine 4.5 - 5.2), IgA nephropathy, chronic anemia 2/2 CKD, and jejunal AVMs (last enteroscopy in 12/2022 s/p ablation of AVMs), hx of frequent admissions requiring PRBC transfusions who presents with outpatient labs showing anemia.     The patient had multiple recent admissions for symptomatic anemia, this year on 1/10/23 - 1/11/23 requiring 5 units of pRBC transfusion, then again 1/13/23 - 1/15/23 (patient left AMA). The patient last received pRBC transfusion 2 units on 1/21/23. Pt states she was planned for 'GI surgery' with her outpatient gastroenterologist Jeremiah Canas because the bleeding source is thought to be further down than an upper endoscopy can evaluate. This appointment is this Wednesday.    Pt went for outpatient follow up appointment with her hematologist where she was found to be anemic. She endorsed generalized fatigue but otherwise denied any significant shortness of breath, palpitations, or chest pain.     ED course: 1 unit of pRBC ordered for hgb down to 4.4. with bumex 2mg IV x1. Hyperkalemia (hemolyzed sample) treated with lokelma. 52 year old female with a PMHx of HTN, diet-controlled DM2, CKD stage 5 (baseline creatinine 4.5 - 5.2), IgA nephropathy, chronic anemia 2/2 CKD, and jejunal AVMs (last enteroscopy in 12/2022 s/p ablation of AVMs), hx of frequent admissions requiring PRBC transfusions who presents with outpatient labs showing anemia.     The patient had multiple recent admissions for symptomatic anemia, this year on 1/10/23 - 1/11/23 requiring 5 units of pRBC transfusion, then again 1/13/23 - 1/15/23 (patient left AMA). The patient last received pRBC transfusion 2 units on 1/21/23. Pt states she had AVM ablation at [facility] via upper endoscopy, and is planned for 'GI surgery' with her outpatient gastroenterologist Jeremiah Canas because the bleeding source is thought to be further down than an upper endoscopy can evaluate. This appointment is this Wednesday.    Pt went for outpatient follow up appointment with her hematologist where she was found to be anemic. She endorsed generalized fatigue but otherwise denied any significant shortness of breath, palpitations, or chest pain.     ED course: 1 unit of pRBC ordered for hgb down to 4.4. with bumex 2mg IV x1. Hyperkalemia (hemolyzed sample) treated with lokelma.

## 2023-02-10 NOTE — H&P ADULT - NSHPPHYSICALEXAM_GEN_ALL_CORE
Vital Signs Last 24 Hrs  T(C): 37.1 (10 Feb 2023 18:40), Max: 37.2 (10 Feb 2023 15:00)  T(F): 98.7 (10 Feb 2023 18:40), Max: 99 (10 Feb 2023 15:00)  HR: 80 (10 Feb 2023 18:40) (80 - 100)  BP: 135/87 (10 Feb 2023 18:40) (122/71 - 135/87)  BP(mean): --  RR: 18 (10 Feb 2023 18:40) (16 - 18)  SpO2: 99% (10 Feb 2023 18:40) (99% - 100%)    Parameters below as of 10 Feb 2023 18:40  Patient On (Oxygen Delivery Method): room air

## 2023-02-10 NOTE — H&P ADULT - PROBLEM SELECTOR PLAN 3
- takes losartan at home  - hold losartan given c/f GI bleed and given hx of CKD  - continue to monitor BPs

## 2023-02-10 NOTE — CONSULT NOTE ADULT - SUBJECTIVE AND OBJECTIVE BOX
Patient is a 52y old  Female who presents with a chief complaint of     HPI:  Patient is a 52 year-old-female with history of HTN, DMII, CKD, IgA nephropathy, chronic anemia 2/2 CKD and jejunal AVMs presents with shortness of breath. Patient reports that she was recently hospitalized for low H/H requiring blood transfusion. Reports that she went to see Dr. Cannon today for further management and was sent in for concerns of dyspnea. Reports that she feels short of breath, worsen with exertion, with some chest tightness. Also felt that her abdomen is distended.        PAST MEDICAL & SURGICAL HISTORY:  Anemia  IgA nephropathy determined by renal biopsy  DM (diabetes mellitus), type 2  Benign essential hypertension  History of endometriosis  H/O left breast biopsy  H/O laparoscopy    MEDICATIONS  (STANDING):  Allergies  ACE inhibitors (Short breath)  Intolerances        SOCIAL HISTORY:  Denies ETOh,Smoking,     FAMILY HISTORY:  FH: diabetes mellitus    FH: colon cancer (Sibling)  dx at age 50        REVIEW OF SYSTEMS:  CONSTITUTIONAL: No weakness, fevers or chills  EYES/ENT: No visual changes;  No vertigo or throat pain   NECK: No pain or stiffness  RESPIRATORY: No cough, wheezing, hemoptysis; No shortness of breath  CARDIOVASCULAR: No chest pain or palpitations  GASTROINTESTINAL: No abdominal or epigastric pain. No nausea, vomiting, or hematemesis; No diarrhea or constipation. No melena or hematochezia.  GENITOURINARY: No dysuria, frequency or hematuria  NEUROLOGICAL: No numbness or weakness  SKIN: No itching, burning, rashes, or lesions   All other review of systems is negative unless indicated above.    VITAL:  T(C): , Max: 37 (02-10-23 @ 13:09)  T(F): , Max: 98.6 (02-10-23 @ 13:09)  HR: 100 (02-10-23 @ 13:09)  BP: 122/71 (02-10-23 @ 13:09)  BP(mean): --  RR: 16 (02-10-23 @ 13:09)  SpO2: 99% (02-10-23 @ 13:09)  Wt(kg): --    PHYSICAL EXAM:  Constitutional: NAD, Alert  HEENT: NCAT, MMM  Neck: Supple, No JVD  Respiratory: CTA-b/l  Cardiovascular: RRR s1s2, no m/r/g  Gastrointestinal: BS+, soft, NT/ND  Extremities: No peripheral edema b/l  Neurological: no focal deficits; strength grossly intact  Back: no CVAT b/l  Skin: No rashes, no nevi    LABS:              IMAGING:    ASSESSMENT:  Patient is a 52 year-old-female with history of HTN, DMII, CKD, IgA nephropathy, chronic anemia 2/2 CKD and jejunal AVMs presents with shortness of breath. nephrology consulted for likely albertina care    recent hospital dc on 1/20/23   recent BW --- 2/8 23  Hb  4.5  ,HCt 16.4     -CKD stage 4   ----IGA without crescents but significant damage   cr 4.5 --5.2 mg/dl most recent   -volume --- acceptable   -HTN ----  -electrolytes   -heme; Hb 4.5 on recent labs   -    RECOMMENDATIONS:  - will follow BMP , cbc ,cxr   - bladder scan  - avoid nephrotoxic   -orthostatics   -monitor I/O  -Might need multiple units of blood       Thank you for involving [facility] in this patient's care.    With warm regards    Stephanie Jensen  [facility]  Office: [phone]             Patient is a 52y old  Female who presents with a chief complaint of     HPI:  Patient is a 52 year-old-female with history of HTN, DMII, CKD, IgA nephropathy, chronic anemia 2/2 CKD and jejunal AVMs presents with shortness of breath. Patient reports that she was recently hospitalized for low H/H requiring blood transfusion. Reports that she went to see Dr. Cannon today for further management and was sent in for concerns of dyspnea. Reports that she feels short of breath, worsen with exertion, with some chest tightness. Also felt that her abdomen is distended.        PAST MEDICAL & SURGICAL HISTORY:  Anemia  IgA nephropathy determined by renal biopsy  DM (diabetes mellitus), type 2  Benign essential hypertension  History of endometriosis  H/O left breast biopsy  H/O laparoscopy    MEDICATIONS  (STANDING):  Allergies  ACE inhibitors (Short breath)  Intolerances        SOCIAL HISTORY:  Denies ETOh,Smoking,     FAMILY HISTORY:  FH: diabetes mellitus    FH: colon cancer (Sibling)  dx at age 50        REVIEW OF SYSTEMS:  CONSTITUTIONAL: No weakness, fevers or chills  EYES/ENT: No visual changes;  No vertigo or throat pain   NECK: No pain or stiffness  RESPIRATORY: No cough, wheezing, hemoptysis; No shortness of breath  CARDIOVASCULAR: No chest pain or palpitations  GASTROINTESTINAL: No abdominal or epigastric pain. No nausea, vomiting, or hematemesis; No diarrhea or constipation. No melena or hematochezia.  GENITOURINARY: No dysuria, frequency or hematuria  NEUROLOGICAL: No numbness or weakness  SKIN: No itching, burning, rashes, or lesions   All other review of systems is negative unless indicated above.    VITAL:  T(C): , Max: 37 (02-10-23 @ 13:09)  T(F): , Max: 98.6 (02-10-23 @ 13:09)  HR: 100 (02-10-23 @ 13:09)  BP: 122/71 (02-10-23 @ 13:09)  BP(mean): --  RR: 16 (02-10-23 @ 13:09)  SpO2: 99% (02-10-23 @ 13:09)  Wt(kg): --    PHYSICAL EXAM:  Constitutional: NAD, Alert  HEENT: NCAT, MMM  Neck: Supple, No JVD  Respiratory: CTA-b/l  Cardiovascular: RRR s1s2, no m/r/g  Gastrointestinal: BS+, soft, NT/ND  Extremities: No peripheral edema b/l  Neurological: no focal deficits; strength grossly intact  Back: no CVAT b/l  Skin: No rashes, no nevi    LABS:              IMAGING:    ASSESSMENT:  Patient is a 52 year-old-female with history of HTN, DMII, CKD, IgA nephropathy, chronic anemia 2/2 CKD and jejunal AVMs presents with shortness of breath. nephrology consulted for likely albertina care    recent hospital dc on 1/20/23   recent BW --- 2/8 23  Hb  4.5  ,HCt 16.4   out pt nephrologist ----Dr Martel     pt seen at the bedside  hemodynamically stable     -CKD stage 4   ---- IgA nephropathy  without crescents but significant damage   cr 4.5 --5.2 mg/dl most recent   albertina non oliguric likely prerenal   -volume --- acceptable ,on bumex at home   -HTN   -hyperkalemia  -anemia-- GI loss     RECOMMENDATIONS:  - give lokelma 10 g , dextrose with insuline, duneb x1 , juan david gluconate 2 g iv   ( hyperkalemia protocol)  - bladder scan  - avoid nephrotoxic   -orthostatics   -monitor I/O  - multiple units of blood required --- bumex iv after transfusion   -Gi eval  -BMP x 12h         Thank you for involving [facility] in this patient's care.    With warm regards    Stephanie Jensen  [facility]  Office: [phone]

## 2023-02-10 NOTE — ED PROVIDER NOTE - ATTENDING APP SHARED VISIT CONTRIBUTION OF CARE
Private Physician Juan Manuel/Davis Zaman pcp  52y female pmh  Anemia w previous transfusions, CKD V, jejunal AVMs, IGA Nephropathy. Pt comes to ed c/o fatigue. Had labs drawn two days ago HGB was 4.5. No obvious bleeding, cp. shortness of breath, abd pain. Private Physician Juan Manuel/Davis Zaman pcp  52y female pmh  Anemia w previous transfusions, CKD V, jejunal AVMs, IGA Nephropathy. Pt comes to ed c/o fatigue. Had labs drawn two days ago HGB was 4.5. No obvious bleeding, cp. shortness of breath, abd pain. PE WDWN female awake alert normocephalic atraumatic neck supple chest clear anterior & posterior cv no rubs, gallops or murmurs abd soft +bs no mass guarding neuro no focal defects  Flynn Marshall MD, Facep

## 2023-02-10 NOTE — ED PROVIDER NOTE - PROGRESS NOTE DETAILS
Discussed with Dr Davis Marshall MD, Facep Endorsed to Dr Barry Marshall MD, Facep Attending (Barry Mathis D.O.):  Patient signed out to me, here with symptomatic anemia in setting of GI bleed likely in setting of jejunal avms per chart review. hgb 4s today. Will consent, transfuse, admit. Attending (Barry Mathis D.O.):  Patient K+ on metabolic hemolyzed 6.1, 5.7 on vbg. Will rpt. EKG ordered.

## 2023-02-10 NOTE — H&P ADULT - PROBLEM SELECTOR PLAN 1
Ddx as above  - ordered for 2 units of pRBC  - given hx of CKD, give bumex 2mg IV after each pRBC transfusion  - repeat CBC this morning and q 12 hrs  - goal hgb > 7.0  - maintain active type and screen  - maintain two large bore IVs  - GI team emailed

## 2023-02-10 NOTE — ED ADULT NURSE REASSESSMENT NOTE - NS ED NURSE REASSESS COMMENT FT1
Pt awake, alert, and speaking in full coherent sentences. Vital signs stable. Administration of PRBCs as per MD orders. Second RN at bedside to verify blood product and correct blood type. Consent in the chart. Pt states she understands the benefits and risks of receiving PRBCs. Pt resting comfortably in stretcher. Bed in lowest position and side rails up. Comfort and safety provided.

## 2023-02-11 ENCOUNTER — TRANSCRIPTION ENCOUNTER (OUTPATIENT)
Age: 53
End: 2023-02-11

## 2023-02-11 VITALS
TEMPERATURE: 98 F | OXYGEN SATURATION: 100 % | SYSTOLIC BLOOD PRESSURE: 141 MMHG | RESPIRATION RATE: 18 BRPM | HEART RATE: 74 BPM | DIASTOLIC BLOOD PRESSURE: 85 MMHG

## 2023-02-11 LAB
ANION GAP SERPL CALC-SCNC: 11 MMOL/L — SIGNIFICANT CHANGE UP (ref 5–17)
BUN SERPL-MCNC: 52 MG/DL — HIGH (ref 7–23)
CALCIUM SERPL-MCNC: 8.7 MG/DL — SIGNIFICANT CHANGE UP (ref 8.4–10.5)
CHLORIDE SERPL-SCNC: 104 MMOL/L — SIGNIFICANT CHANGE UP (ref 96–108)
CO2 SERPL-SCNC: 24 MMOL/L — SIGNIFICANT CHANGE UP (ref 22–31)
CREAT SERPL-MCNC: 5.61 MG/DL — HIGH (ref 0.5–1.3)
EGFR: 9 ML/MIN/1.73M2 — LOW
GLUCOSE SERPL-MCNC: 129 MG/DL — HIGH (ref 70–99)
HCT VFR BLD CALC: 24.3 % — LOW (ref 34.5–45)
HGB BLD-MCNC: 6.9 G/DL — CRITICAL LOW (ref 11.5–15.5)
MAGNESIUM SERPL-MCNC: 2.9 MG/DL — HIGH (ref 1.6–2.6)
MCHC RBC-ENTMCNC: 25.1 PG — LOW (ref 27–34)
MCHC RBC-ENTMCNC: 28.4 GM/DL — LOW (ref 32–36)
MCV RBC AUTO: 88.4 FL — SIGNIFICANT CHANGE UP (ref 80–100)
NRBC # BLD: 0 /100 WBCS — SIGNIFICANT CHANGE UP (ref 0–0)
PHOSPHATE SERPL-MCNC: 4.8 MG/DL — HIGH (ref 2.5–4.5)
PLATELET # BLD AUTO: 276 K/UL — SIGNIFICANT CHANGE UP (ref 150–400)
POTASSIUM SERPL-MCNC: 5.2 MMOL/L — SIGNIFICANT CHANGE UP (ref 3.5–5.3)
POTASSIUM SERPL-SCNC: 5.2 MMOL/L — SIGNIFICANT CHANGE UP (ref 3.5–5.3)
RBC # BLD: 2.75 M/UL — LOW (ref 3.8–5.2)
RBC # FLD: 17.4 % — HIGH (ref 10.3–14.5)
SARS-COV-2 RNA SPEC QL NAA+PROBE: SIGNIFICANT CHANGE UP
SODIUM SERPL-SCNC: 139 MMOL/L — SIGNIFICANT CHANGE UP (ref 135–145)
WBC # BLD: 11.29 K/UL — HIGH (ref 3.8–10.5)
WBC # FLD AUTO: 11.29 K/UL — HIGH (ref 3.8–10.5)

## 2023-02-11 PROCEDURE — 80053 COMPREHEN METABOLIC PANEL: CPT

## 2023-02-11 PROCEDURE — 86901 BLOOD TYPING SEROLOGIC RH(D): CPT

## 2023-02-11 PROCEDURE — U0003: CPT

## 2023-02-11 PROCEDURE — 85730 THROMBOPLASTIN TIME PARTIAL: CPT

## 2023-02-11 PROCEDURE — 82803 BLOOD GASES ANY COMBINATION: CPT

## 2023-02-11 PROCEDURE — 80048 BASIC METABOLIC PNL TOTAL CA: CPT

## 2023-02-11 PROCEDURE — 99223 1ST HOSP IP/OBS HIGH 75: CPT | Mod: GC

## 2023-02-11 PROCEDURE — 36415 COLL VENOUS BLD VENIPUNCTURE: CPT

## 2023-02-11 PROCEDURE — 86850 RBC ANTIBODY SCREEN: CPT

## 2023-02-11 PROCEDURE — 86923 COMPATIBILITY TEST ELECTRIC: CPT

## 2023-02-11 PROCEDURE — 85027 COMPLETE CBC AUTOMATED: CPT

## 2023-02-11 PROCEDURE — 82947 ASSAY GLUCOSE BLOOD QUANT: CPT

## 2023-02-11 PROCEDURE — U0005: CPT

## 2023-02-11 PROCEDURE — 84132 ASSAY OF SERUM POTASSIUM: CPT

## 2023-02-11 PROCEDURE — 85610 PROTHROMBIN TIME: CPT

## 2023-02-11 PROCEDURE — 36430 TRANSFUSION BLD/BLD COMPNT: CPT

## 2023-02-11 PROCEDURE — 85018 HEMOGLOBIN: CPT

## 2023-02-11 PROCEDURE — 83735 ASSAY OF MAGNESIUM: CPT

## 2023-02-11 PROCEDURE — 84100 ASSAY OF PHOSPHORUS: CPT

## 2023-02-11 PROCEDURE — 85014 HEMATOCRIT: CPT

## 2023-02-11 PROCEDURE — 99285 EMERGENCY DEPT VISIT HI MDM: CPT

## 2023-02-11 PROCEDURE — 84295 ASSAY OF SERUM SODIUM: CPT

## 2023-02-11 PROCEDURE — 82330 ASSAY OF CALCIUM: CPT

## 2023-02-11 PROCEDURE — P9040: CPT

## 2023-02-11 PROCEDURE — 83605 ASSAY OF LACTIC ACID: CPT

## 2023-02-11 PROCEDURE — 96374 THER/PROPH/DIAG INJ IV PUSH: CPT

## 2023-02-11 PROCEDURE — 82435 ASSAY OF BLOOD CHLORIDE: CPT

## 2023-02-11 PROCEDURE — 86900 BLOOD TYPING SEROLOGIC ABO: CPT

## 2023-02-11 PROCEDURE — 85025 COMPLETE CBC W/AUTO DIFF WBC: CPT

## 2023-02-11 RX ORDER — LOSARTAN POTASSIUM 100 MG/1
1 TABLET, FILM COATED ORAL
Qty: 0 | Refills: 0 | DISCHARGE

## 2023-02-11 RX ADMIN — SODIUM ZIRCONIUM CYCLOSILICATE 10 GRAM(S): 10 POWDER, FOR SUSPENSION ORAL at 01:43

## 2023-02-11 RX ADMIN — BUMETANIDE 2 MILLIGRAM(S): 0.25 INJECTION INTRAMUSCULAR; INTRAVENOUS at 05:51

## 2023-02-11 NOTE — PROGRESS NOTE ADULT - ASSESSMENT
Patient is a 52 year-old-female with history of HTN, DMII, CKD, IgA nephropathy, chronic anemia 2/2 CKD and jejunal AVMs presents with shortness of breath. nephrology consulted for likely albertina care    recent hospital dc on 1/20/23   recent BW --- 2/8 23  Hb  4.5  ,HCt 16.4   out pt nephrologist ----Dr Martel       -CKD stage 4   ---- IgA nephropathy  without crescents but significant damage   cr 4.5 --5.2 mg/dl most recent   albertina non oliguric likely prerenal   -volume --- acceptable ,on bumex at home   -HTN   -hyperkalemia improved  -anemia-- GI loss     RECOMMENDATIONS:  - avoid nephrotoxic   -orthostatics   -monitor I/O  - multiple units of blood required --- bumex iv after transfusion   -BMP x 12h    Patient is a 52 year-old-female with history of HTN, DMII, CKD, IgA nephropathy, chronic anemia 2/2 CKD and jejunal AVMs presents with shortness of breath. nephrology consulted for likely albertina care    recent hospital dc on 1/20/23   recent BW --- 2/8 23  Hb  4.5  ,HCt 16.4   out pt nephrologist ----Dr Martel       -CKD stage 4   ---- IgA nephropathy  without crescents but significant damage   cr 4.5 --5.2 mg/dl most recent   albertina non oliguric likely prerenal   -volume --- acceptable ,on bumex at home   -HTN   -hyperkalemia improved  -anemia-- GI loss     RECOMMENDATIONS:  - avoid nephrotoxic able   -monitor I/O  - multiple units of blood required --- bumex iv after transfusion   -BMP x 12h

## 2023-02-11 NOTE — CONSULT NOTE ADULT - SUBJECTIVE AND OBJECTIVE BOX
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 GI consulted for anemia.    Patient denying bleeding; no red/black stools, n/v/d or constipation. No abdominal pain or discomfort. Denies heavy alcohol or nsaid use, no illicit/IVDU. She was seen by inhouse GI 1/11 with Hgb 3.5 w/o bleeding; she declined endoscopy at that time as she had a court date and wanted to leave, but also may have been low utility given prior work up; she was instructions to f/u with her established GI and to continue octreotide and consider anti-angiogenic meds outpatient. She has continued the octreotide but plans to discuss avastatin with her OP GI. Had 2 pRBCs reportedly 1/21 (note Hgb 6.0 on 1/20). States she had an endoscopy with her outside GI since being discharged but nothing was found so she was supposed to go for an appointment to discuss GI surgery and possibly another capsule endoscopy on Wednesday next week. States she came to [facility] because some of her kidney doctors are here, but prefers for her GI issues to be managed at [facility].      GI history per prior admission and corroborated with patient:  Enteroscopy approximately 12/20/22 with ablation for AVMs. She follows with Dr. Jeremiah Bentley and was recently started on octreotide (unknown dosage) receiving 1/month). Patient reports that she has discussed with Dr. Bentley about starting Avastatin. Patient has also been on epo every week since Sept 2021 (had not taken it up to 3 weeks prior to last admission). She has had many enteroscopies at [facility], where almost every time multiple jejunal angioectasia were found and treated. She has no abdominal pain.       PMHX/PSHX:  DM (diabetes mellitus)    Anemia    HTN (hypertension)    IgA nephropathy determined by renal biopsy    H/O endometritis    History of endometriosis    DM (diabetes mellitus), type 2    Benign essential hypertension    History of endometriosis    No significant past surgical history    History of endometriosis    H/O left breast biopsy    H/O laparoscopy      Allergies:  ACE inhibitors (Short breath)      Home Medications: reviewed  Hospital Medications:  acetaminophen     Tablet .. 650 milliGRAM(s) Oral every 6 hours PRN  aluminum hydroxide/magnesium hydroxide/simethicone Suspension 30 milliLiter(s) Oral every 4 hours PRN  buMETAnide 2 milliGRAM(s) Oral daily  ferrous    sulfate 325 milliGRAM(s) Oral daily  melatonin 3 milliGRAM(s) Oral at bedtime PRN  ondansetron Injectable 4 milliGRAM(s) IV Push every 8 hours PRN      Social History:   Tob: Denies  EtOH: Denies  Illicit Drugs: Denies    Family history:  No pertinent family history in first degree relatives    FH: diabetes mellitus    FH: colon cancer (Sibling)       ROS:   Complete and normal except as mentioned above.    PHYSICAL EXAM:   Vital Signs:  Vital Signs Last 24 Hrs  T(C): 37.1 (10 Feb 2023 18:40), Max: 37.2 (10 Feb 2023 15:00)  T(F): 98.7 (10 Feb 2023 18:40), Max: 99 (10 Feb 2023 15:00)  HR: 80 (10 Feb 2023 18:40) (80 - 100)  BP: 135/87 (10 Feb 2023 18:40) (122/71 - 135/87)  BP(mean): --  RR: 18 (10 Feb 2023 18:40) (16 - 18)  SpO2: 99% (10 Feb 2023 18:40) (99% - 100%)    Parameters below as of 10 Feb 2023 18:40  Patient On (Oxygen Delivery Method): room air      Daily Height in cm: 162.56 (10 Feb 2023 13:09)    Daily     GENERAL: no acute distress  NEURO: alert  HEENT: anicteric sclera, no conjunctival pallor appreciated  CHEST: no respiratory distress, no accessory muscle use  CARDIAC: regular rate, rhythm  ABDOMEN: soft, non-tender, non-distended, no rebound or guarding  DONTE: patient declined  EXTREMITIES: warm, well perfused, no edema  SKIN: no lesions noted    LABS: reviewed                        4.4 12.12 )-----------( 324      ( 10 Feb 2023 16:17 )             16.8     02-10    137  |  105  |  54<H>  ----------------------------<  135<H>  5.5<H>   |  22  |  5.52<H>    Ca    8.3<L>      10 Feb 2023 21:03    TPro  7.2  /  Alb  3.5  /  TBili  0.1<L>  /  DBili  x   /  AST  38  /  ALT  12  /  AlkPhos  114  02-10    LIVER FUNCTIONS - ( 10 Feb 2023 16:17 )  Alb: 3.5 g/dL / Pro: 7.2 g/dL / ALK PHOS: 114 U/L / ALT: 12 U/L / AST: 38 U/L / GGT: x               Diagnostic Studies: see sunrise for full report

## 2023-02-11 NOTE — PATIENT PROFILE ADULT - FALL HARM RISK - HARM RISK INTERVENTIONS

## 2023-02-11 NOTE — PROGRESS NOTE ADULT - SUBJECTIVE AND OBJECTIVE BOX
Patient is a 52y old  Female who presents with a chief complaint of anemia (11 Feb 2023 14:37)      HPI:  Transfusion in progress  IV obstructed      PAST MEDICAL & SURGICAL HISTORY:  Anemia      IgA nephropathy determined by renal biopsy      DM (diabetes mellitus), type 2      Benign essential hypertension      History of endometriosis      H/O left breast biopsy      H/O laparoscopy          Review of Systems:   CONSTITUTIONAL: No fever, weight loss, or fatigue  EYES: No eye pain, visual disturbances, or discharge  ENMT:  No difficulty hearing, tinnitus, vertigo; No sinus or throat pain  NECK: No pain or stiffness  BREASTS: No pain, masses, or nipple discharge  RESPIRATORY: No cough, wheezing, chills or hemoptysis; No shortness of breath  CARDIOVASCULAR: No chest pain, palpitations, dizziness, or leg swelling  GASTROINTESTINAL: No abdominal or epigastric pain. No nausea, vomiting, or hematemesis; No diarrhea or constipation. No melena or hematochezia.  GENITOURINARY: No dysuria, frequency, hematuria, or incontinence  NEUROLOGICAL: No headaches, memory loss, loss of strength, numbness, or tremors  SKIN: No itching, burning, rashes, or lesions   LYMPH NODES: No enlarged glands  ENDOCRINE: No heat or cold intolerance; No hair loss  MUSCULOSKELETAL: No joint pain or swelling; No muscle, back, or extremity pain  PSYCHIATRIC: No depression, anxiety, mood swings, or difficulty sleeping  HEME/LYMPH: No easy bruising, or bleeding gums  ALLERY AND IMMUNOLOGIC: No hives or eczema    Allergies    ACE inhibitors (Short breath)    Intolerances        Social History:     FAMILY HISTORY:  FH: diabetes mellitus    FH: colon cancer (Sibling)  dx at age 50        MEDICATIONS  (STANDING):  buMETAnide 2 milliGRAM(s) Oral daily  ferrous    sulfate 325 milliGRAM(s) Oral daily    MEDICATIONS  (PRN):  acetaminophen     Tablet .. 650 milliGRAM(s) Oral every 6 hours PRN Temp greater or equal to 38C (100.4F), Mild Pain (1 - 3)  aluminum hydroxide/magnesium hydroxide/simethicone Suspension 30 milliLiter(s) Oral every 4 hours PRN Dyspepsia  melatonin 3 milliGRAM(s) Oral at bedtime PRN Insomnia  ondansetron Injectable 4 milliGRAM(s) IV Push every 8 hours PRN Nausea and/or Vomiting        CAPILLARY BLOOD GLUCOSE        I&O's Summary      PHYSICAL EXAM:  Vital Signs Last 24 Hrs  T(C): 36.9 (11 Feb 2023 14:29), Max: 37.1 (10 Feb 2023 18:40)  T(F): 98.4 (11 Feb 2023 14:29), Max: 98.7 (10 Feb 2023 18:40)  HR: 74 (11 Feb 2023 14:29) (74 - 84)  BP: 141/85 (11 Feb 2023 14:29) (135/87 - 141/85)  BP(mean): --  RR: 18 (11 Feb 2023 14:29) (18 - 18)  SpO2: 100% (11 Feb 2023 14:29) (99% - 100%)    Parameters below as of 11 Feb 2023 14:29  Patient On (Oxygen Delivery Method): room air        GENERAL: NAD, well-developed  HEAD:  Atraumatic, Normocephalic  EYES: EOMI, PERRLA, conjunctiva and sclera clear  NECK: Supple, No JVD  CHEST/LUNG: Clear to auscultation bilaterally; No wheeze  HEART: Regular rate and rhythm; No murmurs, rubs, or gallops  ABDOMEN: Soft, Nontender, Nondistended; Bowel sounds present  EXTREMITIES:  2+ Peripheral Pulses, No clubbing, cyanosis, or edema  PSYCH: AAOx3  NEUROLOGY: non-focal  SKIN: No rashes or lesions    LABS:                        6.9    11.29 )-----------( 276      ( 11 Feb 2023 11:53 )             24.3     02-11    139  |  104  |  52<H>  ----------------------------<  129<H>  5.2   |  24  |  5.61<H>    Ca    8.7      11 Feb 2023 11:53  Phos  4.8     02-11  Mg     2.9     02-11    TPro  7.2  /  Alb  3.5  /  TBili  0.1<L>  /  DBili  x   /  AST  38  /  ALT  12  /  AlkPhos  114  02-10    PT/INR - ( 10 Feb 2023 16:17 )   PT: 12.2 sec;   INR: 1.06 ratio         PTT - ( 10 Feb 2023 16:17 )  PTT:31.5 sec          RADIOLOGY & ADDITIONAL TESTS:    Imaging Personally Reviewed:    Consultant(s) Notes Reviewed:      Care Discussed with Consultants/Other Providers:

## 2023-02-11 NOTE — DISCHARGE NOTE PROVIDER - PROVIDER TOKENS
PROVIDER:[TOKEN:[[id number]]] PROVIDER:[TOKEN:[[id number]]],FREE:[LAST:[[facility]],PHONE:[(   )    -],FAX:[(   )    -]]

## 2023-02-11 NOTE — DISCHARGE NOTE PROVIDER - HOSPITAL COURSE
53 yo female with PMHx of HTN, diet-controlled DM2, CKD, IgA nephropathy, and chronic anemia 2/2 CKD and jejunal AVMs with frequent admissions requiring PRBC transfusions p/w anemia.  Dx-- severe anemia, hgb 4.4- 2 uPRBC ordered with bumex IV after each unit         Hyperkalemia- 5.5, lokelma      # Anemia likely multifactorial considering CKD, history jejunal AVMs    Recommendations:  - No urgent indication for endoscopic evaluation   - Patient declining DONTE, further evaluation and endoscopy/VCE with inhouse GI and prefers to continue her planned care with her GI at [facility] since she is receiving her care there  - Please have patient follow up with her established GI upon discharge  - Continue octreotide  - Consider avastatin outpatient  - Daily CBC, CMP, coags  - Transfuse if Hgb < 7  - Iron supplementation     51 yo female with PMHx of HTN, diet-controlled DM2, CKD, IgA nephropathy, and chronic anemia 2/2 CKD and jejunal AVMs with frequent admissions requiring PRBC transfusions p/w anemia.  Dx-- severe anemia, hgb 4.4- 2 uPRBC ordered with bumex IV after each unit         Hyperkalemia- 5.5, lokelma      # Anemia likely multifactorial considering CKD, history jejunal AVMs    Recommendations:  - No urgent indication for endoscopic evaluation   - Patient declining DONTE, further evaluation and endoscopy/VCE with inhouse GI and prefers to continue her planned care with her GI at [facility] since she is receiving her care there  - Please have patient follow up with her established GI upon discharge  - Continue octreotide  - Consider avastatin outpatient  - Daily CBC, CMP, coags  - Transfuse if Hgb < 7  - Iron supplementation    Plan of care   Explanation of the Risk and Benifits of Leaviing AMA was explained to patient.  Patient A+ O x 3 and verbalized full understanding of risk vs benefits including sudden death. Follow up with your primary MD immediately upon discharge.     patient left AMA: did not want to stay to complete transfusion , d/w dr greenwood       51 yo female with PMHx of HTN, diet-controlled DM2, CKD, IgA nephropathy, and chronic anemia 2/2 CKD and jejunal AVMs with frequent admissions requiring PRBC transfusions p/w anemia.  Dx-- severe anemia, hgb 4.4- 2 uPRBC ordered with bumex IV after each unit         Hyperkalemia- 5.5, lokelma      # Anemia likely multifactorial considering CKD, history jejunal AVMs    Recommendations:  - No urgent indication for endoscopic evaluation   - Patient declining DONTE, further evaluation and endoscopy/VCE with inhouse GI and prefers to continue her planned care with her GI at [facility] since she is receiving her care there  - Please have patient follow up with her established GI upon discharge  - Continue octreotide  - Consider avastatin outpatient  - Daily CBC, CMP, coags  - Transfuse if Hgb < 7  - Iron supplementation    Plan of care   Dr storey saw pt at bedside, Explanation of the Risk and Benifits of Leaviing AMA was explained to patient.  Patient A+ O x 3 and verbalized full understanding of risk vs benefits including sudden death. Follow up with your primary MD immediately upon discharge.     patient left AMA: did not want to stay to complete transfusion , did not sign AMA paper work, walked out d/w dr greenwood

## 2023-02-11 NOTE — DISCHARGE NOTE PROVIDER - NSDCCPCAREPLAN_GEN_ALL_CORE_FT
PRINCIPAL DISCHARGE DIAGNOSIS  Diagnosis: Anemia of chronic disease  Assessment and Plan of Treatment: Follow up with your primary care provider       PRINCIPAL DISCHARGE DIAGNOSIS  Diagnosis: Anemia of chronic disease  Assessment and Plan of Treatment: Follow up with your primary care provider  Follow up with [facility] emma

## 2023-02-11 NOTE — PROGRESS NOTE ADULT - SUBJECTIVE AND OBJECTIVE BOX
Nephrology progress note    Patient is a 52y Female with    Allergies:  ACE inhibitors (Short breath)    Hospital Medications:   MEDICATIONS  (STANDING):  buMETAnide 2 milliGRAM(s) Oral daily  ferrous    sulfate 325 milliGRAM(s) Oral daily      VITALS:  T(F): 98.4 (02-11-23 @ 14:29), Max: 98.7 (02-10-23 @ 18:40)  HR: 74 (02-11-23 @ 14:29)  BP: 141/85 (02-11-23 @ 14:29)  RR: 18 (02-11-23 @ 14:29)  SpO2: 100% (02-11-23 @ 14:29)        PHYSICAL EXAM:  Constitutional: NAD  HEENT: anicteric sclera, oropharynx clear, MMM  Neck: No JVD  Respiratory: CTAB, no wheezes, rales or rhonchi  Cardiovascular: S1, S2, RRR  Gastrointestinal: BS+, soft, NT/ND  Extremities: No cyanosis or clubbing. No peripheral edema  Neurological: A/O x 3, no focal deficits  Psychiatric: Normal mood, normal affect  : No CVA tenderness. No puckett.   Skin: No rashes      LABS:  02-11    139  |  104  |  52<H>  ----------------------------<  129<H>  5.2   |  24  |  5.61<H>    Ca    8.7      11 Feb 2023 11:53  Phos  4.8     02-11  Mg     2.9     02-11    TPro  7.2  /  Alb  3.5  /  TBili  0.1<L>  /  DBili      /  AST  38  /  ALT  12  /  AlkPhos  114  02-10                          6.9    11.29 )-----------( 276      ( 11 Feb 2023 11:53 )             24.3       Urine Studies:      RADIOLOGY & ADDITIONAL STUDIES:   Nephrology Progress Note    Patient is a 52y female with    Allergies:  ACE inhibitors (Short breath)    Hospital Medications:   MEDICATIONS  (STANDING):  buMETAnide 2 milliGRAM(s) Oral daily  ferrous    sulfate 325 milliGRAM(s) Oral daily      VITALS:  T(F): 98.4 (02-11-23 @ 14:29), Max: 98.7 (02-10-23 @ 18:40)  HR: 74 (02-11-23 @ 14:29)  BP: 141/85 (02-11-23 @ 14:29)  RR: 18 (02-11-23 @ 14:29)  SpO2: 100% (02-11-23 @ 14:29)        PHYSICAL EXAM:  Constitutional: NAD  HEENT: anicteric sclera, oropharynx clear, MMM  Neck: No JVD  Respiratory: CTAB, no wheezes, rales or rhonchi  Cardiovascular: S1, S2, RRR  Gastrointestinal: BS+, soft, NT/ND  Extremities: No cyanosis or clubbing. No peripheral edema  Neurological: A/O x 3, no focal deficits  Psychiatric: Normal mood, normal affect  : No CVA tenderness. No puckett.   Skin: No rashes      LABS:  02-11    139  |  104  |  52<H>  ----------------------------<  129<H>  5.2   |  24  |  5.61<H>    Ca    8.7      11 Feb 2023 11:53  Phos  4.8     02-11  Mg     2.9     02-11    TPro  7.2  /  Alb  3.5  /  TBili  0.1<L>  /  DBili      /  AST  38  /  ALT  12  /  AlkPhos  114  02-10                          6.9    11.29 )-----------( 276      ( 11 Feb 2023 11:53 )             24.3              Nephrology Progress Note    Patient is a 52y female with no worsening sob    Allergies:  ACE inhibitors (Short breath)    Hospital Medications:   MEDICATIONS  (STANDING):  buMETAnide 2 milliGRAM(s) Oral daily  ferrous    sulfate 325 milliGRAM(s) Oral daily      VITALS:  T(F): 98.4 (02-11-23 @ 14:29), Max: 98.7 (02-10-23 @ 18:40)  HR: 74 (02-11-23 @ 14:29)  BP: 141/85 (02-11-23 @ 14:29)  RR: 18 (02-11-23 @ 14:29)  SpO2: 100% (02-11-23 @ 14:29)        PHYSICAL EXAM:  Constitutional: NAD  HEENT: anicteric sclera, oropharynx clear, MMM  Neck: No JVD  Respiratory: CTAB, no wheezes, rales or rhonchi  Cardiovascular: S1, S2, RRR  Gastrointestinal: BS+, soft, NT/ND  Extremities: No cyanosis or clubbing. No peripheral edema  Neurological: A/O x 3,   Psychiatric: Normal mood, normal affect  : No CVA tenderness. No puckett.   Skin: No rashes      LABS:  02-11    139  |  104  |  52<H>  ----------------------------<  129<H>  5.2   |  24  |  5.61<H>    Ca    8.7      11 Feb 2023 11:53  Phos  4.8     02-11  Mg     2.9     02-11    TPro  7.2  /  Alb  3.5  /  TBili  0.1<L>  /  DBili      /  AST  38  /  ALT  12  /  AlkPhos  114  02-10                          6.9    11.29 )-----------( 276      ( 11 Feb 2023 11:53 )             24.3

## 2023-02-11 NOTE — DISCHARGE NOTE PROVIDER - NSDCMRMEDTOKEN_GEN_ALL_CORE_FT
bumetanide 1 mg oral tablet: 2 tab(s) orally once a day  ferrous sulfate 325 mg (65 mg elemental iron) oral delayed release tablet: 1 tab(s) orally once a day  losartan 50 mg oral tablet: 1 tab(s) orally once a day  Procrit 20,000 units/mL injectable solution: 1 implant(s) injectable every 7 days Friday  SandoSTATIN: injectable every 4 weeks   bumetanide 1 mg oral tablet: 2 tab(s) orally once a day  ferrous sulfate 325 mg (65 mg elemental iron) oral delayed release tablet: 1 tab(s) orally once a day  Procrit 20,000 units/mL injectable solution: 1 implant(s) injectable every 7 days Friday  SandoSTATIN: injectable every 4 weeks

## 2023-02-11 NOTE — DISCHARGE NOTE PROVIDER - CARE PROVIDER_API CALL
Seb Cannon)  Internal Medicine  [address] [place] [address]  Phone: [phone]  Fax: [phone]  Follow Up Time:    Seb Cannon)  Internal Medicine  [address] [place] [address]  Phone: [phone]  Fax: [phone]  Follow Up Time:     [facility],   Phone: (   )    -  Fax: (   )    -  Follow Up Time:

## 2023-02-11 NOTE — CONSULT NOTE ADULT - ASSESSMENT
# Anemia likely multifactorial considering CKD, history jejunal AVMs    Recommendations:  - No urgent indication for endoscopic evaluation   - Patient declining DONTE, further evaluation and endoscopy/VCE with inhouse GI and prefers to continue her planned care with her GI at [facility]  - Please have patient follow up with her established GI upon discharge  - Continue octreotide  - Consider avastatin outpatient  - Daily CBC, CMP, coags  - Transfuse if Hgb < 7  - Iron supplementation  - Rest of care per primary    Preliminary note until signed by Attending.    Thank you for involving us in this patient's care. Please reach out to General GI if any further questions or concerns.     Abigail Middleton MD  Gastroenterology/Hepatology Fellow, PGY-VI    NON-URGENT CONSULTS:  Please email [email or web address]  AT NIGHT AND ON WEEKENDS:  Contact on-call GI fellow via answering service ([phone]) from 5pm-8am and on weekends/holidays  MONDAY-FRIDAY 8AM-5PM:  Pager# [phone] ([facility])  GI Phone# [phone] ([facility])       # Anemia likely multifactorial considering CKD, history jejunal AVMs    Recommendations:  - No urgent indication for endoscopic evaluation   - Patient declining DONTE, further evaluation and endoscopy/VCE with inhouse GI and prefers to continue her planned care with her GI at [facility] since she is receiving her care there  - Please have patient follow up with her established GI upon discharge  - Continue octreotide  - Consider avastatin outpatient  - Daily CBC, CMP, coags  - Transfuse if Hgb < 7  - Iron supplementation  - Rest of care per primary    Preliminary note until signed by Attending.    Thank you for involving us in this patient's care. Please reach out to General GI if any further questions or concerns.   Abigail Middleton MD  Gastroenterology/Hepatology Fellow, PGY-VI    NON-URGENT CONSULTS:  Please email [email or web address]  AT NIGHT AND ON WEEKENDS:  Contact on-call GI fellow via answering service ([phone]) from 5pm-8am and on weekends/holidays  MONDAY-FRIDAY 8AM-5PM:  Pager# [phone] ([facility])  GI Phone# [phone] ([facility])

## 2023-02-21 ENCOUNTER — APPOINTMENT (OUTPATIENT)
Dept: HEMATOLOGY ONCOLOGY | Facility: CLINIC | Age: 53
End: 2023-02-21

## 2023-02-28 ENCOUNTER — APPOINTMENT (OUTPATIENT)
Dept: HEMATOLOGY ONCOLOGY | Facility: CLINIC | Age: 53
End: 2023-02-28

## 2023-03-03 ENCOUNTER — APPOINTMENT (OUTPATIENT)
Dept: HEMATOLOGY ONCOLOGY | Facility: CLINIC | Age: 53
End: 2023-03-03
Payer: MEDICAID

## 2023-03-03 ENCOUNTER — RESULT REVIEW (OUTPATIENT)
Age: 53
End: 2023-03-03

## 2023-03-03 ENCOUNTER — OUTPATIENT (OUTPATIENT)
Dept: OUTPATIENT SERVICES | Facility: HOSPITAL | Age: 53
LOS: 1 days | End: 2023-03-03
Payer: MEDICAID

## 2023-03-03 VITALS
WEIGHT: 194.89 LBS | RESPIRATION RATE: 16 BRPM | BODY MASS INDEX: 33.68 KG/M2 | HEIGHT: 63.66 IN | HEART RATE: 80 BPM | SYSTOLIC BLOOD PRESSURE: 156 MMHG | DIASTOLIC BLOOD PRESSURE: 100 MMHG | OXYGEN SATURATION: 99 % | TEMPERATURE: 96.4 F

## 2023-03-03 DIAGNOSIS — Z98.890 OTHER SPECIFIED POSTPROCEDURAL STATES: Chronic | ICD-10-CM

## 2023-03-03 DIAGNOSIS — N18.4 CHRONIC KIDNEY DISEASE, STAGE 4 (SEVERE): ICD-10-CM

## 2023-03-03 DIAGNOSIS — N18.9 CHRONIC KIDNEY DISEASE, UNSPECIFIED: ICD-10-CM

## 2023-03-03 LAB
BASOPHILS # BLD AUTO: 0.02 K/UL — SIGNIFICANT CHANGE UP (ref 0–0.2)
BASOPHILS NFR BLD AUTO: 0.2 % — SIGNIFICANT CHANGE UP (ref 0–2)
EOSINOPHIL # BLD AUTO: 0.22 K/UL — SIGNIFICANT CHANGE UP (ref 0–0.5)
EOSINOPHIL NFR BLD AUTO: 2.3 % — SIGNIFICANT CHANGE UP (ref 0–6)
HCT VFR BLD CALC: 28.9 % — LOW (ref 34.5–45)
HGB BLD-MCNC: 8.6 G/DL — LOW (ref 11.5–15.5)
IMM GRANULOCYTES NFR BLD AUTO: 0.3 % — SIGNIFICANT CHANGE UP (ref 0–0.9)
LYMPHOCYTES # BLD AUTO: 0.8 K/UL — LOW (ref 1–3.3)
LYMPHOCYTES # BLD AUTO: 8.4 % — LOW (ref 13–44)
MCHC RBC-ENTMCNC: 24.6 PG — LOW (ref 27–34)
MCHC RBC-ENTMCNC: 29.4 G/DL — LOW (ref 32–36)
MCV RBC AUTO: 84 FL — SIGNIFICANT CHANGE UP (ref 80–100)
MONOCYTES # BLD AUTO: 0.59 K/UL — SIGNIFICANT CHANGE UP (ref 0–0.9)
MONOCYTES NFR BLD AUTO: 6.2 % — SIGNIFICANT CHANGE UP (ref 2–14)
NEUTROPHILS # BLD AUTO: 7.81 K/UL — HIGH (ref 1.8–7.4)
NEUTROPHILS NFR BLD AUTO: 82.6 % — HIGH (ref 43–77)
NRBC # BLD: 0 /100 WBCS — SIGNIFICANT CHANGE UP (ref 0–0)
PLATELET # BLD AUTO: 299 K/UL — SIGNIFICANT CHANGE UP (ref 150–400)
RBC # BLD: 3.49 M/UL — LOW (ref 3.8–5.2)
RBC # FLD: 17.2 % — HIGH (ref 10.3–14.5)
WBC # BLD: 9.47 K/UL — SIGNIFICANT CHANGE UP (ref 3.8–10.5)
WBC # FLD AUTO: 9.47 K/UL — SIGNIFICANT CHANGE UP (ref 3.8–10.5)

## 2023-03-03 PROCEDURE — 99214 OFFICE O/P EST MOD 30 MIN: CPT

## 2023-03-03 RX ORDER — EPOETIN ALFA-EPBX 10000 [IU]/ML
10000 INJECTION, SOLUTION INTRAVENOUS; SUBCUTANEOUS
Refills: 0 | Status: DISCONTINUED | COMMUNITY
Start: 2022-07-24 | End: 2023-03-03

## 2023-03-03 RX ORDER — EPOETIN ALFA-EPBX 10000 [IU]/ML
10000 INJECTION, SOLUTION INTRAVENOUS; SUBCUTANEOUS
Qty: 13 | Refills: 3 | Status: DISCONTINUED | COMMUNITY
Start: 2022-08-08 | End: 2023-03-03

## 2023-03-03 RX ORDER — EPOETIN ALFA-EPBX 20000 [IU]/ML
20000 INJECTION, SOLUTION INTRAVENOUS; SUBCUTANEOUS
Qty: 16 | Refills: 0 | Status: DISCONTINUED | COMMUNITY
Start: 2022-08-25 | End: 2023-03-03

## 2023-03-03 RX ORDER — LOSARTAN POTASSIUM 50 MG/1
50 TABLET, FILM COATED ORAL DAILY
Refills: 0 | Status: DISCONTINUED | COMMUNITY
Start: 2022-07-24 | End: 2023-03-03

## 2023-03-03 NOTE — REVIEW OF SYSTEMS
[Fatigue] : fatigue [Muscle Pain] : muscle pain [SOB on Exertion] : shortness of breath during exertion [Abdominal Pain] : abdominal pain [Negative] : Cardiovascular

## 2023-03-03 NOTE — PHYSICAL EXAM
[Ambulatory and capable of all self care but unable to carry out any work activities] : Status 2- Ambulatory and capable of all self care but unable to carry out any work activities. Up and about more than 50% of waking hours [Normal] : affect appropriate [de-identified] : Moon facies [de-identified] : RRR. S1S2 normal. Gr 2/6 YANETH RUSB. No murmurs.

## 2023-03-03 NOTE — CONSULT LETTER
[Dear  ___] : Dear ~JOSÉ, [Please see my note below.] : Please see my note below. [Consult Closing:] : Thank you very much for allowing me to participate in the care of this patient.  If you have any questions, please do not hesitate to contact me. [Sincerely,] : Sincerely, [DrJohnathan  ___] : Dr. PINTO [Courtesy Letter:] : I had the pleasure of seeing your patient, [unfilled], in my office today. [DrJohnathan ___] : Dr. PINTO [FreeTextEntry2] : Tip Waters MD [FreeTextEntry3] : Juan\par Nando Ceron M.D., FACP\par Professor of Medicine\par [facility] [employer] [facility]\par Associate Chief, Division of Hematology\par [facility]\par [facility]\par [address] [place] [address]\[place] [address]\par [phone]\par \par \par \par \par  Face Mask

## 2023-03-03 NOTE — HISTORY OF PRESENT ILLNESS
[Disease:__________________________] : Disease: [unfilled] [de-identified] : IgA nephropathy\par Chronic renal failure\par Fe deficiency due to chronic UGI bleeding from jejunal AVMs\par  [FreeTextEntry1] : PRBC/oral and IV Fe/Retacrit; Octreotide [de-identified] : She feels well. She is tired. She recently had a bleeding scan followed by push enteroscopy and then laparoscopic jejunal resection. Hgb was 3.8. She was transfused 4 units of packed red blood cells at the hospital ([facility]). She has discomfort in her abdomen due to the surgery. She wakes up in the middle of the night from it. She has  ARIAS after 5 feet. She notes no headaches, visual problems, chest pain, shortness of breath, vomiting, coffee grounds, melena, rectal bleeding, bruising. Retacrit stopped in Oct., 2022 and Octreotide begun.

## 2023-03-03 NOTE — ASSESSMENT
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  She receives her medical care at [facility], but lives in [place], and desires to establish care here to enable transfusional support. \par \par Plan:\par Rest\par CBC once per week\par To ER in the event of any clinical deterioration including chest pain or shortness of breath\par Octreotide therapy\par Continue iron supplementation\par Discuss Avastin therapy with [facility]\par CMP, LDH, Fe/TIBC, ferritin\par RTC 1 month\par \par

## 2023-03-03 NOTE — RESULTS/DATA
[FreeTextEntry1] : WBC 9,470 Hgb 8.6 Hct 28.9 Platelets 299,000 Diff 83P, 8L, 6M, 2 Eos, ANC 7,810\par \par 8/24/22\par CMP CO2 19, Glu 219, BUN 52, Creatinine 4.36, eGFR 12\par B12 773, Folate 5.6\par Ferritin 11\par Fe/TIBC/Sat 125/288/43%\par \par 7/19/22\par CMP: CO2 19, BUN 48, Creatinine 3.81, Ca 8.2, ALT 9, ALK Phosphate 135, eGFR 14, \par \par Ferritin 14\par Fe/TIBC/Sat 148/332/44%\par Von Willebrand Factor Antigen 192%\par VWF Activity 117%\par VWF Multimer Normal\par TFTs normal\par Cortisol free 0.38\par TSH 1.23

## 2023-03-03 NOTE — ADDENDUM
[FreeTextEntry1] : I, Nilesh Lagos, acted solely as a scribe for Dr. Nando Ceron on 03/03/2023. All medical entries made by the Scribe were at my, Dr. Nando Ceron's, direction and personally dictated by me on 03/03/2023. I have reviewed the chart and agree that the record accurately reflects my personal performance of the history, physical exam, assessment and plan. I have also personally directed, reviewed, and agreed with the chart.

## 2023-03-09 ENCOUNTER — APPOINTMENT (OUTPATIENT)
Dept: HEMATOLOGY ONCOLOGY | Facility: CLINIC | Age: 53
End: 2023-03-09

## 2023-03-20 ENCOUNTER — RESULT REVIEW (OUTPATIENT)
Age: 53
End: 2023-03-20

## 2023-03-20 ENCOUNTER — APPOINTMENT (OUTPATIENT)
Dept: HEMATOLOGY ONCOLOGY | Facility: CLINIC | Age: 53
End: 2023-03-20

## 2023-03-20 ENCOUNTER — NON-APPOINTMENT (OUTPATIENT)
Age: 53
End: 2023-03-20

## 2023-03-20 LAB
ALBUMIN SERPL ELPH-MCNC: 3.9 G/DL
ALP BLD-CCNC: 106 U/L
ALT SERPL-CCNC: 8 U/L
ANION GAP SERPL CALC-SCNC: 12 MMOL/L
AST SERPL-CCNC: 11 U/L
BASOPHILS # BLD AUTO: 0.01 K/UL — SIGNIFICANT CHANGE UP (ref 0–0.2)
BASOPHILS NFR BLD AUTO: 0.1 % — SIGNIFICANT CHANGE UP (ref 0–2)
BILIRUB SERPL-MCNC: <0.2 MG/DL
BUN SERPL-MCNC: 59 MG/DL
CALCIUM SERPL-MCNC: 8.7 MG/DL
CHLORIDE SERPL-SCNC: 111 MMOL/L
CO2 SERPL-SCNC: 20 MMOL/L
CREAT SERPL-MCNC: 5.35 MG/DL
EGFR: 9 ML/MIN/1.73M2
EOSINOPHIL # BLD AUTO: 0.28 K/UL — SIGNIFICANT CHANGE UP (ref 0–0.5)
EOSINOPHIL NFR BLD AUTO: 2.5 % — SIGNIFICANT CHANGE UP (ref 0–6)
FERRITIN SERPL-MCNC: 90 NG/ML
GLUCOSE SERPL-MCNC: 122 MG/DL
HCT VFR BLD CALC: 17.8 % — CRITICAL LOW (ref 34.5–45)
HGB BLD-MCNC: 4.8 G/DL — CRITICAL LOW (ref 11.5–15.5)
IMM GRANULOCYTES NFR BLD AUTO: 0.5 % — SIGNIFICANT CHANGE UP (ref 0–0.9)
IRON SATN MFR SERPL: 13 %
IRON SATN MFR SERPL: 15 %
IRON SERPL-MCNC: 34 UG/DL
IRON SERPL-MCNC: 34 UG/DL
LDH SERPL-CCNC: 148 U/L
LYMPHOCYTES # BLD AUTO: 0.98 K/UL — LOW (ref 1–3.3)
LYMPHOCYTES # BLD AUTO: 8.9 % — LOW (ref 13–44)
MCHC RBC-ENTMCNC: 24.2 PG — LOW (ref 27–34)
MCHC RBC-ENTMCNC: 27 G/DL — LOW (ref 32–36)
MCV RBC AUTO: 89.9 FL — SIGNIFICANT CHANGE UP (ref 80–100)
MONOCYTES # BLD AUTO: 0.68 K/UL — SIGNIFICANT CHANGE UP (ref 0–0.9)
MONOCYTES NFR BLD AUTO: 6.2 % — SIGNIFICANT CHANGE UP (ref 2–14)
NEUTROPHILS # BLD AUTO: 9.01 K/UL — HIGH (ref 1.8–7.4)
NEUTROPHILS NFR BLD AUTO: 81.8 % — HIGH (ref 43–77)
NRBC # BLD: 0 /100 WBCS — SIGNIFICANT CHANGE UP (ref 0–0)
PLATELET # BLD AUTO: 223 K/UL — SIGNIFICANT CHANGE UP (ref 150–400)
POTASSIUM SERPL-SCNC: 5.3 MMOL/L
PROT SERPL-MCNC: 6.7 G/DL
RBC # BLD: 1.98 M/UL — LOW (ref 3.8–5.2)
RBC # FLD: 19.7 % — HIGH (ref 10.3–14.5)
SODIUM SERPL-SCNC: 143 MMOL/L
TIBC SERPL-MCNC: 232 UG/DL
TIBC SERPL-MCNC: 262 UG/DL
UIBC SERPL-MCNC: 198 UG/DL
UIBC SERPL-MCNC: 228 UG/DL
WBC # BLD: 11.02 K/UL — HIGH (ref 3.8–10.5)
WBC # FLD AUTO: 11.02 K/UL — HIGH (ref 3.8–10.5)

## 2023-03-31 ENCOUNTER — RESULT REVIEW (OUTPATIENT)
Age: 53
End: 2023-03-31

## 2023-03-31 ENCOUNTER — APPOINTMENT (OUTPATIENT)
Dept: HEMATOLOGY ONCOLOGY | Facility: CLINIC | Age: 53
End: 2023-03-31

## 2023-03-31 ENCOUNTER — NON-APPOINTMENT (OUTPATIENT)
Age: 53
End: 2023-03-31

## 2023-03-31 LAB
BASOPHILS # BLD AUTO: 0.01 K/UL — SIGNIFICANT CHANGE UP (ref 0–0.2)
BASOPHILS NFR BLD AUTO: 0.1 % — SIGNIFICANT CHANGE UP (ref 0–2)
EOSINOPHIL # BLD AUTO: 0.13 K/UL — SIGNIFICANT CHANGE UP (ref 0–0.5)
EOSINOPHIL NFR BLD AUTO: 1.4 % — SIGNIFICANT CHANGE UP (ref 0–6)
HCT VFR BLD CALC: 16.6 % — CRITICAL LOW (ref 34.5–45)
HGB BLD-MCNC: 4.8 G/DL — CRITICAL LOW (ref 11.5–15.5)
IMM GRANULOCYTES NFR BLD AUTO: 0.6 % — SIGNIFICANT CHANGE UP (ref 0–0.9)
LYMPHOCYTES # BLD AUTO: 0.86 K/UL — LOW (ref 1–3.3)
LYMPHOCYTES # BLD AUTO: 9.3 % — LOW (ref 13–44)
MCHC RBC-ENTMCNC: 25.4 PG — LOW (ref 27–34)
MCHC RBC-ENTMCNC: 28.9 G/DL — LOW (ref 32–36)
MCV RBC AUTO: 87.8 FL — SIGNIFICANT CHANGE UP (ref 80–100)
MONOCYTES # BLD AUTO: 0.58 K/UL — SIGNIFICANT CHANGE UP (ref 0–0.9)
MONOCYTES NFR BLD AUTO: 6.2 % — SIGNIFICANT CHANGE UP (ref 2–14)
NEUTROPHILS # BLD AUTO: 7.65 K/UL — HIGH (ref 1.8–7.4)
NEUTROPHILS NFR BLD AUTO: 82.4 % — HIGH (ref 43–77)
NRBC # BLD: 0 /100 WBCS — SIGNIFICANT CHANGE UP (ref 0–0)
PLATELET # BLD AUTO: 190 K/UL — SIGNIFICANT CHANGE UP (ref 150–400)
RBC # BLD: 1.89 M/UL — LOW (ref 3.8–5.2)
RBC # FLD: 15.6 % — HIGH (ref 10.3–14.5)
WBC # BLD: 9.29 K/UL — SIGNIFICANT CHANGE UP (ref 3.8–10.5)
WBC # FLD AUTO: 9.29 K/UL — SIGNIFICANT CHANGE UP (ref 3.8–10.5)

## 2023-03-31 PROCEDURE — 86900 BLOOD TYPING SEROLOGIC ABO: CPT

## 2023-03-31 PROCEDURE — 86850 RBC ANTIBODY SCREEN: CPT

## 2023-03-31 PROCEDURE — 86901 BLOOD TYPING SEROLOGIC RH(D): CPT

## 2023-04-06 ENCOUNTER — OUTPATIENT (OUTPATIENT)
Dept: OUTPATIENT SERVICES | Facility: HOSPITAL | Age: 53
LOS: 1 days | Discharge: HOME | End: 2023-04-06

## 2023-04-06 DIAGNOSIS — D64.9 ANEMIA, UNSPECIFIED: ICD-10-CM

## 2023-04-06 DIAGNOSIS — Z98.890 OTHER SPECIFIED POSTPROCEDURAL STATES: Chronic | ICD-10-CM

## 2023-05-08 NOTE — ED ADULT TRIAGE NOTE - MODE OF ARRIVAL
Terbinafine Pregnancy And Lactation Text: This medication is Pregnancy Category B and is considered safe during pregnancy. It is also excreted in breast milk and breast feeding isn't recommended. Topical Sulfur Applications Counseling: Topical Sulfur Counseling: Patient counseled that this medication may cause skin irritation or allergic reactions.  In the event of skin irritation, the patient was advised to reduce the amount of the drug applied or use it less frequently.   The patient verbalized understanding of the proper use and possible adverse effects of topical sulfur application.  All of the patient's questions and concerns were addressed. Oral Minoxidil Counseling- I discussed with the patient the risks of oral minoxidil including but not limited to shortness of breath, swelling of the feet or ankles, dizziness, lightheadedness, unwanted hair growth and allergic reaction.  The patient verbalized understanding of the proper use and possible adverse effects of oral minoxidil.  All of the patient's questions and concerns were addressed. Minocycline Pregnancy And Lactation Text: This medication is Pregnancy Category D and not consider safe during pregnancy. It is also excreted in breast milk. Finasteride Counseling:  I discussed with the patient the risks of use of finasteride including but not limited to decreased libido, decreased ejaculate volume, gynecomastia, and depression. Women should not handle medication.  All of the patient's questions and concerns were addressed. Ilumya Pregnancy And Lactation Text: The risk during pregnancy and breastfeeding is uncertain with this medication. 5-Fu Counseling: 5-Fluorouracil Counseling:  I discussed with the patient the risks of 5-fluorouracil including but not limited to erythema, scaling, itching, weeping, crusting, and pain. Walk in Hydroxychloroquine Pregnancy And Lactation Text: This medication has been shown to cause fetal harm but it isn't assigned a Pregnancy Risk Category. There are small amounts excreted in breast milk. Aklief Pregnancy And Lactation Text: It is unknown if this medication is safe to use during pregnancy.  It is unknown if this medication is excreted in breast milk.  Breastfeeding women should use the topical cream on the smallest area of the skin for the shortest time needed while breastfeeding.  Do not apply to nipple and areola. Solaraze Counseling:  I discussed with the patient the risks of Solaraze including but not limited to erythema, scaling, itching, weeping, crusting, and pain. Opzelura Pregnancy And Lactation Text: There is insufficient data to evaluate drug-associated risk for major birth defects, miscarriage, or other adverse maternal or fetal outcomes.  There is a pregnancy registry that monitors pregnancy outcomes in pregnant persons exposed to the medication during pregnancy.  It is unknown if this medication is excreted in breast milk.  Do not breastfeed during treatment and for about 4 weeks after the last dose. Cellcept Pregnancy And Lactation Text: This medication is Pregnancy Category D and isn't considered safe during pregnancy. It is unknown if this medication is excreted in breast milk. Cosentyx Counseling:  I discussed with the patient the risks of Cosentyx including but not limited to worsening of Crohn's disease, immunosuppression, allergic reactions and infections.  The patient understands that monitoring is required including a PPD at baseline and must alert us or the primary physician if symptoms of infection or other concerning signs are noted. Fluconazole Counseling:  Patient counseled regarding adverse effects of fluconazole including but not limited to headache, diarrhea, nausea, upset stomach, liver function test abnormalities, taste disturbance, and stomach pain.  There is a rare possibility of liver failure that can occur when taking fluconazole.  The patient understands that monitoring of LFTs and kidney function test may be required, especially at baseline. The patient verbalized understanding of the proper use and possible adverse effects of fluconazole.  All of the patient's questions and concerns were addressed. Clindamycin Counseling: I counseled the patient regarding use of clindamycin as an antibiotic for prophylactic and/or therapeutic purposes. Clindamycin is active against numerous classes of bacteria, including skin bacteria. Side effects may include nausea, diarrhea, gastrointestinal upset, rash, hives, yeast infections, and in rare cases, colitis. Cibinqo Counseling: I discussed with the patient the risks of Cibinqo therapy including but not limited to common cold, nausea, headache, cold sores, increased blood CPK levels, dizziness, UTIs, fatigue, acne, and vomitting. Live vaccines should be avoided.  This medication has been linked to serious infections; higher rate of mortality; malignancy and lymphoproliferative disorders; major adverse cardiovascular events; thrombosis; thrombocytopenia and lymphopenia; lipid elevations; and retinal detachment. Colchicine Counseling:  Patient counseled regarding adverse effects including but not limited to stomach upset (nausea, vomiting, stomach pain, or diarrhea).  Patient instructed to limit alcohol consumption while taking this medication.  Colchicine may reduce blood counts especially with prolonged use.  The patient understands that monitoring of kidney function and blood counts may be required, especially at baseline. The patient verbalized understanding of the proper use and possible adverse effects of colchicine.  All of the patient's questions and concerns were addressed. Include Pregnancy/Lactation Warning?: No Acitretin Pregnancy And Lactation Text: This medication is Pregnancy Category X and should not be given to women who are pregnant or may become pregnant in the future. This medication is excreted in breast milk. Ivermectin Counseling:  Patient instructed to take medication on an empty stomach with a full glass of water.  Patient informed of potential adverse effects including but not limited to nausea, diarrhea, dizziness, itching, and swelling of the extremities or lymph nodes.  The patient verbalized understanding of the proper use and possible adverse effects of ivermectin.  All of the patient's questions and concerns were addressed. Thalidomide Counseling: I discussed with the patient the risks of thalidomide including but not limited to birth defects, anxiety, weakness, chest pain, dizziness, cough and severe allergy. Private Auto Zyclara Counseling:  I discussed with the patient the risks of imiquimod including but not limited to erythema, scaling, itching, weeping, crusting, and pain.  Patient understands that the inflammatory response to imiquimod is variable from person to person and was educated regarded proper titration schedule.  If flu-like symptoms develop, patient knows to discontinue the medication and contact us. Imiquimod Counseling:  I discussed with the patient the risks of imiquimod including but not limited to erythema, scaling, itching, weeping, crusting, and pain.  Patient understands that the inflammatory response to imiquimod is variable from person to person and was educated regarded proper titration schedule.  If flu-like symptoms develop, patient knows to discontinue the medication and contact us. Oral Minoxidil Pregnancy And Lactation Text: This medication should only be used when clearly needed if you are pregnant, attempting to become pregnant or breast feeding. Topical Ketoconazole Counseling: Patient counseled that this medication may cause skin irritation or allergic reactions.  In the event of skin irritation, the patient was advised to reduce the amount of the drug applied or use it less frequently.   The patient verbalized understanding of the proper use and possible adverse effects of ketoconazole.  All of the patient's questions and concerns were addressed. Erivedge Counseling- I discussed with the patient the risks of Erivedge including but not limited to nausea, vomiting, diarrhea, constipation, weight loss, changes in the sense of taste, decreased appetite, muscle spasms, and hair loss.  The patient verbalized understanding of the proper use and possible adverse effects of Erivedge.  All of the patient's questions and concerns were addressed. Low Dose Naltrexone Counseling- I discussed with the patient the potential risks and side effects of low dose naltrexone including but not limited to: more vivid dreams, headaches, nausea, vomiting, abdominal pain, fatigue, dizziness, and anxiety. Quinolones Counseling:  I discussed with the patient the risks of fluoroquinolones including but not limited to GI upset, allergic reaction, drug rash, diarrhea, dizziness, photosensitivity, yeast infections, liver function test abnormalities, tendonitis/tendon rupture. 5-Fu Pregnancy And Lactation Text: This medication is Pregnancy Category X and contraindicated in pregnancy and in women who may become pregnant. It is unknown if this medication is excreted in breast milk. Infliximab Counseling:  I discussed with the patient the risks of infliximab including but not limited to myelosuppression, immunosuppression, autoimmune hepatitis, demyelinating diseases, lymphoma, and serious infections.  The patient understands that monitoring is required including a PPD at baseline and must alert us or the primary physician if symptoms of infection or other concerning signs are noted. Solaraze Pregnancy And Lactation Text: This medication is Pregnancy Category B and is considered safe. There is some data to suggest avoiding during the third trimester. It is unknown if this medication is excreted in breast milk. Topical Sulfur Applications Pregnancy And Lactation Text: This medication is Pregnancy Category C and has an unknown safety profile during pregnancy. It is unknown if this topical medication is excreted in breast milk. Finasteride Pregnancy And Lactation Text: This medication is absolutely contraindicated during pregnancy. It is unknown if it is excreted in breast milk. Cosentyx Pregnancy And Lactation Text: This medication is Pregnancy Category B and is considered safe during pregnancy. It is unknown if this medication is excreted in breast milk. Azelaic Acid Counseling: Patient counseled that medicine may cause skin irritation and to avoid applying near the eyes.  In the event of skin irritation, the patient was advised to reduce the amount of the drug applied or use it less frequently.   The patient verbalized understanding of the proper use and possible adverse effects of azelaic acid.  All of the patient's questions and concerns were addressed. Colchicine Pregnancy And Lactation Text: This medication is Pregnancy Category C and isn't considered safe during pregnancy. It is excreted in breast milk. Fluconazole Pregnancy And Lactation Text: This medication is Pregnancy Category C and it isn't know if it is safe during pregnancy. It is also excreted in breast milk. Clindamycin Pregnancy And Lactation Text: This medication can be used in pregnancy if certain situations. Clindamycin is also present in breast milk. Cyclophosphamide Counseling:  I discussed with the patient the risks of cyclophosphamide including but not limited to hair loss, hormonal abnormalities, decreased fertility, abdominal pain, diarrhea, nausea and vomiting, bone marrow suppression and infection. The patient understands that monitoring is required while taking this medication. Picato Counseling:  I discussed with the patient the risks of Picato including but not limited to erythema, scaling, itching, weeping, crusting, and pain. Bexarotene Counseling:  I discussed with the patient the risks of bexarotene including but not limited to hair loss, dry lips/skin/eyes, liver abnormalities, hyperlipidemia, pancreatitis, depression/suicidal ideation, photosensitivity, drug rash/allergic reactions, hypothyroidism, anemia, leukopenia, infection, cataracts, and teratogenicity.  Patient understands that they will need regular blood tests to check lipid profile, liver function tests, white blood cell count, thyroid function tests and pregnancy test if applicable. Ivermectin Pregnancy And Lactation Text: This medication is Pregnancy Category C and it isn't known if it is safe during pregnancy. It is also excreted in breast milk. Cimetidine Counseling:  I discussed with the patient the risks of Cimetidine including but not limited to gynecomastia, headache, diarrhea, nausea, drowsiness, arrhythmias, pancreatitis, skin rashes, psychosis, bone marrow suppression and kidney toxicity. Zyclara Pregnancy And Lactation Text: This medication is Pregnancy Category C. It is unknown if this medication is excreted in breast milk. Thalidomide Pregnancy And Lactation Text: This medication is Pregnancy Category X and is absolutely contraindicated during pregnancy. It is unknown if it is excreted in breast milk. Stelara Counseling:  I discussed with the patient the risks of ustekinumab including but not limited to immunosuppression, malignancy, posterior leukoencephalopathy syndrome, and serious infections.  The patient understands that monitoring is required including a PPD at baseline and must alert us or the primary physician if symptoms of infection or other concerning signs are noted. Cibinqo Pregnancy And Lactation Text: It is unknown if this medication will adversely affect pregnancy or breast feeding.  You should not take this medication if you are currently pregnant or planning a pregnancy or while breastfeeding. Otezla Counseling: The side effects of Otezla were discussed with the patient, including but not limited to worsening or new depression, weight loss, diarrhea, nausea, upper respiratory tract infection, and headache. Patient instructed to call the office should any adverse effect occur.  The patient verbalized understanding of the proper use and possible adverse effects of Otezla.  All the patient's questions and concerns were addressed. Drysol Counseling:  I discussed with the patient the risks of drysol/aluminum chloride including but not limited to skin rash, itching, irritation, burning. Wartpeel Counseling:  I discussed with the patient the risks of Wartpeel including but not limited to erythema, scaling, itching, weeping, crusting, and pain. Topical Ketoconazole Pregnancy And Lactation Text: This medication is Pregnancy Category B and is considered safe during pregnancy. It is unknown if it is excreted in breast milk. Soolantra Counseling: I discussed with the patients the risks of topial Soolantra. This is a medicine which decreases the number of mites and inflammation in the skin. You experience burning, stinging, eye irritation or allergic reactions.  Please call our office if you develop any problems from using this medication. Cyclophosphamide Pregnancy And Lactation Text: This medication is Pregnancy Category D and it isn't considered safe during pregnancy. This medication is excreted in breast milk. Griseofulvin Counseling:  I discussed with the patient the risks of griseofulvin including but not limited to photosensitivity, cytopenia, liver damage, nausea/vomiting and severe allergy.  The patient understands that this medication is best absorbed when taken with a fatty meal (e.g., ice cream or french fries). Birth Control Pills Counseling: Birth Control Pill Counseling: I discussed with the patient the potential side effects of OCPs including but not limited to increased risk of stroke, heart attack, thrombophlebitis, deep venous thrombosis, hepatic adenomas, breast changes, GI upset, headaches, and depression.  The patient verbalized understanding of the proper use and possible adverse effects of OCPs. All of the patient's questions and concerns were addressed. Low Dose Naltrexone Pregnancy And Lactation Text: Naltrexone is pregnancy category C.  There have been no adequate and well-controlled studies in pregnant women.  It should be used in pregnancy only if the potential benefit justifies the potential risk to the fetus.   Limited data indicates that naltrexone is minimally excreted into breastmilk. Doxycycline Counseling:  Patient counseled regarding possible photosensitivity and increased risk for sunburn.  Patient instructed to avoid sunlight, if possible.  When exposed to sunlight, patients should wear protective clothing, sunglasses, and sunscreen.  The patient was instructed to call the office immediately if the following severe adverse effects occur:  hearing changes, easy bruising/bleeding, severe headache, or vision changes.  The patient verbalized understanding of the proper use and possible adverse effects of doxycycline.  All of the patient's questions and concerns were addressed. Dupixent Counseling: I discussed with the patient the risks of dupilumab including but not limited to eye infection and irritation, cold sores, injection site reactions, worsening of asthma, allergic reactions and increased risk of parasitic infection.  Live vaccines should be avoided while taking dupilumab. Dupilumab will also interact with certain medications such as warfarin and cyclosporine. The patient understands that monitoring is required and they must alert us or the primary physician if symptoms of infection or other concerning signs are noted. Azelaic Acid Pregnancy And Lactation Text: This medication is considered safe during pregnancy and breast feeding. Dapsone Counseling: I discussed with the patient the risks of dapsone including but not limited to hemolytic anemia, agranulocytosis, rashes, methemoglobinemia, kidney failure, peripheral neuropathy, headaches, GI upset, and liver toxicity.  Patients who start dapsone require monitoring including baseline LFTs and weekly CBCs for the first month, then every month thereafter.  The patient verbalized understanding of the proper use and possible adverse effects of dapsone.  All of the patient's questions and concerns were addressed. Olumiant Counseling: I discussed with the patient the risks of Olumiant therapy including but not limited to upper respiratory tract infections, shingles, cold sores, and nausea. Live vaccines should be avoided.  This medication has been linked to serious infections; higher rate of mortality; malignancy and lymphoproliferative disorders; major adverse cardiovascular events; thrombosis; gastrointestinal perforations; neutropenia; lymphopenia; anemia; liver enzyme elevations; and lipid elevations. Bexarotene Pregnancy And Lactation Text: This medication is Pregnancy Category X and should not be given to women who are pregnant or may become pregnant. This medication should not be used if you are breast feeding. Tranexamic Acid Counseling:  Patient advised of the small risk of bleeding problems with tranexamic acid. They were also instructed to call if they developed any nausea, vomiting or diarrhea. All of the patient's questions and concerns were addressed. Otezla Pregnancy And Lactation Text: This medication is Pregnancy Category C and it isn't known if it is safe during pregnancy. It is unknown if it is excreted in breast milk. Klisyri Counseling:  I discussed with the patient the risks of Klisyri including but not limited to erythema, scaling, itching, weeping, crusting, and pain. Topical Metronidazole Counseling: Metronidazole is a topical antibiotic medication. You may experience burning, stinging, redness, or allergic reactions.  Please call our office if you develop any problems from using this medication. Rituxan Counseling:  I discussed with the patient the risks of Rituxan infusions. Side effects can include infusion reactions, severe drug rashes including mucocutaneous reactions, reactivation of latent hepatitis and other infections and rarely progressive multifocal leukoencephalopathy.  All of the patient's questions and concerns were addressed. Libtayo Counseling- I discussed with the patient the risks of Libtayo including but not limited to nausea, vomiting, diarrhea, and bone or muscle pain.  The patient verbalized understanding of the proper use and possible adverse effects of Libtayo.  All of the patient's questions and concerns were addressed. Niacinamide Counseling: I recommended taking niacin or niacinamide, also know as vitamin B3, twice daily. Recent evidence suggests that taking vitamin B3 (500 mg twice daily) can reduce the risk of actinic keratoses and non-melanoma skin cancers. Side effects of vitamin B3 include flushing and headache. Soolantra Pregnancy And Lactation Text: This medication is Pregnancy Category C. This medication is considered safe during breast feeding. Rifampin Counseling: I discussed with the patient the risks of rifampin including but not limited to liver damage, kidney damage, red-orange body fluids, nausea/vomiting and severe allergy. Opioid Counseling: I discussed with the patient the potential side effects of opioids including but not limited to addiction, altered mental status, and depression. I stressed avoiding alcohol, benzodiazepines, muscle relaxants and sleep aids unless specifically okayed by a physician. The patient verbalized understanding of the proper use and possible adverse effects of opioids. All of the patient's questions and concerns were addressed. They were instructed to flush the remaining pills down the toilet if they did not need them for pain. Griseofulvin Pregnancy And Lactation Text: This medication is Pregnancy Category X and is known to cause serious birth defects. It is unknown if this medication is excreted in breast milk but breast feeding should be avoided. Benzoyl Peroxide Counseling: Patient counseled that medicine may cause skin irritation and bleach clothing.  In the event of skin irritation, the patient was advised to reduce the amount of the drug applied or use it less frequently.   The patient verbalized understanding of the proper use and possible adverse effects of benzoyl peroxide.  All of the patient's questions and concerns were addressed. Birth Control Pills Pregnancy And Lactation Text: This medication should be avoided if pregnant and for the first 30 days post-partum. Dupixent Pregnancy And Lactation Text: This medication likely crosses the placenta but the risk for the fetus is uncertain. This medication is excreted in breast milk. Tranexamic Acid Pregnancy And Lactation Text: It is unknown if this medication is safe during pregnancy or breast feeding. Doxycycline Pregnancy And Lactation Text: This medication is Pregnancy Category D and not consider safe during pregnancy. It is also excreted in breast milk but is considered safe for shorter treatment courses. Olumiant Pregnancy And Lactation Text: Based on animal studies, Olumiant may cause embryo-fetal harm when administered to pregnant women.  The medication should not be used in pregnancy.  Breastfeeding is not recommended during treatment. Isotretinoin Counseling: Patient should get monthly blood tests, not donate blood, not drive at night if vision affected, not share medication, and not undergo elective surgery for 6 months after tx completed. Side effects reviewed, pt to contact office should one occur. Adbry Counseling: I discussed with the patient the risks of tralokinumab including but not limited to eye infection and irritation, cold sores, injection site reactions, worsening of asthma, allergic reactions and increased risk of parasitic infection.  Live vaccines should be avoided while taking tralokinumab. The patient understands that monitoring is required and they must alert us or the primary physician if symptoms of infection or other concerning signs are noted. Dapsone Pregnancy And Lactation Text: This medication is Pregnancy Category C and is not considered safe during pregnancy or breast feeding. Protopic Counseling: Patient may experience a mild burning sensation during topical application. Protopic is not approved in children less than 2 years of age. There have been case reports of hematologic and skin malignancies in patients using topical calcineurin inhibitors although causality is questionable. Doxepin Counseling:  Patient advised that the medication is sedating and not to drive a car after taking this medication. Patient informed of potential adverse effects including but not limited to dry mouth, urinary retention, and blurry vision.  The patient verbalized understanding of the proper use and possible adverse effects of doxepin.  All of the patient's questions and concerns were addressed. Cyclosporine Counseling:  I discussed with the patient the risks of cyclosporine including but not limited to hypertension, gingival hyperplasia,myelosuppression, immunosuppression, liver damage, kidney damage, neurotoxicity, lymphoma, and serious infections. The patient understands that monitoring is required including baseline blood pressure, CBC, CMP, lipid panel and uric acid, and then 1-2 times monthly CMP and blood pressure. Oxybutynin Counseling:  I discussed with the patient the risks of oxybutynin including but not limited to skin rash, drowsiness, dry mouth, difficulty urinating, and blurred vision. Azithromycin Counseling:  I discussed with the patient the risks of azithromycin including but not limited to GI upset, allergic reaction, drug rash, diarrhea, and yeast infections. Klisyri Pregnancy And Lactation Text: It is unknown if this medication can harm a developing fetus or if it is excreted in breast milk. Topical Metronidazole Pregnancy And Lactation Text: This medication is Pregnancy Category B and considered safe during pregnancy.  It is also considered safe to use while breastfeeding. Libtayo Pregnancy And Lactation Text: This medication is contraindicated in pregnancy and when breast feeding. Taltz Counseling: I discussed with the patient the risks of ixekizumab including but not limited to immunosuppression, serious infections, worsening of inflammatory bowel disease and drug reactions.  The patient understands that monitoring is required including a PPD at baseline and must alert us or the primary physician if symptoms of infection or other concerning signs are noted. Topical Retinoid counseling:  Patient advised to apply a pea-sized amount only at bedtime and wait 30 minutes after washing their face before applying.  If too drying, patient may add a non-comedogenic moisturizer. The patient verbalized understanding of the proper use and possible adverse effects of retinoids.  All of the patient's questions and concerns were addressed. Rituxan Pregnancy And Lactation Text: This medication is Pregnancy Category C and it isn't know if it is safe during pregnancy. It is unknown if this medication is excreted in breast milk but similar antibodies are known to be excreted. Winlevi Counseling:  I discussed with the patient the risks of topical clascoterone including but not limited to erythema, scaling, itching, and stinging. Patient voiced their understanding. Rifampin Pregnancy And Lactation Text: This medication is Pregnancy Category C and it isn't know if it is safe during pregnancy. It is also excreted in breast milk and should not be used if you are breast feeding. Spironolactone Counseling: Patient advised regarding risks of diarrhea, abdominal pain, hyperkalemia, birth defects (for female patients), liver toxicity and renal toxicity. The patient may need blood work to monitor liver and kidney function and potassium levels while on therapy. The patient verbalized understanding of the proper use and possible adverse effects of spironolactone.  All of the patient's questions and concerns were addressed. Elidel Counseling: Patient may experience a mild burning sensation during topical application. Elidel is not approved in children less than 2 years of age. There have been case reports of hematologic and skin malignancies in patients using topical calcineurin inhibitors although causality is questionable. Niacinamide Pregnancy And Lactation Text: These medications are considered safe during pregnancy. Adbry Pregnancy And Lactation Text: It is unknown if this medication will adversely affect pregnancy or breast feeding. Benzoyl Peroxide Pregnancy And Lactation Text: This medication is Pregnancy Category C. It is unknown if benzoyl peroxide is excreted in breast milk. Gabapentin Counseling: I discussed with the patient the risks of gabapentin including but not limited to dizziness, somnolence, fatigue and ataxia. Erythromycin Counseling:  I discussed with the patient the risks of erythromycin including but not limited to GI upset, allergic reaction, drug rash, diarrhea, increase in liver enzymes, and yeast infections. Opioid Pregnancy And Lactation Text: These medications can lead to premature delivery and should be avoided during pregnancy. These medications are also present in breast milk in small amounts. Valtrex Counseling: I discussed with the patient the risks of valacyclovir including but not limited to kidney damage, nausea, vomiting and severe allergy.  The patient understands that if the infection seems to be worsening or is not improving, they are to call. Cyclosporine Pregnancy And Lactation Text: This medication is Pregnancy Category C and it isn't know if it is safe during pregnancy. This medication is excreted in breast milk. Protopic Pregnancy And Lactation Text: This medication is Pregnancy Category C. It is unknown if this medication is excreted in breast milk when applied topically. Doxepin Pregnancy And Lactation Text: This medication is Pregnancy Category C and it isn't known if it is safe during pregnancy. It is also excreted in breast milk and breast feeding isn't recommended. Enbrel Counseling:  I discussed with the patient the risks of etanercept including but not limited to myelosuppression, immunosuppression, autoimmune hepatitis, demyelinating diseases, lymphoma, and infections.  The patient understands that monitoring is required including a PPD at baseline and must alert us or the primary physician if symptoms of infection or other concerning signs are noted. Isotretinoin Pregnancy And Lactation Text: This medication is Pregnancy Category X and is considered extremely dangerous during pregnancy. It is unknown if it is excreted in breast milk. Itraconazole Counseling:  I discussed with the patient the risks of itraconazole including but not limited to liver damage, nausea/vomiting, neuropathy, and severe allergy.  The patient understands that this medication is best absorbed when taken with acidic beverages such as non-diet cola or ginger ale.  The patient understands that monitoring is required including baseline LFTs and repeat LFTs at intervals.  The patient understands that they are to contact us or the primary physician if concerning signs are noted. Minoxidil Counseling: Minoxidil is a topical medication which can increase blood flow where it is applied. It is uncertain how this medication increases hair growth. Side effects are uncommon and include stinging and allergic reactions. Azithromycin Pregnancy And Lactation Text: This medication is considered safe during pregnancy and is also secreted in breast milk. Rinvoq Counseling: I discussed with the patient the risks of Rinvoq therapy including but not limited to upper respiratory tract infections, shingles, cold sores, bronchitis, nausea, cough, fever, acne, and headache. Live vaccines should be avoided.  This medication has been linked to serious infections; higher rate of mortality; malignancy and lymphoproliferative disorders; major adverse cardiovascular events; thrombosis; thrombocytopenia, anemia, and neutropenia; lipid elevations; liver enzyme elevations; and gastrointestinal perforations. Winlevi Pregnancy And Lactation Text: This medication is considered safe during pregnancy and breastfeeding. Odomzo Counseling- I discussed with the patient the risks of Odomzo including but not limited to nausea, vomiting, diarrhea, constipation, weight loss, changes in the sense of taste, decreased appetite, muscle spasms, and hair loss.  The patient verbalized understanding of the proper use and possible adverse effects of Odomzo.  All of the patient's questions and concerns were addressed. Siliq Counseling:  I discussed with the patient the risks of Siliq including but not limited to new or worsening depression, suicidal thoughts and behavior, immunosuppression, malignancy, posterior leukoencephalopathy syndrome, and serious infections.  The patient understands that monitoring is required including a PPD at baseline and must alert us or the primary physician if symptoms of infection or other concerning signs are noted. There is also a special program designed to monitor depression which is required with Siliq. Sarecycline Counseling: Patient advised regarding possible photosensitivity and discoloration of the teeth, skin, lips, tongue and gums.  Patient instructed to avoid sunlight, if possible.  When exposed to sunlight, patients should wear protective clothing, sunglasses, and sunscreen.  The patient was instructed to call the office immediately if the following severe adverse effects occur:  hearing changes, easy bruising/bleeding, severe headache, or vision changes.  The patient verbalized understanding of the proper use and possible adverse effects of sarecycline.  All of the patient's questions and concerns were addressed. Carac Counseling:  I discussed with the patient the risks of Carac including but not limited to erythema, scaling, itching, weeping, crusting, and pain. Spironolactone Pregnancy And Lactation Text: This medication can cause feminization of the male fetus and should be avoided during pregnancy. The active metabolite is also found in breast milk. Nsaids Counseling: NSAID Counseling: I discussed with the patient that NSAIDs should be taken with food. Prolonged use of NSAIDs can result in the development of stomach ulcers.  Patient advised to stop taking NSAIDs if abdominal pain occurs.  The patient verbalized understanding of the proper use and possible adverse effects of NSAIDs.  All of the patient's questions and concerns were addressed. Erythromycin Pregnancy And Lactation Text: This medication is Pregnancy Category B and is considered safe during pregnancy. It is also excreted in breast milk. Cimzia Counseling:  I discussed with the patient the risks of Cimzia including but not limited to immunosuppression, allergic reactions and infections.  The patient understands that monitoring is required including a PPD at baseline and must alert us or the primary physician if symptoms of infection or other concerning signs are noted. Sotyktu Pregnancy And Lactation Text: There is insufficient data to evaluate whether or not Sotyktu is safe to use during pregnancy.? ?It is not known if Sotyktu passes into breast milk and whether or not it is safe to use when breastfeeding.?? Hydroxyzine Counseling: Patient advised that the medication is sedating and not to drive a car after taking this medication.  Patient informed of potential adverse effects including but not limited to dry mouth, urinary retention, and blurry vision.  The patient verbalized understanding of the proper use and possible adverse effects of hydroxyzine.  All of the patient's questions and concerns were addressed. Methotrexate Counseling:  Patient counseled regarding adverse effects of methotrexate including but not limited to nausea, vomiting, abnormalities in liver function tests. Patients may develop mouth sores, rash, diarrhea, and abnormalities in blood counts. The patient understands that monitoring is required including LFT's and blood counts.  There is a rare possibility of scarring of the liver and lung problems that can occur when taking methotrexate. Persistent nausea, loss of appetite, pale stools, dark urine, cough, and shortness of breath should be reported immediately. Patient advised to discontinue methotrexate treatment at least three months before attempting to become pregnant.  I discussed the need for folate supplements while taking methotrexate.  These supplements can decrease side effects during methotrexate treatment. The patient verbalized understanding of the proper use and possible adverse effects of methotrexate.  All of the patient's questions and concerns were addressed. Qbrexza Counseling:  I discussed with the patient the risks of Qbrexza including but not limited to headache, mydriasis, blurred vision, dry eyes, nasal dryness, dry mouth, dry throat, dry skin, urinary hesitation, and constipation.  Local skin reactions including erythema, burning, stinging, and itching can also occur. Minoxidil Pregnancy And Lactation Text: This medication has not been assigned a Pregnancy Risk Category but animal studies failed to show danger with the topical medication. It is unknown if the medication is excreted in breast milk. High Dose Vitamin A Counseling: Side effects reviewed, pt to contact office should one occur. Valtrex Pregnancy And Lactation Text: this medication is Pregnancy Category B and is considered safe during pregnancy. This medication is not directly found in breast milk but it's metabolite acyclovir is present. Bactrim Counseling:  I discussed with the patient the risks of sulfa antibiotics including but not limited to GI upset, allergic reaction, drug rash, diarrhea, dizziness, photosensitivity, and yeast infections.  Rarely, more serious reactions can occur including but not limited to aplastic anemia, agranulocytosis, methemoglobinemia, blood dyscrasias, liver or kidney failure, lung infiltrates or desquamative/blistering drug rashes. Tremfya Counseling: I discussed with the patient the risks of guselkumab including but not limited to immunosuppression, serious infections, and drug reactions.  The patient understands that monitoring is required including a PPD at baseline and must alert us or the primary physician if symptoms of infection or other concerning signs are noted. Arava Counseling:  Patient counseled regarding adverse effects of Arava including but not limited to nausea, vomiting, abnormalities in liver function tests. Patients may develop mouth sores, rash, diarrhea, and abnormalities in blood counts. The patient understands that monitoring is required including LFTs and blood counts.  There is a rare possibility of scarring of the liver and lung problems that can occur when taking methotrexate. Persistent nausea, loss of appetite, pale stools, dark urine, cough, and shortness of breath should be reported immediately. Patient advised to discontinue Arava treatment and consult with a physician prior to attempting conception. The patient will have to undergo a treatment to eliminate Arava from the body prior to conception. Rinvoq Pregnancy And Lactation Text: Based on animal studies, Rinvoq may cause embryo-fetal harm when administered to pregnant women.  The medication should not be used in pregnancy.  Breastfeeding is not recommended during treatment and for 6 days after the last dose. Propranolol Counseling:  I discussed with the patient the risks of propranolol including but not limited to low heart rate, low blood pressure, low blood sugar, restlessness and increased cold sensitivity. They should call the office if they experience any of these side effects. Eucrisa Counseling: Patient may experience a mild burning sensation during topical application. Eucrisa is not approved in children less than 2 years of age. Nsaids Pregnancy And Lactation Text: These medications are considered safe up to 30 weeks gestation. It is excreted in breast milk. VTAMA Counseling: I discussed with the patient that VTAMA is not for use in the eyes, mouth or mouth. They should call the office if they develop any signs of allergic reactions to VTAMA. The patient verbalized understanding of the proper use and possible adverse effects of VTAMA.  All of the patient's questions and concerns were addressed. Tazorac Counseling:  Patient advised that medication is irritating and drying.  Patient may need to apply sparingly and wash off after an hour before eventually leaving it on overnight.  The patient verbalized understanding of the proper use and possible adverse effects of tazorac.  All of the patient's questions and concerns were addressed. Xeljanz Counseling: I discussed with the patient the risks of Xeljanz therapy including increased risk of infection, liver issues, headache, diarrhea, or cold symptoms. Live vaccines should be avoided. They were instructed to call if they have any problems. Ketoconazole Counseling:   Patient counseled regarding improving absorption with orange juice.  Adverse effects include but are not limited to breast enlargement, headache, diarrhea, nausea, upset stomach, liver function test abnormalities, taste disturbance, and stomach pain.  There is a rare possibility of liver failure that can occur when taking ketoconazole. The patient understands that monitoring of LFTs may be required, especially at baseline. The patient verbalized understanding of the proper use and possible adverse effects of ketoconazole.  All of the patient's questions and concerns were addressed. Qbrexza Pregnancy And Lactation Text: There is no available data on Qbrexza use in pregnant women.  There is no available data on Qbrexza use in lactation. Humira Counseling:  I discussed with the patient the risks of adalimumab including but not limited to myelosuppression, immunosuppression, autoimmune hepatitis, demyelinating diseases, lymphoma, and serious infections.  The patient understands that monitoring is required including a PPD at baseline and must alert us or the primary physician if symptoms of infection or other concerning signs are noted. Metronidazole Counseling:  I discussed with the patient the risks of metronidazole including but not limited to seizures, nausea/vomiting, a metallic taste in the mouth, nausea/vomiting and severe allergy. Hydroxyzine Pregnancy And Lactation Text: This medication is not safe during pregnancy and should not be taken. It is also excreted in breast milk and breast feeding isn't recommended. Glycopyrrolate Counseling:  I discussed with the patient the risks of glycopyrrolate including but not limited to skin rash, drowsiness, dry mouth, difficulty urinating, and blurred vision. Methotrexate Pregnancy And Lactation Text: This medication is Pregnancy Category X and is known to cause fetal harm. This medication is excreted in breast milk. Sotyktu Counseling:  I discussed the most common side effects of Sotyktu including: common cold, sore throat, sinus infections, cold sores, canker sores, folliculitis, and acne.? I also discussed more serious side effects of Sotyktu including but not limited to: serious allergic reactions; increased risk for infections such as TB; cancers such as lymphomas; rhabdomyolysis and elevated CPK; and elevated triglycerides and liver enzymes.? High Dose Vitamin A Pregnancy And Lactation Text: High dose vitamin A therapy is contraindicated during pregnancy and breast feeding. Bactrim Pregnancy And Lactation Text: This medication is Pregnancy Category D and is known to cause fetal risk.  It is also excreted in breast milk. Azathioprine Counseling:  I discussed with the patient the risks of azathioprine including but not limited to myelosuppression, immunosuppression, hepatotoxicity, lymphoma, and infections.  The patient understands that monitoring is required including baseline LFTs, Creatinine, possible TPMP genotyping and weekly CBCs for the first month and then every 2 weeks thereafter.  The patient verbalized understanding of the proper use and possible adverse effects of azathioprine.  All of the patient's questions and concerns were addressed. Mirvaso Counseling: Mirvaso is a topical medication which can decrease superficial blood flow where applied. Side effects are uncommon and include stinging, redness and allergic reactions. Propranolol Pregnancy And Lactation Text: This medication is Pregnancy Category C and it isn't known if it is safe during pregnancy. It is excreted in breast milk. Simponi Counseling:  I discussed with the patient the risks of golimumab including but not limited to myelosuppression, immunosuppression, autoimmune hepatitis, demyelinating diseases, lymphoma, and serious infections.  The patient understands that monitoring is required including a PPD at baseline and must alert us or the primary physician if symptoms of infection or other concerning signs are noted. Vtama Pregnancy And Lactation Text: It is unknown if this medication can cause problems during pregnancy and breastfeeding. Tetracycline Counseling: Patient counseled regarding possible photosensitivity and increased risk for sunburn.  Patient instructed to avoid sunlight, if possible.  When exposed to sunlight, patients should wear protective clothing, sunglasses, and sunscreen.  The patient was instructed to call the office immediately if the following severe adverse effects occur:  hearing changes, easy bruising/bleeding, severe headache, or vision changes.  The patient verbalized understanding of the proper use and possible adverse effects of tetracycline.  All of the patient's questions and concerns were addressed. Patient understands to avoid pregnancy while on therapy due to potential birth defects. Olanzapine Counseling- I discussed with the patient the common side effects of olanzapine including but are not limited to: lack of energy, dry mouth, increased appetite, sleepiness, tremor, constipation, dizziness, changes in behavior, or restlessness.  Explained that teenagers are more likely to experience headaches, abdominal pain, pain in the arms or legs, tiredness, and sleepiness.  Serious side effects include but are not limited: increased risk of death in elderly patients who are confused, have memory loss, or dementia-related psychosis; hyperglycemia; increased cholesterol and triglycerides; and weight gain. Topical Steroids Counseling: I discussed with the patient that prolonged use of topical steroids can result in the increased appearance of superficial blood vessels (telangiectasias), lightening (hypopigmentation) and thinning of the skin (atrophy).  Patient understands to avoid using high potency steroids in skin folds, the groin or the face.  The patient verbalized understanding of the proper use and possible adverse effects of topical steroids.  All of the patient's questions and concerns were addressed. Prednisone Counseling:  I discussed with the patient the risks of prolonged use of prednisone including but not limited to weight gain, insomnia, osteoporosis, mood changes, diabetes, susceptibility to infection, glaucoma and high blood pressure.  In cases where prednisone use is prolonged, patients should be monitored with blood pressure checks, serum glucose levels and an eye exam.  Additionally, the patient may need to be placed on GI prophylaxis, PCP prophylaxis, and calcium and vitamin D supplementation and/or a bisphosphonate.  The patient verbalized understanding of the proper use and the possible adverse effects of prednisone.  All of the patient's questions and concerns were addressed. Xelangelaz Pregnancy And Lactation Text: This medication is Pregnancy Category D and is not considered safe during pregnancy.  The risk during breast feeding is also uncertain. Tazorac Pregnancy And Lactation Text: This medication is not safe during pregnancy. It is unknown if this medication is excreted in breast milk. Rhofade Counseling: Rhofade is a topical medication which can decrease superficial blood flow where applied. Side effects are uncommon and include stinging, redness and allergic reactions. Calcipotriene Counseling:  I discussed with the patient the risks of calcipotriene including but not limited to erythema, scaling, itching, and irritation. Ketoconazole Pregnancy And Lactation Text: This medication is Pregnancy Category C and it isn't know if it is safe during pregnancy. It is also excreted in breast milk and breast feeding isn't recommended. Metronidazole Pregnancy And Lactation Text: This medication is Pregnancy Category B and considered safe during pregnancy.  It is also excreted in breast milk. Dutasteride Counseling: Dustasteride Counseling:  I discussed with the patient the risks of use of dutasteride including but not limited to decreased libido, decreased ejaculate volume, and gynecomastia. Women who can become pregnant should not handle medication.  All of the patient's questions and concerns were addressed. Glycopyrrolate Pregnancy And Lactation Text: This medication is Pregnancy Category B and is considered safe during pregnancy. It is unknown if it is excreted breast milk. Cephalexin Counseling: I counseled the patient regarding use of cephalexin as an antibiotic for prophylactic and/or therapeutic purposes. Cephalexin (commonly prescribed under brand name Keflex) is a cephalosporin antibiotic which is active against numerous classes of bacteria, including most skin bacteria. Side effects may include nausea, diarrhea, gastrointestinal upset, rash, hives, yeast infections, and in rare cases, hepatitis, kidney disease, seizures, fever, confusion, neurologic symptoms, and others. Patients with severe allergies to penicillin medications are cautioned that there is about a 10% incidence of cross-reactivity with cephalosporins. When possible, patients with penicillin allergies should use alternatives to cephalosporins for antibiotic therapy. Clofazimine Counseling:  I discussed with the patient the risks of clofazimine including but not limited to skin and eye pigmentation, liver damage, nausea/vomiting, gastrointestinal bleeding and allergy. SSKI Counseling:  I discussed with the patient the risks of SSKI including but not limited to thyroid abnormalities, metallic taste, GI upset, fever, headache, acne, arthralgias, paraesthesias, lymphadenopathy, easy bleeding, arrhythmias, and allergic reaction. Mirvaso Pregnancy And Lactation Text: This medication has not been assigned a Pregnancy Risk Category. It is unknown if the medication is excreted in breast milk. Calcipotriene Pregnancy And Lactation Text: The use of this medication during pregnancy or lactation is not recommended as there is insufficient data. Xolair Counseling:  Patient informed of potential adverse effects including but not limited to fever, muscle aches, rash and allergic reactions.  The patient verbalized understanding of the proper use and possible adverse effects of Xolair.  All of the patient's questions and concerns were addressed. Hydroquinone Counseling:  Patient advised that medication may result in skin irritation, lightening (hypopigmentation), dryness, and burning.  In the event of skin irritation, the patient was advised to reduce the amount of the drug applied or use it less frequently.  Rarely, spots that are treated with hydroquinone can become darker (pseudoochronosis).  Should this occur, patient instructed to stop medication and call the office. The patient verbalized understanding of the proper use and possible adverse effects of hydroquinone.  All of the patient's questions and concerns were addressed. Albendazole Counseling:  I discussed with the patient the risks of albendazole including but not limited to cytopenia, kidney damage, nausea/vomiting and severe allergy.  The patient understands that this medication is being used in an off-label manner. Topical Clindamycin Counseling: Patient counseled that this medication may cause skin irritation or allergic reactions.  In the event of skin irritation, the patient was advised to reduce the amount of the drug applied or use it less frequently.   The patient verbalized understanding of the proper use and possible adverse effects of clindamycin.  All of the patient's questions and concerns were addressed. Zoryve Counseling:  I discussed with the patient that Zoryve is not for use in the eyes, mouth or vagina. The most commonly reported side effects include diarrhea, headache, insomnia, application site pain, upper respiratory tract infections, and urinary tract infections.  All of the patient's questions and concerns were addressed. Terbinafine Counseling: Patient counseling regarding adverse effects of terbinafine including but not limited to headache, diarrhea, rash, upset stomach, liver function test abnormalities, itching, taste/smell disturbance, nausea, abdominal pain, and flatulence.  There is a rare possibility of liver failure that can occur when taking terbinafine.  The patient understands that a baseline LFT and kidney function test may be required. The patient verbalized understanding of the proper use and possible adverse effects of terbinafine.  All of the patient's questions and concerns were addressed. Olanzapine Pregnancy And Lactation Text: This medication is pregnancy category C.   There are no adequate and well controlled trials with olanzapine in pregnant females.  Olanzapine should be used during pregnancy only if the potential benefit justifies the potential risk to the fetus.   In a study in lactating healthy women, olanzapine was excreted in breast milk.  It is recommended that women taking olanzapine should not breast feed. Topical Steroids Applications Pregnancy And Lactation Text: Most topical steroids are considered safe to use during pregnancy and lactation.  Any topical steroid applied to the breast or nipple should be washed off before breastfeeding. Cantharidin Counseling:  I discussed with the patient the risks of Cantharidin including but not limited to pain, redness, burning, itching, and blistering. Hydroxychloroquine Counseling:  I discussed with the patient that a baseline ophthalmologic exam is needed at the start of therapy and every year thereafter while on therapy. A CBC may also be warranted for monitoring.  The side effects of this medication were discussed with the patient, including but not limited to agranulocytosis, aplastic anemia, seizures, rashes, retinopathy, and liver toxicity. Patient instructed to call the office should any adverse effect occur.  The patient verbalized understanding of the proper use and possible adverse effects of Plaquenil.  All the patient's questions and concerns were addressed. Minocycline Counseling: Patient advised regarding possible photosensitivity and discoloration of the teeth, skin, lips, tongue and gums.  Patient instructed to avoid sunlight, if possible.  When exposed to sunlight, patients should wear protective clothing, sunglasses, and sunscreen.  The patient was instructed to call the office immediately if the following severe adverse effects occur:  hearing changes, easy bruising/bleeding, severe headache, or vision changes.  The patient verbalized understanding of the proper use and possible adverse effects of minocycline.  All of the patient's questions and concerns were addressed. Dutasteride Pregnancy And Lactation Text: This medication is absolutely contraindicated in women, especially during pregnancy and breast feeding. Feminization of male fetuses is possible if taking while pregnant. Ilumya Counseling: I discussed with the patient the risks of tildrakizumab including but not limited to immunosuppression, malignancy, posterior leukoencephalopathy syndrome, and serious infections.  The patient understands that monitoring is required including a PPD at baseline and must alert us or the primary physician if symptoms of infection or other concerning signs are noted. Aklief counseling:  Patient advised to apply a pea-sized amount only at bedtime and wait 30 minutes after washing their face before applying.  If too drying, patient may add a non-comedogenic moisturizer.  The most commonly reported side effects including irritation, redness, scaling, dryness, stinging, burning, itching, and increased risk of sunburn.  The patient verbalized understanding of the proper use and possible adverse effects of retinoids.  All of the patient's questions and concerns were addressed. Cimzia Pregnancy And Lactation Text: This medication crosses the placenta but can be considered safe in certain situations. Cimzia may be excreted in breast milk. Opzelura Counseling:  I discussed with the patient the risks of Opzelura including but not limited to nasopharngitis, bronchitis, ear infection, eosinophila, hives, diarrhea, folliculitis, tonsillitis, and rhinorrhea.  Taken orally, this medication has been linked to serious infections; higher rate of mortality; malignancy and lymphoproliferative disorders; major adverse cardiovascular events; thrombosis; thrombocytopenia, anemia, and neutropenia; and lipid elevations. Xolair Pregnancy And Lactation Text: This medication is Pregnancy Category B and is considered safe during pregnancy. This medication is excreted in breast milk. Sski Pregnancy And Lactation Text: This medication is Pregnancy Category D and isn't considered safe during pregnancy. It is excreted in breast milk. Cantharidin Pregnancy And Lactation Text: This medication has not been proven safe during pregnancy. It is unknown if this medication is excreted in breast milk. Cephalexin Pregnancy And Lactation Text: This medication is Pregnancy Category B and considered safe during pregnancy.  It is also excreted in breast milk but can be used safely for shorter doses. Cellcept Counseling:  I discussed with the patient the risks of mycophenolate mofetil including but not limited to infection/immunosuppression, GI upset, hypokalemia, hypercholesterolemia, bone marrow suppression, lymphoproliferative disorders, malignancy, GI ulceration/bleed/perforation, colitis, interstitial lung disease, kidney failure, progressive multifocal leukoencephalopathy, and birth defects.  The patient understands that monitoring is required including a baseline creatinine and regular CBC testing. In addition, patient must alert us immediately if symptoms of infection or other concerning signs are noted. Acitretin Counseling:  I discussed with the patient the risks of acitretin including but not limited to hair loss, dry lips/skin/eyes, liver damage, hyperlipidemia, depression/suicidal ideation, photosensitivity.  Serious rare side effects can include but are not limited to pancreatitis, pseudotumor cerebri, bony changes, clot formation/stroke/heart attack.  Patient understands that alcohol is contraindicated since it can result in liver toxicity and significantly prolong the elimination of the drug by many years. Detail Level: Simple Skyrizi Counseling: I discussed with the patient the risks of risankizumab-rzaa including but not limited to immunosuppression, and serious infections.  The patient understands that monitoring is required including a PPD at baseline and must alert us or the primary physician if symptoms of infection or other concerning signs are noted.

## 2023-06-20 ENCOUNTER — OUTPATIENT (OUTPATIENT)
Dept: OUTPATIENT SERVICES | Facility: HOSPITAL | Age: 53
LOS: 1 days | Discharge: HOME | End: 2023-06-20

## 2023-06-20 DIAGNOSIS — Z98.890 OTHER SPECIFIED POSTPROCEDURAL STATES: Chronic | ICD-10-CM

## 2023-06-20 DIAGNOSIS — D64.9 ANEMIA, UNSPECIFIED: ICD-10-CM

## 2023-06-30 ENCOUNTER — APPOINTMENT (OUTPATIENT)
Dept: HEMATOLOGY ONCOLOGY | Facility: CLINIC | Age: 53
End: 2023-06-30

## 2023-08-29 NOTE — ED ADULT TRIAGE NOTE - CHIEF COMPLAINT QUOTE
dyspnea, recently discharged after blood transfusion Complex Repair And Flap Additional Text (Will Appearing After The Standard Complex Repair Text): The complex repair was not sufficient to completely close the primary defect. The remaining additional defect was repaired with the flap mentioned below.

## 2023-09-15 NOTE — ED ADULT NURSE NOTE - CAS DISCH CONDITION
[facility]        NAME: Yvonne Huynh is a 17 y.o. female  : 2007    MRN: [id number]  DATE: September 15, 2023  TIME: 7:04 PM    Assessment and Plan   Left wrist injury, initial encounter [S69.92XA]  1. Left wrist injury, initial encounter  XR wrist 3+ vw left        Suspect contusion but with snuffbox tenderness will place in brace and await radiology report. On the AP view of the xr there is a possible tiny avulsion off the radial epiphysis but without point tenderness and full ROM I do not suspect this is an acute fracture. Recommend NONI Polanco. If radiology read is negative I will put a note in her chart that she is ok to play sports. If positive I will put in referral for ortho. Patient Instructions       Follow up with PCP in 3-5 days. Proceed to  ER if symptoms worsen. Chief Complaint     Chief Complaint   Patient presents with   • Wrist Injury     Left wrist injury         History of Present Illness       Pt is a 17 yo R hand dominant female pw left wrist injury x 2 days. States while playing football someone's helmet hit her in the wrist. She has been taking tylenol with moderate relief. No numbness or tingling. No pain at rest.      Review of Systems   Review of Systems   Constitutional: Negative for chills and diaphoresis. Respiratory: Negative for shortness of breath. Cardiovascular: Negative for chest pain. Gastrointestinal: Negative for nausea and vomiting. Musculoskeletal: Positive for arthralgias. Negative for back pain, joint swelling, myalgias and neck pain. Neurological: Negative for weakness and numbness.          Current Medications       Current Outpatient Medications:   •  albuterol (ProAir HFA) 90 mcg/act inhaler, Inhale 2 puffs every 6 (six) hours as needed for wheezing, Disp: 8.5 g, Rfl: 2  •  benzoyl peroxide-erythromycin (BENZAMYCIN) gel, Apply topically 2 (two) times a day, Disp: 23.3 g, Rfl: 0  •  buPROPion (WELLBUTRIN XL) 150 mg 24 hr tablet, Take 1 tablet (150 mg total) by mouth daily, Disp: 90 tablet, Rfl: 1  •  Multiple Vitamin (MULTIVITAMIN) tablet, Take 1 tablet by mouth daily  , Disp: , Rfl:   •  mupirocin (BACTROBAN) 2 % ointment, Apply topically 3 (three) times a day, Disp: 22 g, Rfl: 0  •  ondansetron (ZOFRAN) 4 mg tablet, Take 1 tablet (4 mg total) by mouth every 8 (eight) hours as needed for nausea or vomiting, Disp: 20 tablet, Rfl: 0    Current Allergies     Allergies as of 09/15/2023 - Reviewed 09/15/2023   Allergen Reaction Noted   • Adhesive [medical tape] Irritability 02/14/2020   • Other Other (See Comments) 04/21/2023            The following portions of the patient's history were reviewed and updated as appropriate: allergies, current medications, past family history, past medical history, past social history, past surgical history and problem list.     Past Medical History:   Diagnosis Date   • Anxiety 8/2022   • COVID-19 12/06/2021   • Depression 8/2022   • Otitis media 02/10/2022       History reviewed. No pertinent surgical history. Family History   Problem Relation Age of Onset   • Hyperlipidemia Mother    • Hypertension Mother    • Heart attack Mother    • No Known Problems Father    • Heart failure Maternal Grandmother    • COPD Maternal Grandfather    • No Known Problems Paternal Grandmother    • No Known Problems Paternal Grandfather          Medications have been verified. Objective   Pulse 72   Temp 98.5 °F (36.9 °C)   Resp (!) 20   Wt 49.9 kg (110 lb)   SpO2 99%        Physical Exam     Physical Exam  Vitals and nursing note reviewed. Constitutional:       General: She is not in acute distress. Appearance: Normal appearance. She is not ill-appearing. HENT:      Head: Normocephalic and atraumatic. Cardiovascular:      Rate and Rhythm: Normal rate. Pulmonary:      Effort: Pulmonary effort is normal. No respiratory distress.    Musculoskeletal:      Left wrist: Tenderness, bony tenderness (bruising noted over radial aspect of mid and distal forearm with no point tenderness over radial styloid. Full ROM.) and snuff box tenderness present. No swelling or deformity. Normal range of motion. Skin:     General: Skin is warm and dry. Capillary Refill: Capillary refill takes less than 2 seconds. Neurological:      Mental Status: She is alert and oriented to person, place, and time.    Psychiatric:         Behavior: Behavior normal. Stable

## 2023-09-19 ENCOUNTER — OUTPATIENT (OUTPATIENT)
Dept: OUTPATIENT SERVICES | Facility: HOSPITAL | Age: 53
LOS: 1 days | Discharge: HOME | End: 2023-09-19

## 2023-09-19 DIAGNOSIS — Z98.890 OTHER SPECIFIED POSTPROCEDURAL STATES: Chronic | ICD-10-CM

## 2023-09-19 DIAGNOSIS — D64.9 ANEMIA, UNSPECIFIED: ICD-10-CM

## 2023-09-20 ENCOUNTER — OUTPATIENT (OUTPATIENT)
Dept: OUTPATIENT SERVICES | Facility: HOSPITAL | Age: 53
LOS: 1 days | End: 2023-09-20
Payer: MEDICAID

## 2023-09-20 ENCOUNTER — RESULT REVIEW (OUTPATIENT)
Age: 53
End: 2023-09-20

## 2023-09-20 ENCOUNTER — APPOINTMENT (OUTPATIENT)
Dept: HEMATOLOGY ONCOLOGY | Facility: CLINIC | Age: 53
End: 2023-09-20
Payer: MEDICAID

## 2023-09-20 VITALS
RESPIRATION RATE: 19 BRPM | TEMPERATURE: 97.3 F | WEIGHT: 187.17 LBS | BODY MASS INDEX: 32.47 KG/M2 | OXYGEN SATURATION: 97 % | HEART RATE: 91 BPM | DIASTOLIC BLOOD PRESSURE: 97 MMHG | SYSTOLIC BLOOD PRESSURE: 142 MMHG

## 2023-09-20 DIAGNOSIS — N18.9 CHRONIC KIDNEY DISEASE, UNSPECIFIED: ICD-10-CM

## 2023-09-20 DIAGNOSIS — Z98.890 OTHER SPECIFIED POSTPROCEDURAL STATES: Chronic | ICD-10-CM

## 2023-09-20 LAB
BASOPHILS # BLD AUTO: 0.02 K/UL — SIGNIFICANT CHANGE UP (ref 0–0.2)
BASOPHILS NFR BLD AUTO: 0.2 % — SIGNIFICANT CHANGE UP (ref 0–2)
EOSINOPHIL # BLD AUTO: 0.18 K/UL — SIGNIFICANT CHANGE UP (ref 0–0.5)
EOSINOPHIL NFR BLD AUTO: 2 % — SIGNIFICANT CHANGE UP (ref 0–6)
HCT VFR BLD CALC: 21 % — CRITICAL LOW (ref 34.5–45)
HGB BLD-MCNC: 5.8 G/DL — CRITICAL LOW (ref 11.5–15.5)
IMM GRANULOCYTES NFR BLD AUTO: 0.2 % — SIGNIFICANT CHANGE UP (ref 0–0.9)
LYMPHOCYTES # BLD AUTO: 0.67 K/UL — LOW (ref 1–3.3)
LYMPHOCYTES # BLD AUTO: 7.5 % — LOW (ref 13–44)
MCHC RBC-ENTMCNC: 22.4 PG — LOW (ref 27–34)
MCHC RBC-ENTMCNC: 27.6 G/DL — LOW (ref 32–36)
MCV RBC AUTO: 81.1 FL — SIGNIFICANT CHANGE UP (ref 80–100)
MONOCYTES # BLD AUTO: 0.61 K/UL — SIGNIFICANT CHANGE UP (ref 0–0.9)
MONOCYTES NFR BLD AUTO: 6.8 % — SIGNIFICANT CHANGE UP (ref 2–14)
NEUTROPHILS # BLD AUTO: 7.48 K/UL — HIGH (ref 1.8–7.4)
NEUTROPHILS NFR BLD AUTO: 83.3 % — HIGH (ref 43–77)
NRBC # BLD: 0 /100 WBCS — SIGNIFICANT CHANGE UP (ref 0–0)
PLATELET # BLD AUTO: 262 K/UL — SIGNIFICANT CHANGE UP (ref 150–400)
RBC # BLD: 2.59 M/UL — LOW (ref 3.8–5.2)
RBC # FLD: 18.5 % — HIGH (ref 10.3–14.5)
WBC # BLD: 8.98 K/UL — SIGNIFICANT CHANGE UP (ref 3.8–10.5)
WBC # FLD AUTO: 8.98 K/UL — SIGNIFICANT CHANGE UP (ref 3.8–10.5)

## 2023-09-20 PROCEDURE — 99215 OFFICE O/P EST HI 40 MIN: CPT

## 2023-09-20 RX ORDER — SEVELAMER CARBONATE 800 MG/1
800 TABLET, FILM COATED ORAL
Refills: 0 | Status: ACTIVE | COMMUNITY
Start: 2023-09-20

## 2023-09-20 RX ORDER — OMEGA-3/DHA/EPA/FISH OIL 300-1000MG
400 CAPSULE ORAL
Refills: 0 | Status: DISCONTINUED | COMMUNITY
Start: 2022-07-24 | End: 2023-09-20

## 2023-09-20 RX ORDER — BUMETANIDE 1 MG/1
1 TABLET ORAL
Refills: 0 | Status: ACTIVE | COMMUNITY
Start: 2022-07-24

## 2023-09-20 RX ORDER — FERROUS SULFATE TAB EC 325 MG (65 MG FE EQUIVALENT) 325 (65 FE) MG
325 (65 FE) TABLET DELAYED RESPONSE ORAL DAILY
Refills: 0 | Status: DISCONTINUED | COMMUNITY
Start: 2022-07-24 | End: 2023-09-20

## 2023-09-20 RX ORDER — CALCIUM ACETATE 667 MG/1
667 CAPSULE ORAL 3 TIMES DAILY
Refills: 0 | Status: ACTIVE | COMMUNITY
Start: 2023-09-20

## 2023-09-21 ENCOUNTER — APPOINTMENT (OUTPATIENT)
Dept: INFUSION THERAPY | Facility: HOSPITAL | Age: 53
End: 2023-09-21

## 2023-09-21 DIAGNOSIS — D50.9 IRON DEFICIENCY ANEMIA, UNSPECIFIED: ICD-10-CM

## 2023-10-12 DIAGNOSIS — N02.B9 OTHER RECURRENT AND PERSISTENT IMMUNOGLOBULIN A NEPHROPATHY: ICD-10-CM

## 2023-10-20 ENCOUNTER — RESULT REVIEW (OUTPATIENT)
Age: 53
End: 2023-10-20

## 2023-10-20 ENCOUNTER — APPOINTMENT (OUTPATIENT)
Dept: HEMATOLOGY ONCOLOGY | Facility: CLINIC | Age: 53
End: 2023-10-20
Payer: MEDICAID

## 2023-10-20 VITALS
WEIGHT: 187 LBS | HEIGHT: 64 IN | RESPIRATION RATE: 20 BRPM | TEMPERATURE: 98.2 F | BODY MASS INDEX: 31.92 KG/M2 | HEART RATE: 90 BPM | OXYGEN SATURATION: 94 % | SYSTOLIC BLOOD PRESSURE: 177 MMHG | DIASTOLIC BLOOD PRESSURE: 112 MMHG

## 2023-10-20 LAB
BASOPHILS # BLD AUTO: 0.01 K/UL — SIGNIFICANT CHANGE UP (ref 0–0.2)
BASOPHILS # BLD AUTO: 0.01 K/UL — SIGNIFICANT CHANGE UP (ref 0–0.2)
BASOPHILS NFR BLD AUTO: 0.1 % — SIGNIFICANT CHANGE UP (ref 0–2)
BASOPHILS NFR BLD AUTO: 0.1 % — SIGNIFICANT CHANGE UP (ref 0–2)
EOSINOPHIL # BLD AUTO: 0.19 K/UL — SIGNIFICANT CHANGE UP (ref 0–0.5)
EOSINOPHIL # BLD AUTO: 0.19 K/UL — SIGNIFICANT CHANGE UP (ref 0–0.5)
EOSINOPHIL NFR BLD AUTO: 2.5 % — SIGNIFICANT CHANGE UP (ref 0–6)
EOSINOPHIL NFR BLD AUTO: 2.5 % — SIGNIFICANT CHANGE UP (ref 0–6)
HCT VFR BLD CALC: 25.8 % — LOW (ref 34.5–45)
HCT VFR BLD CALC: 25.8 % — LOW (ref 34.5–45)
HGB BLD-MCNC: 7.4 G/DL — LOW (ref 11.5–15.5)
HGB BLD-MCNC: 7.4 G/DL — LOW (ref 11.5–15.5)
IMM GRANULOCYTES NFR BLD AUTO: 0.5 % — SIGNIFICANT CHANGE UP (ref 0–0.9)
IMM GRANULOCYTES NFR BLD AUTO: 0.5 % — SIGNIFICANT CHANGE UP (ref 0–0.9)
LYMPHOCYTES # BLD AUTO: 0.5 K/UL — LOW (ref 1–3.3)
LYMPHOCYTES # BLD AUTO: 0.5 K/UL — LOW (ref 1–3.3)
LYMPHOCYTES # BLD AUTO: 6.6 % — LOW (ref 13–44)
LYMPHOCYTES # BLD AUTO: 6.6 % — LOW (ref 13–44)
MCHC RBC-ENTMCNC: 22.5 PG — LOW (ref 27–34)
MCHC RBC-ENTMCNC: 22.5 PG — LOW (ref 27–34)
MCHC RBC-ENTMCNC: 28.5 G/DL — LOW (ref 32–36)
MCHC RBC-ENTMCNC: 28.5 G/DL — LOW (ref 32–36)
MCV RBC AUTO: 79 FL — LOW (ref 80–100)
MCV RBC AUTO: 79 FL — LOW (ref 80–100)
MONOCYTES # BLD AUTO: 0.44 K/UL — SIGNIFICANT CHANGE UP (ref 0–0.9)
MONOCYTES # BLD AUTO: 0.44 K/UL — SIGNIFICANT CHANGE UP (ref 0–0.9)
MONOCYTES NFR BLD AUTO: 5.8 % — SIGNIFICANT CHANGE UP (ref 2–14)
MONOCYTES NFR BLD AUTO: 5.8 % — SIGNIFICANT CHANGE UP (ref 2–14)
NEUTROPHILS # BLD AUTO: 6.38 K/UL — SIGNIFICANT CHANGE UP (ref 1.8–7.4)
NEUTROPHILS # BLD AUTO: 6.38 K/UL — SIGNIFICANT CHANGE UP (ref 1.8–7.4)
NEUTROPHILS NFR BLD AUTO: 84.5 % — HIGH (ref 43–77)
NEUTROPHILS NFR BLD AUTO: 84.5 % — HIGH (ref 43–77)
NRBC # BLD: 0 /100 WBCS — SIGNIFICANT CHANGE UP (ref 0–0)
NRBC # BLD: 0 /100 WBCS — SIGNIFICANT CHANGE UP (ref 0–0)
PLATELET # BLD AUTO: 201 K/UL — SIGNIFICANT CHANGE UP (ref 150–400)
PLATELET # BLD AUTO: 201 K/UL — SIGNIFICANT CHANGE UP (ref 150–400)
RBC # BLD: 3.29 M/UL — LOW (ref 3.8–5.2)
RBC # BLD: 3.29 M/UL — LOW (ref 3.8–5.2)
RBC # FLD: 21.3 % — HIGH (ref 10.3–14.5)
RBC # FLD: 21.3 % — HIGH (ref 10.3–14.5)
WBC # BLD: 7.56 K/UL — SIGNIFICANT CHANGE UP (ref 3.8–10.5)
WBC # BLD: 7.56 K/UL — SIGNIFICANT CHANGE UP (ref 3.8–10.5)
WBC # FLD AUTO: 7.56 K/UL — SIGNIFICANT CHANGE UP (ref 3.8–10.5)
WBC # FLD AUTO: 7.56 K/UL — SIGNIFICANT CHANGE UP (ref 3.8–10.5)

## 2023-10-20 PROCEDURE — 86901 BLOOD TYPING SEROLOGIC RH(D): CPT

## 2023-10-20 PROCEDURE — 99213 OFFICE O/P EST LOW 20 MIN: CPT

## 2023-10-20 PROCEDURE — 86900 BLOOD TYPING SEROLOGIC ABO: CPT

## 2023-10-20 PROCEDURE — 86923 COMPATIBILITY TEST ELECTRIC: CPT

## 2023-10-20 PROCEDURE — 86850 RBC ANTIBODY SCREEN: CPT

## 2023-10-21 ENCOUNTER — APPOINTMENT (OUTPATIENT)
Dept: INFUSION THERAPY | Facility: HOSPITAL | Age: 53
End: 2023-10-21

## 2023-10-21 ENCOUNTER — OUTPATIENT (OUTPATIENT)
Dept: OUTPATIENT SERVICES | Facility: HOSPITAL | Age: 53
LOS: 1 days | End: 2023-10-21

## 2023-10-21 DIAGNOSIS — N18.9 CHRONIC KIDNEY DISEASE, UNSPECIFIED: ICD-10-CM

## 2023-10-21 DIAGNOSIS — Z98.890 OTHER SPECIFIED POSTPROCEDURAL STATES: Chronic | ICD-10-CM

## 2023-10-22 LAB
FERRITIN SERPL-MCNC: 138 NG/ML
IRON SATN MFR SERPL: 15 %
IRON SERPL-MCNC: 38 UG/DL
TIBC SERPL-MCNC: 255 UG/DL
UIBC SERPL-MCNC: 217 UG/DL

## 2023-10-23 DIAGNOSIS — Z51.89 ENCOUNTER FOR OTHER SPECIFIED AFTERCARE: ICD-10-CM

## 2023-11-14 ENCOUNTER — OUTPATIENT (OUTPATIENT)
Dept: OUTPATIENT SERVICES | Facility: HOSPITAL | Age: 53
LOS: 1 days | Discharge: HOME | End: 2023-11-14

## 2023-11-14 DIAGNOSIS — Z98.890 OTHER SPECIFIED POSTPROCEDURAL STATES: Chronic | ICD-10-CM

## 2023-11-14 DIAGNOSIS — D64.9 ANEMIA, UNSPECIFIED: ICD-10-CM

## 2023-11-28 ENCOUNTER — APPOINTMENT (OUTPATIENT)
Dept: HEMATOLOGY ONCOLOGY | Facility: CLINIC | Age: 53
End: 2023-11-28

## 2023-12-21 NOTE — ED PROVIDER NOTE - SKIN NEGATIVE STATEMENT, MLM
Pt returned HST device. It was downloaded and forwarded data to the clinical specialist for scoring.    no abrasions, no jaundice, no lesions, no pruritis, and no rashes.

## 2024-01-12 ENCOUNTER — OUTPATIENT (OUTPATIENT)
Dept: OUTPATIENT SERVICES | Facility: HOSPITAL | Age: 54
LOS: 1 days | Discharge: HOME | End: 2024-01-12

## 2024-01-12 DIAGNOSIS — Z98.890 OTHER SPECIFIED POSTPROCEDURAL STATES: Chronic | ICD-10-CM

## 2024-01-12 DIAGNOSIS — D64.9 ANEMIA, UNSPECIFIED: ICD-10-CM

## 2024-01-16 ENCOUNTER — RESULT REVIEW (OUTPATIENT)
Age: 54
End: 2024-01-16

## 2024-01-16 ENCOUNTER — NON-APPOINTMENT (OUTPATIENT)
Age: 54
End: 2024-01-16

## 2024-01-16 ENCOUNTER — OUTPATIENT (OUTPATIENT)
Dept: OUTPATIENT SERVICES | Facility: HOSPITAL | Age: 54
LOS: 1 days | End: 2024-01-16
Payer: MEDICAID

## 2024-01-16 ENCOUNTER — APPOINTMENT (OUTPATIENT)
Dept: HEMATOLOGY ONCOLOGY | Facility: CLINIC | Age: 54
End: 2024-01-16

## 2024-01-16 DIAGNOSIS — Z98.890 OTHER SPECIFIED POSTPROCEDURAL STATES: Chronic | ICD-10-CM

## 2024-01-16 DIAGNOSIS — D50.9 IRON DEFICIENCY ANEMIA, UNSPECIFIED: ICD-10-CM

## 2024-01-16 LAB
BASOPHILS # BLD AUTO: 0.03 K/UL — SIGNIFICANT CHANGE UP (ref 0–0.2)
BASOPHILS NFR BLD AUTO: 0.4 % — SIGNIFICANT CHANGE UP (ref 0–2)
EOSINOPHIL # BLD AUTO: 0.31 K/UL — SIGNIFICANT CHANGE UP (ref 0–0.5)
EOSINOPHIL NFR BLD AUTO: 4 % — SIGNIFICANT CHANGE UP (ref 0–6)
HCT VFR BLD CALC: 31.1 % — LOW (ref 34.5–45)
HGB BLD-MCNC: 9 G/DL — LOW (ref 11.5–15.5)
IMM GRANULOCYTES NFR BLD AUTO: 0.4 % — SIGNIFICANT CHANGE UP (ref 0–0.9)
LYMPHOCYTES # BLD AUTO: 1.1 K/UL — SIGNIFICANT CHANGE UP (ref 1–3.3)
LYMPHOCYTES # BLD AUTO: 14.3 % — SIGNIFICANT CHANGE UP (ref 13–44)
MCHC RBC-ENTMCNC: 23.4 PG — LOW (ref 27–34)
MCHC RBC-ENTMCNC: 28.9 G/DL — LOW (ref 32–36)
MCV RBC AUTO: 81 FL — SIGNIFICANT CHANGE UP (ref 80–100)
MONOCYTES # BLD AUTO: 0.64 K/UL — SIGNIFICANT CHANGE UP (ref 0–0.9)
MONOCYTES NFR BLD AUTO: 8.3 % — SIGNIFICANT CHANGE UP (ref 2–14)
NEUTROPHILS # BLD AUTO: 5.59 K/UL — SIGNIFICANT CHANGE UP (ref 1.8–7.4)
NEUTROPHILS NFR BLD AUTO: 72.6 % — SIGNIFICANT CHANGE UP (ref 43–77)
NRBC # BLD: 0 /100 WBCS — SIGNIFICANT CHANGE UP (ref 0–0)
PLATELET # BLD AUTO: 242 K/UL — SIGNIFICANT CHANGE UP (ref 150–400)
RBC # BLD: 3.84 M/UL — SIGNIFICANT CHANGE UP (ref 3.8–5.2)
RBC # FLD: 17.4 % — HIGH (ref 10.3–14.5)
WBC # BLD: 7.7 K/UL — SIGNIFICANT CHANGE UP (ref 3.8–10.5)
WBC # FLD AUTO: 7.7 K/UL — SIGNIFICANT CHANGE UP (ref 3.8–10.5)

## 2024-01-16 PROCEDURE — 86850 RBC ANTIBODY SCREEN: CPT

## 2024-01-16 PROCEDURE — 86901 BLOOD TYPING SEROLOGIC RH(D): CPT

## 2024-01-16 PROCEDURE — 86900 BLOOD TYPING SEROLOGIC ABO: CPT

## 2024-01-19 ENCOUNTER — APPOINTMENT (OUTPATIENT)
Dept: HEMATOLOGY ONCOLOGY | Facility: CLINIC | Age: 54
End: 2024-01-19

## 2024-02-05 NOTE — ED PROVIDER NOTE - DOMESTIC TRAVEL HIGH RISK QUESTION
Date of Service:  01/24/2024    The patient is here today for a complete physical.  The patient recently lost his wife within last year.  The patient has been grieving.  The patient has history of nonischemic cardiomyopathy, high blood pressure, asymptomatic PVCs, PACs, and he sees [facility] every year.  He has history of moderately reduced LV systolic function, ejection fraction of 35% to 38%.  The patient had outpatient stress test with no evidence of ischemia.  The patient's ejection fraction did improve to 52%.  The patient mentions that he has been doing very well at this time.  There has been no recurrence of lower extremity cellulitis.  Leg swelling remains controlled with use of compression stocking.  Once again, the patient has been grieving.  The patient also sees Pain Management for his chronic arthritis, and the patient gets Euflexxa injections as well.  The patient mentions that has been maintaining stably.  The patient is status post gastric bypass.  The patient's insurance does not pay for any new bariatric weight loss medications.  We did have discussion about that, but apparently, the patient's insurance does not pay for any chronic weight loss medications and new medications have been approved.  The patient mentions that he has been continuing to watch his diet.  The patient also has developed primary insomnia because of the wife's death.  The patient mentions that he has chronic toenail fungal infection.  His skin is noted to be extremely hypertrophied and nail has been noted to be extremely thickened.  He also has several corn and calluses.  He is requesting a Podiatry referral which was given.  The patient also mentions that he has started dating a new friend and would like a refill on his Viagra.  Pneumonia and flu shot were given today.  The patient has been forewarned about gastric bypass and taking NSAIDs.  He understands the risk, but he mentions that this is the only the  medicine that would help his knee.  He is aware about long-term kidney damage as well with taking consistent long-term NSAIDs.  After understanding the risks and benefits, the patient would like to continue and refuses to change any of his medication.  For insomnia, today, hydroxyzine was prescribed.  The patient verbalizes understanding, agrees with the plan.        Dictated By:  Shawnee Mccormick MD  Signing Provider:  MD ALEXANDRE Estrada/AQT  D:  02/05/2024 11:51:03 AM  T:  02/05/2024 12:57:09 PM  Job:  [id number]        No

## 2024-02-06 ENCOUNTER — APPOINTMENT (OUTPATIENT)
Dept: HEMATOLOGY ONCOLOGY | Facility: CLINIC | Age: 54
End: 2024-02-06

## 2024-02-22 ENCOUNTER — NON-APPOINTMENT (OUTPATIENT)
Age: 54
End: 2024-02-22

## 2024-02-23 ENCOUNTER — APPOINTMENT (OUTPATIENT)
Dept: HEMATOLOGY ONCOLOGY | Facility: CLINIC | Age: 54
End: 2024-02-23
Payer: MEDICAID

## 2024-02-23 ENCOUNTER — RESULT REVIEW (OUTPATIENT)
Age: 54
End: 2024-02-23

## 2024-02-23 ENCOUNTER — OUTPATIENT (OUTPATIENT)
Dept: OUTPATIENT SERVICES | Facility: HOSPITAL | Age: 54
LOS: 1 days | End: 2024-02-23
Payer: MEDICAID

## 2024-02-23 VITALS
BODY MASS INDEX: 28.95 KG/M2 | RESPIRATION RATE: 17 BRPM | WEIGHT: 168.65 LBS | OXYGEN SATURATION: 100 % | SYSTOLIC BLOOD PRESSURE: 128 MMHG | HEART RATE: 95 BPM | TEMPERATURE: 98.4 F | DIASTOLIC BLOOD PRESSURE: 81 MMHG

## 2024-02-23 DIAGNOSIS — Z99.2 DEPENDENCE ON RENAL DIALYSIS: ICD-10-CM

## 2024-02-23 DIAGNOSIS — Z98.890 OTHER SPECIFIED POSTPROCEDURAL STATES: Chronic | ICD-10-CM

## 2024-02-23 DIAGNOSIS — D50.9 IRON DEFICIENCY ANEMIA, UNSPECIFIED: ICD-10-CM

## 2024-02-23 DIAGNOSIS — K92.2 GASTROINTESTINAL HEMORRHAGE, UNSPECIFIED: ICD-10-CM

## 2024-02-23 LAB
BASOPHILS # BLD AUTO: 0.01 K/UL — SIGNIFICANT CHANGE UP (ref 0–0.2)
BASOPHILS NFR BLD AUTO: 0.1 % — SIGNIFICANT CHANGE UP (ref 0–2)
EOSINOPHIL # BLD AUTO: 0.22 K/UL — SIGNIFICANT CHANGE UP (ref 0–0.5)
EOSINOPHIL NFR BLD AUTO: 1.9 % — SIGNIFICANT CHANGE UP (ref 0–6)
HCT VFR BLD CALC: 14.3 % — CRITICAL LOW (ref 34.5–45)
HGB BLD-MCNC: 4.5 G/DL — CRITICAL LOW (ref 11.5–15.5)
IMM GRANULOCYTES NFR BLD AUTO: 0.7 % — SIGNIFICANT CHANGE UP (ref 0–0.9)
LYMPHOCYTES # BLD AUTO: 1.28 K/UL — SIGNIFICANT CHANGE UP (ref 1–3.3)
LYMPHOCYTES # BLD AUTO: 11.2 % — LOW (ref 13–44)
MCHC RBC-ENTMCNC: 28.5 PG — SIGNIFICANT CHANGE UP (ref 27–34)
MCHC RBC-ENTMCNC: 31.5 G/DL — LOW (ref 32–36)
MCV RBC AUTO: 90.5 FL — SIGNIFICANT CHANGE UP (ref 80–100)
MONOCYTES # BLD AUTO: 0.61 K/UL — SIGNIFICANT CHANGE UP (ref 0–0.9)
MONOCYTES NFR BLD AUTO: 5.3 % — SIGNIFICANT CHANGE UP (ref 2–14)
NEUTROPHILS # BLD AUTO: 9.22 K/UL — HIGH (ref 1.8–7.4)
NEUTROPHILS NFR BLD AUTO: 80.8 % — HIGH (ref 43–77)
NRBC # BLD: 0 /100 WBCS — SIGNIFICANT CHANGE UP (ref 0–0)
PLATELET # BLD AUTO: 148 K/UL — LOW (ref 150–400)
RBC # BLD: 1.58 M/UL — LOW (ref 3.8–5.2)
RBC # FLD: 15.9 % — HIGH (ref 10.3–14.5)
WBC # BLD: 11.42 K/UL — HIGH (ref 3.8–10.5)
WBC # FLD AUTO: 11.42 K/UL — HIGH (ref 3.8–10.5)

## 2024-02-23 PROCEDURE — 99215 OFFICE O/P EST HI 40 MIN: CPT

## 2024-02-23 PROCEDURE — G2211 COMPLEX E/M VISIT ADD ON: CPT | Mod: NC,1L

## 2024-02-23 RX ORDER — ADHESIVE TAPE 3"X 2.3 YD
50 MCG TAPE, NON-MEDICATED TOPICAL
Refills: 0 | Status: DISCONTINUED | COMMUNITY
Start: 2022-07-24 | End: 2024-02-23

## 2024-02-23 RX ORDER — ASCORBIC ACID 500 MG
500 TABLET ORAL
Refills: 0 | Status: DISCONTINUED | COMMUNITY
Start: 2022-07-24 | End: 2024-02-23

## 2024-02-23 RX ORDER — SODIUM ZIRCONIUM CYCLOSILICATE 10 G/10G
10 POWDER, FOR SUSPENSION ORAL
Refills: 0 | Status: DISCONTINUED | COMMUNITY
Start: 2023-09-20 | End: 2024-02-23

## 2024-02-23 RX ORDER — CALCITRIOL 0.5 UG/1
0.5 CAPSULE, LIQUID FILLED ORAL
Refills: 0 | Status: DISCONTINUED | COMMUNITY
Start: 2023-09-20 | End: 2024-02-23

## 2024-02-23 RX ORDER — SODIUM BICARBONATE 650 MG/1
650 TABLET ORAL TWICE DAILY
Refills: 0 | Status: DISCONTINUED | COMMUNITY
Start: 2023-09-20 | End: 2024-02-23

## 2024-02-23 NOTE — CONSULT LETTER
[Dear  ___] : Dear ~JOSÉ, [Courtesy Letter:] : I had the pleasure of seeing your patient, [unfilled], in my office today. [Please see my note below.] : Please see my note below. [Consult Closing:] : Thank you very much for allowing me to participate in the care of this patient.  If you have any questions, please do not hesitate to contact me. [Sincerely,] : Sincerely, [DrJohnathan  ___] : Dr. PINTO [DrJohnathan ___] : Dr. PINTO [FreeTextEntry2] : Tip Waters MD [FreeTextEntry3] : Juan\par  Nando Ceron M.D., FACP\par  Professor of Medicine\par  [facility] [employer] [facility]\par  Associate Chief, Division of Hematology\par  [facility]\par  [facility]\par  [address] [place] [address]\[place] [address]\par  [phone]\par  \par  \par  \par  \par

## 2024-02-23 NOTE — ASSESSMENT
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  Platelet dysfunction related to her ESRD may be contributing to her bleeding. Explained that dialysis would help this. Recent study published in NEJM confirmed that thalidomide has efficacy in decreasing GI tract AVM bleeding. It was recommended to her by me in past and now her gastroentreologist also recommending it. She is not responding to Octreotide. Reviewed toxicities with her including sedation, neuropathy, constipation and thrombosis. Birth defect risks reviewed. All questions answered. REMS form completed.  Plan: Rest Arrange thalidomide  Sent to  [facility] for transfusion now CMP, LDH, TFT's  To ER in the event of any clinical deterioration including chest pain or shortness of breath D/C Octreotide therapy when begins thalidomide Continue iron supplementation as needed. Gastroenterology f/u - being seen Monday RTC to be arranged

## 2024-02-23 NOTE — REVIEW OF SYSTEMS
[Fatigue] : fatigue [Constipation] : constipation [Negative] : Musculoskeletal [FreeTextEntry7] : Hematochezia, melena

## 2024-02-23 NOTE — HISTORY OF PRESENT ILLNESS
[Disease:__________________________] : Disease: [unfilled] [de-identified] : IgA nephropathy\par  Chronic renal failure\par  Fe deficiency due to chronic UGI bleeding from jejunal AVMs\par   [FreeTextEntry1] : PRBC/oral and IV Fe/Retacrit; Octreotide [de-identified] : She feels fair. She is tired. She receives peritoneal dialysis daily. She was transfused 3U PRBC 3 days ago. Was found to have a leaky heart valve after cardiac catheterization.  Having rectal bleeding with blood mixed into her stool (hematochezia).  Also notes melena. She is constipated. Urinates 2-3 times a day. Still gets IV Fe at [facility]. She notes no headaches. chest pain, SOB, abdominal pain, ankle swelling, paresthesias. Last menses was in August 2019. She is not sexually active.

## 2024-02-23 NOTE — PHYSICAL EXAM
[Restricted in physically strenuous activity but ambulatory and able to carry out work of a light or sedentary nature] : Status 1- Restricted in physically strenuous activity but ambulatory and able to carry out work of a light or sedentary nature, e.g., light house work, office work [Normal] : affect appropriate [de-identified] : Pale conjunctivae

## 2024-02-23 NOTE — ADDENDUM
[FreeTextEntry1] : I, Fei Anderson, acted solely as a scribe for Dr. Nando Ceron on 02/23/2024. All medical entries made by the Scribe were at my, Dr. Nando Ceron's, direction and personally dictated by me on 02/23/2024. I have reviewed the chart and agree that the record accurately reflects my personal performance of the history, physical exam, assessment and plan. I have also personally directed, reviewed, and agreed with the chart.

## 2024-02-23 NOTE — RESULTS/DATA
[FreeTextEntry1] : WBC 11,420 Hgb 4.5 Hct 14.3 MCV 90.5 Platelets 148,000 Diff 81P 11L 5M 1Imm gran 2Eo ANC 9,220  10/20/23 Ferritin 138 Fe/TIBC/Sat 38/255/15%

## 2024-03-06 ENCOUNTER — OUTPATIENT (OUTPATIENT)
Dept: OUTPATIENT SERVICES | Facility: HOSPITAL | Age: 54
LOS: 1 days | Discharge: HOME | End: 2024-03-06

## 2024-03-06 DIAGNOSIS — Z98.890 OTHER SPECIFIED POSTPROCEDURAL STATES: Chronic | ICD-10-CM

## 2024-03-06 DIAGNOSIS — D64.9 ANEMIA, UNSPECIFIED: ICD-10-CM

## 2024-03-06 NOTE — PATIENT PROFILE ADULT - INTERNATIONAL TRAVEL
Noted.   Can they provide more detail for this referral request?   For example - foot ulcer, onychomycosis etc         No

## 2024-03-13 ENCOUNTER — APPOINTMENT (OUTPATIENT)
Dept: HEMATOLOGY ONCOLOGY | Facility: CLINIC | Age: 54
End: 2024-03-13

## 2024-03-13 ENCOUNTER — LABORATORY RESULT (OUTPATIENT)
Age: 54
End: 2024-03-13

## 2024-03-19 ENCOUNTER — LABORATORY RESULT (OUTPATIENT)
Age: 54
End: 2024-03-19

## 2024-03-20 ENCOUNTER — APPOINTMENT (OUTPATIENT)
Dept: HEMATOLOGY ONCOLOGY | Facility: CLINIC | Age: 54
End: 2024-03-20

## 2024-03-21 ENCOUNTER — LABORATORY RESULT (OUTPATIENT)
Age: 54
End: 2024-03-21

## 2024-03-22 ENCOUNTER — APPOINTMENT (OUTPATIENT)
Dept: HEMATOLOGY ONCOLOGY | Facility: CLINIC | Age: 54
End: 2024-03-22
Payer: MEDICAID

## 2024-03-22 ENCOUNTER — RESULT REVIEW (OUTPATIENT)
Age: 54
End: 2024-03-22

## 2024-03-22 ENCOUNTER — APPOINTMENT (OUTPATIENT)
Dept: HEMATOLOGY ONCOLOGY | Facility: CLINIC | Age: 54
End: 2024-03-22

## 2024-03-22 ENCOUNTER — NON-APPOINTMENT (OUTPATIENT)
Age: 54
End: 2024-03-22

## 2024-03-22 ENCOUNTER — LABORATORY RESULT (OUTPATIENT)
Age: 54
End: 2024-03-22

## 2024-03-22 LAB
BASOPHILS # BLD AUTO: 0.02 K/UL — SIGNIFICANT CHANGE UP (ref 0–0.2)
BASOPHILS NFR BLD AUTO: 0.3 % — SIGNIFICANT CHANGE UP (ref 0–2)
EOSINOPHIL # BLD AUTO: 0.31 K/UL — SIGNIFICANT CHANGE UP (ref 0–0.5)
EOSINOPHIL NFR BLD AUTO: 4 % — SIGNIFICANT CHANGE UP (ref 0–6)
HCT VFR BLD CALC: 19.8 % — CRITICAL LOW (ref 34.5–45)
HGB BLD-MCNC: 6 G/DL — CRITICAL LOW (ref 11.5–15.5)
IMM GRANULOCYTES NFR BLD AUTO: 0.5 % — SIGNIFICANT CHANGE UP (ref 0–0.9)
LYMPHOCYTES # BLD AUTO: 0.64 K/UL — LOW (ref 1–3.3)
LYMPHOCYTES # BLD AUTO: 8.2 % — LOW (ref 13–44)
MCHC RBC-ENTMCNC: 25.5 PG — LOW (ref 27–34)
MCHC RBC-ENTMCNC: 30.3 G/DL — LOW (ref 32–36)
MCV RBC AUTO: 84.3 FL — SIGNIFICANT CHANGE UP (ref 80–100)
MONOCYTES # BLD AUTO: 0.64 K/UL — SIGNIFICANT CHANGE UP (ref 0–0.9)
MONOCYTES NFR BLD AUTO: 8.2 % — SIGNIFICANT CHANGE UP (ref 2–14)
NEUTROPHILS # BLD AUTO: 6.17 K/UL — SIGNIFICANT CHANGE UP (ref 1.8–7.4)
NEUTROPHILS NFR BLD AUTO: 78.8 % — HIGH (ref 43–77)
NRBC # BLD: 0 /100 WBCS — SIGNIFICANT CHANGE UP (ref 0–0)
PLATELET # BLD AUTO: 200 K/UL — SIGNIFICANT CHANGE UP (ref 150–400)
RBC # BLD: 2.35 M/UL — LOW (ref 3.8–5.2)
RBC # FLD: 13.7 % — SIGNIFICANT CHANGE UP (ref 10.3–14.5)
WBC # BLD: 7.82 K/UL — SIGNIFICANT CHANGE UP (ref 3.8–10.5)
WBC # FLD AUTO: 7.82 K/UL — SIGNIFICANT CHANGE UP (ref 3.8–10.5)

## 2024-03-22 PROCEDURE — ZZZZZ: CPT

## 2024-03-22 PROCEDURE — 86900 BLOOD TYPING SEROLOGIC ABO: CPT

## 2024-03-22 PROCEDURE — 86901 BLOOD TYPING SEROLOGIC RH(D): CPT

## 2024-03-22 PROCEDURE — 86923 COMPATIBILITY TEST ELECTRIC: CPT

## 2024-03-22 PROCEDURE — 86850 RBC ANTIBODY SCREEN: CPT

## 2024-03-23 ENCOUNTER — APPOINTMENT (OUTPATIENT)
Dept: INFUSION THERAPY | Facility: HOSPITAL | Age: 54
End: 2024-03-23

## 2024-03-25 ENCOUNTER — LABORATORY RESULT (OUTPATIENT)
Age: 54
End: 2024-03-25

## 2024-03-26 ENCOUNTER — LABORATORY RESULT (OUTPATIENT)
Age: 54
End: 2024-03-26

## 2024-03-26 DIAGNOSIS — K92.2 GASTROINTESTINAL HEMORRHAGE, UNSPECIFIED: ICD-10-CM

## 2024-03-26 DIAGNOSIS — Z51.89 ENCOUNTER FOR OTHER SPECIFIED AFTERCARE: ICD-10-CM

## 2024-03-27 ENCOUNTER — LABORATORY RESULT (OUTPATIENT)
Age: 54
End: 2024-03-27

## 2024-03-27 ENCOUNTER — APPOINTMENT (OUTPATIENT)
Dept: HEMATOLOGY ONCOLOGY | Facility: CLINIC | Age: 54
End: 2024-03-27

## 2024-04-01 ENCOUNTER — LABORATORY RESULT (OUTPATIENT)
Age: 54
End: 2024-04-01

## 2024-04-03 ENCOUNTER — LABORATORY RESULT (OUTPATIENT)
Age: 54
End: 2024-04-03

## 2024-04-03 ENCOUNTER — APPOINTMENT (OUTPATIENT)
Dept: HEMATOLOGY ONCOLOGY | Facility: CLINIC | Age: 54
End: 2024-04-03

## 2024-04-04 ENCOUNTER — LABORATORY RESULT (OUTPATIENT)
Age: 54
End: 2024-04-04

## 2024-04-04 NOTE — CONSULT LETTER
[Dear  ___] : Dear ~JOSÉ, [Courtesy Letter:] : I had the pleasure of seeing your patient, [unfilled], in my office today. [Please see my note below.] : Please see my note below. [Consult Closing:] : Thank you very much for allowing me to participate in the care of this patient.  If you have any questions, please do not hesitate to contact me. [Sincerely,] : Sincerely, [DrJohnathan  ___] : Dr. PINTO [DrJohnathan ___] : Dr. PINTO [FreeTextEntry2] : Tip Waters MD [FreeTextEntry3] : Juan\par  Nando Ceron M.D., FACP\par  Professor of Medicine\par  [facility] [employer] [facility]\par  Associate Chief, Division of Hematology\par  [facility]\par  [facility]\par  [address] [place] [address]\[place]  [place] [address]\par  [phone]\par  \par  \par  \par  \par

## 2024-04-04 NOTE — HISTORY OF PRESENT ILLNESS
[Disease:__________________________] : Disease: [unfilled] [Home] : at home, [unfilled] , at the time of the visit. 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  Hgb today is 6.0,  She feels very tired, will get transfusion on Saturday. She receives peritoneal dialysis daily. Was found to have a leaky heart valve after cardiac catheterization.  Having rectal bleeding with blood mixed into her stool (hematochezia).  Also notes melena. She is constipated. Urinates 2-3 times a day. Still gets IV Fe at Renal. She notes no headaches. abdominal pain, ankle swelling, paresthesias.  In last few days, has felt chest pain, dyspnea.; this is similar to how she feels when she needs transfusion.   Denies N/V, chest pain does not radiate down arms or into chin.   Last menses was in August 2019. She is not sexually active.

## 2024-04-04 NOTE — REVIEW OF SYSTEMS
[Fatigue] : fatigue [Constipation] : constipation [Negative] : Allergic/Immunologic [Chest Pain] : chest pain [FreeTextEntry7] : Hematochezia, melena

## 2024-04-04 NOTE — ASSESSMENT
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  Platelet dysfunction related to her ESRD may be contributing to her bleeding. Explained that dialysis would help this. Recent study published in NEJM confirmed that thalidomide has efficacy in decreasing GI tract AVM bleeding. It was recommended to her by me in past and now her gastroentreologist also recommending it. She is not responding to Octreotide. Reviewed toxicities with her including sedation, neuropathy, constipation and thrombosis. Birth defect risks reviewed. All questions answered. REMS form completed.  She has not started Thalidomide yet but plans to do so next week.  Will get 2 units PRBC''s on Saturday. Has chest pain similar to how she feels when her hgb is low,  Advised to go to ED with any worsening symptoms.    Plan: Rest To start thalidomide next week.  Transfuse 2 U's PRBC's.   CMP, LDH, TFT's  To ER in the event of any clinical deterioration including chest pain or shortness of breath D/C Octreotide therapy when begins thalidomide Continue iron supplementation as needed. Telehealth visit weekly.  CBC, CMP weekly through home draw

## 2024-04-05 ENCOUNTER — APPOINTMENT (OUTPATIENT)
Dept: HEMATOLOGY ONCOLOGY | Facility: CLINIC | Age: 54
End: 2024-04-05
Payer: MEDICAID

## 2024-04-05 ENCOUNTER — LABORATORY RESULT (OUTPATIENT)
Age: 54
End: 2024-04-05

## 2024-04-05 PROCEDURE — 99443: CPT

## 2024-04-08 ENCOUNTER — LABORATORY RESULT (OUTPATIENT)
Age: 54
End: 2024-04-08

## 2024-04-09 ENCOUNTER — RESULT REVIEW (OUTPATIENT)
Age: 54
End: 2024-04-09

## 2024-04-09 ENCOUNTER — NON-APPOINTMENT (OUTPATIENT)
Age: 54
End: 2024-04-09

## 2024-04-09 ENCOUNTER — APPOINTMENT (OUTPATIENT)
Dept: HEMATOLOGY ONCOLOGY | Facility: CLINIC | Age: 54
End: 2024-04-09

## 2024-04-09 ENCOUNTER — OUTPATIENT (OUTPATIENT)
Dept: OUTPATIENT SERVICES | Facility: HOSPITAL | Age: 54
LOS: 1 days | End: 2024-04-09
Payer: MEDICAID

## 2024-04-09 ENCOUNTER — LABORATORY RESULT (OUTPATIENT)
Age: 54
End: 2024-04-09

## 2024-04-09 DIAGNOSIS — D50.9 IRON DEFICIENCY ANEMIA, UNSPECIFIED: ICD-10-CM

## 2024-04-09 DIAGNOSIS — Z98.890 OTHER SPECIFIED POSTPROCEDURAL STATES: Chronic | ICD-10-CM

## 2024-04-09 LAB
BASOPHILS # BLD AUTO: 0.06 K/UL — SIGNIFICANT CHANGE UP (ref 0–0.2)
BASOPHILS NFR BLD AUTO: 0.9 % — SIGNIFICANT CHANGE UP (ref 0–2)
EOSINOPHIL # BLD AUTO: 0.4 K/UL — SIGNIFICANT CHANGE UP (ref 0–0.5)
EOSINOPHIL NFR BLD AUTO: 6.2 % — HIGH (ref 0–6)
HCT VFR BLD CALC: 21.5 % — LOW (ref 34.5–45)
HGB BLD-MCNC: 6.4 G/DL — CRITICAL LOW (ref 11.5–15.5)
IMM GRANULOCYTES NFR BLD AUTO: 0.3 % — SIGNIFICANT CHANGE UP (ref 0–0.9)
LYMPHOCYTES # BLD AUTO: 0.85 K/UL — LOW (ref 1–3.3)
LYMPHOCYTES # BLD AUTO: 13.2 % — SIGNIFICANT CHANGE UP (ref 13–44)
MCHC RBC-ENTMCNC: 24.6 PG — LOW (ref 27–34)
MCHC RBC-ENTMCNC: 29.8 G/DL — LOW (ref 32–36)
MCV RBC AUTO: 82.7 FL — SIGNIFICANT CHANGE UP (ref 80–100)
MONOCYTES # BLD AUTO: 0.76 K/UL — SIGNIFICANT CHANGE UP (ref 0–0.9)
MONOCYTES NFR BLD AUTO: 11.8 % — SIGNIFICANT CHANGE UP (ref 2–14)
NEUTROPHILS # BLD AUTO: 4.33 K/UL — SIGNIFICANT CHANGE UP (ref 1.8–7.4)
NEUTROPHILS NFR BLD AUTO: 67.6 % — SIGNIFICANT CHANGE UP (ref 43–77)
NRBC # BLD: 0 /100 WBCS — SIGNIFICANT CHANGE UP (ref 0–0)
PLATELET # BLD AUTO: 219 K/UL — SIGNIFICANT CHANGE UP (ref 150–400)
RBC # BLD: 2.6 M/UL — LOW (ref 3.8–5.2)
RBC # FLD: 14.1 % — SIGNIFICANT CHANGE UP (ref 10.3–14.5)
WBC # BLD: 6.42 K/UL — SIGNIFICANT CHANGE UP (ref 3.8–10.5)
WBC # FLD AUTO: 6.42 K/UL — SIGNIFICANT CHANGE UP (ref 3.8–10.5)

## 2024-04-10 ENCOUNTER — APPOINTMENT (OUTPATIENT)
Dept: INFUSION THERAPY | Facility: HOSPITAL | Age: 54
End: 2024-04-10

## 2024-04-10 ENCOUNTER — RESULT REVIEW (OUTPATIENT)
Age: 54
End: 2024-04-10

## 2024-04-10 LAB
ALBUMIN SERPL ELPH-MCNC: 3.5 G/DL — SIGNIFICANT CHANGE UP (ref 3.3–5)
ALP SERPL-CCNC: 86 U/L — SIGNIFICANT CHANGE UP (ref 40–120)
ALT FLD-CCNC: <5 U/L — LOW (ref 10–45)
ANION GAP SERPL CALC-SCNC: 15 MMOL/L — SIGNIFICANT CHANGE UP (ref 5–17)
AST SERPL-CCNC: 20 U/L — SIGNIFICANT CHANGE UP (ref 10–40)
BILIRUB SERPL-MCNC: 0.3 MG/DL — SIGNIFICANT CHANGE UP (ref 0.2–1.2)
BUN SERPL-MCNC: 70 MG/DL — HIGH (ref 7–23)
CALCIUM SERPL-MCNC: 8.7 MG/DL — SIGNIFICANT CHANGE UP (ref 8.4–10.5)
CHLORIDE SERPL-SCNC: 100 MMOL/L — SIGNIFICANT CHANGE UP (ref 96–108)
CO2 SERPL-SCNC: 23 MMOL/L — SIGNIFICANT CHANGE UP (ref 22–31)
CREAT SERPL-MCNC: 11.5 MG/DL — HIGH (ref 0.5–1.3)
EGFR: 4 ML/MIN/1.73M2 — LOW
GLUCOSE SERPL-MCNC: 121 MG/DL — HIGH (ref 70–99)
POTASSIUM SERPL-MCNC: 4 MMOL/L — SIGNIFICANT CHANGE UP (ref 3.5–5.3)
POTASSIUM SERPL-SCNC: 4 MMOL/L — SIGNIFICANT CHANGE UP (ref 3.5–5.3)
PROT SERPL-MCNC: 7.3 G/DL — SIGNIFICANT CHANGE UP (ref 6–8.3)
SODIUM SERPL-SCNC: 138 MMOL/L — SIGNIFICANT CHANGE UP (ref 135–145)

## 2024-04-11 DIAGNOSIS — D50.9 IRON DEFICIENCY ANEMIA, UNSPECIFIED: ICD-10-CM

## 2024-04-12 ENCOUNTER — APPOINTMENT (OUTPATIENT)
Dept: HEMATOLOGY ONCOLOGY | Facility: CLINIC | Age: 54
End: 2024-04-12

## 2024-04-13 NOTE — ED CDU PROVIDER INITIAL DAY NOTE - NSTIMEPROVIDERCAREINITIATE_GEN_ER
Pre-procedure checklist reviewed, AUC complete and pre-sedation note complete.    MD aware of maximum contrast dose of 216 mL. 11-Sep-2021 19:11

## 2024-04-15 ENCOUNTER — LABORATORY RESULT (OUTPATIENT)
Age: 54
End: 2024-04-15

## 2024-04-16 ENCOUNTER — LABORATORY RESULT (OUTPATIENT)
Age: 54
End: 2024-04-16

## 2024-04-17 ENCOUNTER — LABORATORY RESULT (OUTPATIENT)
Age: 54
End: 2024-04-17

## 2024-04-18 ENCOUNTER — LABORATORY RESULT (OUTPATIENT)
Age: 54
End: 2024-04-18

## 2024-04-19 ENCOUNTER — APPOINTMENT (OUTPATIENT)
Dept: HEMATOLOGY ONCOLOGY | Facility: CLINIC | Age: 54
End: 2024-04-19
Payer: MEDICAID

## 2024-04-19 PROCEDURE — 99442: CPT

## 2024-04-22 ENCOUNTER — LABORATORY RESULT (OUTPATIENT)
Age: 54
End: 2024-04-22

## 2024-04-22 ENCOUNTER — NON-APPOINTMENT (OUTPATIENT)
Age: 54
End: 2024-04-22

## 2024-04-23 ENCOUNTER — NON-APPOINTMENT (OUTPATIENT)
Age: 54
End: 2024-04-23

## 2024-04-23 NOTE — ASSESSMENT
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  Platelet dysfunction related to her ESRD may be contributing to her bleeding. Explained that dialysis would help this. Recent study published in NEJM confirmed that thalidomide has efficacy in decreasing GI tract AVM bleeding. It was recommended to her by me in past and now her gastroentreologist also recommending it. She is not responding to Octreotide. Reviewed toxicities with her including sedation, neuropathy, constipation and thrombosis. Birth defect risks reviewed. All questions answered.  Started Thalidomide on 3/31/24.   Plan: Rest CBC, CMP weekly To ER in the event of any clinical deterioration including chest pain or shortness of breath Continue iron supplementation as needed. Telehealth visit weekly.  Call with any issues.

## 2024-04-23 NOTE — REVIEW OF SYSTEMS
[Fatigue] : fatigue [FreeTextEntry7] : Hematochezia, melena [Chest Pain] : no chest pain [Constipation] : no constipation [Negative] : Musculoskeletal

## 2024-04-23 NOTE — HISTORY OF PRESENT ILLNESS
[Disease:__________________________] : Disease: [unfilled] [Home] : at home, [unfilled] , at the time of the visit. 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  Hgb was not done today but will be done tomorrow.  Started Thalidomide on 3/31/24.  She is getting peritoneal dialysis daily. Was found to have a leaky heart valve after cardiac catheterization.  Remains with rectal bleeding with blood mixed into her stool (hematochezia), also notes melena. Constipation continues.  Urinates 2-3 times a day. Still gets IV Fe at Renal. She notes no headaches. abdominal pain, ankle swelling, paresthesias.    Chest pain that she had last week resolved after transfusion last week.

## 2024-04-23 NOTE — CONSULT LETTER
[Dear  ___] : Dear ~JOSÉ, [Courtesy Letter:] : I had the pleasure of seeing your patient, [unfilled], in my office today. [Please see my note below.] : Please see my note below. [Consult Closing:] : Thank you very much for allowing me to participate in the care of this patient.  If you have any questions, please do not hesitate to contact me. [Sincerely,] : Sincerely, [DrJohnathan  ___] : Dr. PINTO [DrJohnathan ___] : Dr. PINTO [FreeTextEntry2] : Tip Waters MD [FreeTextEntry3] : Juan\par  Nando Ceron M.D., FACP\par  Professor of Medicine\par  [facility] [employer] at [facility]\par  Associate Chief, Division of Hematology\par  [facility]\par  [facility]\par  [address] [place] [address]\[place] [address]\par  [phone]\par  \par  \par  \par  \par

## 2024-04-23 NOTE — REASON FOR VISIT
[Home] : at home, [unfilled] , at the time of the visit. [Medical Office: ([facility])___] : at the medical office located in  [Follow-Up Visit] : a follow-up visit for [Blood Count Assessment] : blood count assessment [Verbal consent obtained from patient] : the patient, [unfilled]

## 2024-04-24 ENCOUNTER — LABORATORY RESULT (OUTPATIENT)
Age: 54
End: 2024-04-24

## 2024-04-26 ENCOUNTER — RESULT REVIEW (OUTPATIENT)
Age: 54
End: 2024-04-26

## 2024-04-26 ENCOUNTER — APPOINTMENT (OUTPATIENT)
Dept: HEMATOLOGY ONCOLOGY | Facility: CLINIC | Age: 54
End: 2024-04-26
Payer: MEDICAID

## 2024-04-26 LAB
BASOPHILS # BLD AUTO: 0.11 K/UL — SIGNIFICANT CHANGE UP (ref 0–0.2)
BASOPHILS NFR BLD AUTO: 1.2 % — SIGNIFICANT CHANGE UP (ref 0–2)
EOSINOPHIL # BLD AUTO: 0.89 K/UL — HIGH (ref 0–0.5)
EOSINOPHIL NFR BLD AUTO: 9.8 % — HIGH (ref 0–6)
HCT VFR BLD CALC: 21.8 % — LOW (ref 34.5–45)
HGB BLD-MCNC: 6.8 G/DL — CRITICAL LOW (ref 11.5–15.5)
IMM GRANULOCYTES NFR BLD AUTO: 0.7 % — SIGNIFICANT CHANGE UP (ref 0–0.9)
LYMPHOCYTES # BLD AUTO: 0.86 K/UL — LOW (ref 1–3.3)
LYMPHOCYTES # BLD AUTO: 9.5 % — LOW (ref 13–44)
MCHC RBC-ENTMCNC: 25.7 PG — LOW (ref 27–34)
MCHC RBC-ENTMCNC: 31.2 G/DL — LOW (ref 32–36)
MCV RBC AUTO: 82.3 FL — SIGNIFICANT CHANGE UP (ref 80–100)
MONOCYTES # BLD AUTO: 0.76 K/UL — SIGNIFICANT CHANGE UP (ref 0–0.9)
MONOCYTES NFR BLD AUTO: 8.4 % — SIGNIFICANT CHANGE UP (ref 2–14)
NEUTROPHILS # BLD AUTO: 6.38 K/UL — SIGNIFICANT CHANGE UP (ref 1.8–7.4)
NEUTROPHILS NFR BLD AUTO: 70.4 % — SIGNIFICANT CHANGE UP (ref 43–77)
NRBC # BLD: 0 /100 WBCS — SIGNIFICANT CHANGE UP (ref 0–0)
PLATELET # BLD AUTO: 198 K/UL — SIGNIFICANT CHANGE UP (ref 150–400)
RBC # BLD: 2.65 M/UL — LOW (ref 3.8–5.2)
RBC # FLD: 14.9 % — HIGH (ref 10.3–14.5)
WBC # BLD: 9.06 K/UL — SIGNIFICANT CHANGE UP (ref 3.8–10.5)
WBC # FLD AUTO: 9.06 K/UL — SIGNIFICANT CHANGE UP (ref 3.8–10.5)

## 2024-04-26 PROCEDURE — 86900 BLOOD TYPING SEROLOGIC ABO: CPT

## 2024-04-26 PROCEDURE — ZZZZZ: CPT

## 2024-04-26 PROCEDURE — 86850 RBC ANTIBODY SCREEN: CPT

## 2024-04-26 PROCEDURE — 86901 BLOOD TYPING SEROLOGIC RH(D): CPT

## 2024-04-26 PROCEDURE — 86923 COMPATIBILITY TEST ELECTRIC: CPT

## 2024-04-27 ENCOUNTER — LABORATORY RESULT (OUTPATIENT)
Age: 54
End: 2024-04-27

## 2024-04-27 ENCOUNTER — APPOINTMENT (OUTPATIENT)
Dept: INFUSION THERAPY | Facility: HOSPITAL | Age: 54
End: 2024-04-27

## 2024-04-29 ENCOUNTER — LABORATORY RESULT (OUTPATIENT)
Age: 54
End: 2024-04-29

## 2024-04-29 ENCOUNTER — APPOINTMENT (OUTPATIENT)
Dept: INFUSION THERAPY | Facility: HOSPITAL | Age: 54
End: 2024-04-29

## 2024-04-29 LAB
ALBUMIN SERPL ELPH-MCNC: 3.7 G/DL
ALP BLD-CCNC: 88 U/L
ALT SERPL-CCNC: 8 U/L
ANION GAP SERPL CALC-SCNC: 20 MMOL/L
AST SERPL-CCNC: 12 U/L
BILIRUB SERPL-MCNC: 0.2 MG/DL
BUN SERPL-MCNC: 89 MG/DL
CALCIUM SERPL-MCNC: 8.5 MG/DL
CHLORIDE SERPL-SCNC: 99 MMOL/L
CO2 SERPL-SCNC: 19 MMOL/L
CREAT SERPL-MCNC: 12.15 MG/DL
EGFR: 3 ML/MIN/1.73M2
GLUCOSE SERPL-MCNC: 115 MG/DL
POTASSIUM SERPL-SCNC: 4.1 MMOL/L
PROT SERPL-MCNC: 7.3 G/DL
SODIUM SERPL-SCNC: 138 MMOL/L

## 2024-05-01 ENCOUNTER — LABORATORY RESULT (OUTPATIENT)
Age: 54
End: 2024-05-01

## 2024-05-03 ENCOUNTER — APPOINTMENT (OUTPATIENT)
Dept: HEMATOLOGY ONCOLOGY | Facility: CLINIC | Age: 54
End: 2024-05-03
Payer: MEDICAID

## 2024-05-03 PROCEDURE — ZZZZZ: CPT

## 2024-05-03 NOTE — ASSESSMENT
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  Platelet dysfunction related to her ESRD may be contributing to her bleeding. Explained that dialysis would help this. Recent study published in NEJM confirmed that thalidomide has efficacy in decreasing GI tract AVM bleeding. It was recommended to her by me in past and now her gastroentreologist also recommending it. She is not responding to Octreotide. Started Thalidomide on 4/10/24. Has generalized myalgias, worse in shoulders, upper back and legs. CBC is 7.4. Denies dyspnea or chest pain, will continue to monitor.     Plan: Rest Continue Thalidomide.  To ER in the event of any clinical deterioration including chest pain or shortness of breath Continue iron supplementation as needed. Telehealth visit weekly.  CBC, CMP weekly through home draw

## 2024-05-03 NOTE — HISTORY OF PRESENT ILLNESS
[Home] : at home, [unfilled] , at the time of the visit. 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  [de-identified] : She still feels very tired.  She receives peritoneal dialysis daily.   Pt reports melena but has not seen blood in stool.  She is constipated. Urinates 2-3 times a day. Still gets IV Fe at Renal. She notes no headaches. abdominal pain, ankle swelling, paresthesias.  Has upper back, shoulder discomfort and upper leg pain, which she says she gets intermittently.     Last menses was in August 2019. She is not sexually active.   Has endoscopy scheduled for 4/30.

## 2024-05-03 NOTE — CONSULT LETTER
[FreeTextEntry2] : Tip Waters MD [FreeTextEntry3] : Juan\par  Nando Ceron M.D., FACP\par  Professor of Medicine\par  [facility] [employer] at [facility]\par  Associate Chief, Division of Hematology\par  [facility]\par  [facility]\par  [address] [place] [address]\[place] [address]\par  [phone]\par  \par  \par  \par  \par

## 2024-05-06 ENCOUNTER — OUTPATIENT (OUTPATIENT)
Dept: OUTPATIENT SERVICES | Facility: HOSPITAL | Age: 54
LOS: 1 days | Discharge: HOME | End: 2024-05-06

## 2024-05-06 ENCOUNTER — LABORATORY RESULT (OUTPATIENT)
Age: 54
End: 2024-05-06

## 2024-05-06 DIAGNOSIS — Z98.890 OTHER SPECIFIED POSTPROCEDURAL STATES: Chronic | ICD-10-CM

## 2024-05-06 DIAGNOSIS — N18.4 CHRONIC KIDNEY DISEASE, STAGE 4 (SEVERE): ICD-10-CM

## 2024-05-06 DIAGNOSIS — D64.9 ANEMIA, UNSPECIFIED: ICD-10-CM

## 2024-05-06 DIAGNOSIS — K92.2 GASTROINTESTINAL HEMORRHAGE, UNSPECIFIED: ICD-10-CM

## 2024-05-06 DIAGNOSIS — D63.1 ANEMIA IN CHRONIC KIDNEY DISEASE: ICD-10-CM

## 2024-05-06 DIAGNOSIS — D50.9 IRON DEFICIENCY ANEMIA, UNSPECIFIED: ICD-10-CM

## 2024-05-08 NOTE — REVIEW OF SYSTEMS
[Fatigue] : fatigue [Constipation] : constipation [Chest Pain] : no chest pain [Negative] : Cardiovascular

## 2024-05-08 NOTE — ASSESSMENT
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  Platelet dysfunction related to her ESRD may be contributing to her bleeding. Explained that dialysis would help this. Recent study published in NEJM confirmed that thalidomide has efficacy in decreasing GI tract AVM bleeding. It was recommended to her by me in past and now her gastroentreologist also recommending it. She is not responding to Octreotide. Started Thalidomide on 4/10/24. Has generalized myalgias, worse in shoulders, upper back and legs. CBC is 8.9.  Had transfusion on 4/29/24. Pt will be in [place] for 10 days, advised to go to ED or urgent care with any increasing fatigue, dyspnea.   Plan: Rest Continue Thalidomide.  Going to [place] for 10 days-advised to go to  ER in the event of any clinical deterioration including chest pain or shortness of breath Continue iron supplementation as needed. Telehealth visit weekly.  CBC, CMP weekly through home draw

## 2024-05-08 NOTE — HISTORY OF PRESENT ILLNESS
[Disease:__________________________] : Disease: [unfilled] [Home] : at home, [unfilled] , at the time of the visit. 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  [de-identified] : She still feels tired but feels like fatigue has improved.  She receives peritoneal dialysis daily.   Pt reports no bleeding in stool.  She is constipated. Urinates 2-3 times a day. Still gets IV Fe at Renal. She notes no headaches. abdominal pain, ankle swelling, paresthesias.  Has upper back, shoulder discomfort and upper leg pain, which she says she gets intermittently.     Last menses was in August 2019. She is not sexually active.   Had endoscopy done on 4/30, awaiting results.   Got 2 U's PRBC's on 4/29/24.  Is going to [place] 10 days, leaving on 5/7/24.

## 2024-05-08 NOTE — CONSULT LETTER
[Dear  ___] : Dear ~JOSÉ, [Courtesy Letter:] : I had the pleasure of seeing your patient, [unfilled], in my office today. [Please see my note below.] : Please see my note below. [Consult Closing:] : Thank you very much for allowing me to participate in the care of this patient.  If you have any questions, please do not hesitate to contact me. [Sincerely,] : Sincerely, [DrJohnathan  ___] : Dr. PINTO [DrJohnathan ___] : Dr. PINTO [FreeTextEntry2] : Tip Waters MD [FreeTextEntry3] : Juan\par  Nando Ceron M.D., FACP\par  Professor of Medicine\par  [facility] [employer] at [facility]\par  Associate Chief, Division of Hematology\par  [facility]\par  [facility]\par  [address] [place] [address]\[place] [address]\par  [phone]\par  \par  \par  \par  \par

## 2024-05-10 ENCOUNTER — APPOINTMENT (OUTPATIENT)
Dept: HEMATOLOGY ONCOLOGY | Facility: CLINIC | Age: 54
End: 2024-05-10

## 2024-05-13 ENCOUNTER — LABORATORY RESULT (OUTPATIENT)
Age: 54
End: 2024-05-13

## 2024-05-24 ENCOUNTER — APPOINTMENT (OUTPATIENT)
Dept: HEMATOLOGY ONCOLOGY | Facility: CLINIC | Age: 54
End: 2024-05-24

## 2024-05-24 ENCOUNTER — NON-APPOINTMENT (OUTPATIENT)
Age: 54
End: 2024-05-24

## 2024-05-24 ENCOUNTER — LABORATORY RESULT (OUTPATIENT)
Age: 54
End: 2024-05-24

## 2024-05-29 ENCOUNTER — LABORATORY RESULT (OUTPATIENT)
Age: 54
End: 2024-05-29

## 2024-06-04 ENCOUNTER — LABORATORY RESULT (OUTPATIENT)
Age: 54
End: 2024-06-04

## 2024-06-06 DIAGNOSIS — K55.20 ANGIODYSPLASIA OF COLON W/OUT HEMORRHAGE: ICD-10-CM

## 2024-06-06 DIAGNOSIS — D63.1 CHRONIC KIDNEY DISEASE, UNSPECIFIED: ICD-10-CM

## 2024-06-06 DIAGNOSIS — N18.9 CHRONIC KIDNEY DISEASE, UNSPECIFIED: ICD-10-CM

## 2024-06-07 ENCOUNTER — RESULT REVIEW (OUTPATIENT)
Age: 54
End: 2024-06-07

## 2024-06-07 ENCOUNTER — OUTPATIENT (OUTPATIENT)
Dept: OUTPATIENT SERVICES | Facility: HOSPITAL | Age: 54
LOS: 1 days | End: 2024-06-07
Payer: MEDICARE

## 2024-06-07 ENCOUNTER — APPOINTMENT (OUTPATIENT)
Dept: HEMATOLOGY ONCOLOGY | Facility: CLINIC | Age: 54
End: 2024-06-07

## 2024-06-07 ENCOUNTER — NON-APPOINTMENT (OUTPATIENT)
Age: 54
End: 2024-06-07

## 2024-06-07 DIAGNOSIS — Z98.890 OTHER SPECIFIED POSTPROCEDURAL STATES: Chronic | ICD-10-CM

## 2024-06-07 DIAGNOSIS — K55.20 ANGIODYSPLASIA OF COLON WITHOUT HEMORRHAGE: ICD-10-CM

## 2024-06-09 ENCOUNTER — APPOINTMENT (OUTPATIENT)
Dept: INFUSION THERAPY | Facility: HOSPITAL | Age: 54
End: 2024-06-09

## 2024-06-11 ENCOUNTER — LABORATORY RESULT (OUTPATIENT)
Age: 54
End: 2024-06-11

## 2024-06-18 ENCOUNTER — LABORATORY RESULT (OUTPATIENT)
Age: 54
End: 2024-06-18

## 2024-06-19 DIAGNOSIS — D50.9 IRON DEFICIENCY ANEMIA, UNSPECIFIED: ICD-10-CM

## 2024-06-19 RX ORDER — OCTREOTIDE ACETATE 10 MG
10 KIT INTRAMUSCULAR
Refills: 0 | Status: DISCONTINUED | COMMUNITY
Start: 2023-03-03 | End: 2024-06-19

## 2024-06-20 ENCOUNTER — NON-APPOINTMENT (OUTPATIENT)
Age: 54
End: 2024-06-20

## 2024-06-20 ENCOUNTER — APPOINTMENT (OUTPATIENT)
Dept: HEMATOLOGY ONCOLOGY | Facility: CLINIC | Age: 54
End: 2024-06-20

## 2024-06-20 ENCOUNTER — LABORATORY RESULT (OUTPATIENT)
Age: 54
End: 2024-06-20

## 2024-06-20 ENCOUNTER — RESULT REVIEW (OUTPATIENT)
Age: 54
End: 2024-06-20

## 2024-06-20 PROCEDURE — 86900 BLOOD TYPING SEROLOGIC ABO: CPT

## 2024-06-20 PROCEDURE — 86901 BLOOD TYPING SEROLOGIC RH(D): CPT

## 2024-06-20 PROCEDURE — 86850 RBC ANTIBODY SCREEN: CPT

## 2024-06-20 PROCEDURE — 86923 COMPATIBILITY TEST ELECTRIC: CPT

## 2024-06-21 ENCOUNTER — APPOINTMENT (OUTPATIENT)
Dept: INFUSION THERAPY | Facility: HOSPITAL | Age: 54
End: 2024-06-21

## 2024-06-21 LAB
FERRITIN SERPL-MCNC: 230 NG/ML
IRON SATN MFR SERPL: 28 %
IRON SERPL-MCNC: 63 UG/DL
TIBC SERPL-MCNC: 225 UG/DL
UIBC SERPL-MCNC: 163 UG/DL

## 2024-06-24 RX ORDER — THALIDOMIDE 100 MG/1
100 CAPSULE ORAL
Qty: 1 | Refills: 0 | Status: ACTIVE | COMMUNITY
Start: 2024-02-26 | End: 1900-01-01

## 2024-06-25 ENCOUNTER — LABORATORY RESULT (OUTPATIENT)
Age: 54
End: 2024-06-25

## 2024-07-02 ENCOUNTER — LABORATORY RESULT (OUTPATIENT)
Age: 54
End: 2024-07-02

## 2024-07-08 ENCOUNTER — RESULT REVIEW (OUTPATIENT)
Age: 54
End: 2024-07-08

## 2024-07-08 ENCOUNTER — APPOINTMENT (OUTPATIENT)
Dept: HEMATOLOGY ONCOLOGY | Facility: CLINIC | Age: 54
End: 2024-07-08

## 2024-07-08 ENCOUNTER — NON-APPOINTMENT (OUTPATIENT)
Age: 54
End: 2024-07-08

## 2024-07-08 ENCOUNTER — OUTPATIENT (OUTPATIENT)
Dept: OUTPATIENT SERVICES | Facility: HOSPITAL | Age: 54
LOS: 1 days | End: 2024-07-08
Payer: MEDICARE

## 2024-07-08 DIAGNOSIS — N18.9 CHRONIC KIDNEY DISEASE, UNSPECIFIED: ICD-10-CM

## 2024-07-08 DIAGNOSIS — Z98.890 OTHER SPECIFIED POSTPROCEDURAL STATES: Chronic | ICD-10-CM

## 2024-07-09 ENCOUNTER — LABORATORY RESULT (OUTPATIENT)
Age: 54
End: 2024-07-09

## 2024-07-10 ENCOUNTER — APPOINTMENT (OUTPATIENT)
Dept: INFUSION THERAPY | Facility: HOSPITAL | Age: 54
End: 2024-07-10

## 2024-07-16 ENCOUNTER — LABORATORY RESULT (OUTPATIENT)
Age: 54
End: 2024-07-16

## 2024-07-18 ENCOUNTER — RESULT REVIEW (OUTPATIENT)
Age: 54
End: 2024-07-18

## 2024-07-18 ENCOUNTER — APPOINTMENT (OUTPATIENT)
Dept: HEMATOLOGY ONCOLOGY | Facility: CLINIC | Age: 54
End: 2024-07-18

## 2024-07-18 DIAGNOSIS — N18.9 CHRONIC KIDNEY DISEASE, UNSPECIFIED: ICD-10-CM

## 2024-07-18 DIAGNOSIS — D63.1 CHRONIC KIDNEY DISEASE, UNSPECIFIED: ICD-10-CM

## 2024-07-18 DIAGNOSIS — N18.4 CHRONIC KIDNEY DISEASE, STAGE 4 (SEVERE): ICD-10-CM

## 2024-07-19 ENCOUNTER — APPOINTMENT (OUTPATIENT)
Dept: HEMATOLOGY ONCOLOGY | Facility: CLINIC | Age: 54
End: 2024-07-19

## 2024-07-19 RX ORDER — THALIDOMIDE 50 MG/1
50 CAPSULE ORAL
Qty: 28 | Refills: 0 | Status: ACTIVE | COMMUNITY
Start: 2024-07-18 | End: 1900-01-01

## 2024-07-20 ENCOUNTER — APPOINTMENT (OUTPATIENT)
Dept: INFUSION THERAPY | Facility: HOSPITAL | Age: 54
End: 2024-07-20

## 2024-07-23 ENCOUNTER — LABORATORY RESULT (OUTPATIENT)
Age: 54
End: 2024-07-23

## 2024-07-25 ENCOUNTER — APPOINTMENT (OUTPATIENT)
Dept: HEMATOLOGY ONCOLOGY | Facility: CLINIC | Age: 54
End: 2024-07-25

## 2024-07-25 DIAGNOSIS — K55.20 ANGIODYSPLASIA OF COLON W/OUT HEMORRHAGE: ICD-10-CM

## 2024-07-25 PROCEDURE — 99442: CPT | Mod: 93

## 2024-07-25 NOTE — REASON FOR VISIT
[Home] : at home, [unfilled] , at the time of the visit. [Other Location: e.g. Home (Enter Location, City,State)___] : at [unfilled] [Patient] : the patient [Follow-Up Visit] : a follow-up visit for [Blood Count Assessment] : blood count assessment

## 2024-07-26 ENCOUNTER — RESULT REVIEW (OUTPATIENT)
Age: 54
End: 2024-07-26

## 2024-07-26 ENCOUNTER — NON-APPOINTMENT (OUTPATIENT)
Age: 54
End: 2024-07-26

## 2024-07-26 ENCOUNTER — APPOINTMENT (OUTPATIENT)
Dept: HEMATOLOGY ONCOLOGY | Facility: CLINIC | Age: 54
End: 2024-07-26

## 2024-07-29 ENCOUNTER — APPOINTMENT (OUTPATIENT)
Dept: INFUSION THERAPY | Facility: HOSPITAL | Age: 54
End: 2024-07-29

## 2024-07-29 LAB
FERRITIN SERPL-MCNC: 295 NG/ML
IRON SATN MFR SERPL: 22 %
IRON SERPL-MCNC: 50 UG/DL
TIBC SERPL-MCNC: 227 UG/DL
UIBC SERPL-MCNC: 177 UG/DL

## 2024-07-30 ENCOUNTER — LABORATORY RESULT (OUTPATIENT)
Age: 54
End: 2024-07-30

## 2024-07-30 DIAGNOSIS — D50.9 IRON DEFICIENCY ANEMIA, UNSPECIFIED: ICD-10-CM

## 2024-08-01 NOTE — ASSESSMENT
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  Platelet dysfunction related to her ESRD may be contributing to her bleeding. Explained that dialysis would help this. Recent study published in NEJM confirmed that thalidomide has efficacy in decreasing GI tract AVM bleeding. It was recommended to her by me in past and now her gastroentreologist also recommending it. She is not responding to Octreotide. Started Thalidomide on 4/10/24. Has generalized myalgias, worse in shoulders, upper back and legs. CBC is 7.0.  Denies dyspnea or chest pain, will transfuse.      Plan: Rest Transfuse Continue Thalidomide.  To ER in the event of any clinical deterioration including chest pain or shortness of breath Continue iron supplementation as needed. Telehealth visit weekly.  CBC, CMP weekly through home draw

## 2024-08-01 NOTE — HISTORY OF PRESENT ILLNESS
[Disease:__________________________] : Disease: [unfilled] [Home] : at home, [unfilled] , at the time of the visit. 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  [de-identified] : She stopped Thalidomide 2 weeks ago due to side effects of fatigue and body aches. These side effects have improved since stopping it.  Pt does peritoneal dialysis daily at home.    Pt reports melena but has not seen blood in stool.  She is constipated. Urinates 2-3 times a day. Still gets IV Fe at Renal. She notes no headaches. abdominal pain, ankle swelling, paresthesias.  In the beginning of July, she had menses which lasted 2 days.  Prior to that, her last menses was in August 2019. She is not sexually active.   Has pilonidal cyst by rectum that bleeds and drains.

## 2024-08-01 NOTE — CONSULT LETTER
[Dear  ___] : Dear ~JOSÉ, [Courtesy Letter:] : I had the pleasure of seeing your patient, [unfilled], in my office today. [Please see my note below.] : Please see my note below. [Consult Closing:] : Thank you very much for allowing me to participate in the care of this patient.  If you have any questions, please do not hesitate to contact me. [Sincerely,] : Sincerely, [DrJohnathan  ___] : Dr. PINTO [DrJohnathan ___] : Dr. PINTO [FreeTextEntry2] : Tip Waters MD [FreeTextEntry3] : Juan\par  Nando Ceron M.D., FACP\par  Professor of Medicine\par  [facility] at [facility]\par  Associate Chief, Division of Hematology\par  [facility]\par  [facility]\par  [address] [place] [address]\[place] [address]\par  [phone]\par  \par  \par  \par  \par

## 2024-08-01 NOTE — CONSULT LETTER
[Dear  ___] : Dear ~JOSÉ, [Courtesy Letter:] : I had the pleasure of seeing your patient, [unfilled], in my office today. [Please see my note below.] : Please see my note below. [Consult Closing:] : Thank you very much for allowing me to participate in the care of this patient.  If you have any questions, please do not hesitate to contact me. [Sincerely,] : Sincerely, [DrJohnathan  ___] : Dr. PINTO [DrJohnathan ___] : Dr. PINTO [FreeTextEntry2] : Tip Waters MD [FreeTextEntry3] : Juan\par  Nando Ceron M.D., FACP\par  Professor of Medicine\par  [facility] [employer] [facility] at [facility]\par  Associate Chief, Division of Hematology\par  [facility]\par  [facility]\par  [address] [place] [address]\[place] [address]\par  [phone]\par  \par  \par  \par  \par

## 2024-08-01 NOTE — HISTORY OF PRESENT ILLNESS
[Disease:__________________________] : Disease: [unfilled] [Home] : at home, [unfilled] , at the time of the visit. 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  [de-identified] : She stopped Thalidomide 2 weeks ago due to side effects of fatigue and body aches. These side effects have improved since stopping it.  Pt does peritoneal dialysis daily at home.    Pt reports melena but has not seen blood in stool.  She is constipated. Urinates 2-3 times a day. Still gets IV Fe at Renal. She notes no headaches. abdominal pain, ankle swelling, paresthesias.  In the beginning of July, she had menses which lasted 2 days.  Prior to that, her last menses was in August 2019. She is not sexually active.   Has pilonidal cyst by rectum that bleeds and drains.

## 2024-08-01 NOTE — REVIEW OF SYSTEMS
[Fatigue] : fatigue [Chest Pain] : chest pain [Constipation] : constipation [Negative] : Allergic/Immunologic [FreeTextEntry7] : florian

## 2024-08-01 NOTE — CONSULT LETTER
[Dear  ___] : Dear ~JOSÉ, [Courtesy Letter:] : I had the pleasure of seeing your patient, [unfilled], in my office today. [Please see my note below.] : Please see my note below. [Consult Closing:] : Thank you very much for allowing me to participate in the care of this patient.  If you have any questions, please do not hesitate to contact me. [Sincerely,] : Sincerely, [DrJohnathan  ___] : Dr. PINTO [DrJohnathan ___] : Dr. PINTO [FreeTextEntry2] : Tip Waters MD [FreeTextEntry3] : Juan\par  Nando Ceron M.D., FACP\par  Professor of Medicine\par  [facility] [employer] at [facility]\par  Associate Chief, Division of Hematology\par  [facility]\par  [facility]\par  [address] [place] [address]\[place] [address]\par  [phone]\par  \par  \par  \par  \par

## 2024-08-01 NOTE — HISTORY OF PRESENT ILLNESS
[Disease:__________________________] : Disease: [unfilled] [Home] : at home, [unfilled] , at the time of the visit. 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  [de-identified] : She stopped Thalidomide 2 weeks ago due to side effects of fatigue and body aches. These side effects have improved since stopping it.  Pt does peritoneal dialysis daily at home.    Pt reports melena but has not seen blood in stool.  She is constipated. Urinates 2-3 times a day. Still gets IV Fe at Renal. She notes no headaches. abdominal pain, ankle swelling, paresthesias.  In the beginning of July, she had menses which lasted 2 days.  Prior to that, her last menses was in August 2019. She is not sexually active.   Has pilonidal cyst by rectum that bleeds and drains.

## 2024-08-05 ENCOUNTER — NON-APPOINTMENT (OUTPATIENT)
Age: 54
End: 2024-08-05

## 2024-08-06 ENCOUNTER — RESULT REVIEW (OUTPATIENT)
Age: 54
End: 2024-08-06

## 2024-08-06 ENCOUNTER — APPOINTMENT (OUTPATIENT)
Dept: HEMATOLOGY ONCOLOGY | Facility: CLINIC | Age: 54
End: 2024-08-06

## 2024-08-06 ENCOUNTER — OUTPATIENT (OUTPATIENT)
Dept: OUTPATIENT SERVICES | Facility: HOSPITAL | Age: 54
LOS: 1 days | End: 2024-08-06
Payer: MEDICARE

## 2024-08-06 ENCOUNTER — OUTPATIENT (OUTPATIENT)
Dept: OUTPATIENT SERVICES | Facility: HOSPITAL | Age: 54
LOS: 1 days | Discharge: HOME | End: 2024-08-06

## 2024-08-06 DIAGNOSIS — Z98.890 OTHER SPECIFIED POSTPROCEDURAL STATES: Chronic | ICD-10-CM

## 2024-08-06 DIAGNOSIS — N18.4 CHRONIC KIDNEY DISEASE, STAGE 4 (SEVERE): ICD-10-CM

## 2024-08-06 DIAGNOSIS — N18.9 CHRONIC KIDNEY DISEASE, UNSPECIFIED: ICD-10-CM

## 2024-08-06 DIAGNOSIS — D64.9 ANEMIA, UNSPECIFIED: ICD-10-CM

## 2024-08-06 DIAGNOSIS — D50.9 IRON DEFICIENCY ANEMIA, UNSPECIFIED: ICD-10-CM

## 2024-08-06 DIAGNOSIS — K92.2 GASTROINTESTINAL HEMORRHAGE, UNSPECIFIED: ICD-10-CM

## 2024-08-06 LAB
BASOPHILS # BLD AUTO: 0.03 K/UL — SIGNIFICANT CHANGE UP (ref 0–0.2)
BASOPHILS NFR BLD AUTO: 0.2 % — SIGNIFICANT CHANGE UP (ref 0–2)
EOSINOPHIL # BLD AUTO: 0.18 K/UL — SIGNIFICANT CHANGE UP (ref 0–0.5)
EOSINOPHIL NFR BLD AUTO: 1.3 % — SIGNIFICANT CHANGE UP (ref 0–6)
HCT VFR BLD CALC: 19.7 % — CRITICAL LOW (ref 34.5–45)
HGB BLD-MCNC: 6.2 G/DL — CRITICAL LOW (ref 11.5–15.5)
IMM GRANULOCYTES NFR BLD AUTO: 0.9 % — SIGNIFICANT CHANGE UP (ref 0–0.9)
LYMPHOCYTES # BLD AUTO: 0.76 K/UL — LOW (ref 1–3.3)
LYMPHOCYTES # BLD AUTO: 5.4 % — LOW (ref 13–44)
MCHC RBC-ENTMCNC: 27.8 PG — SIGNIFICANT CHANGE UP (ref 27–34)
MCHC RBC-ENTMCNC: 31.5 G/DL — LOW (ref 32–36)
MCV RBC AUTO: 88.3 FL — SIGNIFICANT CHANGE UP (ref 80–100)
MONOCYTES # BLD AUTO: 0.62 K/UL — SIGNIFICANT CHANGE UP (ref 0–0.9)
MONOCYTES NFR BLD AUTO: 4.4 % — SIGNIFICANT CHANGE UP (ref 2–14)
NEUTROPHILS # BLD AUTO: 12.26 K/UL — HIGH (ref 1.8–7.4)
NEUTROPHILS NFR BLD AUTO: 87.8 % — HIGH (ref 43–77)
NRBC # BLD: 0 /100 WBCS — SIGNIFICANT CHANGE UP (ref 0–0)
PLATELET # BLD AUTO: 212 K/UL — SIGNIFICANT CHANGE UP (ref 150–400)
RBC # BLD: 2.23 M/UL — LOW (ref 3.8–5.2)
RBC # FLD: 15.8 % — HIGH (ref 10.3–14.5)
WBC # BLD: 13.98 K/UL — HIGH (ref 3.8–10.5)
WBC # FLD AUTO: 13.98 K/UL — HIGH (ref 3.8–10.5)

## 2024-08-07 ENCOUNTER — RESULT REVIEW (OUTPATIENT)
Age: 54
End: 2024-08-07

## 2024-08-07 ENCOUNTER — LABORATORY RESULT (OUTPATIENT)
Age: 54
End: 2024-08-07

## 2024-08-07 ENCOUNTER — APPOINTMENT (OUTPATIENT)
Dept: INFUSION THERAPY | Facility: HOSPITAL | Age: 54
End: 2024-08-07

## 2024-08-07 PROCEDURE — 86850 RBC ANTIBODY SCREEN: CPT

## 2024-08-07 PROCEDURE — 86923 COMPATIBILITY TEST ELECTRIC: CPT

## 2024-08-07 PROCEDURE — 86901 BLOOD TYPING SEROLOGIC RH(D): CPT

## 2024-08-07 PROCEDURE — 86900 BLOOD TYPING SEROLOGIC ABO: CPT

## 2024-08-08 ENCOUNTER — LABORATORY RESULT (OUTPATIENT)
Age: 54
End: 2024-08-08

## 2024-08-08 ENCOUNTER — APPOINTMENT (OUTPATIENT)
Dept: HEMATOLOGY ONCOLOGY | Facility: CLINIC | Age: 54
End: 2024-08-08

## 2024-08-09 ENCOUNTER — APPOINTMENT (OUTPATIENT)
Dept: INFUSION THERAPY | Facility: HOSPITAL | Age: 54
End: 2024-08-09

## 2024-08-13 ENCOUNTER — LABORATORY RESULT (OUTPATIENT)
Age: 54
End: 2024-08-13

## 2024-08-14 ENCOUNTER — LABORATORY RESULT (OUTPATIENT)
Age: 54
End: 2024-08-14

## 2024-08-15 ENCOUNTER — LABORATORY RESULT (OUTPATIENT)
Age: 54
End: 2024-08-15

## 2024-08-20 ENCOUNTER — LABORATORY RESULT (OUTPATIENT)
Age: 54
End: 2024-08-20

## 2024-08-21 ENCOUNTER — LABORATORY RESULT (OUTPATIENT)
Age: 54
End: 2024-08-21

## 2024-08-22 ENCOUNTER — LABORATORY RESULT (OUTPATIENT)
Age: 54
End: 2024-08-22

## 2024-08-29 DIAGNOSIS — K55.20 ANGIODYSPLASIA OF COLON W/OUT HEMORRHAGE: ICD-10-CM

## 2024-09-02 ENCOUNTER — NON-APPOINTMENT (OUTPATIENT)
Age: 54
End: 2024-09-02

## 2024-09-03 ENCOUNTER — RESULT REVIEW (OUTPATIENT)
Age: 54
End: 2024-09-03

## 2024-09-03 ENCOUNTER — APPOINTMENT (OUTPATIENT)
Dept: HEMATOLOGY ONCOLOGY | Facility: CLINIC | Age: 54
End: 2024-09-03

## 2024-09-03 LAB
BASOPHILS # BLD AUTO: 0.02 K/UL — SIGNIFICANT CHANGE UP (ref 0–0.2)
BASOPHILS NFR BLD AUTO: 0.2 % — SIGNIFICANT CHANGE UP (ref 0–2)
EOSINOPHIL # BLD AUTO: 0.18 K/UL — SIGNIFICANT CHANGE UP (ref 0–0.5)
EOSINOPHIL NFR BLD AUTO: 1.5 % — SIGNIFICANT CHANGE UP (ref 0–6)
HCT VFR BLD CALC: 18.3 % — CRITICAL LOW (ref 34.5–45)
HGB BLD-MCNC: 5.7 G/DL — CRITICAL LOW (ref 11.5–15.5)
IMM GRANULOCYTES NFR BLD AUTO: 1.1 % — HIGH (ref 0–0.9)
LYMPHOCYTES # BLD AUTO: 0.78 K/UL — LOW (ref 1–3.3)
LYMPHOCYTES # BLD AUTO: 6.7 % — LOW (ref 13–44)
MCHC RBC-ENTMCNC: 27.5 PG — SIGNIFICANT CHANGE UP (ref 27–34)
MCHC RBC-ENTMCNC: 31.1 G/DL — LOW (ref 32–36)
MCV RBC AUTO: 88.4 FL — SIGNIFICANT CHANGE UP (ref 80–100)
MONOCYTES # BLD AUTO: 0.56 K/UL — SIGNIFICANT CHANGE UP (ref 0–0.9)
MONOCYTES NFR BLD AUTO: 4.8 % — SIGNIFICANT CHANGE UP (ref 2–14)
NEUTROPHILS # BLD AUTO: 9.96 K/UL — HIGH (ref 1.8–7.4)
NEUTROPHILS NFR BLD AUTO: 85.7 % — HIGH (ref 43–77)
NRBC # BLD: 0 /100 WBCS — SIGNIFICANT CHANGE UP (ref 0–0)
PLATELET # BLD AUTO: 237 K/UL — SIGNIFICANT CHANGE UP (ref 150–400)
RBC # BLD: 2.07 M/UL — LOW (ref 3.8–5.2)
RBC # FLD: 14.3 % — SIGNIFICANT CHANGE UP (ref 10.3–14.5)
WBC # BLD: 11.63 K/UL — HIGH (ref 3.8–10.5)
WBC # FLD AUTO: 11.63 K/UL — HIGH (ref 3.8–10.5)

## 2024-09-03 PROCEDURE — 86923 COMPATIBILITY TEST ELECTRIC: CPT

## 2024-09-03 PROCEDURE — 86900 BLOOD TYPING SEROLOGIC ABO: CPT

## 2024-09-03 PROCEDURE — 86901 BLOOD TYPING SEROLOGIC RH(D): CPT

## 2024-09-03 PROCEDURE — 86850 RBC ANTIBODY SCREEN: CPT

## 2024-09-04 ENCOUNTER — NON-APPOINTMENT (OUTPATIENT)
Age: 54
End: 2024-09-04

## 2024-09-04 ENCOUNTER — APPOINTMENT (OUTPATIENT)
Dept: INFUSION THERAPY | Facility: HOSPITAL | Age: 54
End: 2024-09-04

## 2024-09-11 ENCOUNTER — LABORATORY RESULT (OUTPATIENT)
Age: 54
End: 2024-09-11

## 2024-09-11 ENCOUNTER — NON-APPOINTMENT (OUTPATIENT)
Age: 54
End: 2024-09-11

## 2024-09-12 ENCOUNTER — RESULT REVIEW (OUTPATIENT)
Age: 54
End: 2024-09-12

## 2024-09-12 ENCOUNTER — OUTPATIENT (OUTPATIENT)
Dept: OUTPATIENT SERVICES | Facility: HOSPITAL | Age: 54
LOS: 1 days | End: 2024-09-12
Payer: MEDICARE

## 2024-09-12 ENCOUNTER — APPOINTMENT (OUTPATIENT)
Dept: HEMATOLOGY ONCOLOGY | Facility: CLINIC | Age: 54
End: 2024-09-12

## 2024-09-12 ENCOUNTER — NON-APPOINTMENT (OUTPATIENT)
Age: 54
End: 2024-09-12

## 2024-09-12 DIAGNOSIS — K55.20 ANGIODYSPLASIA OF COLON W/OUT HEMORRHAGE: ICD-10-CM

## 2024-09-12 DIAGNOSIS — K55.20 ANGIODYSPLASIA OF COLON WITHOUT HEMORRHAGE: ICD-10-CM

## 2024-09-12 DIAGNOSIS — Z98.890 OTHER SPECIFIED POSTPROCEDURAL STATES: Chronic | ICD-10-CM

## 2024-09-12 LAB
ALBUMIN SERPL ELPH-MCNC: 3.4 G/DL
ALP BLD-CCNC: 146 U/L
ALT SERPL-CCNC: 16 U/L
ANION GAP SERPL CALC-SCNC: 20 MMOL/L
AST SERPL-CCNC: 17 U/L
BASOPHILS # BLD AUTO: 0.02 K/UL — SIGNIFICANT CHANGE UP (ref 0–0.2)
BASOPHILS NFR BLD AUTO: 0.2 % — SIGNIFICANT CHANGE UP (ref 0–2)
BILIRUB SERPL-MCNC: 0.3 MG/DL
BUN SERPL-MCNC: 93 MG/DL
CALCIUM SERPL-MCNC: 8 MG/DL
CHLORIDE SERPL-SCNC: 97 MMOL/L
CO2 SERPL-SCNC: 24 MMOL/L
CREAT SERPL-MCNC: 16.9 MG/DL
EGFR: 2 ML/MIN/1.73M2
EOSINOPHIL # BLD AUTO: 0.19 K/UL — SIGNIFICANT CHANGE UP (ref 0–0.5)
EOSINOPHIL NFR BLD AUTO: 1.7 % — SIGNIFICANT CHANGE UP (ref 0–6)
GLUCOSE SERPL-MCNC: 112 MG/DL
HCT VFR BLD CALC: 19.2 % — CRITICAL LOW (ref 34.5–45)
HGB BLD-MCNC: 6.2 G/DL — CRITICAL LOW (ref 11.5–15.5)
IMM GRANULOCYTES NFR BLD AUTO: 0.6 % — SIGNIFICANT CHANGE UP (ref 0–0.9)
LYMPHOCYTES # BLD AUTO: 0.74 K/UL — LOW (ref 1–3.3)
LYMPHOCYTES # BLD AUTO: 6.6 % — LOW (ref 13–44)
MCHC RBC-ENTMCNC: 28.2 PG — SIGNIFICANT CHANGE UP (ref 27–34)
MCHC RBC-ENTMCNC: 32.3 G/DL — SIGNIFICANT CHANGE UP (ref 32–36)
MCV RBC AUTO: 87.3 FL — SIGNIFICANT CHANGE UP (ref 80–100)
MONOCYTES # BLD AUTO: 0.61 K/UL — SIGNIFICANT CHANGE UP (ref 0–0.9)
MONOCYTES NFR BLD AUTO: 5.4 % — SIGNIFICANT CHANGE UP (ref 2–14)
NEUTROPHILS # BLD AUTO: 9.62 K/UL — HIGH (ref 1.8–7.4)
NEUTROPHILS NFR BLD AUTO: 85.5 % — HIGH (ref 43–77)
NRBC # BLD: 0 /100 WBCS — SIGNIFICANT CHANGE UP (ref 0–0)
PLATELET # BLD AUTO: 184 K/UL — SIGNIFICANT CHANGE UP (ref 150–400)
POTASSIUM SERPL-SCNC: 3.7 MMOL/L
PROT SERPL-MCNC: 6.9 G/DL
RBC # BLD: 2.2 M/UL — LOW (ref 3.8–5.2)
RBC # FLD: 13.9 % — SIGNIFICANT CHANGE UP (ref 10.3–14.5)
SODIUM SERPL-SCNC: 141 MMOL/L
WBC # BLD: 11.25 K/UL — HIGH (ref 3.8–10.5)
WBC # FLD AUTO: 11.25 K/UL — HIGH (ref 3.8–10.5)

## 2024-09-12 PROCEDURE — 86900 BLOOD TYPING SEROLOGIC ABO: CPT

## 2024-09-12 PROCEDURE — 86850 RBC ANTIBODY SCREEN: CPT

## 2024-09-12 PROCEDURE — 86901 BLOOD TYPING SEROLOGIC RH(D): CPT

## 2024-09-12 PROCEDURE — 86923 COMPATIBILITY TEST ELECTRIC: CPT

## 2024-09-13 ENCOUNTER — APPOINTMENT (OUTPATIENT)
Dept: INFUSION THERAPY | Facility: HOSPITAL | Age: 54
End: 2024-09-13

## 2024-09-24 NOTE — H&P ADULT - GENERAL
Goal Outcome Evaluation:   Patient remain unresponsive does not follow commands cough with suction, SBP low started Levo and Vaso , Dex cont .down for CT guided to placed percutaneous cholecystomy tube with IR Dr Bagley, tube feeds off per order, on cooling blanket temp 105 bladder probe, on ABX                                             details…

## 2024-10-03 DIAGNOSIS — D63.1 CHRONIC KIDNEY DISEASE, UNSPECIFIED: ICD-10-CM

## 2024-10-03 DIAGNOSIS — N18.9 CHRONIC KIDNEY DISEASE, UNSPECIFIED: ICD-10-CM

## 2024-10-04 ENCOUNTER — OUTPATIENT (OUTPATIENT)
Dept: OUTPATIENT SERVICES | Facility: HOSPITAL | Age: 54
LOS: 1 days | Discharge: HOME | End: 2024-10-04

## 2024-10-04 DIAGNOSIS — Z98.890 OTHER SPECIFIED POSTPROCEDURAL STATES: Chronic | ICD-10-CM

## 2024-10-04 DIAGNOSIS — N18.4 CHRONIC KIDNEY DISEASE, STAGE 4 (SEVERE): ICD-10-CM

## 2024-10-04 DIAGNOSIS — K92.2 GASTROINTESTINAL HEMORRHAGE, UNSPECIFIED: ICD-10-CM

## 2024-10-04 DIAGNOSIS — D64.9 ANEMIA, UNSPECIFIED: ICD-10-CM

## 2024-10-04 DIAGNOSIS — D50.9 IRON DEFICIENCY ANEMIA, UNSPECIFIED: ICD-10-CM

## 2024-10-09 ENCOUNTER — NON-APPOINTMENT (OUTPATIENT)
Age: 54
End: 2024-10-09

## 2024-10-09 ENCOUNTER — LABORATORY RESULT (OUTPATIENT)
Age: 54
End: 2024-10-09

## 2024-10-10 DIAGNOSIS — N18.4 CHRONIC KIDNEY DISEASE, STAGE 4 (SEVERE): ICD-10-CM

## 2024-10-10 DIAGNOSIS — D50.9 IRON DEFICIENCY ANEMIA, UNSPECIFIED: ICD-10-CM

## 2024-10-11 ENCOUNTER — RESULT REVIEW (OUTPATIENT)
Age: 54
End: 2024-10-11

## 2024-10-11 ENCOUNTER — APPOINTMENT (OUTPATIENT)
Dept: HEMATOLOGY ONCOLOGY | Facility: CLINIC | Age: 54
End: 2024-10-11

## 2024-10-11 ENCOUNTER — OUTPATIENT (OUTPATIENT)
Dept: OUTPATIENT SERVICES | Facility: HOSPITAL | Age: 54
LOS: 1 days | End: 2024-10-11
Payer: MEDICARE

## 2024-10-11 ENCOUNTER — NON-APPOINTMENT (OUTPATIENT)
Age: 54
End: 2024-10-11

## 2024-10-11 DIAGNOSIS — Z98.890 OTHER SPECIFIED POSTPROCEDURAL STATES: Chronic | ICD-10-CM

## 2024-10-11 DIAGNOSIS — D64.9 ANEMIA, UNSPECIFIED: ICD-10-CM

## 2024-10-11 LAB
BASOPHILS # BLD AUTO: 0.02 K/UL — SIGNIFICANT CHANGE UP (ref 0–0.2)
BASOPHILS NFR BLD AUTO: 0.2 % — SIGNIFICANT CHANGE UP (ref 0–2)
EOSINOPHIL # BLD AUTO: 0.22 K/UL — SIGNIFICANT CHANGE UP (ref 0–0.5)
EOSINOPHIL NFR BLD AUTO: 2.2 % — SIGNIFICANT CHANGE UP (ref 0–6)
HCT VFR BLD CALC: 20.6 % — CRITICAL LOW (ref 34.5–45)
HGB BLD-MCNC: 6.5 G/DL — CRITICAL LOW (ref 11.5–15.5)
IMM GRANULOCYTES NFR BLD AUTO: 0.7 % — SIGNIFICANT CHANGE UP (ref 0–0.9)
LYMPHOCYTES # BLD AUTO: 0.63 K/UL — LOW (ref 1–3.3)
LYMPHOCYTES # BLD AUTO: 6.3 % — LOW (ref 13–44)
MCHC RBC-ENTMCNC: 26.2 PG — LOW (ref 27–34)
MCHC RBC-ENTMCNC: 31.6 G/DL — LOW (ref 32–36)
MCV RBC AUTO: 83.1 FL — SIGNIFICANT CHANGE UP (ref 80–100)
MONOCYTES # BLD AUTO: 0.52 K/UL — SIGNIFICANT CHANGE UP (ref 0–0.9)
MONOCYTES NFR BLD AUTO: 5.2 % — SIGNIFICANT CHANGE UP (ref 2–14)
NEUTROPHILS # BLD AUTO: 8.6 K/UL — HIGH (ref 1.8–7.4)
NEUTROPHILS NFR BLD AUTO: 85.4 % — HIGH (ref 43–77)
NRBC # BLD: 0 /100 WBCS — SIGNIFICANT CHANGE UP (ref 0–0)
PLATELET # BLD AUTO: 191 K/UL — SIGNIFICANT CHANGE UP (ref 150–400)
RBC # BLD: 2.48 M/UL — LOW (ref 3.8–5.2)
RBC # FLD: 15.2 % — HIGH (ref 10.3–14.5)
WBC # BLD: 10.06 K/UL — SIGNIFICANT CHANGE UP (ref 3.8–10.5)
WBC # FLD AUTO: 10.06 K/UL — SIGNIFICANT CHANGE UP (ref 3.8–10.5)

## 2024-10-12 ENCOUNTER — APPOINTMENT (OUTPATIENT)
Dept: INFUSION THERAPY | Facility: HOSPITAL | Age: 54
End: 2024-10-12

## 2024-10-15 DIAGNOSIS — Z51.89 ENCOUNTER FOR OTHER SPECIFIED AFTERCARE: ICD-10-CM

## 2024-10-16 ENCOUNTER — LABORATORY RESULT (OUTPATIENT)
Age: 54
End: 2024-10-16

## 2024-10-17 ENCOUNTER — NON-APPOINTMENT (OUTPATIENT)
Age: 54
End: 2024-10-17

## 2024-10-17 DIAGNOSIS — K55.20 ANGIODYSPLASIA OF COLON W/OUT HEMORRHAGE: ICD-10-CM

## 2024-10-21 ENCOUNTER — APPOINTMENT (OUTPATIENT)
Dept: HEMATOLOGY ONCOLOGY | Facility: CLINIC | Age: 54
End: 2024-10-21

## 2024-10-21 ENCOUNTER — RESULT REVIEW (OUTPATIENT)
Age: 54
End: 2024-10-21

## 2024-10-21 ENCOUNTER — NON-APPOINTMENT (OUTPATIENT)
Age: 54
End: 2024-10-21

## 2024-10-21 LAB
BASOPHILS # BLD AUTO: 0.02 K/UL — SIGNIFICANT CHANGE UP (ref 0–0.2)
BASOPHILS NFR BLD AUTO: 0.2 % — SIGNIFICANT CHANGE UP (ref 0–2)
EOSINOPHIL # BLD AUTO: 0.12 K/UL — SIGNIFICANT CHANGE UP (ref 0–0.5)
EOSINOPHIL NFR BLD AUTO: 1.1 % — SIGNIFICANT CHANGE UP (ref 0–6)
HCT VFR BLD CALC: 21.1 % — LOW (ref 34.5–45)
HGB BLD-MCNC: 6.7 G/DL — CRITICAL LOW (ref 11.5–15.5)
IMM GRANULOCYTES NFR BLD AUTO: 0.6 % — SIGNIFICANT CHANGE UP (ref 0–0.9)
LYMPHOCYTES # BLD AUTO: 0.88 K/UL — LOW (ref 1–3.3)
LYMPHOCYTES # BLD AUTO: 8.1 % — LOW (ref 13–44)
MCHC RBC-ENTMCNC: 26.9 PG — LOW (ref 27–34)
MCHC RBC-ENTMCNC: 31.8 G/DL — LOW (ref 32–36)
MCV RBC AUTO: 84.7 FL — SIGNIFICANT CHANGE UP (ref 80–100)
MONOCYTES # BLD AUTO: 0.59 K/UL — SIGNIFICANT CHANGE UP (ref 0–0.9)
MONOCYTES NFR BLD AUTO: 5.4 % — SIGNIFICANT CHANGE UP (ref 2–14)
NEUTROPHILS # BLD AUTO: 9.2 K/UL — HIGH (ref 1.8–7.4)
NEUTROPHILS NFR BLD AUTO: 84.6 % — HIGH (ref 43–77)
NRBC # BLD: 0 /100 WBCS — SIGNIFICANT CHANGE UP (ref 0–0)
PLATELET # BLD AUTO: 241 K/UL — SIGNIFICANT CHANGE UP (ref 150–400)
RBC # BLD: 2.49 M/UL — LOW (ref 3.8–5.2)
RBC # FLD: 14.1 % — SIGNIFICANT CHANGE UP (ref 10.3–14.5)
WBC # BLD: 10.88 K/UL — HIGH (ref 3.8–10.5)
WBC # FLD AUTO: 10.88 K/UL — HIGH (ref 3.8–10.5)

## 2024-10-22 ENCOUNTER — APPOINTMENT (OUTPATIENT)
Dept: INFUSION THERAPY | Facility: HOSPITAL | Age: 54
End: 2024-10-22

## 2024-10-23 ENCOUNTER — LABORATORY RESULT (OUTPATIENT)
Age: 54
End: 2024-10-23

## 2024-10-30 ENCOUNTER — LABORATORY RESULT (OUTPATIENT)
Age: 54
End: 2024-10-30

## 2024-11-04 ENCOUNTER — LABORATORY RESULT (OUTPATIENT)
Age: 54
End: 2024-11-04

## 2024-11-08 ENCOUNTER — RESULT REVIEW (OUTPATIENT)
Age: 54
End: 2024-11-08

## 2024-11-08 ENCOUNTER — APPOINTMENT (OUTPATIENT)
Dept: HEMATOLOGY ONCOLOGY | Facility: CLINIC | Age: 54
End: 2024-11-08

## 2024-11-08 LAB
BASOPHILS # BLD AUTO: 0.04 K/UL — SIGNIFICANT CHANGE UP (ref 0–0.2)
BASOPHILS NFR BLD AUTO: 0.3 % — SIGNIFICANT CHANGE UP (ref 0–2)
EOSINOPHIL # BLD AUTO: 0.21 K/UL — SIGNIFICANT CHANGE UP (ref 0–0.5)
EOSINOPHIL NFR BLD AUTO: 1.5 % — SIGNIFICANT CHANGE UP (ref 0–6)
HCT VFR BLD CALC: 21.5 % — LOW (ref 34.5–45)
HGB BLD-MCNC: 6.8 G/DL — CRITICAL LOW (ref 11.5–15.5)
IMM GRANULOCYTES NFR BLD AUTO: 0.8 % — SIGNIFICANT CHANGE UP (ref 0–0.9)
LYMPHOCYTES # BLD AUTO: 0.96 K/UL — LOW (ref 1–3.3)
LYMPHOCYTES # BLD AUTO: 6.9 % — LOW (ref 13–44)
MCHC RBC-ENTMCNC: 27 PG — SIGNIFICANT CHANGE UP (ref 27–34)
MCHC RBC-ENTMCNC: 31.6 G/DL — LOW (ref 32–36)
MCV RBC AUTO: 85.3 FL — SIGNIFICANT CHANGE UP (ref 80–100)
MONOCYTES # BLD AUTO: 1.02 K/UL — HIGH (ref 0–0.9)
MONOCYTES NFR BLD AUTO: 7.3 % — SIGNIFICANT CHANGE UP (ref 2–14)
NEUTROPHILS # BLD AUTO: 11.65 K/UL — HIGH (ref 1.8–7.4)
NEUTROPHILS NFR BLD AUTO: 83.2 % — HIGH (ref 43–77)
NRBC # BLD: 0 /100 WBCS — SIGNIFICANT CHANGE UP (ref 0–0)
PLATELET # BLD AUTO: 247 K/UL — SIGNIFICANT CHANGE UP (ref 150–400)
RBC # BLD: 2.52 M/UL — LOW (ref 3.8–5.2)
RBC # FLD: 15 % — HIGH (ref 10.3–14.5)
WBC # BLD: 13.99 K/UL — HIGH (ref 3.8–10.5)
WBC # FLD AUTO: 13.99 K/UL — HIGH (ref 3.8–10.5)

## 2024-11-09 ENCOUNTER — APPOINTMENT (OUTPATIENT)
Dept: INFUSION THERAPY | Facility: HOSPITAL | Age: 54
End: 2024-11-09

## 2024-11-13 ENCOUNTER — LABORATORY RESULT (OUTPATIENT)
Age: 54
End: 2024-11-13

## 2024-11-18 ENCOUNTER — RESULT REVIEW (OUTPATIENT)
Age: 54
End: 2024-11-18

## 2024-11-18 ENCOUNTER — OUTPATIENT (OUTPATIENT)
Dept: OUTPATIENT SERVICES | Facility: HOSPITAL | Age: 54
LOS: 1 days | End: 2024-11-18
Payer: MEDICARE

## 2024-11-18 ENCOUNTER — APPOINTMENT (OUTPATIENT)
Dept: HEMATOLOGY ONCOLOGY | Facility: CLINIC | Age: 54
End: 2024-11-18

## 2024-11-18 DIAGNOSIS — N02.B9 OTHER RECURRENT AND PERSISTENT IMMUNOGLOBULIN A NEPHROPATHY: ICD-10-CM

## 2024-11-18 DIAGNOSIS — Z98.890 OTHER SPECIFIED POSTPROCEDURAL STATES: Chronic | ICD-10-CM

## 2024-11-18 LAB
BASOPHILS # BLD AUTO: 0.02 K/UL — SIGNIFICANT CHANGE UP (ref 0–0.2)
BASOPHILS NFR BLD AUTO: 0.2 % — SIGNIFICANT CHANGE UP (ref 0–2)
EOSINOPHIL # BLD AUTO: 0.24 K/UL — SIGNIFICANT CHANGE UP (ref 0–0.5)
EOSINOPHIL NFR BLD AUTO: 2.2 % — SIGNIFICANT CHANGE UP (ref 0–6)
HCT VFR BLD CALC: 26.4 % — LOW (ref 34.5–45)
HGB BLD-MCNC: 8.3 G/DL — LOW (ref 11.5–15.5)
IMM GRANULOCYTES NFR BLD AUTO: 0.4 % — SIGNIFICANT CHANGE UP (ref 0–0.9)
LYMPHOCYTES # BLD AUTO: 0.85 K/UL — LOW (ref 1–3.3)
LYMPHOCYTES # BLD AUTO: 7.9 % — LOW (ref 13–44)
MCHC RBC-ENTMCNC: 26.5 PG — LOW (ref 27–34)
MCHC RBC-ENTMCNC: 31.4 G/DL — LOW (ref 32–36)
MCV RBC AUTO: 84.3 FL — SIGNIFICANT CHANGE UP (ref 80–100)
MONOCYTES # BLD AUTO: 0.67 K/UL — SIGNIFICANT CHANGE UP (ref 0–0.9)
MONOCYTES NFR BLD AUTO: 6.3 % — SIGNIFICANT CHANGE UP (ref 2–14)
NEUTROPHILS # BLD AUTO: 8.9 K/UL — HIGH (ref 1.8–7.4)
NEUTROPHILS NFR BLD AUTO: 83 % — HIGH (ref 43–77)
NRBC # BLD: 0 /100 WBCS — SIGNIFICANT CHANGE UP (ref 0–0)
PLATELET # BLD AUTO: 230 K/UL — SIGNIFICANT CHANGE UP (ref 150–400)
RBC # BLD: 3.13 M/UL — LOW (ref 3.8–5.2)
RBC # FLD: 14.3 % — SIGNIFICANT CHANGE UP (ref 10.3–14.5)
WBC # BLD: 10.72 K/UL — HIGH (ref 3.8–10.5)
WBC # FLD AUTO: 10.72 K/UL — HIGH (ref 3.8–10.5)

## 2024-11-19 ENCOUNTER — APPOINTMENT (OUTPATIENT)
Dept: HEMATOLOGY ONCOLOGY | Facility: CLINIC | Age: 54
End: 2024-11-19

## 2024-11-20 ENCOUNTER — LABORATORY RESULT (OUTPATIENT)
Age: 54
End: 2024-11-20

## 2024-11-20 ENCOUNTER — APPOINTMENT (OUTPATIENT)
Dept: INFUSION THERAPY | Facility: HOSPITAL | Age: 54
End: 2024-11-20

## 2024-11-20 DIAGNOSIS — D63.1 CHRONIC KIDNEY DISEASE, UNSPECIFIED: ICD-10-CM

## 2024-11-20 DIAGNOSIS — N18.9 CHRONIC KIDNEY DISEASE, UNSPECIFIED: ICD-10-CM

## 2024-11-20 PROCEDURE — 86923 COMPATIBILITY TEST ELECTRIC: CPT

## 2024-11-20 PROCEDURE — 86901 BLOOD TYPING SEROLOGIC RH(D): CPT

## 2024-11-20 PROCEDURE — 86900 BLOOD TYPING SEROLOGIC ABO: CPT

## 2024-11-20 PROCEDURE — 86850 RBC ANTIBODY SCREEN: CPT

## 2024-11-24 NOTE — ED ADULT NURSE NOTE - SKIN INTEGRITY
Anemia is likely due to chronic disease due to ESRD. Most recent hemoglobin and hematocrit are listed below.  Recent Labs     11/22/24  0617 11/23/24  0532 11/24/24  0528   HGB 8.4* 9.1* 8.1*   HCT 26.5* 28.8* 26.1*       Plan  - Monitor serial CBC: Daily  - Transfuse PRBC if patient becomes hemodynamically unstable, symptomatic or H/H drops below 7/21.  - Patient has received 2 units of PRBCs on 11/20/24  - Patient's anemia is currently stable at 8-9    Hemoglobin improved slightly overnight. On EPO 10,000 units.   intact

## 2024-11-27 ENCOUNTER — LABORATORY RESULT (OUTPATIENT)
Age: 54
End: 2024-11-27

## 2024-11-29 ENCOUNTER — LABORATORY RESULT (OUTPATIENT)
Age: 54
End: 2024-11-29

## 2024-12-04 ENCOUNTER — LABORATORY RESULT (OUTPATIENT)
Age: 54
End: 2024-12-04

## 2024-12-05 ENCOUNTER — APPOINTMENT (OUTPATIENT)
Dept: HEMATOLOGY ONCOLOGY | Facility: CLINIC | Age: 54
End: 2024-12-05

## 2024-12-05 ENCOUNTER — OUTPATIENT (OUTPATIENT)
Dept: OUTPATIENT SERVICES | Facility: HOSPITAL | Age: 54
LOS: 1 days | Discharge: HOME | End: 2024-12-05

## 2024-12-05 ENCOUNTER — RESULT REVIEW (OUTPATIENT)
Age: 54
End: 2024-12-05

## 2024-12-05 DIAGNOSIS — Z98.890 OTHER SPECIFIED POSTPROCEDURAL STATES: Chronic | ICD-10-CM

## 2024-12-05 DIAGNOSIS — D64.9 ANEMIA, UNSPECIFIED: ICD-10-CM

## 2024-12-05 PROCEDURE — 86850 RBC ANTIBODY SCREEN: CPT

## 2024-12-05 PROCEDURE — 86900 BLOOD TYPING SEROLOGIC ABO: CPT

## 2024-12-05 PROCEDURE — 86923 COMPATIBILITY TEST ELECTRIC: CPT

## 2024-12-05 PROCEDURE — 86901 BLOOD TYPING SEROLOGIC RH(D): CPT

## 2024-12-07 ENCOUNTER — APPOINTMENT (OUTPATIENT)
Dept: INFUSION THERAPY | Facility: HOSPITAL | Age: 54
End: 2024-12-07

## 2024-12-10 DIAGNOSIS — Z51.89 ENCOUNTER FOR OTHER SPECIFIED AFTERCARE: ICD-10-CM

## 2024-12-11 ENCOUNTER — LABORATORY RESULT (OUTPATIENT)
Age: 54
End: 2024-12-11

## 2024-12-17 ENCOUNTER — LABORATORY RESULT (OUTPATIENT)
Age: 54
End: 2024-12-17

## 2024-12-17 ENCOUNTER — NON-APPOINTMENT (OUTPATIENT)
Age: 54
End: 2024-12-17

## 2024-12-18 ENCOUNTER — APPOINTMENT (OUTPATIENT)
Dept: HEMATOLOGY ONCOLOGY | Facility: CLINIC | Age: 54
End: 2024-12-18

## 2024-12-19 ENCOUNTER — RESULT REVIEW (OUTPATIENT)
Age: 54
End: 2024-12-19

## 2024-12-19 ENCOUNTER — OUTPATIENT (OUTPATIENT)
Dept: OUTPATIENT SERVICES | Facility: HOSPITAL | Age: 54
LOS: 1 days | End: 2024-12-19
Payer: MEDICARE

## 2024-12-19 ENCOUNTER — NON-APPOINTMENT (OUTPATIENT)
Age: 54
End: 2024-12-19

## 2024-12-19 ENCOUNTER — APPOINTMENT (OUTPATIENT)
Dept: HEMATOLOGY ONCOLOGY | Facility: CLINIC | Age: 54
End: 2024-12-19

## 2024-12-19 DIAGNOSIS — Z98.890 OTHER SPECIFIED POSTPROCEDURAL STATES: Chronic | ICD-10-CM

## 2024-12-19 DIAGNOSIS — K55.20 ANGIODYSPLASIA OF COLON WITHOUT HEMORRHAGE: ICD-10-CM

## 2024-12-19 LAB
BASOPHILS # BLD AUTO: 0.02 K/UL — SIGNIFICANT CHANGE UP (ref 0–0.2)
BASOPHILS NFR BLD AUTO: 0.2 % — SIGNIFICANT CHANGE UP (ref 0–2)
EOSINOPHIL # BLD AUTO: 0.14 K/UL — SIGNIFICANT CHANGE UP (ref 0–0.5)
EOSINOPHIL NFR BLD AUTO: 1.4 % — SIGNIFICANT CHANGE UP (ref 0–6)
HCT VFR BLD CALC: 19.7 % — CRITICAL LOW (ref 34.5–45)
HGB BLD-MCNC: 6.2 G/DL — CRITICAL LOW (ref 11.5–15.5)
IMM GRANULOCYTES NFR BLD AUTO: 0.8 % — SIGNIFICANT CHANGE UP (ref 0–0.9)
LYMPHOCYTES # BLD AUTO: 0.84 K/UL — LOW (ref 1–3.3)
LYMPHOCYTES # BLD AUTO: 8.3 % — LOW (ref 13–44)
MCHC RBC-ENTMCNC: 26.8 PG — LOW (ref 27–34)
MCHC RBC-ENTMCNC: 31.5 G/DL — LOW (ref 32–36)
MCV RBC AUTO: 85.3 FL — SIGNIFICANT CHANGE UP (ref 80–100)
MONOCYTES # BLD AUTO: 0.67 K/UL — SIGNIFICANT CHANGE UP (ref 0–0.9)
MONOCYTES NFR BLD AUTO: 6.6 % — SIGNIFICANT CHANGE UP (ref 2–14)
NEUTROPHILS # BLD AUTO: 8.35 K/UL — HIGH (ref 1.8–7.4)
NEUTROPHILS NFR BLD AUTO: 82.7 % — HIGH (ref 43–77)
NRBC # BLD: 0 /100 WBCS — SIGNIFICANT CHANGE UP (ref 0–0)
NRBC BLD-RTO: 0 /100 WBCS — SIGNIFICANT CHANGE UP (ref 0–0)
PLATELET # BLD AUTO: 200 K/UL — SIGNIFICANT CHANGE UP (ref 150–400)
RBC # BLD: 2.31 M/UL — LOW (ref 3.8–5.2)
RBC # FLD: 14.7 % — HIGH (ref 10.3–14.5)
WBC # BLD: 10.1 K/UL — SIGNIFICANT CHANGE UP (ref 3.8–10.5)
WBC # FLD AUTO: 10.1 K/UL — SIGNIFICANT CHANGE UP (ref 3.8–10.5)

## 2024-12-21 ENCOUNTER — APPOINTMENT (OUTPATIENT)
Dept: INFUSION THERAPY | Facility: HOSPITAL | Age: 54
End: 2024-12-21

## 2024-12-26 ENCOUNTER — LABORATORY RESULT (OUTPATIENT)
Age: 54
End: 2024-12-26

## 2025-01-02 ENCOUNTER — LABORATORY RESULT (OUTPATIENT)
Age: 55
End: 2025-01-02

## 2025-01-03 ENCOUNTER — RESULT REVIEW (OUTPATIENT)
Age: 55
End: 2025-01-03

## 2025-01-03 ENCOUNTER — APPOINTMENT (OUTPATIENT)
Dept: HEMATOLOGY ONCOLOGY | Facility: CLINIC | Age: 55
End: 2025-01-03

## 2025-01-03 ENCOUNTER — LABORATORY RESULT (OUTPATIENT)
Age: 55
End: 2025-01-03

## 2025-01-03 LAB
BASOPHILS # BLD AUTO: 0.04 K/UL — SIGNIFICANT CHANGE UP (ref 0–0.2)
BASOPHILS NFR BLD AUTO: 0.3 % — SIGNIFICANT CHANGE UP (ref 0–2)
EOSINOPHIL # BLD AUTO: 0.15 K/UL — SIGNIFICANT CHANGE UP (ref 0–0.5)
EOSINOPHIL NFR BLD AUTO: 1 % — SIGNIFICANT CHANGE UP (ref 0–6)
HCT VFR BLD CALC: 23.7 % — LOW (ref 34.5–45)
HGB BLD-MCNC: 7.6 G/DL — LOW (ref 11.5–15.5)
IMM GRANULOCYTES NFR BLD AUTO: 0.6 % — SIGNIFICANT CHANGE UP (ref 0–0.9)
LYMPHOCYTES # BLD AUTO: 1.06 K/UL — SIGNIFICANT CHANGE UP (ref 1–3.3)
LYMPHOCYTES # BLD AUTO: 7.3 % — LOW (ref 13–44)
MCHC RBC-ENTMCNC: 27.1 PG — SIGNIFICANT CHANGE UP (ref 27–34)
MCHC RBC-ENTMCNC: 32.1 G/DL — SIGNIFICANT CHANGE UP (ref 32–36)
MCV RBC AUTO: 84.6 FL — SIGNIFICANT CHANGE UP (ref 80–100)
MONOCYTES # BLD AUTO: 0.93 K/UL — HIGH (ref 0–0.9)
MONOCYTES NFR BLD AUTO: 6.4 % — SIGNIFICANT CHANGE UP (ref 2–14)
NEUTROPHILS # BLD AUTO: 12.31 K/UL — HIGH (ref 1.8–7.4)
NEUTROPHILS NFR BLD AUTO: 84.4 % — HIGH (ref 43–77)
NRBC # BLD: 0 /100 WBCS — SIGNIFICANT CHANGE UP (ref 0–0)
NRBC BLD-RTO: 0 /100 WBCS — SIGNIFICANT CHANGE UP (ref 0–0)
PLATELET # BLD AUTO: 241 K/UL — SIGNIFICANT CHANGE UP (ref 150–400)
RBC # BLD: 2.8 M/UL — LOW (ref 3.8–5.2)
RBC # FLD: 13.6 % — SIGNIFICANT CHANGE UP (ref 10.3–14.5)
WBC # BLD: 14.58 K/UL — HIGH (ref 3.8–10.5)
WBC # FLD AUTO: 14.58 K/UL — HIGH (ref 3.8–10.5)

## 2025-01-03 PROCEDURE — 86900 BLOOD TYPING SEROLOGIC ABO: CPT

## 2025-01-03 PROCEDURE — 86923 COMPATIBILITY TEST ELECTRIC: CPT

## 2025-01-03 PROCEDURE — 86901 BLOOD TYPING SEROLOGIC RH(D): CPT

## 2025-01-03 PROCEDURE — 86850 RBC ANTIBODY SCREEN: CPT

## 2025-01-04 ENCOUNTER — APPOINTMENT (OUTPATIENT)
Dept: INFUSION THERAPY | Facility: HOSPITAL | Age: 55
End: 2025-01-04

## 2025-01-06 ENCOUNTER — LABORATORY RESULT (OUTPATIENT)
Age: 55
End: 2025-01-06

## 2025-01-07 NOTE — ED ADULT NURSE NOTE - CAS TRG GEN SKIN CONDITION
Medication:   Requested Prescriptions     Pending Prescriptions Disp Refills    atorvastatin (LIPITOR) 20 MG tablet 90 tablet 0     Sig: Take 1 tablet by mouth daily      Last Filled:  10/10/24    Patient Phone Number: [phone] (home)     Last appt: 10/10/2024   Next appt: Visit date not found    Last OARRS:        No data to display              PDMP Monitoring:    Last PDMP [place] as Reviewed ([place]):  Review User Review Instant Review Result          Preferred Pharmacy:   [facility]/pharmacy #[id number] - [place], [place] - [address] - P [phone] - F [phone]  [address] [address]  Phone: [phone] Fax: [phone]    Recent Visits  Date Type Provider Dept   10/10/24 Office Visit Arnol Lee MD [facility]   09/13/24 Office Visit Arnol Lee MD [facility]   09/05/24 Office Visit [facility] MD [facility]   Showing recent visits within past 540 days with a meds authorizing provider and meeting all other requirements  Future Appointments  No visits were found meeting these conditions.  Showing future appointments within next 150 days with a meds authorizing provider and meeting all other requirements     10/10/2024     Warm

## 2025-01-08 ENCOUNTER — LABORATORY RESULT (OUTPATIENT)
Age: 55
End: 2025-01-08

## 2025-01-15 ENCOUNTER — LABORATORY RESULT (OUTPATIENT)
Age: 55
End: 2025-01-15

## 2025-01-22 ENCOUNTER — LABORATORY RESULT (OUTPATIENT)
Age: 55
End: 2025-01-22

## 2025-01-23 DIAGNOSIS — N18.9 CHRONIC KIDNEY DISEASE, UNSPECIFIED: ICD-10-CM

## 2025-01-23 DIAGNOSIS — D63.1 CHRONIC KIDNEY DISEASE, UNSPECIFIED: ICD-10-CM

## 2025-01-24 ENCOUNTER — RESULT REVIEW (OUTPATIENT)
Age: 55
End: 2025-01-24

## 2025-01-24 ENCOUNTER — APPOINTMENT (OUTPATIENT)
Dept: HEMATOLOGY ONCOLOGY | Facility: CLINIC | Age: 55
End: 2025-01-24

## 2025-01-24 ENCOUNTER — OUTPATIENT (OUTPATIENT)
Dept: OUTPATIENT SERVICES | Facility: HOSPITAL | Age: 55
LOS: 1 days | End: 2025-01-24
Payer: MEDICARE

## 2025-01-24 DIAGNOSIS — Z98.890 OTHER SPECIFIED POSTPROCEDURAL STATES: Chronic | ICD-10-CM

## 2025-01-24 DIAGNOSIS — D64.9 ANEMIA, UNSPECIFIED: ICD-10-CM

## 2025-01-24 LAB
BASOPHILS # BLD AUTO: 0.04 K/UL — SIGNIFICANT CHANGE UP (ref 0–0.2)
BASOPHILS NFR BLD AUTO: 0.3 % — SIGNIFICANT CHANGE UP (ref 0–2)
EOSINOPHIL # BLD AUTO: 0.21 K/UL — SIGNIFICANT CHANGE UP (ref 0–0.5)
EOSINOPHIL NFR BLD AUTO: 1.5 % — SIGNIFICANT CHANGE UP (ref 0–6)
HCT VFR BLD CALC: 25.3 % — LOW (ref 34.5–45)
HGB BLD-MCNC: 8.2 G/DL — LOW (ref 11.5–15.5)
IMM GRANULOCYTES NFR BLD AUTO: 0.8 % — SIGNIFICANT CHANGE UP (ref 0–0.9)
LYMPHOCYTES # BLD AUTO: 0.87 K/UL — LOW (ref 1–3.3)
LYMPHOCYTES # BLD AUTO: 6 % — LOW (ref 13–44)
MCHC RBC-ENTMCNC: 27 PG — SIGNIFICANT CHANGE UP (ref 27–34)
MCHC RBC-ENTMCNC: 32.4 G/DL — SIGNIFICANT CHANGE UP (ref 32–36)
MCV RBC AUTO: 83.2 FL — SIGNIFICANT CHANGE UP (ref 80–100)
MONOCYTES # BLD AUTO: 0.94 K/UL — HIGH (ref 0–0.9)
MONOCYTES NFR BLD AUTO: 6.5 % — SIGNIFICANT CHANGE UP (ref 2–14)
NEUTROPHILS # BLD AUTO: 12.26 K/UL — HIGH (ref 1.8–7.4)
NEUTROPHILS NFR BLD AUTO: 84.9 % — HIGH (ref 43–77)
NRBC # BLD: 0 /100 WBCS — SIGNIFICANT CHANGE UP (ref 0–0)
NRBC BLD-RTO: 0 /100 WBCS — SIGNIFICANT CHANGE UP (ref 0–0)
PLATELET # BLD AUTO: 225 K/UL — SIGNIFICANT CHANGE UP (ref 150–400)
RBC # BLD: 3.04 M/UL — LOW (ref 3.8–5.2)
RBC # FLD: 13.2 % — SIGNIFICANT CHANGE UP (ref 10.3–14.5)
WBC # BLD: 14.43 K/UL — HIGH (ref 3.8–10.5)
WBC # FLD AUTO: 14.43 K/UL — HIGH (ref 3.8–10.5)

## 2025-01-27 ENCOUNTER — APPOINTMENT (OUTPATIENT)
Dept: INFUSION THERAPY | Facility: HOSPITAL | Age: 55
End: 2025-01-27

## 2025-01-29 ENCOUNTER — LABORATORY RESULT (OUTPATIENT)
Age: 55
End: 2025-01-29

## 2025-02-05 ENCOUNTER — LABORATORY RESULT (OUTPATIENT)
Age: 55
End: 2025-02-05

## 2025-02-06 ENCOUNTER — LABORATORY RESULT (OUTPATIENT)
Age: 55
End: 2025-02-06

## 2025-02-12 ENCOUNTER — OUTPATIENT (OUTPATIENT)
Dept: OUTPATIENT SERVICES | Facility: HOSPITAL | Age: 55
LOS: 1 days | Discharge: HOME | End: 2025-02-12

## 2025-02-12 DIAGNOSIS — K92.2 GASTROINTESTINAL HEMORRHAGE, UNSPECIFIED: ICD-10-CM

## 2025-02-12 DIAGNOSIS — D64.9 ANEMIA, UNSPECIFIED: ICD-10-CM

## 2025-02-12 DIAGNOSIS — D63.1 ANEMIA IN CHRONIC KIDNEY DISEASE: ICD-10-CM

## 2025-02-12 DIAGNOSIS — Z98.890 OTHER SPECIFIED POSTPROCEDURAL STATES: Chronic | ICD-10-CM

## 2025-02-12 DIAGNOSIS — N18.4 CHRONIC KIDNEY DISEASE, STAGE 4 (SEVERE): ICD-10-CM

## 2025-02-12 DIAGNOSIS — D50.9 IRON DEFICIENCY ANEMIA, UNSPECIFIED: ICD-10-CM

## 2025-02-13 ENCOUNTER — APPOINTMENT (OUTPATIENT)
Dept: HEMATOLOGY ONCOLOGY | Facility: CLINIC | Age: 55
End: 2025-02-13

## 2025-02-14 ENCOUNTER — RESULT REVIEW (OUTPATIENT)
Age: 55
End: 2025-02-14

## 2025-02-14 ENCOUNTER — APPOINTMENT (OUTPATIENT)
Dept: HEMATOLOGY ONCOLOGY | Facility: CLINIC | Age: 55
End: 2025-02-14

## 2025-02-14 DIAGNOSIS — D63.1 CHRONIC KIDNEY DISEASE, UNSPECIFIED: ICD-10-CM

## 2025-02-14 DIAGNOSIS — N18.9 CHRONIC KIDNEY DISEASE, UNSPECIFIED: ICD-10-CM

## 2025-02-14 LAB
BASOPHILS # BLD AUTO: 0.02 K/UL — SIGNIFICANT CHANGE UP (ref 0–0.2)
BASOPHILS NFR BLD AUTO: 0.2 % — SIGNIFICANT CHANGE UP (ref 0–2)
EOSINOPHIL # BLD AUTO: 0.18 K/UL — SIGNIFICANT CHANGE UP (ref 0–0.5)
EOSINOPHIL NFR BLD AUTO: 1.6 % — SIGNIFICANT CHANGE UP (ref 0–6)
HCT VFR BLD CALC: 23.5 % — LOW (ref 34.5–45)
HGB BLD-MCNC: 7.3 G/DL — LOW (ref 11.5–15.5)
IMM GRANULOCYTES NFR BLD AUTO: 1 % — HIGH (ref 0–0.9)
LYMPHOCYTES # BLD AUTO: 0.63 K/UL — LOW (ref 1–3.3)
LYMPHOCYTES # BLD AUTO: 5.6 % — LOW (ref 13–44)
MCHC RBC-ENTMCNC: 25.5 PG — LOW (ref 27–34)
MCHC RBC-ENTMCNC: 31.1 G/DL — LOW (ref 32–36)
MCV RBC AUTO: 82.2 FL — SIGNIFICANT CHANGE UP (ref 80–100)
MONOCYTES # BLD AUTO: 0.86 K/UL — SIGNIFICANT CHANGE UP (ref 0–0.9)
MONOCYTES NFR BLD AUTO: 7.6 % — SIGNIFICANT CHANGE UP (ref 2–14)
NEUTROPHILS # BLD AUTO: 9.49 K/UL — HIGH (ref 1.8–7.4)
NEUTROPHILS NFR BLD AUTO: 84 % — HIGH (ref 43–77)
NRBC BLD AUTO-RTO: 0 /100 WBCS — SIGNIFICANT CHANGE UP (ref 0–0)
PLATELET # BLD AUTO: 232 K/UL — SIGNIFICANT CHANGE UP (ref 150–400)
RBC # BLD: 2.86 M/UL — LOW (ref 3.8–5.2)
RBC # FLD: 14.1 % — SIGNIFICANT CHANGE UP (ref 10.3–14.5)
WBC # BLD: 11.29 K/UL — HIGH (ref 3.8–10.5)
WBC # FLD AUTO: 11.29 K/UL — HIGH (ref 3.8–10.5)

## 2025-02-15 ENCOUNTER — APPOINTMENT (OUTPATIENT)
Dept: INFUSION THERAPY | Facility: HOSPITAL | Age: 55
End: 2025-02-15

## 2025-02-16 NOTE — DISCHARGE NOTE NURSING/CASE MANAGEMENT/SOCIAL WORK - NSDPDISTO_GEN_ALL_CORE
0920: Unable to perform 6 minute walk test. Pt able to take only 1-2 steps with 2 members of the nursing staff present to transfer to bedside commode. Notified Dr. Zavala and Dr. Hurst a 6 minute walk test cannot safely be performed at this time. Will re-attempt Monday with PT staff present.   Against medical advice

## 2025-02-19 ENCOUNTER — LABORATORY RESULT (OUTPATIENT)
Age: 55
End: 2025-02-19

## 2025-02-19 DIAGNOSIS — Z51.89 ENCOUNTER FOR OTHER SPECIFIED AFTERCARE: ICD-10-CM

## 2025-02-20 ENCOUNTER — NON-APPOINTMENT (OUTPATIENT)
Age: 55
End: 2025-02-20

## 2025-02-20 ENCOUNTER — LABORATORY RESULT (OUTPATIENT)
Age: 55
End: 2025-02-20

## 2025-02-24 ENCOUNTER — APPOINTMENT (OUTPATIENT)
Dept: HEMATOLOGY ONCOLOGY | Facility: CLINIC | Age: 55
End: 2025-02-24

## 2025-02-24 ENCOUNTER — RESULT REVIEW (OUTPATIENT)
Age: 55
End: 2025-02-24

## 2025-02-24 ENCOUNTER — NON-APPOINTMENT (OUTPATIENT)
Age: 55
End: 2025-02-24

## 2025-02-24 LAB
BASOPHILS # BLD AUTO: 0.03 K/UL — SIGNIFICANT CHANGE UP (ref 0–0.2)
BASOPHILS NFR BLD AUTO: 0.2 % — SIGNIFICANT CHANGE UP (ref 0–2)
EOSINOPHIL # BLD AUTO: 0.18 K/UL — SIGNIFICANT CHANGE UP (ref 0–0.5)
EOSINOPHIL NFR BLD AUTO: 1.4 % — SIGNIFICANT CHANGE UP (ref 0–6)
HCT VFR BLD CALC: 20 % — CRITICAL LOW (ref 34.5–45)
HGB BLD-MCNC: 6.2 G/DL — CRITICAL LOW (ref 11.5–15.5)
IMM GRANULOCYTES NFR BLD AUTO: 1.3 % — HIGH (ref 0–0.9)
LYMPHOCYTES # BLD AUTO: 0.94 K/UL — LOW (ref 1–3.3)
LYMPHOCYTES # BLD AUTO: 7.4 % — LOW (ref 13–44)
MCHC RBC-ENTMCNC: 25.6 PG — LOW (ref 27–34)
MCHC RBC-ENTMCNC: 31 G/DL — LOW (ref 32–36)
MCV RBC AUTO: 82.6 FL — SIGNIFICANT CHANGE UP (ref 80–100)
MONOCYTES # BLD AUTO: 0.63 K/UL — SIGNIFICANT CHANGE UP (ref 0–0.9)
MONOCYTES NFR BLD AUTO: 4.9 % — SIGNIFICANT CHANGE UP (ref 2–14)
NEUTROPHILS # BLD AUTO: 10.81 K/UL — HIGH (ref 1.8–7.4)
NEUTROPHILS NFR BLD AUTO: 84.8 % — HIGH (ref 43–77)
NRBC BLD AUTO-RTO: 0 /100 WBCS — SIGNIFICANT CHANGE UP (ref 0–0)
PLATELET # BLD AUTO: 238 K/UL — SIGNIFICANT CHANGE UP (ref 150–400)
RBC # BLD: 2.42 M/UL — LOW (ref 3.8–5.2)
RBC # FLD: 14.8 % — HIGH (ref 10.3–14.5)
WBC # BLD: 12.76 K/UL — HIGH (ref 3.8–10.5)
WBC # FLD AUTO: 12.76 K/UL — HIGH (ref 3.8–10.5)

## 2025-02-24 PROCEDURE — 86923 COMPATIBILITY TEST ELECTRIC: CPT

## 2025-02-24 PROCEDURE — 86900 BLOOD TYPING SEROLOGIC ABO: CPT

## 2025-02-24 PROCEDURE — 86850 RBC ANTIBODY SCREEN: CPT

## 2025-02-24 PROCEDURE — 86901 BLOOD TYPING SEROLOGIC RH(D): CPT

## 2025-02-25 ENCOUNTER — APPOINTMENT (OUTPATIENT)
Dept: INFUSION THERAPY | Facility: HOSPITAL | Age: 55
End: 2025-02-25

## 2025-02-26 ENCOUNTER — APPOINTMENT (OUTPATIENT)
Dept: INFUSION THERAPY | Facility: HOSPITAL | Age: 55
End: 2025-02-26

## 2025-02-26 ENCOUNTER — LABORATORY RESULT (OUTPATIENT)
Age: 55
End: 2025-02-26

## 2025-02-27 ENCOUNTER — LABORATORY RESULT (OUTPATIENT)
Age: 55
End: 2025-02-27

## 2025-03-04 ENCOUNTER — LABORATORY RESULT (OUTPATIENT)
Age: 55
End: 2025-03-04

## 2025-03-06 ENCOUNTER — RESULT REVIEW (OUTPATIENT)
Age: 55
End: 2025-03-06

## 2025-03-06 ENCOUNTER — APPOINTMENT (OUTPATIENT)
Dept: HEMATOLOGY ONCOLOGY | Facility: CLINIC | Age: 55
End: 2025-03-06

## 2025-03-06 ENCOUNTER — NON-APPOINTMENT (OUTPATIENT)
Age: 55
End: 2025-03-06

## 2025-03-06 DIAGNOSIS — N18.9 CHRONIC KIDNEY DISEASE, UNSPECIFIED: ICD-10-CM

## 2025-03-06 DIAGNOSIS — D63.1 CHRONIC KIDNEY DISEASE, UNSPECIFIED: ICD-10-CM

## 2025-03-06 LAB
BASOPHILS # BLD AUTO: 0.01 K/UL — SIGNIFICANT CHANGE UP (ref 0–0.2)
BASOPHILS NFR BLD AUTO: 0.1 % — SIGNIFICANT CHANGE UP (ref 0–2)
EOSINOPHIL # BLD AUTO: 0.26 K/UL — SIGNIFICANT CHANGE UP (ref 0–0.5)
EOSINOPHIL NFR BLD AUTO: 3.6 % — SIGNIFICANT CHANGE UP (ref 0–6)
HCT VFR BLD CALC: 19.8 % — CRITICAL LOW (ref 34.5–45)
HGB BLD-MCNC: 6.2 G/DL — CRITICAL LOW (ref 11.5–15.5)
IMM GRANULOCYTES NFR BLD AUTO: 1.1 % — HIGH (ref 0–0.9)
LYMPHOCYTES # BLD AUTO: 0.33 K/UL — LOW (ref 1–3.3)
LYMPHOCYTES # BLD AUTO: 4.6 % — LOW (ref 13–44)
MCHC RBC-ENTMCNC: 26.1 PG — LOW (ref 27–34)
MCHC RBC-ENTMCNC: 31.3 G/DL — LOW (ref 32–36)
MCV RBC AUTO: 83.2 FL — SIGNIFICANT CHANGE UP (ref 80–100)
MONOCYTES # BLD AUTO: 0.81 K/UL — SIGNIFICANT CHANGE UP (ref 0–0.9)
MONOCYTES NFR BLD AUTO: 11.3 % — SIGNIFICANT CHANGE UP (ref 2–14)
NEUTROPHILS # BLD AUTO: 5.68 K/UL — SIGNIFICANT CHANGE UP (ref 1.8–7.4)
NEUTROPHILS NFR BLD AUTO: 79.3 % — HIGH (ref 43–77)
NRBC BLD AUTO-RTO: 0 /100 WBCS — SIGNIFICANT CHANGE UP (ref 0–0)
PLATELET # BLD AUTO: 197 K/UL — SIGNIFICANT CHANGE UP (ref 150–400)
RBC # BLD: 2.38 M/UL — LOW (ref 3.8–5.2)
RBC # FLD: 15.9 % — HIGH (ref 10.3–14.5)
WBC # BLD: 7.17 K/UL — SIGNIFICANT CHANGE UP (ref 3.8–10.5)
WBC # FLD AUTO: 7.17 K/UL — SIGNIFICANT CHANGE UP (ref 3.8–10.5)

## 2025-03-08 ENCOUNTER — APPOINTMENT (OUTPATIENT)
Dept: INFUSION THERAPY | Facility: HOSPITAL | Age: 55
End: 2025-03-08

## 2025-03-18 NOTE — DISCHARGE NOTE PROVIDER - ATTENDING ATTESTATION STATEMENT
PT Evaluation   Today's date: 3/18/2025  Patient name: Jossy Gandhi  : 2005  MRN: [id number]  Referring provider: Grey Carmen III, MD  Dx:   Encounter Diagnosis     ICD-10-CM    1. Chronic back pain, unspecified back location, unspecified back pain laterality  M54.9 Ambulatory Referral to Physical Therapy    G89.29           Assessment    Jossy Gandhi is a pleasant 19 y.o. female who presents with a referring diagnosis of chronic back pain. The patient's greatest concern is pain associated with her poor posture. The primary movement system diagnosis is thoracic hypomobility with concurrent lumbar hypermobility with nociceptive pain resulting in pathoanatomical symptoms of impaired thoracolumbar mobility, poor postural strength, poor postural endurance, poor hip and core strength, and poor core neuromuscular control. The aforementioned impairments have limited the patient's ability to perfrom recreational activities. No further referral is neccessary at this time based upon examination results. Positive prognostic indicators include motivation to improve and positive attitude. Negative prognostic indicators include chronicity of symptoms.     Impairments: abnormal muscle firing, abnormal or restricted ROM, activity intolerance, Impaired physical strength, lacks appropriate HEP, pain with function, poor posture, and poor body mechanics    Symptom Irritability: low    Problem List:  - impaired thoracolumbar mobility   - poor postural endurance     Concordant Sign:  - flexion  - extension    Patient education performed this session included: home exercise program, plan of care, expected tissue healing time, and prognosis and diagnosis    Patient verbalized good understanding of POC.    Please contact me if you have any questions or recommendations. Thank you for the referral and the opportunity to share in Jossy Gandhi's care.       Plan    Patient would benefit from: Skilled PT  Planned modality  "interventions: biofeedback, cryotherapy, TENS, thermotherapy (hot pack), and traction  Planned therapy interventions: abdominal trunk stabilization, activity modification, balance/weight bearing training, behavior modification, body mechanics training, functional ROM exercises, graded exercise, graded motor, HEP, joint mobilization, manual therapy, Qiu taping, motor coordination training, neuromuscular re-education, patient education, postural training, strengthening, stretching, therapeutic activities, and therapeutic exercises    Frequency: 2x per week  Duration in weeks: 6 weeks    Plan of Care beginning date: 3/18/2025  Plan of Care expiration date: 6 weeks - 4/29/2025  Treatment plan discussed with: patient      Goals    Short Term Goals (3 weeks):    Patient will be independent with basic HEP.  Patient will report >50% reduction in pain.  Patient will demonstrate >1/3 improvement in MMT grade as applicable  Patient will demonstrate full pain-free lumbar ROM  Patient will eb able to sit through a school day without pain    Long Term Goals (6 weeks):  Patient will be independent in a comprehensive home exercise program  Patient FOTO score will improve by 10 points  Patient will self-report >75% improvement in function  Patient will be able to perform volleyball skills without pain  Patient will be independent with gym based exercise program      History    History of Present Illness  Mechanism of injury: Patient reports that she had a chest xray which showed kyphosis. She was sent by her PCP to work on strengthening to fix it. She does have a history of low back pain which seems to flare-up with prolonged sitting, prolonged walking, and volleyball. During the season, she was getting treatment from the [facility]. They did heat, e-stim, ice bath, and ktape. Her  has said something about \"problems with her SI joint\".     Prior level of function: independent    Progression: no change    Patient goals for " therapy: decrease pain, increase motion, increase strength, independence with ADLs, and return to leisure activities, improve posture    Pain   3/18/2025    Current 2/10    Best 0/10    Worst 7/10     Location: lower lumbar and mid thoracic  Description: dull aching  Aggravating factors: sitting, standing, walking, jumping, and lifting  Relieving factors: heat and ice      Physical Examination     Red Flag Screen  (+): none    Postural Assessment  Posture in Sitting: poor  Posture in Standing: poor  Postural Correction: makes symptoms better   Manual or self correction? manual    Sensation  Light touch: intact bilaterally    Lumbar Spine ROM   3/18/2025    Flexion 75%*    Extension 100%*    Lt Rotation 75%    Rt Rotation 75%    Lt Lateral Flexion 75%    Rt Lateral Flexion 75%     (*) = reproduction of patient's reported pain    Hip PROM  WNL and pain-free in all directions    (*) = reproduction of patient's reported pain    LE MMT   3/18/2025    LEFT RIGHT     Hip Flexion 4/5 4/5    Hip Extension 4/5 4/5    Hip Abduction 4/5 4/5    Hip Adduction 4/5 4/5       Knee Flexion 5/5 5/5    Knee Extension 5/5 5/5       Ankle DF 5/5 5/5    Ankle PF 5/5 5/5     (*) = reproduction of patient's reported pain    Mechanical Assessment  Mechanical unresponsive, pain at end-range in all directions    Joint Play  Thoracic: hypomobile  L1: hypermobile  L2: hypermobile  L3: hypermobile  L4: hypermobile  L5: hypermobile    Functional Assessment  Gait Assessment: unremarkable           Insurance:  AMA/CMS Eval/ Re-eval Auth #/ Referral # Total units  Start date  Expiration date Extension  Visit limitation?  PT only or  PT+OT? Co-Insurance   CMS () 3/18/2025                                                             POC Start Date POC Expiration Date Signed POC?   3/18/2025 6 weeks - 4/30/2025       Date               Units:  Used               Authed:  Remaining                  Precautions:   Past Medical History:   Diagnosis  Date    Abnormal ECG 11/12/24    Abnormal hearing test 01/06/2016    Acute bronchitis 01/06/2016    Acute conjunctivitis 12/05/2016    Acute post-traumatic headache, not intractable 02/28/2023    Acute suppurative otitis media of left ear with spontaneous rupture of tympanic membrane 12/07/2016    Acute viral conjunctivitis of left eye 12/05/2016    Allergic rhinitis 01/18/2016    Bacterial pneumonia     Impacted cerumen of right ear 01/12/2017    Irritable bowel syndrome with diarrhea 11/30/2015    Left ear pain 12/07/2016    Loss of hearing     Mild persistent asthma with acute exacerbation 05/18/2016    Non-recurrent acute suppurative otitis media of both ears without spontaneous rupture of tympanic membranes 11/12/2024    Patellar subluxation, right, initial encounter 04/17/2023    Reactive airway disease 07/30/2014    Sore throat 03/09/2021    Thumb injury 06/27/2014    Viral syndrome 03/09/2021       Date 3/18/2025        Visit Number IE        FOTO Tracking Intake survey     Follow-up #1   Manual         Thoracic mobs         Lumbar mobs                                    TherEx         LTR         SKTC/DKTC         Open books  X10 thoracic bias                                                     Neuro Re-Ed         TrA isos         Cat/cow X10        Thread the needle X10 each                                                     TherAct         Patient education HEP and POC x 10 min        Posture education         Lifting mechanics                           Gait Training                                    Modalities                             I have personally seen and examined the patient. I have collaborated with and supervised the

## 2025-03-26 ENCOUNTER — LABORATORY RESULT (OUTPATIENT)
Age: 55
End: 2025-03-26

## 2025-03-26 ENCOUNTER — NON-APPOINTMENT (OUTPATIENT)
Age: 55
End: 2025-03-26

## 2025-04-02 ENCOUNTER — LABORATORY RESULT (OUTPATIENT)
Age: 55
End: 2025-04-02

## 2025-04-04 ENCOUNTER — APPOINTMENT (OUTPATIENT)
Dept: HEMATOLOGY ONCOLOGY | Facility: CLINIC | Age: 55
End: 2025-04-04
Payer: MEDICARE

## 2025-04-04 ENCOUNTER — RESULT REVIEW (OUTPATIENT)
Age: 55
End: 2025-04-04

## 2025-04-04 VITALS
OXYGEN SATURATION: 99 % | SYSTOLIC BLOOD PRESSURE: 100 MMHG | HEART RATE: 68 BPM | WEIGHT: 176.15 LBS | DIASTOLIC BLOOD PRESSURE: 69 MMHG | RESPIRATION RATE: 16 BRPM | TEMPERATURE: 98.1 F | BODY MASS INDEX: 30.24 KG/M2

## 2025-04-04 DIAGNOSIS — D50.9 IRON DEFICIENCY ANEMIA, UNSPECIFIED: ICD-10-CM

## 2025-04-04 DIAGNOSIS — N18.9 CHRONIC KIDNEY DISEASE, UNSPECIFIED: ICD-10-CM

## 2025-04-04 DIAGNOSIS — N18.4 CHRONIC KIDNEY DISEASE, STAGE 4 (SEVERE): ICD-10-CM

## 2025-04-04 DIAGNOSIS — D63.1 CHRONIC KIDNEY DISEASE, UNSPECIFIED: ICD-10-CM

## 2025-04-04 DIAGNOSIS — K55.20 ANGIODYSPLASIA OF COLON W/OUT HEMORRHAGE: ICD-10-CM

## 2025-04-04 DIAGNOSIS — K92.2 GASTROINTESTINAL HEMORRHAGE, UNSPECIFIED: ICD-10-CM

## 2025-04-04 LAB
ALBUMIN SERPL ELPH-MCNC: 3.9 G/DL
ALP BLD-CCNC: 204 U/L
ALT SERPL-CCNC: 8 U/L
ANION GAP SERPL CALC-SCNC: 24 MMOL/L
AST SERPL-CCNC: 10 U/L
BASOPHILS # BLD AUTO: 0.05 K/UL — SIGNIFICANT CHANGE UP (ref 0–0.2)
BASOPHILS NFR BLD AUTO: 0.3 % — SIGNIFICANT CHANGE UP (ref 0–2)
BILIRUB SERPL-MCNC: 0.4 MG/DL
BUN SERPL-MCNC: 70 MG/DL
CALCIUM SERPL-MCNC: 8.8 MG/DL
CHLORIDE SERPL-SCNC: 90 MMOL/L
CO2 SERPL-SCNC: 22 MMOL/L
CREAT SERPL-MCNC: 20.5 MG/DL
EGFRCR SERPLBLD CKD-EPI 2021: 2 ML/MIN/1.73M2
EOSINOPHIL # BLD AUTO: 0.14 K/UL — SIGNIFICANT CHANGE UP (ref 0–0.5)
EOSINOPHIL NFR BLD AUTO: 1 % — SIGNIFICANT CHANGE UP (ref 0–6)
GLUCOSE SERPL-MCNC: 210 MG/DL
HCT VFR BLD CALC: 30.3 % — LOW (ref 34.5–45)
HGB BLD-MCNC: 9.6 G/DL — LOW (ref 11.5–15.5)
IMM GRANULOCYTES NFR BLD AUTO: 0.6 % — SIGNIFICANT CHANGE UP (ref 0–0.9)
LYMPHOCYTES # BLD AUTO: 0.91 K/UL — LOW (ref 1–3.3)
LYMPHOCYTES # BLD AUTO: 6.4 % — LOW (ref 13–44)
MCHC RBC-ENTMCNC: 26.8 PG — LOW (ref 27–34)
MCHC RBC-ENTMCNC: 31.4 G/DL — LOW (ref 32–36)
MCV RBC AUTO: 85.6 FL — SIGNIFICANT CHANGE UP (ref 80–100)
MONOCYTES # BLD AUTO: 0.82 K/UL — SIGNIFICANT CHANGE UP (ref 0–0.9)
MONOCYTES NFR BLD AUTO: 5.7 % — SIGNIFICANT CHANGE UP (ref 2–14)
NEUTROPHILS # BLD AUTO: 12.31 K/UL — HIGH (ref 1.8–7.4)
NEUTROPHILS NFR BLD AUTO: 86 % — HIGH (ref 43–77)
NRBC BLD AUTO-RTO: 0 /100 WBCS — SIGNIFICANT CHANGE UP (ref 0–0)
PLATELET # BLD AUTO: 328 K/UL — SIGNIFICANT CHANGE UP (ref 150–400)
POTASSIUM SERPL-SCNC: 3.7 MMOL/L
PROT SERPL-MCNC: 7.8 G/DL
RBC # BLD: 3.54 M/UL — LOW (ref 3.8–5.2)
RBC # FLD: 14.7 % — HIGH (ref 10.3–14.5)
SODIUM SERPL-SCNC: 137 MMOL/L
WBC # BLD: 14.32 K/UL — HIGH (ref 3.8–10.5)
WBC # FLD AUTO: 14.32 K/UL — HIGH (ref 3.8–10.5)

## 2025-04-04 PROCEDURE — 99215 OFFICE O/P EST HI 40 MIN: CPT

## 2025-04-04 PROCEDURE — 99205 OFFICE O/P NEW HI 60 MIN: CPT

## 2025-04-04 RX ORDER — EPOETIN ALFA 10000 [IU]/ML
10000 SOLUTION INTRAVENOUS; SUBCUTANEOUS
Refills: 0 | Status: ACTIVE | COMMUNITY
Start: 2025-04-04

## 2025-04-04 RX ORDER — CHROMIUM 200 MCG
800 TABLET ORAL DAILY
Refills: 0 | Status: ACTIVE | COMMUNITY
Start: 2025-04-04

## 2025-04-04 RX ORDER — CYCLOBENZAPRINE HYDROCHLORIDE 5 MG/1
5 TABLET, FILM COATED ORAL
Refills: 0 | Status: ACTIVE | COMMUNITY
Start: 2025-04-04

## 2025-04-04 RX ORDER — OCTREOTIDE ACETATE 20 MG
20 KIT INTRAMUSCULAR
Refills: 0 | Status: ACTIVE | COMMUNITY
Start: 2025-04-04

## 2025-04-04 RX ORDER — DICYCLOMINE HYDROCHLORIDE 20 MG/1
20 TABLET ORAL
Refills: 0 | Status: ACTIVE | COMMUNITY
Start: 2025-04-04

## 2025-04-07 LAB
FERRITIN SERPL-MCNC: 310 NG/ML
IRON SATN MFR SERPL: 16 %
IRON SERPL-MCNC: 36 UG/DL
LDH SERPL-CCNC: 188 U/L
TIBC SERPL-MCNC: 217 UG/DL
UIBC SERPL-MCNC: 181 UG/DL

## 2025-04-09 ENCOUNTER — LABORATORY RESULT (OUTPATIENT)
Age: 55
End: 2025-04-09

## 2025-04-16 ENCOUNTER — LABORATORY RESULT (OUTPATIENT)
Age: 55
End: 2025-04-16

## 2025-04-17 ENCOUNTER — RESULT REVIEW (OUTPATIENT)
Age: 55
End: 2025-04-17

## 2025-04-17 ENCOUNTER — APPOINTMENT (OUTPATIENT)
Dept: HEMATOLOGY ONCOLOGY | Facility: CLINIC | Age: 55
End: 2025-04-17

## 2025-04-17 ENCOUNTER — OUTPATIENT (OUTPATIENT)
Dept: OUTPATIENT SERVICES | Facility: HOSPITAL | Age: 55
LOS: 1 days | Discharge: HOME | End: 2025-04-17

## 2025-04-17 DIAGNOSIS — Z98.890 OTHER SPECIFIED POSTPROCEDURAL STATES: Chronic | ICD-10-CM

## 2025-04-17 DIAGNOSIS — D64.9 ANEMIA, UNSPECIFIED: ICD-10-CM

## 2025-04-17 LAB
BASOPHILS # BLD AUTO: 0.02 K/UL — SIGNIFICANT CHANGE UP (ref 0–0.2)
BASOPHILS NFR BLD AUTO: 0.1 % — SIGNIFICANT CHANGE UP (ref 0–2)
EOSINOPHIL # BLD AUTO: 0.16 K/UL — SIGNIFICANT CHANGE UP (ref 0–0.5)
EOSINOPHIL NFR BLD AUTO: 1.2 % — SIGNIFICANT CHANGE UP (ref 0–6)
HCT VFR BLD CALC: 23.8 % — LOW (ref 34.5–45)
HGB BLD-MCNC: 7.2 G/DL — LOW (ref 11.5–15.5)
IMM GRANULOCYTES NFR BLD AUTO: 0.6 % — SIGNIFICANT CHANGE UP (ref 0–0.9)
LYMPHOCYTES # BLD AUTO: 0.72 K/UL — LOW (ref 1–3.3)
LYMPHOCYTES # BLD AUTO: 5.4 % — LOW (ref 13–44)
MCHC RBC-ENTMCNC: 25.8 PG — LOW (ref 27–34)
MCHC RBC-ENTMCNC: 30.3 G/DL — LOW (ref 32–36)
MCV RBC AUTO: 85.3 FL — SIGNIFICANT CHANGE UP (ref 80–100)
MONOCYTES # BLD AUTO: 0.56 K/UL — SIGNIFICANT CHANGE UP (ref 0–0.9)
MONOCYTES NFR BLD AUTO: 4.2 % — SIGNIFICANT CHANGE UP (ref 2–14)
NEUTROPHILS # BLD AUTO: 11.81 K/UL — HIGH (ref 1.8–7.4)
NEUTROPHILS NFR BLD AUTO: 88.5 % — HIGH (ref 43–77)
NRBC BLD AUTO-RTO: 0 /100 WBCS — SIGNIFICANT CHANGE UP (ref 0–0)
PLATELET # BLD AUTO: 235 K/UL — SIGNIFICANT CHANGE UP (ref 150–400)
RBC # BLD: 2.79 M/UL — LOW (ref 3.8–5.2)
RBC # FLD: 15.3 % — HIGH (ref 10.3–14.5)
WBC # BLD: 13.35 K/UL — HIGH (ref 3.8–10.5)
WBC # FLD AUTO: 13.35 K/UL — HIGH (ref 3.8–10.5)

## 2025-04-19 ENCOUNTER — APPOINTMENT (OUTPATIENT)
Dept: INFUSION THERAPY | Facility: HOSPITAL | Age: 55
End: 2025-04-19

## 2025-04-23 ENCOUNTER — LABORATORY RESULT (OUTPATIENT)
Age: 55
End: 2025-04-23

## 2025-04-30 ENCOUNTER — LABORATORY RESULT (OUTPATIENT)
Age: 55
End: 2025-04-30

## 2025-05-01 DIAGNOSIS — D63.1 CHRONIC KIDNEY DISEASE, UNSPECIFIED: ICD-10-CM

## 2025-05-01 DIAGNOSIS — N18.9 CHRONIC KIDNEY DISEASE, UNSPECIFIED: ICD-10-CM

## 2025-05-01 DIAGNOSIS — K55.20 ANGIODYSPLASIA OF COLON W/OUT HEMORRHAGE: ICD-10-CM

## 2025-05-02 ENCOUNTER — RESULT REVIEW (OUTPATIENT)
Age: 55
End: 2025-05-02

## 2025-05-02 ENCOUNTER — APPOINTMENT (OUTPATIENT)
Dept: HEMATOLOGY ONCOLOGY | Facility: CLINIC | Age: 55
End: 2025-05-02

## 2025-05-02 LAB
BASOPHILS # BLD AUTO: 0.04 K/UL — SIGNIFICANT CHANGE UP (ref 0–0.2)
BASOPHILS NFR BLD AUTO: 0.4 % — SIGNIFICANT CHANGE UP (ref 0–2)
EOSINOPHIL # BLD AUTO: 0.18 K/UL — SIGNIFICANT CHANGE UP (ref 0–0.5)
EOSINOPHIL NFR BLD AUTO: 1.6 % — SIGNIFICANT CHANGE UP (ref 0–6)
HCT VFR BLD CALC: 25 % — LOW (ref 34.5–45)
HGB BLD-MCNC: 7.7 G/DL — LOW (ref 11.5–15.5)
IMM GRANULOCYTES NFR BLD AUTO: 0.5 % — SIGNIFICANT CHANGE UP (ref 0–0.9)
LYMPHOCYTES # BLD AUTO: 0.79 K/UL — LOW (ref 1–3.3)
LYMPHOCYTES # BLD AUTO: 7.1 % — LOW (ref 13–44)
MCHC RBC-ENTMCNC: 26.1 PG — LOW (ref 27–34)
MCHC RBC-ENTMCNC: 30.8 G/DL — LOW (ref 32–36)
MCV RBC AUTO: 84.7 FL — SIGNIFICANT CHANGE UP (ref 80–100)
MONOCYTES # BLD AUTO: 0.76 K/UL — SIGNIFICANT CHANGE UP (ref 0–0.9)
MONOCYTES NFR BLD AUTO: 6.9 % — SIGNIFICANT CHANGE UP (ref 2–14)
NEUTROPHILS # BLD AUTO: 9.24 K/UL — HIGH (ref 1.8–7.4)
NEUTROPHILS NFR BLD AUTO: 83.5 % — HIGH (ref 43–77)
NRBC BLD AUTO-RTO: 0 /100 WBCS — SIGNIFICANT CHANGE UP (ref 0–0)
PLATELET # BLD AUTO: 251 K/UL — SIGNIFICANT CHANGE UP (ref 150–400)
RBC # BLD: 2.95 M/UL — LOW (ref 3.8–5.2)
RBC # FLD: 14.6 % — HIGH (ref 10.3–14.5)
WBC # BLD: 11.06 K/UL — HIGH (ref 3.8–10.5)
WBC # FLD AUTO: 11.06 K/UL — HIGH (ref 3.8–10.5)

## 2025-05-05 ENCOUNTER — APPOINTMENT (OUTPATIENT)
Dept: INFUSION THERAPY | Facility: HOSPITAL | Age: 55
End: 2025-05-05

## 2025-05-06 DIAGNOSIS — Z51.89 ENCOUNTER FOR OTHER SPECIFIED AFTERCARE: ICD-10-CM

## 2025-05-07 ENCOUNTER — LABORATORY RESULT (OUTPATIENT)
Age: 55
End: 2025-05-07

## 2025-05-07 LAB — CA SERPL QL: NORMAL

## 2025-05-14 ENCOUNTER — LABORATORY RESULT (OUTPATIENT)
Age: 55
End: 2025-05-14

## 2025-05-15 DIAGNOSIS — N02.B9 OTHER RECURRENT AND PERSISTENT IMMUNOGLOBULIN A NEPHROPATHY: ICD-10-CM

## 2025-05-15 DIAGNOSIS — D50.9 IRON DEFICIENCY ANEMIA, UNSPECIFIED: ICD-10-CM

## 2025-05-16 ENCOUNTER — RESULT REVIEW (OUTPATIENT)
Age: 55
End: 2025-05-16

## 2025-05-16 ENCOUNTER — APPOINTMENT (OUTPATIENT)
Dept: HEMATOLOGY ONCOLOGY | Facility: CLINIC | Age: 55
End: 2025-05-16

## 2025-05-16 LAB
BASOPHILS # BLD AUTO: 0.02 K/UL — SIGNIFICANT CHANGE UP (ref 0–0.2)
BASOPHILS NFR BLD AUTO: 0.2 % — SIGNIFICANT CHANGE UP (ref 0–2)
EOSINOPHIL # BLD AUTO: 0.17 K/UL — SIGNIFICANT CHANGE UP (ref 0–0.5)
EOSINOPHIL NFR BLD AUTO: 1.6 % — SIGNIFICANT CHANGE UP (ref 0–6)
HCT VFR BLD CALC: 23.7 % — LOW (ref 34.5–45)
HGB BLD-MCNC: 7.5 G/DL — LOW (ref 11.5–15.5)
IMM GRANULOCYTES NFR BLD AUTO: 0.6 % — SIGNIFICANT CHANGE UP (ref 0–0.9)
LYMPHOCYTES # BLD AUTO: 0.7 K/UL — LOW (ref 1–3.3)
LYMPHOCYTES # BLD AUTO: 6.4 % — LOW (ref 13–44)
MCHC RBC-ENTMCNC: 25.9 PG — LOW (ref 27–34)
MCHC RBC-ENTMCNC: 31.6 G/DL — LOW (ref 32–36)
MCV RBC AUTO: 81.7 FL — SIGNIFICANT CHANGE UP (ref 80–100)
MONOCYTES # BLD AUTO: 0.59 K/UL — SIGNIFICANT CHANGE UP (ref 0–0.9)
MONOCYTES NFR BLD AUTO: 5.4 % — SIGNIFICANT CHANGE UP (ref 2–14)
NEUTROPHILS # BLD AUTO: 9.32 K/UL — HIGH (ref 1.8–7.4)
NEUTROPHILS NFR BLD AUTO: 85.8 % — HIGH (ref 43–77)
NRBC BLD AUTO-RTO: 0 /100 WBCS — SIGNIFICANT CHANGE UP (ref 0–0)
PLATELET # BLD AUTO: 210 K/UL — SIGNIFICANT CHANGE UP (ref 150–400)
RBC # BLD: 2.9 M/UL — LOW (ref 3.8–5.2)
RBC # FLD: 14.9 % — HIGH (ref 10.3–14.5)
WBC # BLD: 10.87 K/UL — HIGH (ref 3.8–10.5)
WBC # FLD AUTO: 10.87 K/UL — HIGH (ref 3.8–10.5)

## 2025-05-19 ENCOUNTER — APPOINTMENT (OUTPATIENT)
Dept: INFUSION THERAPY | Facility: HOSPITAL | Age: 55
End: 2025-05-19

## 2025-05-21 ENCOUNTER — LABORATORY RESULT (OUTPATIENT)
Age: 55
End: 2025-05-21

## 2025-05-27 ENCOUNTER — APPOINTMENT (OUTPATIENT)
Dept: HEMATOLOGY ONCOLOGY | Facility: CLINIC | Age: 55
End: 2025-05-27

## 2025-05-29 ENCOUNTER — RESULT REVIEW (OUTPATIENT)
Age: 55
End: 2025-05-29

## 2025-05-29 ENCOUNTER — NON-APPOINTMENT (OUTPATIENT)
Age: 55
End: 2025-05-29

## 2025-05-29 ENCOUNTER — APPOINTMENT (OUTPATIENT)
Dept: HEMATOLOGY ONCOLOGY | Facility: CLINIC | Age: 55
End: 2025-05-29

## 2025-05-29 DIAGNOSIS — D63.1 CHRONIC KIDNEY DISEASE, UNSPECIFIED: ICD-10-CM

## 2025-05-29 DIAGNOSIS — N18.9 CHRONIC KIDNEY DISEASE, UNSPECIFIED: ICD-10-CM

## 2025-05-29 LAB
BASOPHILS # BLD AUTO: 0.02 K/UL — SIGNIFICANT CHANGE UP (ref 0–0.2)
BASOPHILS NFR BLD AUTO: 0.2 % — SIGNIFICANT CHANGE UP (ref 0–2)
EOSINOPHIL # BLD AUTO: 0.19 K/UL — SIGNIFICANT CHANGE UP (ref 0–0.5)
EOSINOPHIL NFR BLD AUTO: 1.7 % — SIGNIFICANT CHANGE UP (ref 0–6)
HCT VFR BLD CALC: 18.3 % — CRITICAL LOW (ref 34.5–45)
HGB BLD-MCNC: 5.7 G/DL — CRITICAL LOW (ref 11.5–15.5)
IMM GRANULOCYTES NFR BLD AUTO: 0.6 % — SIGNIFICANT CHANGE UP (ref 0–0.9)
LYMPHOCYTES # BLD AUTO: 0.72 K/UL — LOW (ref 1–3.3)
LYMPHOCYTES # BLD AUTO: 6.5 % — LOW (ref 13–44)
MCHC RBC-ENTMCNC: 24.9 PG — LOW (ref 27–34)
MCHC RBC-ENTMCNC: 31.1 G/DL — LOW (ref 32–36)
MCV RBC AUTO: 79.9 FL — LOW (ref 80–100)
MONOCYTES # BLD AUTO: 0.73 K/UL — SIGNIFICANT CHANGE UP (ref 0–0.9)
MONOCYTES NFR BLD AUTO: 6.6 % — SIGNIFICANT CHANGE UP (ref 2–14)
NEUTROPHILS # BLD AUTO: 9.31 K/UL — HIGH (ref 1.8–7.4)
NEUTROPHILS NFR BLD AUTO: 84.4 % — HIGH (ref 43–77)
NRBC BLD AUTO-RTO: 0 /100 WBCS — SIGNIFICANT CHANGE UP (ref 0–0)
PLATELET # BLD AUTO: 224 K/UL — SIGNIFICANT CHANGE UP (ref 150–400)
RBC # BLD: 2.29 M/UL — LOW (ref 3.8–5.2)
RBC # FLD: 16.7 % — HIGH (ref 10.3–14.5)
WBC # BLD: 11.04 K/UL — HIGH (ref 3.8–10.5)
WBC # FLD AUTO: 11.04 K/UL — HIGH (ref 3.8–10.5)

## 2025-05-30 ENCOUNTER — RESULT REVIEW (OUTPATIENT)
Age: 55
End: 2025-05-30

## 2025-05-30 ENCOUNTER — APPOINTMENT (OUTPATIENT)
Dept: INFUSION THERAPY | Facility: HOSPITAL | Age: 55
End: 2025-05-30

## 2025-05-30 LAB
HCT VFR BLD CALC: 24.7 % — LOW (ref 34.5–45)
HGB BLD-MCNC: 8.2 G/DL — LOW (ref 11.5–15.5)
MCHC RBC-ENTMCNC: 27.6 PG — SIGNIFICANT CHANGE UP (ref 27–34)
MCHC RBC-ENTMCNC: 33.2 G/DL — SIGNIFICANT CHANGE UP (ref 32–36)
MCV RBC AUTO: 83.2 FL — SIGNIFICANT CHANGE UP (ref 80–100)
PLATELET # BLD AUTO: 184 K/UL — SIGNIFICANT CHANGE UP (ref 150–400)
RBC # BLD: 2.97 M/UL — LOW (ref 3.8–5.2)
RBC # FLD: 15.5 % — HIGH (ref 10.3–14.5)
WBC # BLD: 10.41 K/UL — SIGNIFICANT CHANGE UP (ref 3.8–10.5)
WBC # FLD AUTO: 10.41 K/UL — SIGNIFICANT CHANGE UP (ref 3.8–10.5)

## 2025-06-02 ENCOUNTER — NON-APPOINTMENT (OUTPATIENT)
Age: 55
End: 2025-06-02

## 2025-06-02 ENCOUNTER — APPOINTMENT (OUTPATIENT)
Dept: INFUSION THERAPY | Facility: HOSPITAL | Age: 55
End: 2025-06-02

## 2025-06-04 ENCOUNTER — LABORATORY RESULT (OUTPATIENT)
Age: 55
End: 2025-06-04

## 2025-06-11 ENCOUNTER — LABORATORY RESULT (OUTPATIENT)
Age: 55
End: 2025-06-11

## 2025-06-16 ENCOUNTER — APPOINTMENT (OUTPATIENT)
Dept: HEMATOLOGY ONCOLOGY | Facility: CLINIC | Age: 55
End: 2025-06-16

## 2025-06-16 ENCOUNTER — RESULT REVIEW (OUTPATIENT)
Age: 55
End: 2025-06-16

## 2025-06-16 ENCOUNTER — NON-APPOINTMENT (OUTPATIENT)
Age: 55
End: 2025-06-16

## 2025-06-16 LAB
BASOPHILS # BLD AUTO: 0.02 K/UL — SIGNIFICANT CHANGE UP (ref 0–0.2)
BASOPHILS NFR BLD AUTO: 0.2 % — SIGNIFICANT CHANGE UP (ref 0–2)
EOSINOPHIL # BLD AUTO: 0.19 K/UL — SIGNIFICANT CHANGE UP (ref 0–0.5)
EOSINOPHIL NFR BLD AUTO: 1.5 % — SIGNIFICANT CHANGE UP (ref 0–6)
HCT VFR BLD CALC: 14.8 % — CRITICAL LOW (ref 34.5–45)
HGB BLD-MCNC: 4.7 G/DL — CRITICAL LOW (ref 11.5–15.5)
IMM GRANULOCYTES NFR BLD AUTO: 0.8 % — SIGNIFICANT CHANGE UP (ref 0–0.9)
LYMPHOCYTES # BLD AUTO: 1.01 K/UL — SIGNIFICANT CHANGE UP (ref 1–3.3)
LYMPHOCYTES # BLD AUTO: 7.8 % — LOW (ref 13–44)
MCHC RBC-ENTMCNC: 26.7 PG — LOW (ref 27–34)
MCHC RBC-ENTMCNC: 31.8 G/DL — LOW (ref 32–36)
MCV RBC AUTO: 84.1 FL — SIGNIFICANT CHANGE UP (ref 80–100)
MONOCYTES # BLD AUTO: 0.68 K/UL — SIGNIFICANT CHANGE UP (ref 0–0.9)
MONOCYTES NFR BLD AUTO: 5.2 % — SIGNIFICANT CHANGE UP (ref 2–14)
NEUTROPHILS # BLD AUTO: 11.02 K/UL — HIGH (ref 1.8–7.4)
NEUTROPHILS NFR BLD AUTO: 84.5 % — HIGH (ref 43–77)
NRBC BLD AUTO-RTO: 0 /100 WBCS — SIGNIFICANT CHANGE UP (ref 0–0)
PLATELET # BLD AUTO: 180 K/UL — SIGNIFICANT CHANGE UP (ref 150–400)
RBC # BLD: 1.76 M/UL — LOW (ref 3.8–5.2)
RBC # FLD: 14.9 % — HIGH (ref 10.3–14.5)
WBC # BLD: 13.02 K/UL — HIGH (ref 3.8–10.5)
WBC # FLD AUTO: 13.02 K/UL — HIGH (ref 3.8–10.5)

## 2025-06-17 ENCOUNTER — RESULT REVIEW (OUTPATIENT)
Age: 55
End: 2025-06-17

## 2025-06-17 ENCOUNTER — APPOINTMENT (OUTPATIENT)
Dept: HEMATOLOGY ONCOLOGY | Facility: CLINIC | Age: 55
End: 2025-06-17

## 2025-06-17 ENCOUNTER — OUTPATIENT (OUTPATIENT)
Dept: OUTPATIENT SERVICES | Facility: HOSPITAL | Age: 55
LOS: 1 days | Discharge: HOME | End: 2025-06-17

## 2025-06-17 ENCOUNTER — NON-APPOINTMENT (OUTPATIENT)
Age: 55
End: 2025-06-17

## 2025-06-17 DIAGNOSIS — N18.4 CHRONIC KIDNEY DISEASE, STAGE 4 (SEVERE): ICD-10-CM

## 2025-06-17 DIAGNOSIS — D63.1 ANEMIA IN CHRONIC KIDNEY DISEASE: ICD-10-CM

## 2025-06-17 DIAGNOSIS — D50.9 IRON DEFICIENCY ANEMIA, UNSPECIFIED: ICD-10-CM

## 2025-06-17 DIAGNOSIS — D64.9 ANEMIA, UNSPECIFIED: ICD-10-CM

## 2025-06-17 DIAGNOSIS — Z98.890 OTHER SPECIFIED POSTPROCEDURAL STATES: Chronic | ICD-10-CM

## 2025-06-17 DIAGNOSIS — K92.2 GASTROINTESTINAL HEMORRHAGE, UNSPECIFIED: ICD-10-CM

## 2025-06-17 LAB
BASOPHILS # BLD AUTO: 0.03 K/UL — SIGNIFICANT CHANGE UP (ref 0–0.2)
BASOPHILS NFR BLD AUTO: 0.2 % — SIGNIFICANT CHANGE UP (ref 0–2)
EOSINOPHIL # BLD AUTO: 0.24 K/UL — SIGNIFICANT CHANGE UP (ref 0–0.5)
EOSINOPHIL NFR BLD AUTO: 1.8 % — SIGNIFICANT CHANGE UP (ref 0–6)
HCT VFR BLD CALC: 21.1 % — LOW (ref 34.5–45)
HGB BLD-MCNC: 7 G/DL — CRITICAL LOW (ref 11.5–15.5)
IMM GRANULOCYTES NFR BLD AUTO: 0.9 % — SIGNIFICANT CHANGE UP (ref 0–0.9)
LYMPHOCYTES # BLD AUTO: 0.67 K/UL — LOW (ref 1–3.3)
LYMPHOCYTES # BLD AUTO: 5.1 % — LOW (ref 13–44)
MCHC RBC-ENTMCNC: 28.6 PG — SIGNIFICANT CHANGE UP (ref 27–34)
MCHC RBC-ENTMCNC: 33.2 G/DL — SIGNIFICANT CHANGE UP (ref 32–36)
MCV RBC AUTO: 86.1 FL — SIGNIFICANT CHANGE UP (ref 80–100)
MONOCYTES # BLD AUTO: 0.7 K/UL — SIGNIFICANT CHANGE UP (ref 0–0.9)
MONOCYTES NFR BLD AUTO: 5.3 % — SIGNIFICANT CHANGE UP (ref 2–14)
NEUTROPHILS # BLD AUTO: 11.42 K/UL — HIGH (ref 1.8–7.4)
NEUTROPHILS NFR BLD AUTO: 86.7 % — HIGH (ref 43–77)
NRBC BLD AUTO-RTO: 0 /100 WBCS — SIGNIFICANT CHANGE UP (ref 0–0)
PLATELET # BLD AUTO: 162 K/UL — SIGNIFICANT CHANGE UP (ref 150–400)
RBC # BLD: 2.45 M/UL — LOW (ref 3.8–5.2)
RBC # FLD: 14.7 % — HIGH (ref 10.3–14.5)
WBC # BLD: 13.18 K/UL — HIGH (ref 3.8–10.5)
WBC # FLD AUTO: 13.18 K/UL — HIGH (ref 3.8–10.5)

## 2025-06-18 ENCOUNTER — APPOINTMENT (OUTPATIENT)
Dept: INFUSION THERAPY | Facility: HOSPITAL | Age: 55
End: 2025-06-18

## 2025-06-18 NOTE — H&P ADULT - NEUROLOGICAL DETAILS
Please follow instructions as recommended during visit.  Recommend low salt DASH diet  Strongly encourage compliance with your medications.   Please reach out to us if you have any questions   Recommend you present to the emergency room or call our office if you have chest pain, chest pressure, shortness of breath, any new, persistent or progressive symptoms.   
alert and oriented x 3/sensation intact/cranial nerves intact/normal strength

## 2025-06-24 ENCOUNTER — LABORATORY RESULT (OUTPATIENT)
Age: 55
End: 2025-06-24

## 2025-06-27 ENCOUNTER — APPOINTMENT (OUTPATIENT)
Dept: HEMATOLOGY ONCOLOGY | Facility: CLINIC | Age: 55
End: 2025-06-27

## 2025-06-27 ENCOUNTER — RESULT REVIEW (OUTPATIENT)
Age: 55
End: 2025-06-27

## 2025-06-27 LAB
BASOPHILS # BLD AUTO: 0.03 K/UL — SIGNIFICANT CHANGE UP (ref 0–0.2)
BASOPHILS NFR BLD AUTO: 0.3 % — SIGNIFICANT CHANGE UP (ref 0–2)
EOSINOPHIL # BLD AUTO: 0.17 K/UL — SIGNIFICANT CHANGE UP (ref 0–0.5)
EOSINOPHIL NFR BLD AUTO: 1.7 % — SIGNIFICANT CHANGE UP (ref 0–6)
HCT VFR BLD CALC: 24.4 % — LOW (ref 34.5–45)
HGB BLD-MCNC: 7.9 G/DL — LOW (ref 11.5–15.5)
IMM GRANULOCYTES NFR BLD AUTO: 0.3 % — SIGNIFICANT CHANGE UP (ref 0–0.9)
LYMPHOCYTES # BLD AUTO: 0.71 K/UL — LOW (ref 1–3.3)
LYMPHOCYTES # BLD AUTO: 7.2 % — LOW (ref 13–44)
MCHC RBC-ENTMCNC: 27.9 PG — SIGNIFICANT CHANGE UP (ref 27–34)
MCHC RBC-ENTMCNC: 32.4 G/DL — SIGNIFICANT CHANGE UP (ref 32–36)
MCV RBC AUTO: 86.2 FL — SIGNIFICANT CHANGE UP (ref 80–100)
MONOCYTES # BLD AUTO: 0.65 K/UL — SIGNIFICANT CHANGE UP (ref 0–0.9)
MONOCYTES NFR BLD AUTO: 6.6 % — SIGNIFICANT CHANGE UP (ref 2–14)
NEUTROPHILS # BLD AUTO: 8.22 K/UL — HIGH (ref 1.8–7.4)
NEUTROPHILS NFR BLD AUTO: 83.9 % — HIGH (ref 43–77)
NRBC BLD AUTO-RTO: 0 /100 WBCS — SIGNIFICANT CHANGE UP (ref 0–0)
PLATELET # BLD AUTO: 175 K/UL — SIGNIFICANT CHANGE UP (ref 150–400)
RBC # BLD: 2.83 M/UL — LOW (ref 3.8–5.2)
RBC # FLD: 13.7 % — SIGNIFICANT CHANGE UP (ref 10.3–14.5)
WBC # BLD: 9.81 K/UL — SIGNIFICANT CHANGE UP (ref 3.8–10.5)
WBC # FLD AUTO: 9.81 K/UL — SIGNIFICANT CHANGE UP (ref 3.8–10.5)

## 2025-06-30 ENCOUNTER — APPOINTMENT (OUTPATIENT)
Dept: INFUSION THERAPY | Facility: HOSPITAL | Age: 55
End: 2025-06-30

## 2025-06-30 NOTE — ED ADULT NURSE NOTE - URINE CHARACTERISTICS
"Received call from MAGDY Gutierrez with [facility].     She is calling to report that patient seems to have an infection in his groin area. On both sides of the groin area there is a lot of moisture, it is reddened and has a \"yeast\" smell. She is requesting Nystatin be prescribed for patient.    She is also requesting clarification if patient should be taking Aspirin or not. He has not been taking it but some of his papers state he should restart as of 6/26/25.    Pharmacy: [id number]  [place], [place] - [address] [facility] NLASHON OlivasN MAGDY  [facility]  " clear

## 2025-07-01 ENCOUNTER — LABORATORY RESULT (OUTPATIENT)
Age: 55
End: 2025-07-01

## 2025-07-09 NOTE — H&P ADULT - MUSCULOSKELETAL
Rush,      Your visit to [facility] for your surgery is coming soon and we look forward to seeing you! This friendly reminder and pre-procedure checklist will help to ensure your surgery goes smoothly and meets your expectations. At [facility], our goal is to provide you with a great patient experience and to deliver genuine, professional care to every patient.     Please complete all the steps in advance of your visit. If you have any questions about the items listed below, please give our office a call. We can be reached at [phone].    Procedure:       Procedure Date :     *A surgery nurse will call you 1-2 days before surgery to inform you of the time of arrival for surgery.    Surgeon:   Dr. Pablo Roldan    Admission Type:   Outpatient    Procedure Location:   [facility]:  [address] [place] [address] (Phone: [phone], Fax: [phone])    Please enter through the main entrance. Check in at the Welcome Desk and you will be directed to the surgery unit.     Post Operative Appointment:       Preparation Instructions to complete:    []  You will need a Pre-op Physical within 30 days before surgery with your primary care provider. This exam is required by the anesthesiologist to ensure a safe surgical experience.    Failure  to obtain your pre-op physical will lead to cancellation of your surgery  Call them right away to schedule this. Please ensure your Preoperative Physical is faxed to the Hospital (numbers listed above)    [] Preoperative Medication Instructions  We would like you to stop your anticoagulation medications 3-5 days before surgery HOWEVER contact your prescribing provider for instructions before discontinuing any medications. (Examples: Coumadin, Plavix, Xarelto, Eliquis, Pradaxa, Effient, Brilinta) If you are on Coumadin, we would like the goal INR <= 1.2.  IT IS OK TO STAY ON ASPIRIN PRIOR TO SURGERY.   Hold  Ibuprofen 24 hours prior.   Hold Herbal Supplements and vitamins 14 days prior.   Stop Cialis, Levitra and Viagra 2-3 days before surgery.   Please discuss with your primary care provider if you need to hold any blood pressure medications or others.   If you take these diabetic medications, please discuss with your primary doctor and follow the hold instructions:   []  Hold seven (7) days prior for once weekly injectable doses [semaglutide (Ozempic, Wegovy), dulaglutide (Trulicity), exenatide ER (Bydureon), tirzepatide (Mounjaro)]  []  Hold the day before and day of for once daily injectable GLP-1 agonists [exenatide (Byetta), liraglutide (Saxenda, Victoza)]  []  Hold seven (7) days for oral semaglutide (Rybelsus)     [] Fasting Requirements  Nothing to eat for 8 hours before surgery unless instructed differently by the surgery nurse.  You may have clear liquids up to two hours before your arrival time (coffee, tea, water, or Gatorade. No cream or milk)  If your primary care provider has instructed you to continue oral medications, you may take them on the morning of surgery with a small sip of water.    No alcohol or smoking after 12:00am the day of surgery    [] Contact your insurance regarding coverage  If you would like a Good Sun Estimate for your upcoming procedure at [facility] Location, contact Cost of Care Estimates   Advocates are available be phone Monday through Friday 9am - 3pm   at [phone]  You may also submit a request online at [email or web address]. Select the Pricing Form under the Pricing Inquiry Section.    For all self-pay, estimate, or anesthesia billing questions at [facility], the contact information is below:    Who to contact: Ligia ALONSO  [facility] number: [phone]  Prepay number: [phone]    The billing office at the [facility] will contact you with the  facility charge estimate but you may also reach them at [phone] with questions.    [] DO NOT BRING FMLA WITH TO SURGERY.  These should be sent to the provider's office by fax to [phone].    [] Day of Surgery  Medications - Take as indicated with sips of water.   Wear comfortable loose fitting clothes. Wear your glasses-Not contacts. Do not wear jewelry and remove body piercing's. Surgery may be cancelled if they are not removed.   You may have 1 family member wait in the lobby during your surgery. Visitor restrictions are subject to change. Please verify with the surgery nurse when they call.   If same day surgery-Have a someone come with you to surgery that can help you understand the surgeon's instructions, drive you home and stay with you overnight the first night.    [] You will receive a call from a surgery nurse 1-3 days prior to surgery. They will go over more details with you.         Frequently Asked Questions    WHAT DO I DO WHEN I COME HOME FROM SURGERY?    After surgery and/ or your hospital stay you may be tired and drowsy. Rest, but make sure to get up and walk around every couple of hours to circulate your blood. If you are non-weight bearing do not put any weight on your foot.  Be sure to take your medications as directed. Try to get a restful sleep and begin more normal activities as tolerated.    HOW MUCH PAIN SHOULD I EXPECT AND WILL I HAVE PAIN MEDICATION?    Everyone tolerates pain differently. Still, each type of surgery involves a certain level and type of pain. Generally most people feel better within a few days but keep in mind that everyone has a different tolerance to pain. All medications should be taken as directed. All medications can have side effects such as bleeding, upset stomach, headaches, dizziness, constipation, etc. If you have any major problems or allergic reaction, stop the medication and call the office. If you only have a little pain, you may simply take Tylenol  or Ibuprofen as directed on the label.     WHAT ABOUT MY DRESSING AND WHEN DO I COME BACK TO THE OFFICE?    The outer dressing stays in place for 7 days and when you come in for your first post-op we will remove it.  Some patients may have staples, zipper or sutures; these stay in for 10-14 days and will be removed at your 2nd post-op visit. After 24 hours you may shower using shower precautions.    WHAT ABOUT SWELLING, REDNESS, BRUISING OR DRAINAGE?    It is normal to have some swelling, and bruising after surgery. It gradually subsides but may be present for several weeks. Elevation and icing will help to reduce the swelling more rapidly.  If your bandage is really tight after 24 hours you may cut the bandage an inch by the toes or under your foot and by your ankle.  Ice therapy often works better than oral pain medication. Using cold therapy is recommended every hour for 20 minutes.    WHEN CAN I TAKE A BATH?    You can take a bath as long as the wound has no drainage and is fully healed without a scab. This generally takes about 2 weeks.  Unless you get the bandaged protector from above then you can take a shower when you feel up to it.    WHEN CAN I RETURN TO WORK?    You may return to work to an office-type job whenever you feel comfortable enough. The only restriction would be your own motivation and pain tolerance. If your job requires vigorous activities you may be off 1-4 weeks which is determined by what surgery you have and your operating physician.     WHAT ABOUT DRIVING OR FLYING?    As a general rule, you may resume driving as soon as you are comfortable and fully alert and off narcotic pain medications. If you are traveling by plane within 4 weeks of surgery, perform range of motion exercises of the legs and feet during the flight. Get up and walk around frequently to prevent blood clots.      WHEN CAN I EXERSICE?    You may return to normal activities such as exercising when your surgeon tells you  usually 2 weeks. Walking, sitting, standing and going up the stairs are all fine after the 2-week danny. You may have restrictions for 1-4 weeks of no lifting, pushing, pulling in excess of 20 lbs. This is determined by what surgery you have and your surgeon.    WILL I BECOME ADDICTED TO MY PAIN MEDICATION?    Pain medications are safe and effective when used as directed. However, misuse of these products can be extremely harmful and even deadly. Pain medications must be taken only as directed by your surgeon. Do not change the dose of your pain medication without talking to your operating physician first.    POSTOPERATIVE INFORMATION: MANAGING PAIN  Measuring Your Pain    A pain scale helps you rate pain intensity. In the scale, 0 means no pain, and 10 is the worst pain possible.  You should rate your pain every 4-6 hours. You may feel some pain even with medications. It is important to tell your health care provider if medications don't reduce the pain.        Managing Post-Op Pain at Home: Medications    Pain after an operation (post-op pain) is common and expected. These guidelines can help you stay as comfortable as possible.    Taking Pain Medications    Take any prescribed pain medications as directed by your doctor.  Take pain medications with some food to avoid an upset stomach.  Be aware that narcotic pain medications cause constipation. We recommend taking Milk of Magnesia   Eating high-fiber foods and drink plenty of water.  Don t drink alcohol while using pain medications.  Possible side effects include stomach upset, nausea, and itching.    Alternating Ibuprofen with your pain medication    Ibuprofen has three actions: it reduces fever, relieves pain and fights inflammation. Alternate between your prescribed pain medication and Ibuprofen every three hours (i.e., take a dose of Ibuprofen then three hours later take your prescribed pain medication then three hours later Ibuprofen, ect.) These two  medications are safe to use together like this.    POSTOPERATIVE INFORMATION: CONSTIPATION    Constipation after surgery is a common problem and is often related to taking post-operative narcotic pain medication. Signs that you may be constipated include bloating, abdominal distention and/or pain.    Constipation Prevention & Treatment    Drink plenty of fluids! This is the most important thing you can do to help prevent constipation.  Increase physical activity as recommended by your surgeon.  Consume a high fiber diet. We recommend introducing high fiber foods pre-operatively. Some foods high in fiber include:    Oatmeal Bran  Flaxseed Dried Fruit    Carrots   Bananas Strawberries Oranges Whole Grain Bread     Bananas Prunes  Pears  Raspberries     Over the counter medication/supplements - in order of recommended treatment:   (Be sure to follow instructions on product packaging)    Fiber supplement   Bulk forming laxative - Can be used to prevent constipation  Great food sources of fiber include but are not limited to:  Colace (docusate sodium)  Osmotic stool softener - Can be used 2-3 times per day to prevent constipation  Milk of Magnesia  MIRALAX  Enema/Suppository    Patient can also  some probiotics such as Culturelle to help prevent Antibiotic associated diarrhea. They can take this 1 hour before or 2 hours after taking their antibiotic should not be taken at the same time as they can cancel out the effects.                          ** AFTER SURGERY INSTRUCTIONS **                          [] You are to remain NON WEIGHT BEARING on your right foot NON WEIGHT BEARING MEANS NO PRESSURE ON YOUR FOOT OR HEEL AT ANY TIME FOR ANY REASON!    [] During surgery Dr. Dr. Pablo Roldan will apply a gauze dressing to your foot. This will remain intact until you are seen in clinic for follow up    [] It is NOT OKAY to get your surgical site wet while bathing, you will need to purchase a cast cover to protect your  details… foot from getting wet. You can purchase this at [facility] or your local pharmacy.    [] It is important that you elevate your affected foot after surgery. Proper elevation is raising your foot above your waist. The fluid in your lower extremities needs to get back to your heart so it can get pumped to your kidneys and expelled through urination. So if you notice you have to go to the bathroom more frequently when you are elevating your leg this is a good sign that it is working.     [] It is important that you increase your protein intake after surgery. Protein is essential for wound healing. We recommend you take a protein supplement twice per day. This is in addition to your normal diet. Examples of protein supplements are Ensure, Boost, Glucerna (if you are diabetic), or protein powder. You can purchase these at your local retailer or grocery store.       Notify our office right away, if you have any changes in your health status, or if you develop a cold, flu, diarrhea, infection, fever or sore throat before your scheduled surgery date. We can be reached at [phone]   Monday-Friday 8 am - 4:30 pm if you have any questions.     Thank you for trusting us with your care!      For informational purposes only. Not to replace the advice of your health care provider. Copyright   2020 [facility]. All rights reserved. Clinically reviewed by Infection Prevention and the [facility] COVID-19 Clinical Team. [employer]INE[employer] 300660 - Rev 09/09/21.      Can be found at [employer], [employer], or [employer]       ROM intact/strength 5/5 bilateral upper extremities

## 2025-07-11 ENCOUNTER — NON-APPOINTMENT (OUTPATIENT)
Age: 55
End: 2025-07-11

## 2025-07-11 ENCOUNTER — RESULT REVIEW (OUTPATIENT)
Age: 55
End: 2025-07-11

## 2025-07-11 ENCOUNTER — APPOINTMENT (OUTPATIENT)
Dept: HEMATOLOGY ONCOLOGY | Facility: CLINIC | Age: 55
End: 2025-07-11

## 2025-07-11 LAB
BASOPHILS # BLD AUTO: 0.02 K/UL — SIGNIFICANT CHANGE UP (ref 0–0.2)
BASOPHILS NFR BLD AUTO: 0.1 % — SIGNIFICANT CHANGE UP (ref 0–2)
EOSINOPHIL # BLD AUTO: 0.23 K/UL — SIGNIFICANT CHANGE UP (ref 0–0.5)
EOSINOPHIL NFR BLD AUTO: 1.6 % — SIGNIFICANT CHANGE UP (ref 0–6)
HCT VFR BLD CALC: 16.2 % — CRITICAL LOW (ref 34.5–45)
HGB BLD-MCNC: 5.1 G/DL — CRITICAL LOW (ref 11.5–15.5)
IMM GRANULOCYTES NFR BLD AUTO: 1.7 % — HIGH (ref 0–0.9)
LYMPHOCYTES # BLD AUTO: 0.99 K/UL — LOW (ref 1–3.3)
LYMPHOCYTES # BLD AUTO: 6.9 % — LOW (ref 13–44)
MCHC RBC-ENTMCNC: 28.2 PG — SIGNIFICANT CHANGE UP (ref 27–34)
MCHC RBC-ENTMCNC: 31.5 G/DL — LOW (ref 32–36)
MCV RBC AUTO: 89.5 FL — SIGNIFICANT CHANGE UP (ref 80–100)
MONOCYTES # BLD AUTO: 0.8 K/UL — SIGNIFICANT CHANGE UP (ref 0–0.9)
MONOCYTES NFR BLD AUTO: 5.6 % — SIGNIFICANT CHANGE UP (ref 2–14)
NEUTROPHILS # BLD AUTO: 12.02 K/UL — HIGH (ref 1.8–7.4)
NEUTROPHILS NFR BLD AUTO: 84.1 % — HIGH (ref 43–77)
NRBC BLD AUTO-RTO: 0 /100 WBCS — SIGNIFICANT CHANGE UP (ref 0–0)
PLATELET # BLD AUTO: 235 K/UL — SIGNIFICANT CHANGE UP (ref 150–400)
RBC # BLD: 1.81 M/UL — LOW (ref 3.8–5.2)
RBC # FLD: 15 % — HIGH (ref 10.3–14.5)
WBC # BLD: 14.31 K/UL — HIGH (ref 3.8–10.5)
WBC # FLD AUTO: 14.31 K/UL — HIGH (ref 3.8–10.5)

## 2025-07-14 ENCOUNTER — RESULT REVIEW (OUTPATIENT)
Age: 55
End: 2025-07-14

## 2025-07-14 ENCOUNTER — APPOINTMENT (OUTPATIENT)
Dept: INFUSION THERAPY | Facility: HOSPITAL | Age: 55
End: 2025-07-14

## 2025-07-14 ENCOUNTER — APPOINTMENT (OUTPATIENT)
Dept: HEMATOLOGY ONCOLOGY | Facility: CLINIC | Age: 55
End: 2025-07-14

## 2025-07-14 LAB
BASOPHILS # BLD AUTO: 0.02 K/UL — SIGNIFICANT CHANGE UP (ref 0–0.2)
BASOPHILS NFR BLD AUTO: 0.2 % — SIGNIFICANT CHANGE UP (ref 0–2)
EOSINOPHIL # BLD AUTO: 0.15 K/UL — SIGNIFICANT CHANGE UP (ref 0–0.5)
EOSINOPHIL NFR BLD AUTO: 1.3 % — SIGNIFICANT CHANGE UP (ref 0–6)
HCT VFR BLD CALC: 23.7 % — LOW (ref 34.5–45)
HGB BLD-MCNC: 7.6 G/DL — LOW (ref 11.5–15.5)
IMM GRANULOCYTES NFR BLD AUTO: 0.5 % — SIGNIFICANT CHANGE UP (ref 0–0.9)
LYMPHOCYTES # BLD AUTO: 0.73 K/UL — LOW (ref 1–3.3)
LYMPHOCYTES # BLD AUTO: 6.1 % — LOW (ref 13–44)
MCHC RBC-ENTMCNC: 27.9 PG — SIGNIFICANT CHANGE UP (ref 27–34)
MCHC RBC-ENTMCNC: 32.1 G/DL — SIGNIFICANT CHANGE UP (ref 32–36)
MCV RBC AUTO: 87.1 FL — SIGNIFICANT CHANGE UP (ref 80–100)
MONOCYTES # BLD AUTO: 0.68 K/UL — SIGNIFICANT CHANGE UP (ref 0–0.9)
MONOCYTES NFR BLD AUTO: 5.7 % — SIGNIFICANT CHANGE UP (ref 2–14)
NEUTROPHILS # BLD AUTO: 10.3 K/UL — HIGH (ref 1.8–7.4)
NEUTROPHILS NFR BLD AUTO: 86.2 % — HIGH (ref 43–77)
NRBC BLD AUTO-RTO: 0 /100 WBCS — SIGNIFICANT CHANGE UP (ref 0–0)
PLATELET # BLD AUTO: 219 K/UL — SIGNIFICANT CHANGE UP (ref 150–400)
RBC # BLD: 2.72 M/UL — LOW (ref 3.8–5.2)
RBC # FLD: 14.3 % — SIGNIFICANT CHANGE UP (ref 10.3–14.5)
WBC # BLD: 11.94 K/UL — HIGH (ref 3.8–10.5)
WBC # FLD AUTO: 11.94 K/UL — HIGH (ref 3.8–10.5)

## 2025-07-15 ENCOUNTER — LABORATORY RESULT (OUTPATIENT)
Age: 55
End: 2025-07-15

## 2025-07-18 ENCOUNTER — RESULT REVIEW (OUTPATIENT)
Age: 55
End: 2025-07-18

## 2025-07-18 ENCOUNTER — APPOINTMENT (OUTPATIENT)
Dept: HEMATOLOGY ONCOLOGY | Facility: CLINIC | Age: 55
End: 2025-07-18

## 2025-07-18 LAB
BASOPHILS # BLD AUTO: 0.04 K/UL — SIGNIFICANT CHANGE UP (ref 0–0.2)
BASOPHILS NFR BLD AUTO: 0.4 % — SIGNIFICANT CHANGE UP (ref 0–2)
EOSINOPHIL # BLD AUTO: 0.18 K/UL — SIGNIFICANT CHANGE UP (ref 0–0.5)
EOSINOPHIL NFR BLD AUTO: 1.7 % — SIGNIFICANT CHANGE UP (ref 0–6)
HCT VFR BLD CALC: 26.9 % — LOW (ref 34.5–45)
HGB BLD-MCNC: 8.8 G/DL — LOW (ref 11.5–15.5)
IMM GRANULOCYTES NFR BLD AUTO: 0.6 % — SIGNIFICANT CHANGE UP (ref 0–0.9)
LYMPHOCYTES # BLD AUTO: 0.58 K/UL — LOW (ref 1–3.3)
LYMPHOCYTES # BLD AUTO: 5.5 % — LOW (ref 13–44)
MCHC RBC-ENTMCNC: 28.2 PG — SIGNIFICANT CHANGE UP (ref 27–34)
MCHC RBC-ENTMCNC: 32.7 G/DL — SIGNIFICANT CHANGE UP (ref 32–36)
MCV RBC AUTO: 86.2 FL — SIGNIFICANT CHANGE UP (ref 80–100)
MONOCYTES # BLD AUTO: 0.53 K/UL — SIGNIFICANT CHANGE UP (ref 0–0.9)
MONOCYTES NFR BLD AUTO: 5 % — SIGNIFICANT CHANGE UP (ref 2–14)
NEUTROPHILS # BLD AUTO: 9.2 K/UL — HIGH (ref 1.8–7.4)
NEUTROPHILS NFR BLD AUTO: 86.8 % — HIGH (ref 43–77)
NRBC BLD AUTO-RTO: 0 /100 WBCS — SIGNIFICANT CHANGE UP (ref 0–0)
PLATELET # BLD AUTO: 160 K/UL — SIGNIFICANT CHANGE UP (ref 150–400)
RBC # BLD: 3.12 M/UL — LOW (ref 3.8–5.2)
RBC # FLD: 13.5 % — SIGNIFICANT CHANGE UP (ref 10.3–14.5)
WBC # BLD: 10.59 K/UL — HIGH (ref 3.8–10.5)
WBC # FLD AUTO: 10.59 K/UL — HIGH (ref 3.8–10.5)

## 2025-07-19 ENCOUNTER — APPOINTMENT (OUTPATIENT)
Dept: INFUSION THERAPY | Facility: HOSPITAL | Age: 55
End: 2025-07-19

## 2025-07-21 DIAGNOSIS — Z51.89 ENCOUNTER FOR OTHER SPECIFIED AFTERCARE: ICD-10-CM

## 2025-07-21 DIAGNOSIS — R11.2 NAUSEA WITH VOMITING, UNSPECIFIED: ICD-10-CM

## 2025-07-22 ENCOUNTER — LABORATORY RESULT (OUTPATIENT)
Age: 55
End: 2025-07-22

## 2025-07-25 ENCOUNTER — RESULT REVIEW (OUTPATIENT)
Age: 55
End: 2025-07-25

## 2025-07-25 ENCOUNTER — APPOINTMENT (OUTPATIENT)
Dept: HEMATOLOGY ONCOLOGY | Facility: CLINIC | Age: 55
End: 2025-07-25

## 2025-07-25 DIAGNOSIS — K55.20 ANGIODYSPLASIA OF COLON W/OUT HEMORRHAGE: ICD-10-CM

## 2025-07-25 LAB
BASOPHILS # BLD AUTO: 0.02 K/UL — SIGNIFICANT CHANGE UP (ref 0–0.2)
BASOPHILS NFR BLD AUTO: 0.2 % — SIGNIFICANT CHANGE UP (ref 0–2)
EOSINOPHIL # BLD AUTO: 0.2 K/UL — SIGNIFICANT CHANGE UP (ref 0–0.5)
EOSINOPHIL NFR BLD AUTO: 2.2 % — SIGNIFICANT CHANGE UP (ref 0–6)
HCT VFR BLD CALC: 32.4 % — LOW (ref 34.5–45)
HGB BLD-MCNC: 10.5 G/DL — LOW (ref 11.5–15.5)
IMM GRANULOCYTES NFR BLD AUTO: 0.5 % — SIGNIFICANT CHANGE UP (ref 0–0.9)
LYMPHOCYTES # BLD AUTO: 0.73 K/UL — LOW (ref 1–3.3)
LYMPHOCYTES # BLD AUTO: 8 % — LOW (ref 13–44)
MCHC RBC-ENTMCNC: 27.9 PG — SIGNIFICANT CHANGE UP (ref 27–34)
MCHC RBC-ENTMCNC: 32.4 G/DL — SIGNIFICANT CHANGE UP (ref 32–36)
MCV RBC AUTO: 85.9 FL — SIGNIFICANT CHANGE UP (ref 80–100)
MONOCYTES # BLD AUTO: 0.64 K/UL — SIGNIFICANT CHANGE UP (ref 0–0.9)
MONOCYTES NFR BLD AUTO: 7 % — SIGNIFICANT CHANGE UP (ref 2–14)
NEUTROPHILS # BLD AUTO: 7.46 K/UL — HIGH (ref 1.8–7.4)
NEUTROPHILS NFR BLD AUTO: 82.1 % — HIGH (ref 43–77)
NRBC BLD AUTO-RTO: 0 /100 WBCS — SIGNIFICANT CHANGE UP (ref 0–0)
PLATELET # BLD AUTO: 171 K/UL — SIGNIFICANT CHANGE UP (ref 150–400)
RBC # BLD: 3.77 M/UL — LOW (ref 3.8–5.2)
RBC # FLD: 13.7 % — SIGNIFICANT CHANGE UP (ref 10.3–14.5)
WBC # BLD: 9.1 K/UL — SIGNIFICANT CHANGE UP (ref 3.8–10.5)
WBC # FLD AUTO: 9.1 K/UL — SIGNIFICANT CHANGE UP (ref 3.8–10.5)

## 2025-07-28 ENCOUNTER — APPOINTMENT (OUTPATIENT)
Dept: INFUSION THERAPY | Facility: HOSPITAL | Age: 55
End: 2025-07-28

## 2025-07-29 ENCOUNTER — LABORATORY RESULT (OUTPATIENT)
Age: 55
End: 2025-07-29

## 2025-08-05 ENCOUNTER — LABORATORY RESULT (OUTPATIENT)
Age: 55
End: 2025-08-05

## 2025-08-05 ENCOUNTER — APPOINTMENT (OUTPATIENT)
Dept: HEMATOLOGY ONCOLOGY | Facility: CLINIC | Age: 55
End: 2025-08-05

## 2025-08-06 ENCOUNTER — LABORATORY RESULT (OUTPATIENT)
Age: 55
End: 2025-08-06

## 2025-08-07 ENCOUNTER — APPOINTMENT (OUTPATIENT)
Dept: HEMATOLOGY ONCOLOGY | Facility: CLINIC | Age: 55
End: 2025-08-07

## 2025-08-07 ENCOUNTER — RESULT REVIEW (OUTPATIENT)
Age: 55
End: 2025-08-07

## 2025-08-07 ENCOUNTER — NON-APPOINTMENT (OUTPATIENT)
Age: 55
End: 2025-08-07

## 2025-08-07 LAB
BASOPHILS # BLD AUTO: 0.02 K/UL — SIGNIFICANT CHANGE UP (ref 0–0.2)
BASOPHILS NFR BLD AUTO: 0.2 % — SIGNIFICANT CHANGE UP (ref 0–2)
EOSINOPHIL # BLD AUTO: 0.25 K/UL — SIGNIFICANT CHANGE UP (ref 0–0.5)
EOSINOPHIL NFR BLD AUTO: 2.1 % — SIGNIFICANT CHANGE UP (ref 0–6)
HCT VFR BLD CALC: 19.8 % — CRITICAL LOW (ref 34.5–45)
HGB BLD-MCNC: 6.4 G/DL — CRITICAL LOW (ref 11.5–15.5)
IMM GRANULOCYTES NFR BLD AUTO: 0.9 % — SIGNIFICANT CHANGE UP (ref 0–0.9)
LYMPHOCYTES # BLD AUTO: 0.99 K/UL — LOW (ref 1–3.3)
LYMPHOCYTES # BLD AUTO: 8.3 % — LOW (ref 13–44)
MCHC RBC-ENTMCNC: 27 PG — SIGNIFICANT CHANGE UP (ref 27–34)
MCHC RBC-ENTMCNC: 32.3 G/DL — SIGNIFICANT CHANGE UP (ref 32–36)
MCV RBC AUTO: 83.5 FL — SIGNIFICANT CHANGE UP (ref 80–100)
MONOCYTES # BLD AUTO: 0.77 K/UL — SIGNIFICANT CHANGE UP (ref 0–0.9)
MONOCYTES NFR BLD AUTO: 6.4 % — SIGNIFICANT CHANGE UP (ref 2–14)
NEUTROPHILS # BLD AUTO: 9.82 K/UL — HIGH (ref 1.8–7.4)
NEUTROPHILS NFR BLD AUTO: 82.1 % — HIGH (ref 43–77)
NRBC BLD AUTO-RTO: 0 /100 WBCS — SIGNIFICANT CHANGE UP (ref 0–0)
PLATELET # BLD AUTO: 241 K/UL — SIGNIFICANT CHANGE UP (ref 150–400)
RBC # BLD: 2.37 M/UL — LOW (ref 3.8–5.2)
RBC # FLD: 14.1 % — SIGNIFICANT CHANGE UP (ref 10.3–14.5)
WBC # BLD: 11.96 K/UL — HIGH (ref 3.8–10.5)
WBC # FLD AUTO: 11.96 K/UL — HIGH (ref 3.8–10.5)

## 2025-08-08 ENCOUNTER — APPOINTMENT (OUTPATIENT)
Dept: INFUSION THERAPY | Facility: HOSPITAL | Age: 55
End: 2025-08-08

## 2025-08-12 ENCOUNTER — LABORATORY RESULT (OUTPATIENT)
Age: 55
End: 2025-08-12

## 2025-08-13 ENCOUNTER — NON-APPOINTMENT (OUTPATIENT)
Age: 55
End: 2025-08-13

## 2025-08-13 DIAGNOSIS — D63.1 CHRONIC KIDNEY DISEASE, UNSPECIFIED: ICD-10-CM

## 2025-08-13 DIAGNOSIS — N18.9 CHRONIC KIDNEY DISEASE, UNSPECIFIED: ICD-10-CM

## 2025-08-15 ENCOUNTER — RESULT REVIEW (OUTPATIENT)
Age: 55
End: 2025-08-15

## 2025-08-15 ENCOUNTER — APPOINTMENT (OUTPATIENT)
Dept: HEMATOLOGY ONCOLOGY | Facility: CLINIC | Age: 55
End: 2025-08-15

## 2025-08-15 LAB
BASOPHILS # BLD AUTO: 0.02 K/UL — SIGNIFICANT CHANGE UP (ref 0–0.2)
BASOPHILS NFR BLD AUTO: 0.2 % — SIGNIFICANT CHANGE UP (ref 0–2)
EOSINOPHIL # BLD AUTO: 0.16 K/UL — SIGNIFICANT CHANGE UP (ref 0–0.5)
EOSINOPHIL NFR BLD AUTO: 1.6 % — SIGNIFICANT CHANGE UP (ref 0–6)
HCT VFR BLD CALC: 22.2 % — LOW (ref 34.5–45)
HGB BLD-MCNC: 7.2 G/DL — LOW (ref 11.5–15.5)
IMM GRANULOCYTES NFR BLD AUTO: 0.6 % — SIGNIFICANT CHANGE UP (ref 0–0.9)
LYMPHOCYTES # BLD AUTO: 0.66 K/UL — LOW (ref 1–3.3)
LYMPHOCYTES # BLD AUTO: 6.6 % — LOW (ref 13–44)
MCHC RBC-ENTMCNC: 28 PG — SIGNIFICANT CHANGE UP (ref 27–34)
MCHC RBC-ENTMCNC: 32.4 G/DL — SIGNIFICANT CHANGE UP (ref 32–36)
MCV RBC AUTO: 86.4 FL — SIGNIFICANT CHANGE UP (ref 80–100)
MONOCYTES # BLD AUTO: 0.59 K/UL — SIGNIFICANT CHANGE UP (ref 0–0.9)
MONOCYTES NFR BLD AUTO: 5.9 % — SIGNIFICANT CHANGE UP (ref 2–14)
NEUTROPHILS # BLD AUTO: 8.49 K/UL — HIGH (ref 1.8–7.4)
NEUTROPHILS NFR BLD AUTO: 85.1 % — HIGH (ref 43–77)
NRBC BLD AUTO-RTO: 0 /100 WBCS — SIGNIFICANT CHANGE UP (ref 0–0)
PLATELET # BLD AUTO: 191 K/UL — SIGNIFICANT CHANGE UP (ref 150–400)
RBC # BLD: 2.57 M/UL — LOW (ref 3.8–5.2)
RBC # FLD: 13.6 % — SIGNIFICANT CHANGE UP (ref 10.3–14.5)
WBC # BLD: 9.98 K/UL — SIGNIFICANT CHANGE UP (ref 3.8–10.5)
WBC # FLD AUTO: 9.98 K/UL — SIGNIFICANT CHANGE UP (ref 3.8–10.5)

## 2025-08-16 ENCOUNTER — APPOINTMENT (OUTPATIENT)
Dept: INFUSION THERAPY | Facility: HOSPITAL | Age: 55
End: 2025-08-16

## 2025-08-19 ENCOUNTER — LABORATORY RESULT (OUTPATIENT)
Age: 55
End: 2025-08-19

## 2025-08-19 ENCOUNTER — EMERGENCY (EMERGENCY)
Facility: HOSPITAL | Age: 55
LOS: 1 days | End: 2025-08-19
Attending: STUDENT IN AN ORGANIZED HEALTH CARE EDUCATION/TRAINING PROGRAM
Payer: MEDICARE

## 2025-08-19 ENCOUNTER — NON-APPOINTMENT (OUTPATIENT)
Age: 55
End: 2025-08-19

## 2025-08-19 DIAGNOSIS — Z98.890 OTHER SPECIFIED POSTPROCEDURAL STATES: Chronic | ICD-10-CM

## 2025-08-19 PROCEDURE — 99291 CRITICAL CARE FIRST HOUR: CPT

## 2025-08-20 VITALS
RESPIRATION RATE: 20 BRPM | SYSTOLIC BLOOD PRESSURE: 124 MMHG | HEART RATE: 85 BPM | TEMPERATURE: 98 F | OXYGEN SATURATION: 99 % | DIASTOLIC BLOOD PRESSURE: 85 MMHG

## 2025-08-20 VITALS
HEIGHT: 64 IN | TEMPERATURE: 99 F | OXYGEN SATURATION: 100 % | SYSTOLIC BLOOD PRESSURE: 117 MMHG | HEART RATE: 113 BPM | WEIGHT: 175.05 LBS | RESPIRATION RATE: 16 BRPM | DIASTOLIC BLOOD PRESSURE: 84 MMHG

## 2025-08-20 LAB
ALBUMIN SERPL ELPH-MCNC: 3.3 G/DL — SIGNIFICANT CHANGE UP (ref 3.3–5)
ALP SERPL-CCNC: 182 U/L — HIGH (ref 40–120)
ALT FLD-CCNC: 8 U/L — LOW (ref 10–45)
ANION GAP SERPL CALC-SCNC: 19 MMOL/L — HIGH (ref 5–17)
APTT BLD: 22.1 SEC — LOW (ref 26.1–36.8)
AST SERPL-CCNC: 8 U/L — LOW (ref 10–40)
BASO STIPL BLD QL SMEAR: PRESENT
BASOPHILS # BLD AUTO: 0.02 K/UL — SIGNIFICANT CHANGE UP (ref 0–0.2)
BASOPHILS # BLD MANUAL: 0 K/UL — SIGNIFICANT CHANGE UP (ref 0–0.2)
BASOPHILS NFR BLD AUTO: 0.2 % — SIGNIFICANT CHANGE UP (ref 0–2)
BASOPHILS NFR BLD MANUAL: 0 % — SIGNIFICANT CHANGE UP (ref 0–2)
BILIRUB SERPL-MCNC: 0.2 MG/DL — SIGNIFICANT CHANGE UP (ref 0.2–1.2)
BLD GP AB SCN SERPL QL: NEGATIVE — SIGNIFICANT CHANGE UP
BUN SERPL-MCNC: 111 MG/DL — HIGH (ref 7–23)
CALCIUM SERPL-MCNC: 9.1 MG/DL — SIGNIFICANT CHANGE UP (ref 8.4–10.5)
CHLORIDE SERPL-SCNC: 93 MMOL/L — LOW (ref 96–108)
CO2 SERPL-SCNC: 24 MMOL/L — SIGNIFICANT CHANGE UP (ref 22–31)
CREAT SERPL-MCNC: 18.69 MG/DL — HIGH (ref 0.5–1.3)
EGFR: 2 ML/MIN/1.73M2 — LOW
EGFR: 2 ML/MIN/1.73M2 — LOW
EOSINOPHIL # BLD AUTO: 0.17 K/UL — SIGNIFICANT CHANGE UP (ref 0–0.5)
EOSINOPHIL # BLD MANUAL: 0.44 K/UL — SIGNIFICANT CHANGE UP (ref 0–0.5)
EOSINOPHIL NFR BLD AUTO: 1.4 % — SIGNIFICANT CHANGE UP (ref 0–6)
EOSINOPHIL NFR BLD MANUAL: 3.5 % — SIGNIFICANT CHANGE UP (ref 0–6)
GLUCOSE BLDC GLUCOMTR-MCNC: 148 MG/DL — HIGH (ref 70–99)
GLUCOSE SERPL-MCNC: 129 MG/DL — HIGH (ref 70–99)
HCT VFR BLD CALC: 20.5 % — CRITICAL LOW (ref 34.5–45)
HCT VFR BLD CALC: 25.9 % — LOW (ref 34.5–45)
HGB BLD-MCNC: 6.7 G/DL — CRITICAL LOW (ref 11.5–15.5)
HGB BLD-MCNC: 8.7 G/DL — LOW (ref 11.5–15.5)
HYPOCHROMIA BLD QL: SLIGHT — SIGNIFICANT CHANGE UP
IMM GRANULOCYTES # BLD AUTO: 0.09 K/UL — HIGH (ref 0–0.07)
IMM GRANULOCYTES NFR BLD AUTO: 0.7 % — SIGNIFICANT CHANGE UP (ref 0–0.9)
INR BLD: 1.04 RATIO — SIGNIFICANT CHANGE UP (ref 0.85–1.16)
LYMPHOCYTES # BLD AUTO: 0.61 K/UL — LOW (ref 1–3.3)
LYMPHOCYTES # BLD MANUAL: 0.32 K/UL — LOW (ref 1–3.3)
LYMPHOCYTES NFR BLD AUTO: 4.9 % — LOW (ref 13–44)
LYMPHOCYTES NFR BLD MANUAL: 2.6 % — LOW (ref 13–44)
MCHC RBC-ENTMCNC: 27.9 PG — SIGNIFICANT CHANGE UP (ref 27–34)
MCHC RBC-ENTMCNC: 28 PG — SIGNIFICANT CHANGE UP (ref 27–34)
MCHC RBC-ENTMCNC: 32.7 G/DL — SIGNIFICANT CHANGE UP (ref 32–36)
MCHC RBC-ENTMCNC: 33.6 G/DL — SIGNIFICANT CHANGE UP (ref 32–36)
MCV RBC AUTO: 83 FL — SIGNIFICANT CHANGE UP (ref 80–100)
MCV RBC AUTO: 85.8 FL — SIGNIFICANT CHANGE UP (ref 80–100)
MICROCYTES BLD QL: SLIGHT — SIGNIFICANT CHANGE UP
MONOCYTES # BLD AUTO: 0.74 K/UL — SIGNIFICANT CHANGE UP (ref 0–0.9)
MONOCYTES # BLD MANUAL: 0.21 K/UL — SIGNIFICANT CHANGE UP (ref 0–0.9)
MONOCYTES NFR BLD AUTO: 6 % — SIGNIFICANT CHANGE UP (ref 2–14)
MONOCYTES NFR BLD MANUAL: 1.7 % — LOW (ref 2–14)
NEUTROPHILS # BLD AUTO: 10.8 K/UL — HIGH (ref 1.8–7.4)
NEUTROPHILS # BLD MANUAL: 11.46 K/UL — HIGH (ref 1.8–7.4)
NEUTROPHILS NFR BLD AUTO: 86.8 % — HIGH (ref 43–77)
NEUTROPHILS NFR BLD MANUAL: 92.2 % — HIGH (ref 43–77)
NRBC # BLD AUTO: 0 K/UL — SIGNIFICANT CHANGE UP (ref 0–0)
NRBC # BLD AUTO: 0 K/UL — SIGNIFICANT CHANGE UP (ref 0–0)
NRBC # FLD: 0 K/UL — SIGNIFICANT CHANGE UP (ref 0–0)
NRBC # FLD: 0 K/UL — SIGNIFICANT CHANGE UP (ref 0–0)
NRBC BLD AUTO-RTO: 0 /100 WBCS — SIGNIFICANT CHANGE UP (ref 0–0)
NRBC BLD AUTO-RTO: 0 /100 WBCS — SIGNIFICANT CHANGE UP (ref 0–0)
OVALOCYTES BLD QL SMEAR: SLIGHT — SIGNIFICANT CHANGE UP
PLAT MORPH BLD: NORMAL — SIGNIFICANT CHANGE UP
PLATELET # BLD AUTO: 157 K/UL — SIGNIFICANT CHANGE UP (ref 150–400)
PLATELET # BLD AUTO: 178 K/UL — SIGNIFICANT CHANGE UP (ref 150–400)
PLATELET COUNT - ESTIMATE: NORMAL — SIGNIFICANT CHANGE UP
PMV BLD: 11.1 FL — SIGNIFICANT CHANGE UP (ref 7–13)
PMV BLD: 11.6 FL — SIGNIFICANT CHANGE UP (ref 7–13)
POLYCHROMASIA BLD QL SMEAR: SLIGHT — SIGNIFICANT CHANGE UP
POTASSIUM SERPL-MCNC: 4.2 MMOL/L — SIGNIFICANT CHANGE UP (ref 3.5–5.3)
POTASSIUM SERPL-SCNC: 4.2 MMOL/L — SIGNIFICANT CHANGE UP (ref 3.5–5.3)
PROT SERPL-MCNC: 6.5 G/DL — SIGNIFICANT CHANGE UP (ref 6–8.3)
PROTHROM AB SERPL-ACNC: 12.1 SEC — SIGNIFICANT CHANGE UP (ref 9.9–13.4)
RBC # BLD: 2.39 M/UL — LOW (ref 3.8–5.2)
RBC # BLD: 3.12 M/UL — LOW (ref 3.8–5.2)
RBC # FLD: 13.6 % — SIGNIFICANT CHANGE UP (ref 10.3–14.5)
RBC # FLD: 14.6 % — HIGH (ref 10.3–14.5)
RBC BLD AUTO: ABNORMAL
RH IG SCN BLD-IMP: POSITIVE — SIGNIFICANT CHANGE UP
SCHISTOCYTES BLD QL AUTO: SLIGHT — SIGNIFICANT CHANGE UP
SODIUM SERPL-SCNC: 136 MMOL/L — SIGNIFICANT CHANGE UP (ref 135–145)
WBC # BLD: 10.37 K/UL — SIGNIFICANT CHANGE UP (ref 3.8–10.5)
WBC # BLD: 12.43 K/UL — HIGH (ref 3.8–10.5)
WBC # FLD AUTO: 10.37 K/UL — SIGNIFICANT CHANGE UP (ref 3.8–10.5)
WBC # FLD AUTO: 12.43 K/UL — HIGH (ref 3.8–10.5)

## 2025-08-20 PROCEDURE — 99231 SBSQ HOSP IP/OBS SF/LOW 25: CPT | Mod: GC

## 2025-08-20 PROCEDURE — 85027 COMPLETE CBC AUTOMATED: CPT

## 2025-08-20 PROCEDURE — 86900 BLOOD TYPING SEROLOGIC ABO: CPT

## 2025-08-20 PROCEDURE — 99285 EMERGENCY DEPT VISIT HI MDM: CPT | Mod: 25

## 2025-08-20 PROCEDURE — 85610 PROTHROMBIN TIME: CPT

## 2025-08-20 PROCEDURE — 36415 COLL VENOUS BLD VENIPUNCTURE: CPT

## 2025-08-20 PROCEDURE — 85025 COMPLETE CBC W/AUTO DIFF WBC: CPT

## 2025-08-20 PROCEDURE — 86922 COMPATIBILITY TEST ANTIGLOB: CPT

## 2025-08-20 PROCEDURE — 80053 COMPREHEN METABOLIC PANEL: CPT

## 2025-08-20 PROCEDURE — G0378: CPT

## 2025-08-20 PROCEDURE — P9040: CPT

## 2025-08-20 PROCEDURE — 82962 GLUCOSE BLOOD TEST: CPT

## 2025-08-20 PROCEDURE — 85730 THROMBOPLASTIN TIME PARTIAL: CPT

## 2025-08-20 PROCEDURE — 86850 RBC ANTIBODY SCREEN: CPT

## 2025-08-20 PROCEDURE — 86901 BLOOD TYPING SEROLOGIC RH(D): CPT

## 2025-08-20 RX ORDER — SODIUM CHLORIDE 9 G/1000ML
1000 INJECTION, SOLUTION INTRAVENOUS
Refills: 0 | Status: ACTIVE | OUTPATIENT
Start: 2025-08-20 | End: 2026-07-19

## 2025-08-20 RX ORDER — INSULIN LISPRO 100 U/ML
INJECTION, SOLUTION INTRAVENOUS; SUBCUTANEOUS
Refills: 0 | Status: ACTIVE | OUTPATIENT
Start: 2025-08-20 | End: 2026-07-19

## 2025-08-20 RX ORDER — GLUCAGON 3 MG/1
1 POWDER NASAL ONCE
Refills: 0 | Status: ACTIVE | OUTPATIENT
Start: 2025-08-20 | End: 2026-07-19

## 2025-08-20 RX ORDER — DEXTROSE 50 % IN WATER 50 %
25 SYRINGE (ML) INTRAVENOUS ONCE
Refills: 0 | Status: ACTIVE | OUTPATIENT
Start: 2025-08-20

## 2025-08-20 RX ORDER — DEXTROSE 50 % IN WATER 50 %
15 SYRINGE (ML) INTRAVENOUS ONCE
Refills: 0 | Status: ACTIVE | OUTPATIENT
Start: 2025-08-20 | End: 2026-07-19

## 2025-08-20 RX ORDER — DEXTROSE 50 % IN WATER 50 %
12.5 SYRINGE (ML) INTRAVENOUS ONCE
Refills: 0 | Status: ACTIVE | OUTPATIENT
Start: 2025-08-20

## 2025-08-26 ENCOUNTER — LABORATORY RESULT (OUTPATIENT)
Age: 55
End: 2025-08-26

## 2025-08-27 ENCOUNTER — OUTPATIENT (OUTPATIENT)
Dept: OUTPATIENT SERVICES | Facility: HOSPITAL | Age: 55
LOS: 1 days | Discharge: HOME | End: 2025-08-27

## 2025-08-27 DIAGNOSIS — D63.1 ANEMIA IN CHRONIC KIDNEY DISEASE: ICD-10-CM

## 2025-08-27 DIAGNOSIS — D50.9 IRON DEFICIENCY ANEMIA, UNSPECIFIED: ICD-10-CM

## 2025-08-27 DIAGNOSIS — N18.4 CHRONIC KIDNEY DISEASE, STAGE 4 (SEVERE): ICD-10-CM

## 2025-08-27 DIAGNOSIS — Z98.890 OTHER SPECIFIED POSTPROCEDURAL STATES: Chronic | ICD-10-CM

## 2025-08-27 DIAGNOSIS — D64.9 ANEMIA, UNSPECIFIED: ICD-10-CM

## 2025-08-27 DIAGNOSIS — K92.2 GASTROINTESTINAL HEMORRHAGE, UNSPECIFIED: ICD-10-CM

## 2025-08-28 ENCOUNTER — APPOINTMENT (OUTPATIENT)
Dept: HEMATOLOGY ONCOLOGY | Facility: CLINIC | Age: 55
End: 2025-08-28

## 2025-08-28 ENCOUNTER — RESULT REVIEW (OUTPATIENT)
Age: 55
End: 2025-08-28

## 2025-08-28 ENCOUNTER — NON-APPOINTMENT (OUTPATIENT)
Age: 55
End: 2025-08-28

## 2025-08-28 DIAGNOSIS — K55.20 ANGIODYSPLASIA OF COLON W/OUT HEMORRHAGE: ICD-10-CM

## 2025-08-28 LAB
BASOPHILS # BLD AUTO: 0.02 K/UL — SIGNIFICANT CHANGE UP (ref 0–0.2)
BASOPHILS NFR BLD AUTO: 0.1 % — SIGNIFICANT CHANGE UP (ref 0–2)
EOSINOPHIL # BLD AUTO: 0.12 K/UL — SIGNIFICANT CHANGE UP (ref 0–0.5)
EOSINOPHIL NFR BLD AUTO: 0.9 % — SIGNIFICANT CHANGE UP (ref 0–6)
HCT VFR BLD CALC: 20.5 % — CRITICAL LOW (ref 34.5–45)
HGB BLD-MCNC: 6.8 G/DL — CRITICAL LOW (ref 11.5–15.5)
IMM GRANULOCYTES NFR BLD AUTO: 1.1 % — HIGH (ref 0–0.9)
LYMPHOCYTES # BLD AUTO: 0.64 K/UL — LOW (ref 1–3.3)
LYMPHOCYTES # BLD AUTO: 4.6 % — LOW (ref 13–44)
MCHC RBC-ENTMCNC: 27.9 PG — SIGNIFICANT CHANGE UP (ref 27–34)
MCHC RBC-ENTMCNC: 33.2 G/DL — SIGNIFICANT CHANGE UP (ref 32–36)
MCV RBC AUTO: 84 FL — SIGNIFICANT CHANGE UP (ref 80–100)
MONOCYTES # BLD AUTO: 0.62 K/UL — SIGNIFICANT CHANGE UP (ref 0–0.9)
MONOCYTES NFR BLD AUTO: 4.4 % — SIGNIFICANT CHANGE UP (ref 2–14)
NEUTROPHILS # BLD AUTO: 12.46 K/UL — HIGH (ref 1.8–7.4)
NEUTROPHILS NFR BLD AUTO: 88.9 % — HIGH (ref 43–77)
NRBC BLD AUTO-RTO: 0 /100 WBCS — SIGNIFICANT CHANGE UP (ref 0–0)
PLATELET # BLD AUTO: 224 K/UL — SIGNIFICANT CHANGE UP (ref 150–400)
RBC # BLD: 2.44 M/UL — LOW (ref 3.8–5.2)
RBC # FLD: 13.6 % — SIGNIFICANT CHANGE UP (ref 10.3–14.5)
WBC # BLD: 14.01 K/UL — HIGH (ref 3.8–10.5)
WBC # FLD AUTO: 14.01 K/UL — HIGH (ref 3.8–10.5)

## 2025-08-30 ENCOUNTER — APPOINTMENT (OUTPATIENT)
Dept: INFUSION THERAPY | Facility: HOSPITAL | Age: 55
End: 2025-08-30

## 2025-09-02 ENCOUNTER — RESULT REVIEW (OUTPATIENT)
Age: 55
End: 2025-09-02

## 2025-09-02 ENCOUNTER — LABORATORY RESULT (OUTPATIENT)
Age: 55
End: 2025-09-02

## 2025-09-02 ENCOUNTER — APPOINTMENT (OUTPATIENT)
Dept: HEMATOLOGY ONCOLOGY | Facility: CLINIC | Age: 55
End: 2025-09-02

## 2025-09-02 ENCOUNTER — APPOINTMENT (OUTPATIENT)
Dept: INFUSION THERAPY | Facility: HOSPITAL | Age: 55
End: 2025-09-02

## 2025-09-02 DIAGNOSIS — Z51.89 ENCOUNTER FOR OTHER SPECIFIED AFTERCARE: ICD-10-CM

## 2025-09-02 LAB
BASOPHILS # BLD AUTO: 0.03 K/UL — SIGNIFICANT CHANGE UP (ref 0–0.2)
BASOPHILS NFR BLD AUTO: 0.2 % — SIGNIFICANT CHANGE UP (ref 0–2)
EOSINOPHIL # BLD AUTO: 0.19 K/UL — SIGNIFICANT CHANGE UP (ref 0–0.5)
EOSINOPHIL NFR BLD AUTO: 1.2 % — SIGNIFICANT CHANGE UP (ref 0–6)
HCT VFR BLD CALC: 24.5 % — LOW (ref 34.5–45)
HGB BLD-MCNC: 8.2 G/DL — LOW (ref 11.5–15.5)
IMM GRANULOCYTES NFR BLD AUTO: 0.9 % — SIGNIFICANT CHANGE UP (ref 0–0.9)
LYMPHOCYTES # BLD AUTO: 0.82 K/UL — LOW (ref 1–3.3)
LYMPHOCYTES # BLD AUTO: 5.2 % — LOW (ref 13–44)
MCHC RBC-ENTMCNC: 27.5 PG — SIGNIFICANT CHANGE UP (ref 27–34)
MCHC RBC-ENTMCNC: 33.5 G/DL — SIGNIFICANT CHANGE UP (ref 32–36)
MCV RBC AUTO: 82.2 FL — SIGNIFICANT CHANGE UP (ref 80–100)
MONOCYTES # BLD AUTO: 0.82 K/UL — SIGNIFICANT CHANGE UP (ref 0–0.9)
MONOCYTES NFR BLD AUTO: 5.2 % — SIGNIFICANT CHANGE UP (ref 2–14)
NEUTROPHILS # BLD AUTO: 13.81 K/UL — HIGH (ref 1.8–7.4)
NEUTROPHILS NFR BLD AUTO: 87.3 % — HIGH (ref 43–77)
NRBC BLD AUTO-RTO: 0 /100 WBCS — SIGNIFICANT CHANGE UP (ref 0–0)
PLATELET # BLD AUTO: 221 K/UL — SIGNIFICANT CHANGE UP (ref 150–400)
RBC # BLD: 2.98 M/UL — LOW (ref 3.8–5.2)
RBC # FLD: 14.2 % — SIGNIFICANT CHANGE UP (ref 10.3–14.5)
WBC # BLD: 15.82 K/UL — HIGH (ref 3.8–10.5)
WBC # FLD AUTO: 15.82 K/UL — HIGH (ref 3.8–10.5)

## 2025-09-09 ENCOUNTER — LABORATORY RESULT (OUTPATIENT)
Age: 55
End: 2025-09-09

## 2025-09-10 ENCOUNTER — LABORATORY RESULT (OUTPATIENT)
Age: 55
End: 2025-09-10

## 2025-09-10 ENCOUNTER — NON-APPOINTMENT (OUTPATIENT)
Age: 55
End: 2025-09-10

## 2025-09-11 ENCOUNTER — EMERGENCY (EMERGENCY)
Facility: HOSPITAL | Age: 55
LOS: 1 days | End: 2025-09-11
Attending: STUDENT IN AN ORGANIZED HEALTH CARE EDUCATION/TRAINING PROGRAM
Payer: MEDICARE

## 2025-09-11 VITALS
OXYGEN SATURATION: 98 % | WEIGHT: 175.05 LBS | HEIGHT: 64 IN | RESPIRATION RATE: 18 BRPM | SYSTOLIC BLOOD PRESSURE: 128 MMHG | DIASTOLIC BLOOD PRESSURE: 74 MMHG | TEMPERATURE: 98 F | HEART RATE: 66 BPM

## 2025-09-11 VITALS
TEMPERATURE: 98 F | SYSTOLIC BLOOD PRESSURE: 118 MMHG | DIASTOLIC BLOOD PRESSURE: 78 MMHG | HEART RATE: 82 BPM | OXYGEN SATURATION: 98 % | RESPIRATION RATE: 18 BRPM

## 2025-09-11 DIAGNOSIS — Z98.890 OTHER SPECIFIED POSTPROCEDURAL STATES: Chronic | ICD-10-CM

## 2025-09-11 LAB
ALBUMIN SERPL ELPH-MCNC: 3.4 G/DL — SIGNIFICANT CHANGE UP (ref 3.3–5)
ALP SERPL-CCNC: 170 U/L — HIGH (ref 40–120)
ALT FLD-CCNC: 8 U/L — LOW (ref 10–45)
ANION GAP SERPL CALC-SCNC: 23 MMOL/L — HIGH (ref 5–17)
APTT BLD: 30 SEC — SIGNIFICANT CHANGE UP (ref 26.1–36.8)
AST SERPL-CCNC: 12 U/L — SIGNIFICANT CHANGE UP (ref 10–40)
BASOPHILS # BLD AUTO: 0.02 K/UL — SIGNIFICANT CHANGE UP (ref 0–0.2)
BASOPHILS # BLD MANUAL: 0 K/UL — SIGNIFICANT CHANGE UP (ref 0–0.2)
BASOPHILS NFR BLD AUTO: 0.2 % — SIGNIFICANT CHANGE UP (ref 0–2)
BASOPHILS NFR BLD MANUAL: 0 % — SIGNIFICANT CHANGE UP (ref 0–2)
BILIRUB SERPL-MCNC: 0.4 MG/DL — SIGNIFICANT CHANGE UP (ref 0.2–1.2)
BLD GP AB SCN SERPL QL: NEGATIVE — SIGNIFICANT CHANGE UP
BUN SERPL-MCNC: 121 MG/DL — HIGH (ref 7–23)
CALCIUM SERPL-MCNC: 8.2 MG/DL — LOW (ref 8.4–10.5)
CHLORIDE SERPL-SCNC: 95 MMOL/L — LOW (ref 96–108)
CO2 SERPL-SCNC: 20 MMOL/L — LOW (ref 22–31)
CREAT SERPL-MCNC: 20.9 MG/DL — HIGH (ref 0.5–1.3)
EGFR: 2 ML/MIN/1.73M2 — LOW
EGFR: 2 ML/MIN/1.73M2 — LOW
EOSINOPHIL # BLD AUTO: 0.3 K/UL — SIGNIFICANT CHANGE UP (ref 0–0.5)
EOSINOPHIL # BLD MANUAL: 0.1 K/UL — SIGNIFICANT CHANGE UP (ref 0–0.5)
EOSINOPHIL NFR BLD AUTO: 2.6 % — SIGNIFICANT CHANGE UP (ref 0–6)
EOSINOPHIL NFR BLD MANUAL: 0.9 % — SIGNIFICANT CHANGE UP (ref 0–6)
GLUCOSE SERPL-MCNC: 126 MG/DL — HIGH (ref 70–99)
HCG SERPL-ACNC: <2 MIU/ML — SIGNIFICANT CHANGE UP
HCT VFR BLD CALC: 17.3 % — CRITICAL LOW (ref 34.5–45)
HGB BLD-MCNC: 5.2 G/DL — CRITICAL LOW (ref 11.5–15.5)
IMM GRANULOCYTES # BLD AUTO: 0.07 K/UL — SIGNIFICANT CHANGE UP (ref 0–0.07)
IMM GRANULOCYTES NFR BLD AUTO: 0.6 % — SIGNIFICANT CHANGE UP (ref 0–0.9)
INR BLD: 1.08 RATIO — SIGNIFICANT CHANGE UP (ref 0.85–1.16)
LYMPHOCYTES # BLD AUTO: 0.68 K/UL — LOW (ref 1–3.3)
LYMPHOCYTES # BLD MANUAL: 0.59 K/UL — LOW (ref 1–3.3)
LYMPHOCYTES NFR BLD AUTO: 6 % — LOW (ref 13–44)
LYMPHOCYTES NFR BLD MANUAL: 5.2 % — LOW (ref 13–44)
MCHC RBC-ENTMCNC: 26.5 PG — LOW (ref 27–34)
MCHC RBC-ENTMCNC: 30.1 G/DL — LOW (ref 32–36)
MCV RBC AUTO: 88.3 FL — SIGNIFICANT CHANGE UP (ref 80–100)
MONOCYTES # BLD AUTO: 0.68 K/UL — SIGNIFICANT CHANGE UP (ref 0–0.9)
MONOCYTES # BLD MANUAL: 0.19 K/UL — SIGNIFICANT CHANGE UP (ref 0–0.9)
MONOCYTES NFR BLD AUTO: 6 % — SIGNIFICANT CHANGE UP (ref 2–14)
MONOCYTES NFR BLD MANUAL: 1.7 % — LOW (ref 2–14)
NEUTROPHILS # BLD AUTO: 9.66 K/UL — HIGH (ref 1.8–7.4)
NEUTROPHILS # BLD MANUAL: 10.52 K/UL — HIGH (ref 1.8–7.4)
NEUTROPHILS NFR BLD AUTO: 84.6 % — HIGH (ref 43–77)
NEUTROPHILS NFR BLD MANUAL: 92.2 % — HIGH (ref 43–77)
NRBC # BLD AUTO: 0 K/UL — SIGNIFICANT CHANGE UP (ref 0–0)
NRBC # FLD: 0 K/UL — SIGNIFICANT CHANGE UP (ref 0–0)
NRBC BLD AUTO-RTO: 0 /100 WBCS — SIGNIFICANT CHANGE UP (ref 0–0)
PLAT MORPH BLD: NORMAL — SIGNIFICANT CHANGE UP
PLATELET # BLD AUTO: 229 K/UL — SIGNIFICANT CHANGE UP (ref 150–400)
PMV BLD: 11.1 FL — SIGNIFICANT CHANGE UP (ref 7–13)
POLYCHROMASIA BLD QL SMEAR: SLIGHT — SIGNIFICANT CHANGE UP
POTASSIUM SERPL-MCNC: 4.8 MMOL/L — SIGNIFICANT CHANGE UP (ref 3.5–5.3)
POTASSIUM SERPL-SCNC: 4.8 MMOL/L — SIGNIFICANT CHANGE UP (ref 3.5–5.3)
PROT SERPL-MCNC: 6.6 G/DL — SIGNIFICANT CHANGE UP (ref 6–8.3)
PROTHROM AB SERPL-ACNC: 12.5 SEC — SIGNIFICANT CHANGE UP (ref 9.9–13.4)
RBC # BLD: 1.96 M/UL — LOW (ref 3.8–5.2)
RBC # FLD: 14.8 % — HIGH (ref 10.3–14.5)
RBC BLD AUTO: SIGNIFICANT CHANGE UP
RH IG SCN BLD-IMP: POSITIVE — SIGNIFICANT CHANGE UP
SODIUM SERPL-SCNC: 138 MMOL/L — SIGNIFICANT CHANGE UP (ref 135–145)
WBC # BLD: 11.41 K/UL — HIGH (ref 3.8–10.5)
WBC # FLD AUTO: 11.41 K/UL — HIGH (ref 3.8–10.5)

## 2025-09-11 PROCEDURE — 86901 BLOOD TYPING SEROLOGIC RH(D): CPT

## 2025-09-11 PROCEDURE — 85018 HEMOGLOBIN: CPT

## 2025-09-11 PROCEDURE — 82947 ASSAY GLUCOSE BLOOD QUANT: CPT

## 2025-09-11 PROCEDURE — 99291 CRITICAL CARE FIRST HOUR: CPT

## 2025-09-11 PROCEDURE — 85014 HEMATOCRIT: CPT

## 2025-09-11 PROCEDURE — 86900 BLOOD TYPING SEROLOGIC ABO: CPT

## 2025-09-11 PROCEDURE — 99285 EMERGENCY DEPT VISIT HI MDM: CPT | Mod: 25

## 2025-09-11 PROCEDURE — 93010 ELECTROCARDIOGRAM REPORT: CPT

## 2025-09-11 PROCEDURE — P9040: CPT

## 2025-09-11 PROCEDURE — 84702 CHORIONIC GONADOTROPIN TEST: CPT

## 2025-09-11 PROCEDURE — 36415 COLL VENOUS BLD VENIPUNCTURE: CPT

## 2025-09-11 PROCEDURE — 85610 PROTHROMBIN TIME: CPT

## 2025-09-11 PROCEDURE — 80053 COMPREHEN METABOLIC PANEL: CPT

## 2025-09-11 PROCEDURE — 93005 ELECTROCARDIOGRAM TRACING: CPT

## 2025-09-11 PROCEDURE — 84132 ASSAY OF SERUM POTASSIUM: CPT

## 2025-09-11 PROCEDURE — 86922 COMPATIBILITY TEST ANTIGLOB: CPT

## 2025-09-11 PROCEDURE — 84295 ASSAY OF SERUM SODIUM: CPT

## 2025-09-11 PROCEDURE — 82435 ASSAY OF BLOOD CHLORIDE: CPT

## 2025-09-11 PROCEDURE — 86850 RBC ANTIBODY SCREEN: CPT

## 2025-09-11 PROCEDURE — 85025 COMPLETE CBC W/AUTO DIFF WBC: CPT

## 2025-09-11 PROCEDURE — 82330 ASSAY OF CALCIUM: CPT

## 2025-09-11 PROCEDURE — 85730 THROMBOPLASTIN TIME PARTIAL: CPT

## 2025-09-11 PROCEDURE — 82803 BLOOD GASES ANY COMBINATION: CPT

## 2025-09-11 PROCEDURE — 83605 ASSAY OF LACTIC ACID: CPT

## 2025-09-16 ENCOUNTER — LABORATORY RESULT (OUTPATIENT)
Age: 55
End: 2025-09-16

## 2025-09-16 ENCOUNTER — NON-APPOINTMENT (OUTPATIENT)
Age: 55
End: 2025-09-16

## 2025-09-18 ENCOUNTER — APPOINTMENT (OUTPATIENT)
Dept: HEMATOLOGY ONCOLOGY | Facility: CLINIC | Age: 55
End: 2025-09-18

## 2025-09-18 ENCOUNTER — RESULT REVIEW (OUTPATIENT)
Age: 55
End: 2025-09-18

## 2025-09-18 ENCOUNTER — NON-APPOINTMENT (OUTPATIENT)
Age: 55
End: 2025-09-18

## 2025-09-20 ENCOUNTER — APPOINTMENT (OUTPATIENT)
Dept: INFUSION THERAPY | Facility: HOSPITAL | Age: 55
End: 2025-09-20

## 2025-09-20 ENCOUNTER — RESULT REVIEW (OUTPATIENT)
Age: 55
End: 2025-09-20